# Patient Record
Sex: FEMALE | Race: WHITE | NOT HISPANIC OR LATINO | Employment: UNEMPLOYED | URBAN - NONMETROPOLITAN AREA
[De-identification: names, ages, dates, MRNs, and addresses within clinical notes are randomized per-mention and may not be internally consistent; named-entity substitution may affect disease eponyms.]

---

## 2019-10-30 ENCOUNTER — HOSPITAL ENCOUNTER (EMERGENCY)
Facility: HOSPITAL | Age: 29
Discharge: HOME | End: 2019-10-30
Attending: FAMILY MEDICINE | Admitting: FAMILY MEDICINE

## 2019-10-30 ENCOUNTER — APPOINTMENT (OUTPATIENT)
Dept: GENERAL RADIOLOGY | Facility: HOSPITAL | Age: 29
End: 2019-10-30

## 2019-10-30 VITALS
RESPIRATION RATE: 16 BRPM | OXYGEN SATURATION: 100 % | HEART RATE: 106 BPM | BODY MASS INDEX: 22.5 KG/M2 | WEIGHT: 140 LBS | SYSTOLIC BLOOD PRESSURE: 130 MMHG | DIASTOLIC BLOOD PRESSURE: 82 MMHG | HEIGHT: 66 IN | TEMPERATURE: 98.2 F

## 2019-10-30 DIAGNOSIS — L02.416 ABSCESS OF KNEE, LEFT: Primary | ICD-10-CM

## 2019-10-30 LAB
6-ACETYL MORPHINE: NEGATIVE
ALBUMIN SERPL-MCNC: 4.48 G/DL (ref 3.5–5.2)
ALBUMIN/GLOB SERPL: 1.3 G/DL
ALP SERPL-CCNC: 105 U/L (ref 39–117)
ALT SERPL W P-5'-P-CCNC: 93 U/L (ref 1–33)
AMORPH URATE CRY URNS QL MICRO: ABNORMAL /HPF
AMPHET+METHAMPHET UR QL: NEGATIVE
ANION GAP SERPL CALCULATED.3IONS-SCNC: 9.5 MMOL/L (ref 5–15)
AST SERPL-CCNC: 42 U/L (ref 1–32)
B-HCG UR QL: NEGATIVE
BACTERIA UR QL AUTO: ABNORMAL /HPF
BARBITURATES UR QL SCN: NEGATIVE
BASOPHILS # BLD AUTO: 0.07 10*3/MM3 (ref 0–0.2)
BASOPHILS NFR BLD AUTO: 0.8 % (ref 0–1.5)
BENZODIAZ UR QL SCN: NEGATIVE
BILIRUB SERPL-MCNC: 0.2 MG/DL (ref 0.2–1.2)
BILIRUB UR QL STRIP: NEGATIVE
BUN BLD-MCNC: 17 MG/DL (ref 6–20)
BUN/CREAT SERPL: 18.1 (ref 7–25)
BUPRENORPHINE SERPL-MCNC: NEGATIVE NG/ML
CALCIUM SPEC-SCNC: 9.9 MG/DL (ref 8.6–10.5)
CANNABINOIDS SERPL QL: NEGATIVE
CHLORIDE SERPL-SCNC: 101 MMOL/L (ref 98–107)
CLARITY UR: ABNORMAL
CO2 SERPL-SCNC: 28.5 MMOL/L (ref 22–29)
COCAINE UR QL: NEGATIVE
COLOR UR: YELLOW
CREAT BLD-MCNC: 0.94 MG/DL (ref 0.57–1)
CRP SERPL-MCNC: <0.03 MG/DL (ref 0–0.5)
DEPRECATED RDW RBC AUTO: 41.7 FL (ref 37–54)
EOSINOPHIL # BLD AUTO: 0.1 10*3/MM3 (ref 0–0.4)
EOSINOPHIL NFR BLD AUTO: 1.1 % (ref 0.3–6.2)
ERYTHROCYTE [DISTWIDTH] IN BLOOD BY AUTOMATED COUNT: 12.2 % (ref 12.3–15.4)
ERYTHROCYTE [SEDIMENTATION RATE] IN BLOOD: 3 MM/HR (ref 0–20)
GFR SERPL CREATININE-BSD FRML MDRD: 71 ML/MIN/1.73
GLOBULIN UR ELPH-MCNC: 3.4 GM/DL
GLUCOSE BLD-MCNC: 97 MG/DL (ref 65–99)
GLUCOSE UR STRIP-MCNC: NEGATIVE MG/DL
HCT VFR BLD AUTO: 44.5 % (ref 34–46.6)
HGB BLD-MCNC: 14.8 G/DL (ref 12–15.9)
HGB UR QL STRIP.AUTO: NEGATIVE
HOLD SPECIMEN: NORMAL
HOLD SPECIMEN: NORMAL
HYALINE CASTS UR QL AUTO: ABNORMAL /LPF
IMM GRANULOCYTES # BLD AUTO: 0.06 10*3/MM3 (ref 0–0.05)
IMM GRANULOCYTES NFR BLD AUTO: 0.7 % (ref 0–0.5)
KETONES UR QL STRIP: NEGATIVE
LEUKOCYTE ESTERASE UR QL STRIP.AUTO: ABNORMAL
LYMPHOCYTES # BLD AUTO: 3.03 10*3/MM3 (ref 0.7–3.1)
LYMPHOCYTES NFR BLD AUTO: 34.5 % (ref 19.6–45.3)
MCH RBC QN AUTO: 30.6 PG (ref 26.6–33)
MCHC RBC AUTO-ENTMCNC: 33.3 G/DL (ref 31.5–35.7)
MCV RBC AUTO: 92.1 FL (ref 79–97)
METHADONE UR QL SCN: NEGATIVE
MONOCYTES # BLD AUTO: 0.69 10*3/MM3 (ref 0.1–0.9)
MONOCYTES NFR BLD AUTO: 7.9 % (ref 5–12)
NEUTROPHILS # BLD AUTO: 4.82 10*3/MM3 (ref 1.7–7)
NEUTROPHILS NFR BLD AUTO: 55 % (ref 42.7–76)
NITRITE UR QL STRIP: NEGATIVE
NRBC BLD AUTO-RTO: 0 /100 WBC (ref 0–0.2)
OPIATES UR QL: NEGATIVE
OXYCODONE UR QL SCN: NEGATIVE
PCP UR QL SCN: NEGATIVE
PH UR STRIP.AUTO: 7 [PH] (ref 5–8)
PLATELET # BLD AUTO: 316 10*3/MM3 (ref 140–450)
PMV BLD AUTO: 10.3 FL (ref 6–12)
POTASSIUM BLD-SCNC: 4.7 MMOL/L (ref 3.5–5.2)
PROT SERPL-MCNC: 7.9 G/DL (ref 6–8.5)
PROT UR QL STRIP: NEGATIVE
RBC # BLD AUTO: 4.83 10*6/MM3 (ref 3.77–5.28)
RBC # UR: ABNORMAL /HPF
REF LAB TEST METHOD: ABNORMAL
SODIUM BLD-SCNC: 139 MMOL/L (ref 136–145)
SP GR UR STRIP: 1.02 (ref 1–1.03)
SQUAMOUS #/AREA URNS HPF: ABNORMAL /HPF
UROBILINOGEN UR QL STRIP: ABNORMAL
WBC NRBC COR # BLD: 8.77 10*3/MM3 (ref 3.4–10.8)
WBC UR QL AUTO: ABNORMAL /HPF
WHOLE BLOOD HOLD SPECIMEN: NORMAL
WHOLE BLOOD HOLD SPECIMEN: NORMAL

## 2019-10-30 PROCEDURE — 80307 DRUG TEST PRSMV CHEM ANLYZR: CPT | Performed by: FAMILY MEDICINE

## 2019-10-30 PROCEDURE — 87205 SMEAR GRAM STAIN: CPT | Performed by: FAMILY MEDICINE

## 2019-10-30 PROCEDURE — 86140 C-REACTIVE PROTEIN: CPT | Performed by: FAMILY MEDICINE

## 2019-10-30 PROCEDURE — 73562 X-RAY EXAM OF KNEE 3: CPT

## 2019-10-30 PROCEDURE — 85652 RBC SED RATE AUTOMATED: CPT | Performed by: FAMILY MEDICINE

## 2019-10-30 PROCEDURE — 81025 URINE PREGNANCY TEST: CPT | Performed by: FAMILY MEDICINE

## 2019-10-30 PROCEDURE — 87070 CULTURE OTHR SPECIMN AEROBIC: CPT | Performed by: FAMILY MEDICINE

## 2019-10-30 PROCEDURE — 87186 SC STD MICRODIL/AGAR DIL: CPT | Performed by: FAMILY MEDICINE

## 2019-10-30 PROCEDURE — 85025 COMPLETE CBC W/AUTO DIFF WBC: CPT | Performed by: FAMILY MEDICINE

## 2019-10-30 PROCEDURE — 99284 EMERGENCY DEPT VISIT MOD MDM: CPT

## 2019-10-30 PROCEDURE — 81001 URINALYSIS AUTO W/SCOPE: CPT | Performed by: FAMILY MEDICINE

## 2019-10-30 PROCEDURE — 87147 CULTURE TYPE IMMUNOLOGIC: CPT | Performed by: FAMILY MEDICINE

## 2019-10-30 PROCEDURE — 80053 COMPREHEN METABOLIC PANEL: CPT | Performed by: FAMILY MEDICINE

## 2019-10-30 RX ORDER — SODIUM CHLORIDE 0.9 % (FLUSH) 0.9 %
10 SYRINGE (ML) INJECTION AS NEEDED
Status: DISCONTINUED | OUTPATIENT
Start: 2019-10-30 | End: 2019-10-31 | Stop reason: HOSPADM

## 2019-10-30 RX ORDER — ACETAMINOPHEN 500 MG
1000 TABLET ORAL ONCE
Status: COMPLETED | OUTPATIENT
Start: 2019-10-30 | End: 2019-10-30

## 2019-10-30 RX ORDER — SULFAMETHOXAZOLE AND TRIMETHOPRIM 800; 160 MG/1; MG/1
1 TABLET ORAL ONCE
Status: COMPLETED | OUTPATIENT
Start: 2019-10-30 | End: 2019-10-30

## 2019-10-30 RX ORDER — DEXTROSE MONOHYDRATE 50 MG/ML
50 INJECTION, SOLUTION INTRAVENOUS ONCE
Status: DISCONTINUED | OUTPATIENT
Start: 2019-10-30 | End: 2019-10-31 | Stop reason: HOSPADM

## 2019-10-30 RX ORDER — SULFAMETHOXAZOLE AND TRIMETHOPRIM 800; 160 MG/1; MG/1
1 TABLET ORAL 2 TIMES DAILY
Qty: 14 TABLET | Refills: 0 | Status: SHIPPED | OUTPATIENT
Start: 2019-10-30 | End: 2019-11-06

## 2019-10-30 RX ADMIN — ACETAMINOPHEN 1000 MG: 500 TABLET ORAL at 21:59

## 2019-10-30 RX ADMIN — SODIUM CHLORIDE 1000 ML: 9 INJECTION, SOLUTION INTRAVENOUS at 21:50

## 2019-10-30 RX ADMIN — SULFAMETHOXAZOLE AND TRIMETHOPRIM 160 MG: 800; 160 TABLET ORAL at 22:38

## 2019-11-03 LAB
BACTERIA SPEC AEROBE CULT: ABNORMAL
GRAM STN SPEC: ABNORMAL

## 2021-06-07 ENCOUNTER — HOSPITAL ENCOUNTER (EMERGENCY)
Facility: HOSPITAL | Age: 31
Discharge: LEFT BEFORE DISCHARGE | End: 2021-06-07
Attending: EMERGENCY MEDICINE

## 2021-06-07 VITALS
WEIGHT: 120 LBS | TEMPERATURE: 98 F | HEART RATE: 121 BPM | SYSTOLIC BLOOD PRESSURE: 108 MMHG | DIASTOLIC BLOOD PRESSURE: 68 MMHG | BODY MASS INDEX: 19.29 KG/M2 | HEIGHT: 66 IN | OXYGEN SATURATION: 99 % | RESPIRATION RATE: 20 BRPM

## 2021-06-07 PROCEDURE — 99211 OFF/OP EST MAY X REQ PHY/QHP: CPT | Performed by: EMERGENCY MEDICINE

## 2021-06-08 NOTE — ED NOTES
Pt called for room assignment at this time, no answer. Will continue to watch for pt     Annalisa Cain RN  06/07/21 5776

## 2021-06-08 NOTE — ED NOTES
Pt called for room assignment again at this time. No answer.      Annalisa Cain RN  06/07/21 0144

## 2021-06-11 ENCOUNTER — HOSPITAL ENCOUNTER (EMERGENCY)
Facility: HOSPITAL | Age: 31
Discharge: HOME | End: 2021-06-11
Attending: STUDENT IN AN ORGANIZED HEALTH CARE EDUCATION/TRAINING PROGRAM | Admitting: STUDENT IN AN ORGANIZED HEALTH CARE EDUCATION/TRAINING PROGRAM

## 2021-06-11 DIAGNOSIS — M54.32 SCIATICA OF LEFT SIDE: Primary | ICD-10-CM

## 2021-06-11 PROCEDURE — 63710000001 PROMETHAZINE PER 25 MG: Performed by: NURSE PRACTITIONER

## 2021-06-11 PROCEDURE — 99283 EMERGENCY DEPT VISIT LOW MDM: CPT

## 2021-06-11 PROCEDURE — 96372 THER/PROPH/DIAG INJ SC/IM: CPT

## 2021-06-11 PROCEDURE — 25010000002 HYDROMORPHONE 1 MG/ML SOLUTION: Performed by: NURSE PRACTITIONER

## 2021-06-11 RX ORDER — METHYLPREDNISOLONE ACETATE 80 MG/ML
60 INJECTION, SUSPENSION INTRA-ARTICULAR; INTRALESIONAL; INTRAMUSCULAR; SOFT TISSUE ONCE
Status: DISCONTINUED | OUTPATIENT
Start: 2021-06-11 | End: 2021-06-11 | Stop reason: HOSPADM

## 2021-06-11 RX ORDER — PROMETHAZINE HYDROCHLORIDE 25 MG/1
25 TABLET ORAL ONCE
Status: COMPLETED | OUTPATIENT
Start: 2021-06-11 | End: 2021-06-11

## 2021-06-11 RX ORDER — HYDROCODONE BITARTRATE AND ACETAMINOPHEN 5; 325 MG/1; MG/1
1 TABLET ORAL EVERY 6 HOURS PRN
Qty: 10 TABLET | Refills: 0 | Status: ON HOLD | OUTPATIENT
Start: 2021-06-11 | End: 2021-06-18

## 2021-06-11 RX ADMIN — HYDROMORPHONE HYDROCHLORIDE 1 MG: 1 INJECTION, SOLUTION INTRAMUSCULAR; INTRAVENOUS; SUBCUTANEOUS at 14:38

## 2021-06-11 RX ADMIN — PROMETHAZINE HYDROCHLORIDE 25 MG: 25 TABLET ORAL at 14:38

## 2021-06-11 NOTE — DISCHARGE INSTRUCTIONS
Call one of the offices below to establish a primary care provider.  If you are unable to get an appointment and feel it is an emergency and need to be seen immediately please return to the Emergency Department.    Call one of the office below to set up a primary care provider.    Dr. Spike Acuna                                                                                                       [address] [facility] [address]  [phone]    Dr. Parham, Dr. SEBASTIÁN Jackson, Dr. SANJEVE Jackson ([facility])  [address] [facility] [address]  [phone]    Dr. Hudson, Dr. Gustafson, Dr. Osorio ([facility])  [address] [facility] [address]  [phone]    Dr. Chen  [address] [facility] [address]  [phone]    Dr. Roberson, Dr. Costello, Dr. Pruett, Dr. Farrell ([facility])  [address] [place] [address] [address]ER[address]A [address]  [facility] [address]  [phone]    Dr. Sanna Amaro  [address] [facility] [place] [address]  [phone]    Dr. Roxy eBrgman  [address]   [address]  [facility] [address]  [phone]    Dr. Acuna  [address] [facility] [address]  [phone]    Dr. Steele, Dr. Hays  [address] [facility] [address]  [phone]    Dr. Babin  [address]                                                              BER[address]  [facility] [address]  [phone]    Dr. Finney  [address] [facility] [address]  [phone]    Dr. Miriam Leonard  [address]  [address]  [place] [address]  [phone]    Dr. Elizondo and [facility]   [address] [facility]  [address]  [place] [address]  [phone]               Follow up with your primary care provider in 2-3 days.    Return to the emergency room for worsening symptoms.

## 2021-06-11 NOTE — ED PROVIDER NOTES
Subjective     Back Pain  Location:  Lumbar spine  Quality:  Aching  Radiates to:  Does not radiate  Pain severity:  Moderate  Pain is:  Same all the time  Onset quality:  Sudden  Duration:  1 day  Timing:  Constant  Progression:  Waxing and waning  Chronicity:  New  Context: twisting    Context: not emotional stress, not jumping from heights, not MCA and not MVA    Relieved by:  Nothing  Worsened by:  Nothing  Ineffective treatments:  None tried  Associated symptoms: no abdominal swelling, no bowel incontinence, no leg pain, no perianal numbness, no tingling and no weight loss    Risk factors: no hx of cancer, no lack of exercise and not obese        Review of Systems   Constitutional: Negative.  Negative for weight loss.   HENT: Negative.    Eyes: Negative.    Respiratory: Negative.    Cardiovascular: Negative.    Gastrointestinal: Negative.  Negative for bowel incontinence.   Endocrine: Negative.    Genitourinary: Negative.    Musculoskeletal: Positive for back pain.   Skin: Negative.    Allergic/Immunologic: Negative.    Neurological: Negative.  Negative for tingling.   Hematological: Negative.    Psychiatric/Behavioral: Negative.        No past medical history on file.    No Known Allergies    No past surgical history on file.    No family history on file.    Social History     Socioeconomic History   • Marital status: Single     Spouse name: Not on file   • Number of children: Not on file   • Years of education: Not on file   • Highest education level: Not on file           Objective   Physical Exam  Vitals and nursing note reviewed.   Constitutional:       Appearance: She is well-developed.   HENT:      Head: Normocephalic.      Right Ear: External ear normal.      Left Ear: External ear normal.   Eyes:      Conjunctiva/sclera: Conjunctivae normal.      Pupils: Pupils are equal, round, and reactive to light.   Cardiovascular:      Rate and Rhythm: Normal rate and regular rhythm.      Heart sounds: Normal  heart sounds.   Pulmonary:      Effort: Pulmonary effort is normal.      Breath sounds: Normal breath sounds.   Abdominal:      General: Bowel sounds are normal.      Palpations: Abdomen is soft.   Musculoskeletal:         General: Normal range of motion.      Cervical back: Normal range of motion and neck supple.   Skin:     General: Skin is warm and dry.      Capillary Refill: Capillary refill takes less than 2 seconds.   Neurological:      Mental Status: She is alert and oriented to person, place, and time.   Psychiatric:         Behavior: Behavior normal.         Thought Content: Thought content normal.         Procedures           ED Course                                           MDM    Final diagnoses:   Sciatica of left side       ED Disposition  ED Disposition     ED Disposition Condition Comment    Discharge Stable           Provider, No Known  [facility] [address]  [phone]               Medication List      New Prescriptions    HYDROcodone-acetaminophen 5-325 MG per tablet  Commonly known as: NORCO  Take 1 tablet by mouth Every 6 (Six) Hours As Needed for Moderate Pain .           Where to Get Your Medications      These medications were sent to [facility] [address] [place] [address] [address] [facility] - [phone] [place] [id number] [address] [address]    Phone: [phone]   · HYDROcodone-acetaminophen 5-325 MG per tablet          Geraldo Ogden, APRN  06/11/21 1909

## 2021-06-17 ENCOUNTER — HOSPITAL ENCOUNTER (INPATIENT)
Facility: HOSPITAL | Age: 31
LOS: 35 days | Discharge: HOME | End: 2021-07-23
Attending: FAMILY MEDICINE | Admitting: INTERNAL MEDICINE

## 2021-06-17 ENCOUNTER — APPOINTMENT (OUTPATIENT)
Dept: GENERAL RADIOLOGY | Facility: HOSPITAL | Age: 31
End: 2021-06-17

## 2021-06-17 DIAGNOSIS — U07.1 COVID-19: ICD-10-CM

## 2021-06-17 DIAGNOSIS — A41.9 SEPSIS, DUE TO UNSPECIFIED ORGANISM, UNSPECIFIED WHETHER ACUTE ORGAN DYSFUNCTION PRESENT (HCC): ICD-10-CM

## 2021-06-17 DIAGNOSIS — I33.0 ACUTE BACTERIAL ENDOCARDITIS: Primary | ICD-10-CM

## 2021-06-17 LAB
6-ACETYL MORPHINE: NEGATIVE
ALBUMIN SERPL-MCNC: 2.37 G/DL (ref 3.5–5.2)
ALBUMIN/GLOB SERPL: 0.5 G/DL
ALP SERPL-CCNC: 304 U/L (ref 39–117)
ALT SERPL W P-5'-P-CCNC: 179 U/L (ref 1–33)
AMPHET+METHAMPHET UR QL: POSITIVE
ANION GAP SERPL CALCULATED.3IONS-SCNC: 11.9 MMOL/L (ref 5–15)
AST SERPL-CCNC: 37 U/L (ref 1–32)
B-HCG UR QL: NEGATIVE
BACTERIA UR QL AUTO: ABNORMAL /HPF
BARBITURATES UR QL SCN: NEGATIVE
BASOPHILS # BLD AUTO: 0.09 10*3/MM3 (ref 0–0.2)
BASOPHILS NFR BLD AUTO: 0.6 % (ref 0–1.5)
BENZODIAZ UR QL SCN: NEGATIVE
BILIRUB SERPL-MCNC: 1 MG/DL (ref 0–1.2)
BILIRUB UR QL STRIP: NEGATIVE
BUN SERPL-MCNC: 24 MG/DL (ref 6–20)
BUN/CREAT SERPL: 24 (ref 7–25)
BUPRENORPHINE SERPL-MCNC: NEGATIVE NG/ML
CALCIUM SPEC-SCNC: 8.2 MG/DL (ref 8.6–10.5)
CANNABINOIDS SERPL QL: NEGATIVE
CHLORIDE SERPL-SCNC: 100 MMOL/L (ref 98–107)
CK SERPL-CCNC: 54 U/L (ref 20–180)
CLARITY UR: ABNORMAL
CO2 SERPL-SCNC: 24.1 MMOL/L (ref 22–29)
COCAINE UR QL: NEGATIVE
COLOR UR: ABNORMAL
CREAT SERPL-MCNC: 1 MG/DL (ref 0.57–1)
CRP SERPL-MCNC: 29.71 MG/DL (ref 0–0.5)
D-LACTATE SERPL-SCNC: 2.4 MMOL/L (ref 0.5–2)
DEPRECATED RDW RBC AUTO: 41.5 FL (ref 37–54)
EOSINOPHIL # BLD AUTO: 0 10*3/MM3 (ref 0–0.4)
EOSINOPHIL NFR BLD AUTO: 0 % (ref 0.3–6.2)
ERYTHROCYTE [DISTWIDTH] IN BLOOD BY AUTOMATED COUNT: 13.1 % (ref 12.3–15.4)
ETHANOL BLD-MCNC: <10 MG/DL (ref 0–10)
ETHANOL UR QL: <0.01 %
GFR SERPL CREATININE-BSD FRML MDRD: 65 ML/MIN/1.73
GLOBULIN UR ELPH-MCNC: 4.3 GM/DL
GLUCOSE SERPL-MCNC: 127 MG/DL (ref 65–99)
GLUCOSE UR STRIP-MCNC: NEGATIVE MG/DL
HCT VFR BLD AUTO: 34.7 % (ref 34–46.6)
HGB BLD-MCNC: 11.7 G/DL (ref 12–15.9)
HGB UR QL STRIP.AUTO: ABNORMAL
HYALINE CASTS UR QL AUTO: ABNORMAL /LPF
IMM GRANULOCYTES # BLD AUTO: 0.19 10*3/MM3 (ref 0–0.05)
IMM GRANULOCYTES NFR BLD AUTO: 1.2 % (ref 0–0.5)
KETONES UR QL STRIP: NEGATIVE
LEUKOCYTE ESTERASE UR QL STRIP.AUTO: ABNORMAL
LYMPHOCYTES # BLD AUTO: 1.24 10*3/MM3 (ref 0.7–3.1)
LYMPHOCYTES NFR BLD AUTO: 7.7 % (ref 19.6–45.3)
MCH RBC QN AUTO: 29.3 PG (ref 26.6–33)
MCHC RBC AUTO-ENTMCNC: 33.7 G/DL (ref 31.5–35.7)
MCV RBC AUTO: 87 FL (ref 79–97)
METHADONE UR QL SCN: NEGATIVE
MONOCYTES # BLD AUTO: 1.21 10*3/MM3 (ref 0.1–0.9)
MONOCYTES NFR BLD AUTO: 7.5 % (ref 5–12)
NEUTROPHILS NFR BLD AUTO: 13.36 10*3/MM3 (ref 1.7–7)
NEUTROPHILS NFR BLD AUTO: 83 % (ref 42.7–76)
NITRITE UR QL STRIP: POSITIVE
NRBC BLD AUTO-RTO: 0 /100 WBC (ref 0–0.2)
NT-PROBNP SERPL-MCNC: 509.8 PG/ML (ref 0–450)
OPIATES UR QL: POSITIVE
OXYCODONE UR QL SCN: NEGATIVE
PCP UR QL SCN: NEGATIVE
PH UR STRIP.AUTO: 6 [PH] (ref 5–8)
PLATELET # BLD AUTO: 105 10*3/MM3 (ref 140–450)
PMV BLD AUTO: 11.1 FL (ref 6–12)
POTASSIUM SERPL-SCNC: 2.8 MMOL/L (ref 3.5–5.2)
PROT SERPL-MCNC: 6.7 G/DL (ref 6–8.5)
PROT UR QL STRIP: ABNORMAL
RBC # BLD AUTO: 3.99 10*6/MM3 (ref 3.77–5.28)
RBC # UR: ABNORMAL /HPF
REF LAB TEST METHOD: ABNORMAL
SODIUM SERPL-SCNC: 136 MMOL/L (ref 136–145)
SP GR UR STRIP: 1.01 (ref 1–1.03)
SQUAMOUS #/AREA URNS HPF: ABNORMAL /HPF
TROPONIN T SERPL-MCNC: <0.01 NG/ML (ref 0–0.03)
TSH SERPL DL<=0.05 MIU/L-ACNC: 0.72 UIU/ML (ref 0.27–4.2)
UROBILINOGEN UR QL STRIP: ABNORMAL
WBC # BLD AUTO: 16.09 10*3/MM3 (ref 3.4–10.8)
WBC UR QL AUTO: ABNORMAL /HPF

## 2021-06-17 PROCEDURE — 83605 ASSAY OF LACTIC ACID: CPT | Performed by: FAMILY MEDICINE

## 2021-06-17 PROCEDURE — 80307 DRUG TEST PRSMV CHEM ANLYZR: CPT | Performed by: FAMILY MEDICINE

## 2021-06-17 PROCEDURE — 87086 URINE CULTURE/COLONY COUNT: CPT | Performed by: FAMILY MEDICINE

## 2021-06-17 PROCEDURE — 93010 ELECTROCARDIOGRAM REPORT: CPT | Performed by: SPECIALIST

## 2021-06-17 PROCEDURE — 81001 URINALYSIS AUTO W/SCOPE: CPT | Performed by: FAMILY MEDICINE

## 2021-06-17 PROCEDURE — 80053 COMPREHEN METABOLIC PANEL: CPT | Performed by: FAMILY MEDICINE

## 2021-06-17 PROCEDURE — 99284 EMERGENCY DEPT VISIT MOD MDM: CPT

## 2021-06-17 PROCEDURE — 25010000002 PIPERACILLIN SOD-TAZOBACTAM PER 1 G: Performed by: FAMILY MEDICINE

## 2021-06-17 PROCEDURE — 85025 COMPLETE CBC W/AUTO DIFF WBC: CPT | Performed by: FAMILY MEDICINE

## 2021-06-17 PROCEDURE — 86140 C-REACTIVE PROTEIN: CPT | Performed by: FAMILY MEDICINE

## 2021-06-17 PROCEDURE — 82077 ASSAY SPEC XCP UR&BREATH IA: CPT | Performed by: FAMILY MEDICINE

## 2021-06-17 PROCEDURE — 93005 ELECTROCARDIOGRAM TRACING: CPT | Performed by: FAMILY MEDICINE

## 2021-06-17 PROCEDURE — 87040 BLOOD CULTURE FOR BACTERIA: CPT | Performed by: FAMILY MEDICINE

## 2021-06-17 PROCEDURE — 83735 ASSAY OF MAGNESIUM: CPT | Performed by: FAMILY MEDICINE

## 2021-06-17 PROCEDURE — 36415 COLL VENOUS BLD VENIPUNCTURE: CPT

## 2021-06-17 PROCEDURE — 71045 X-RAY EXAM CHEST 1 VIEW: CPT

## 2021-06-17 PROCEDURE — 87150 DNA/RNA AMPLIFIED PROBE: CPT | Performed by: FAMILY MEDICINE

## 2021-06-17 PROCEDURE — 83880 ASSAY OF NATRIURETIC PEPTIDE: CPT | Performed by: FAMILY MEDICINE

## 2021-06-17 PROCEDURE — 87147 CULTURE TYPE IMMUNOLOGIC: CPT | Performed by: FAMILY MEDICINE

## 2021-06-17 PROCEDURE — 87186 SC STD MICRODIL/AGAR DIL: CPT | Performed by: FAMILY MEDICINE

## 2021-06-17 PROCEDURE — 81025 URINE PREGNANCY TEST: CPT | Performed by: FAMILY MEDICINE

## 2021-06-17 PROCEDURE — 83036 HEMOGLOBIN GLYCOSYLATED A1C: CPT | Performed by: INTERNAL MEDICINE

## 2021-06-17 PROCEDURE — 84443 ASSAY THYROID STIM HORMONE: CPT | Performed by: FAMILY MEDICINE

## 2021-06-17 PROCEDURE — 87077 CULTURE AEROBIC IDENTIFY: CPT | Performed by: FAMILY MEDICINE

## 2021-06-17 PROCEDURE — 84484 ASSAY OF TROPONIN QUANT: CPT | Performed by: FAMILY MEDICINE

## 2021-06-17 PROCEDURE — 82550 ASSAY OF CK (CPK): CPT | Performed by: FAMILY MEDICINE

## 2021-06-17 RX ORDER — SODIUM CHLORIDE 0.9 % (FLUSH) 0.9 %
10 SYRINGE (ML) INJECTION AS NEEDED
Status: DISCONTINUED | OUTPATIENT
Start: 2021-06-17 | End: 2021-07-23 | Stop reason: HOSPADM

## 2021-06-17 RX ADMIN — SODIUM CHLORIDE 1000 ML: 9 INJECTION, SOLUTION INTRAVENOUS at 23:30

## 2021-06-17 RX ADMIN — SODIUM CHLORIDE 1000 ML: 9 INJECTION, SOLUTION INTRAVENOUS at 23:04

## 2021-06-17 RX ADMIN — PIPERACILLIN SODIUM AND TAZOBACTAM SODIUM 3.38 G: 3; .375 INJECTION, POWDER, LYOPHILIZED, FOR SOLUTION INTRAVENOUS at 23:30

## 2021-06-18 ENCOUNTER — APPOINTMENT (OUTPATIENT)
Dept: CARDIOLOGY | Facility: HOSPITAL | Age: 31
End: 2021-06-18

## 2021-06-18 ENCOUNTER — APPOINTMENT (OUTPATIENT)
Dept: GENERAL RADIOLOGY | Facility: HOSPITAL | Age: 31
End: 2021-06-18

## 2021-06-18 ENCOUNTER — APPOINTMENT (OUTPATIENT)
Dept: ULTRASOUND IMAGING | Facility: HOSPITAL | Age: 31
End: 2021-06-18

## 2021-06-18 ENCOUNTER — APPOINTMENT (OUTPATIENT)
Dept: CT IMAGING | Facility: HOSPITAL | Age: 31
End: 2021-06-18

## 2021-06-18 PROBLEM — I38 ENDOCARDITIS: Status: ACTIVE | Noted: 2021-06-18

## 2021-06-18 LAB
BACTERIA BLD CULT: ABNORMAL
BH CV ECHO MEAS - ACS: 1.6 CM
BH CV ECHO MEAS - AO MAX PG: 8.4 MMHG
BH CV ECHO MEAS - AO MEAN PG: 6 MMHG
BH CV ECHO MEAS - AO ROOT AREA (BSA CORRECTED): 1.8
BH CV ECHO MEAS - AO ROOT AREA: 7.3 CM^2
BH CV ECHO MEAS - AO ROOT DIAM: 3.1 CM
BH CV ECHO MEAS - AO V2 MAX: 145 CM/SEC
BH CV ECHO MEAS - AO V2 MEAN: 116 CM/SEC
BH CV ECHO MEAS - AO V2 VTI: 22.9 CM
BH CV ECHO MEAS - BSA(HAYCOCK): 1.6 M^2
BH CV ECHO MEAS - BSA: 1.7 M^2
BH CV ECHO MEAS - BZI_BMI: 20.8 KILOGRAMS/M^2
BH CV ECHO MEAS - BZI_METRIC_HEIGHT: 167.6 CM
BH CV ECHO MEAS - BZI_METRIC_WEIGHT: 58.5 KG
BH CV ECHO MEAS - EDV(CUBED): 79.5 ML
BH CV ECHO MEAS - EDV(MOD-SP4): 50.6 ML
BH CV ECHO MEAS - EDV(TEICH): 83.1 ML
BH CV ECHO MEAS - EF(CUBED): 65.7 %
BH CV ECHO MEAS - EF(MOD-SP4): 72.7 %
BH CV ECHO MEAS - EF(TEICH): 57.5 %
BH CV ECHO MEAS - ESV(CUBED): 27.3 ML
BH CV ECHO MEAS - ESV(MOD-SP4): 13.8 ML
BH CV ECHO MEAS - ESV(TEICH): 35.3 ML
BH CV ECHO MEAS - FS: 30 %
BH CV ECHO MEAS - IVS/LVPW: 0.94
BH CV ECHO MEAS - IVSD: 0.72 CM
BH CV ECHO MEAS - LA DIMENSION: 2.1 CM
BH CV ECHO MEAS - LA/AO: 0.69
BH CV ECHO MEAS - LV DIASTOLIC VOL/BSA (35-75): 30.5 ML/M^2
BH CV ECHO MEAS - LV MASS(C)D: 96.2 GRAMS
BH CV ECHO MEAS - LV MASS(C)DI: 57.9 GRAMS/M^2
BH CV ECHO MEAS - LV SYSTOLIC VOL/BSA (12-30): 8.3 ML/M^2
BH CV ECHO MEAS - LVIDD: 4.3 CM
BH CV ECHO MEAS - LVIDS: 3 CM
BH CV ECHO MEAS - LVLD AP4: 7.3 CM
BH CV ECHO MEAS - LVLS AP4: 5.9 CM
BH CV ECHO MEAS - LVOT AREA (M): 2.8 CM^2
BH CV ECHO MEAS - LVOT AREA: 2.8 CM^2
BH CV ECHO MEAS - LVOT DIAM: 1.9 CM
BH CV ECHO MEAS - LVPWD: 0.77 CM
BH CV ECHO MEAS - MV A MAX VEL: 96.8 CM/SEC
BH CV ECHO MEAS - MV E MAX VEL: 90.8 CM/SEC
BH CV ECHO MEAS - MV E/A: 0.94
BH CV ECHO MEAS - PA ACC TIME: 0.11 SEC
BH CV ECHO MEAS - PA PR(ACCEL): 30 MMHG
BH CV ECHO MEAS - RAP SYSTOLE: 10 MMHG
BH CV ECHO MEAS - RVSP: 29.5 MMHG
BH CV ECHO MEAS - SI(AO): 100.8 ML/M^2
BH CV ECHO MEAS - SI(CUBED): 31.5 ML/M^2
BH CV ECHO MEAS - SI(MOD-SP4): 22.2 ML/M^2
BH CV ECHO MEAS - SI(TEICH): 28.8 ML/M^2
BH CV ECHO MEAS - SV(AO): 167.3 ML
BH CV ECHO MEAS - SV(CUBED): 52.2 ML
BH CV ECHO MEAS - SV(MOD-SP4): 36.8 ML
BH CV ECHO MEAS - SV(TEICH): 47.8 ML
BH CV ECHO MEAS - TR MAX VEL: 221 CM/SEC
BOTTLE TYPE: ABNORMAL
D DIMER PPP FEU-MCNC: 7.66 MCGFEU/ML (ref 0–0.5)
D-LACTATE SERPL-SCNC: 1.3 MMOL/L (ref 0.5–2)
FLUAV RNA RESP QL NAA+PROBE: NOT DETECTED
FLUBV RNA RESP QL NAA+PROBE: NOT DETECTED
GGT SERPL-CCNC: 242 U/L (ref 5–36)
HAV IGM SERPL QL IA: ABNORMAL
HBA1C MFR BLD: 5.6 % (ref 4.8–5.6)
HBV CORE IGM SERPL QL IA: ABNORMAL
HBV SURFACE AG SERPL QL IA: ABNORMAL
HCV AB SER DONR QL: REACTIVE
HIV1+2 AB SER QL: NORMAL
M PNEUMO IGM SER QL: NEGATIVE
MAGNESIUM SERPL-MCNC: 2 MG/DL (ref 1.6–2.6)
MAGNESIUM SERPL-MCNC: 2.1 MG/DL (ref 1.6–2.6)
MAXIMAL PREDICTED HEART RATE: 190 BPM
PHOSPHATE SERPL-MCNC: 2.9 MG/DL (ref 2.5–4.5)
POTASSIUM SERPL-SCNC: 3.1 MMOL/L (ref 3.5–5.2)
S PNEUM AG SPEC QL LA: NEGATIVE
SARS-COV-2 RNA RESP QL NAA+PROBE: DETECTED
STRESS TARGET HR: 162 BPM
TROPONIN T SERPL-MCNC: <0.01 NG/ML (ref 0–0.03)

## 2021-06-18 PROCEDURE — 83735 ASSAY OF MAGNESIUM: CPT | Performed by: INTERNAL MEDICINE

## 2021-06-18 PROCEDURE — 73560 X-RAY EXAM OF KNEE 1 OR 2: CPT

## 2021-06-18 PROCEDURE — 25010000002 VANCOMYCIN: Performed by: INTERNAL MEDICINE

## 2021-06-18 PROCEDURE — 73502 X-RAY EXAM HIP UNI 2-3 VIEWS: CPT | Performed by: RADIOLOGY

## 2021-06-18 PROCEDURE — 25010000002 VANCOMYCIN 5 G RECONSTITUTED SOLUTION: Performed by: INTERNAL MEDICINE

## 2021-06-18 PROCEDURE — 84100 ASSAY OF PHOSPHORUS: CPT | Performed by: INTERNAL MEDICINE

## 2021-06-18 PROCEDURE — G0432 EIA HIV-1/HIV-2 SCREEN: HCPCS | Performed by: INTERNAL MEDICINE

## 2021-06-18 PROCEDURE — 73590 X-RAY EXAM OF LOWER LEG: CPT

## 2021-06-18 PROCEDURE — 73502 X-RAY EXAM HIP UNI 2-3 VIEWS: CPT

## 2021-06-18 PROCEDURE — 87899 AGENT NOS ASSAY W/OPTIC: CPT | Performed by: INTERNAL MEDICINE

## 2021-06-18 PROCEDURE — 85379 FIBRIN DEGRADATION QUANT: CPT | Performed by: FAMILY MEDICINE

## 2021-06-18 PROCEDURE — 93971 EXTREMITY STUDY: CPT

## 2021-06-18 PROCEDURE — 84484 ASSAY OF TROPONIN QUANT: CPT | Performed by: FAMILY MEDICINE

## 2021-06-18 PROCEDURE — 25010000002 MORPHINE PER 10 MG: Performed by: INTERNAL MEDICINE

## 2021-06-18 PROCEDURE — 0 IOPAMIDOL PER 1 ML: Performed by: FAMILY MEDICINE

## 2021-06-18 PROCEDURE — 73620 X-RAY EXAM OF FOOT: CPT

## 2021-06-18 PROCEDURE — 94799 UNLISTED PULMONARY SVC/PX: CPT

## 2021-06-18 PROCEDURE — 73060 X-RAY EXAM OF HUMERUS: CPT | Performed by: RADIOLOGY

## 2021-06-18 PROCEDURE — 84132 ASSAY OF SERUM POTASSIUM: CPT | Performed by: INTERNAL MEDICINE

## 2021-06-18 PROCEDURE — 87636 SARSCOV2 & INF A&B AMP PRB: CPT | Performed by: FAMILY MEDICINE

## 2021-06-18 PROCEDURE — 73590 X-RAY EXAM OF LOWER LEG: CPT | Performed by: RADIOLOGY

## 2021-06-18 PROCEDURE — 83605 ASSAY OF LACTIC ACID: CPT | Performed by: FAMILY MEDICINE

## 2021-06-18 PROCEDURE — 82977 ASSAY OF GGT: CPT | Performed by: INTERNAL MEDICINE

## 2021-06-18 PROCEDURE — 73090 X-RAY EXAM OF FOREARM: CPT | Performed by: RADIOLOGY

## 2021-06-18 PROCEDURE — 73090 X-RAY EXAM OF FOREARM: CPT

## 2021-06-18 PROCEDURE — 80074 ACUTE HEPATITIS PANEL: CPT | Performed by: INTERNAL MEDICINE

## 2021-06-18 PROCEDURE — 73560 X-RAY EXAM OF KNEE 1 OR 2: CPT | Performed by: RADIOLOGY

## 2021-06-18 PROCEDURE — 25010000002 VANCOMYCIN 1 G RECONSTITUTED SOLUTION: Performed by: FAMILY MEDICINE

## 2021-06-18 PROCEDURE — 71275 CT ANGIOGRAPHY CHEST: CPT

## 2021-06-18 PROCEDURE — 73060 X-RAY EXAM OF HUMERUS: CPT

## 2021-06-18 PROCEDURE — 25010000002 ENOXAPARIN PER 10 MG: Performed by: INTERNAL MEDICINE

## 2021-06-18 PROCEDURE — 25010000002 PIPERACILLIN SOD-TAZOBACTAM PER 1 G: Performed by: INTERNAL MEDICINE

## 2021-06-18 PROCEDURE — 99223 1ST HOSP IP/OBS HIGH 75: CPT | Performed by: INTERNAL MEDICINE

## 2021-06-18 PROCEDURE — 93306 TTE W/DOPPLER COMPLETE: CPT | Performed by: SPECIALIST

## 2021-06-18 PROCEDURE — 86738 MYCOPLASMA ANTIBODY: CPT | Performed by: INTERNAL MEDICINE

## 2021-06-18 PROCEDURE — 70450 CT HEAD/BRAIN W/O DYE: CPT

## 2021-06-18 PROCEDURE — 93306 TTE W/DOPPLER COMPLETE: CPT

## 2021-06-18 RX ORDER — DEXTROSE MONOHYDRATE 25 G/50ML
25 INJECTION, SOLUTION INTRAVENOUS
Status: DISCONTINUED | OUTPATIENT
Start: 2021-06-18 | End: 2021-06-18

## 2021-06-18 RX ORDER — MAGNESIUM SULFATE HEPTAHYDRATE 40 MG/ML
2 INJECTION, SOLUTION INTRAVENOUS AS NEEDED
Status: DISCONTINUED | OUTPATIENT
Start: 2021-06-18 | End: 2021-06-26

## 2021-06-18 RX ORDER — POTASSIUM CHLORIDE 20 MEQ/1
40 TABLET, EXTENDED RELEASE ORAL EVERY 4 HOURS
Status: COMPLETED | OUTPATIENT
Start: 2021-06-18 | End: 2021-06-19

## 2021-06-18 RX ORDER — POTASSIUM CHLORIDE 750 MG/1
40 CAPSULE, EXTENDED RELEASE ORAL AS NEEDED
Status: DISCONTINUED | OUTPATIENT
Start: 2021-06-18 | End: 2021-06-26

## 2021-06-18 RX ORDER — POTASSIUM CHLORIDE 1.5 G/1.77G
40 POWDER, FOR SOLUTION ORAL AS NEEDED
Status: DISCONTINUED | OUTPATIENT
Start: 2021-06-18 | End: 2021-06-26

## 2021-06-18 RX ORDER — NICOTINE POLACRILEX 4 MG
15 LOZENGE BUCCAL
Status: DISCONTINUED | OUTPATIENT
Start: 2021-06-18 | End: 2021-06-18

## 2021-06-18 RX ORDER — SODIUM CHLORIDE 0.9 % (FLUSH) 0.9 %
3 SYRINGE (ML) INJECTION EVERY 12 HOURS SCHEDULED
Status: DISCONTINUED | OUTPATIENT
Start: 2021-06-18 | End: 2021-07-23 | Stop reason: HOSPADM

## 2021-06-18 RX ORDER — MAGNESIUM SULFATE HEPTAHYDRATE 40 MG/ML
4 INJECTION, SOLUTION INTRAVENOUS AS NEEDED
Status: DISCONTINUED | OUTPATIENT
Start: 2021-06-18 | End: 2021-06-26

## 2021-06-18 RX ORDER — L.ACID,PARA/B.BIFIDUM/S.THERM 8B CELL
1 CAPSULE ORAL DAILY
Status: DISCONTINUED | OUTPATIENT
Start: 2021-06-18 | End: 2021-07-23 | Stop reason: HOSPADM

## 2021-06-18 RX ORDER — HYDROCODONE BITARTRATE AND ACETAMINOPHEN 7.5; 325 MG/1; MG/1
1 TABLET ORAL EVERY 6 HOURS PRN
Status: DISPENSED | OUTPATIENT
Start: 2021-06-18 | End: 2021-06-25

## 2021-06-18 RX ORDER — POTASSIUM CHLORIDE 1.5 G/1.77G
40 POWDER, FOR SOLUTION ORAL ONCE
Status: COMPLETED | OUTPATIENT
Start: 2021-06-18 | End: 2021-06-18

## 2021-06-18 RX ORDER — NITROGLYCERIN 0.4 MG/1
0.4 TABLET SUBLINGUAL
Status: DISCONTINUED | OUTPATIENT
Start: 2021-06-18 | End: 2021-06-18

## 2021-06-18 RX ORDER — SODIUM CHLORIDE 0.9 % (FLUSH) 0.9 %
3-10 SYRINGE (ML) INJECTION AS NEEDED
Status: DISCONTINUED | OUTPATIENT
Start: 2021-06-18 | End: 2021-07-23 | Stop reason: HOSPADM

## 2021-06-18 RX ADMIN — HYDROCODONE BITARTRATE AND ACETAMINOPHEN 1 TABLET: 7.5; 325 TABLET ORAL at 21:33

## 2021-06-18 RX ADMIN — MORPHINE SULFATE 4 MG: 4 INJECTION, SOLUTION INTRAMUSCULAR; INTRAVENOUS at 16:33

## 2021-06-18 RX ADMIN — SODIUM CHLORIDE, PRESERVATIVE FREE 3 ML: 5 INJECTION INTRAVENOUS at 20:16

## 2021-06-18 RX ADMIN — VANCOMYCIN HYDROCHLORIDE 750 MG: 1 INJECTION, POWDER, LYOPHILIZED, FOR SOLUTION INTRAVENOUS at 23:33

## 2021-06-18 RX ADMIN — MORPHINE SULFATE 4 MG: 4 INJECTION, SOLUTION INTRAMUSCULAR; INTRAVENOUS at 20:16

## 2021-06-18 RX ADMIN — VANCOMYCIN HYDROCHLORIDE 1000 MG: 1 INJECTION, POWDER, LYOPHILIZED, FOR SOLUTION INTRAVENOUS at 00:26

## 2021-06-18 RX ADMIN — HYDROCODONE BITARTRATE AND ACETAMINOPHEN 1 TABLET: 7.5; 325 TABLET ORAL at 12:33

## 2021-06-18 RX ADMIN — PIPERACILLIN SODIUM AND TAZOBACTAM SODIUM 3.38 G: 3; .375 INJECTION, POWDER, LYOPHILIZED, FOR SOLUTION INTRAVENOUS at 07:52

## 2021-06-18 RX ADMIN — SODIUM CHLORIDE, PRESERVATIVE FREE 3 ML: 5 INJECTION INTRAVENOUS at 08:01

## 2021-06-18 RX ADMIN — Medication 1 CAPSULE: at 17:26

## 2021-06-18 RX ADMIN — PIPERACILLIN SODIUM AND TAZOBACTAM SODIUM 3.38 G: 3; .375 INJECTION, POWDER, LYOPHILIZED, FOR SOLUTION INTRAVENOUS at 16:32

## 2021-06-18 RX ADMIN — VANCOMYCIN HYDROCHLORIDE 750 MG: 1 INJECTION, POWDER, LYOPHILIZED, FOR SOLUTION INTRAVENOUS at 11:54

## 2021-06-18 RX ADMIN — POTASSIUM CHLORIDE 40 MEQ: 1.5 POWDER, FOR SOLUTION ORAL at 02:11

## 2021-06-18 RX ADMIN — ENOXAPARIN SODIUM 50 MG: 60 INJECTION SUBCUTANEOUS at 08:01

## 2021-06-18 RX ADMIN — POTASSIUM CHLORIDE 40 MEQ: 20 TABLET, EXTENDED RELEASE ORAL at 21:33

## 2021-06-18 RX ADMIN — IOPAMIDOL 85 ML: 755 INJECTION, SOLUTION INTRAVENOUS at 03:56

## 2021-06-18 RX ADMIN — PIPERACILLIN SODIUM AND TAZOBACTAM SODIUM 3.38 G: 3; .375 INJECTION, POWDER, LYOPHILIZED, FOR SOLUTION INTRAVENOUS at 23:32

## 2021-06-19 LAB
ALBUMIN SERPL-MCNC: 1.79 G/DL (ref 3.5–5.2)
ALP SERPL-CCNC: 240 U/L (ref 39–117)
ALT SERPL W P-5'-P-CCNC: 101 U/L (ref 1–33)
ANION GAP SERPL CALCULATED.3IONS-SCNC: 10.9 MMOL/L (ref 5–15)
AST SERPL-CCNC: 44 U/L (ref 1–32)
BASOPHILS # BLD AUTO: 0.03 10*3/MM3 (ref 0–0.2)
BASOPHILS NFR BLD AUTO: 0.2 % (ref 0–1.5)
BILIRUB CONJ SERPL-MCNC: 0.4 MG/DL (ref 0–0.3)
BILIRUB INDIRECT SERPL-MCNC: 0.6 MG/DL
BILIRUB SERPL-MCNC: 1 MG/DL (ref 0–1.2)
BUN SERPL-MCNC: 18 MG/DL (ref 6–20)
BUN/CREAT SERPL: 22.2 (ref 7–25)
CALCIUM SPEC-SCNC: 8 MG/DL (ref 8.6–10.5)
CHLORIDE SERPL-SCNC: 106 MMOL/L (ref 98–107)
CO2 SERPL-SCNC: 15.1 MMOL/L (ref 22–29)
CREAT SERPL-MCNC: 0.81 MG/DL (ref 0.57–1)
DEPRECATED RDW RBC AUTO: 46.1 FL (ref 37–54)
EOSINOPHIL # BLD AUTO: 0.06 10*3/MM3 (ref 0–0.4)
EOSINOPHIL NFR BLD AUTO: 0.5 % (ref 0.3–6.2)
ERYTHROCYTE [DISTWIDTH] IN BLOOD BY AUTOMATED COUNT: 13.4 % (ref 12.3–15.4)
GFR SERPL CREATININE-BSD FRML MDRD: 83 ML/MIN/1.73
GLUCOSE SERPL-MCNC: 72 MG/DL (ref 65–99)
HCT VFR BLD AUTO: 36.8 % (ref 34–46.6)
HGB BLD-MCNC: 11.6 G/DL (ref 12–15.9)
IMM GRANULOCYTES # BLD AUTO: 0.19 10*3/MM3 (ref 0–0.05)
IMM GRANULOCYTES NFR BLD AUTO: 1.5 % (ref 0–0.5)
INR PPP: 1.08 (ref 0.9–1.1)
LYMPHOCYTES # BLD AUTO: 1.28 10*3/MM3 (ref 0.7–3.1)
LYMPHOCYTES NFR BLD AUTO: 9.8 % (ref 19.6–45.3)
MCH RBC QN AUTO: 29.1 PG (ref 26.6–33)
MCHC RBC AUTO-ENTMCNC: 31.5 G/DL (ref 31.5–35.7)
MCV RBC AUTO: 92.5 FL (ref 79–97)
MONOCYTES # BLD AUTO: 0.95 10*3/MM3 (ref 0.1–0.9)
MONOCYTES NFR BLD AUTO: 7.3 % (ref 5–12)
NEUTROPHILS NFR BLD AUTO: 10.55 10*3/MM3 (ref 1.7–7)
NEUTROPHILS NFR BLD AUTO: 80.7 % (ref 42.7–76)
NRBC BLD AUTO-RTO: 0 /100 WBC (ref 0–0.2)
PLATELET # BLD AUTO: 209 10*3/MM3 (ref 140–450)
PMV BLD AUTO: 10.9 FL (ref 6–12)
POTASSIUM SERPL-SCNC: 5 MMOL/L (ref 3.5–5.2)
POTASSIUM SERPL-SCNC: 5 MMOL/L (ref 3.5–5.2)
PROT SERPL-MCNC: 6.5 G/DL (ref 6–8.5)
PROTHROMBIN TIME: 14.4 SECONDS (ref 12.8–14.5)
QT INTERVAL: 330 MS
QTC INTERVAL: 442 MS
RBC # BLD AUTO: 3.98 10*6/MM3 (ref 3.77–5.28)
SODIUM SERPL-SCNC: 132 MMOL/L (ref 136–145)
TROPONIN T SERPL-MCNC: <0.01 NG/ML (ref 0–0.03)
VANCOMYCIN TROUGH SERPL-MCNC: 7.2 MCG/ML (ref 5–20)
WBC # BLD AUTO: 13.06 10*3/MM3 (ref 3.4–10.8)

## 2021-06-19 PROCEDURE — 85025 COMPLETE CBC W/AUTO DIFF WBC: CPT | Performed by: INTERNAL MEDICINE

## 2021-06-19 PROCEDURE — 84484 ASSAY OF TROPONIN QUANT: CPT | Performed by: INTERNAL MEDICINE

## 2021-06-19 PROCEDURE — 99232 SBSQ HOSP IP/OBS MODERATE 35: CPT | Performed by: INTERNAL MEDICINE

## 2021-06-19 PROCEDURE — 87040 BLOOD CULTURE FOR BACTERIA: CPT | Performed by: INTERNAL MEDICINE

## 2021-06-19 PROCEDURE — 85610 PROTHROMBIN TIME: CPT | Performed by: INTERNAL MEDICINE

## 2021-06-19 PROCEDURE — 80076 HEPATIC FUNCTION PANEL: CPT | Performed by: INTERNAL MEDICINE

## 2021-06-19 PROCEDURE — 99253 IP/OBS CNSLTJ NEW/EST LOW 45: CPT | Performed by: SPECIALIST

## 2021-06-19 PROCEDURE — 25010000002 PIPERACILLIN SOD-TAZOBACTAM PER 1 G: Performed by: INTERNAL MEDICINE

## 2021-06-19 PROCEDURE — 93005 ELECTROCARDIOGRAM TRACING: CPT | Performed by: INTERNAL MEDICINE

## 2021-06-19 PROCEDURE — 87147 CULTURE TYPE IMMUNOLOGIC: CPT | Performed by: INTERNAL MEDICINE

## 2021-06-19 PROCEDURE — 84132 ASSAY OF SERUM POTASSIUM: CPT | Performed by: INTERNAL MEDICINE

## 2021-06-19 PROCEDURE — 94799 UNLISTED PULMONARY SVC/PX: CPT

## 2021-06-19 PROCEDURE — 93010 ELECTROCARDIOGRAM REPORT: CPT | Performed by: SPECIALIST

## 2021-06-19 PROCEDURE — 80048 BASIC METABOLIC PNL TOTAL CA: CPT | Performed by: INTERNAL MEDICINE

## 2021-06-19 PROCEDURE — 80202 ASSAY OF VANCOMYCIN: CPT | Performed by: INTERNAL MEDICINE

## 2021-06-19 PROCEDURE — 25010000002 VANCOMYCIN 5 G RECONSTITUTED SOLUTION: Performed by: INTERNAL MEDICINE

## 2021-06-19 PROCEDURE — 25010000002 ENOXAPARIN PER 10 MG: Performed by: INTERNAL MEDICINE

## 2021-06-19 RX ORDER — IBUPROFEN 400 MG/1
400 TABLET ORAL EVERY 6 HOURS PRN
Status: DISCONTINUED | OUTPATIENT
Start: 2021-06-19 | End: 2021-07-23 | Stop reason: HOSPADM

## 2021-06-19 RX ADMIN — PIPERACILLIN SODIUM AND TAZOBACTAM SODIUM 3.38 G: 3; .375 INJECTION, POWDER, LYOPHILIZED, FOR SOLUTION INTRAVENOUS at 16:21

## 2021-06-19 RX ADMIN — VANCOMYCIN HYDROCHLORIDE 750 MG: 1 INJECTION, POWDER, LYOPHILIZED, FOR SOLUTION INTRAVENOUS at 19:28

## 2021-06-19 RX ADMIN — PIPERACILLIN SODIUM AND TAZOBACTAM SODIUM 3.38 G: 3; .375 INJECTION, POWDER, LYOPHILIZED, FOR SOLUTION INTRAVENOUS at 22:20

## 2021-06-19 RX ADMIN — HYDROCODONE BITARTRATE AND ACETAMINOPHEN 1 TABLET: 7.5; 325 TABLET ORAL at 22:20

## 2021-06-19 RX ADMIN — POTASSIUM CHLORIDE 40 MEQ: 20 TABLET, EXTENDED RELEASE ORAL at 06:00

## 2021-06-19 RX ADMIN — POTASSIUM CHLORIDE 40 MEQ: 20 TABLET, EXTENDED RELEASE ORAL at 01:01

## 2021-06-19 RX ADMIN — SODIUM CHLORIDE, PRESERVATIVE FREE 3 ML: 5 INJECTION INTRAVENOUS at 19:30

## 2021-06-19 RX ADMIN — PIPERACILLIN SODIUM AND TAZOBACTAM SODIUM 3.38 G: 3; .375 INJECTION, POWDER, LYOPHILIZED, FOR SOLUTION INTRAVENOUS at 06:00

## 2021-06-19 RX ADMIN — Medication 1 CAPSULE: at 10:04

## 2021-06-19 RX ADMIN — ENOXAPARIN SODIUM 50 MG: 60 INJECTION SUBCUTANEOUS at 10:03

## 2021-06-19 RX ADMIN — SODIUM CHLORIDE, PRESERVATIVE FREE 3 ML: 5 INJECTION INTRAVENOUS at 10:04

## 2021-06-19 RX ADMIN — IBUPROFEN 400 MG: 400 TABLET, FILM COATED ORAL at 18:00

## 2021-06-19 RX ADMIN — HYDROCODONE BITARTRATE AND ACETAMINOPHEN 1 TABLET: 7.5; 325 TABLET ORAL at 16:22

## 2021-06-19 RX ADMIN — IBUPROFEN 400 MG: 400 TABLET, FILM COATED ORAL at 01:00

## 2021-06-19 RX ADMIN — HYDROCODONE BITARTRATE AND ACETAMINOPHEN 1 TABLET: 7.5; 325 TABLET ORAL at 10:03

## 2021-06-20 ENCOUNTER — APPOINTMENT (OUTPATIENT)
Dept: GENERAL RADIOLOGY | Facility: HOSPITAL | Age: 31
End: 2021-06-20

## 2021-06-20 LAB
BACTERIA SPEC AEROBE CULT: ABNORMAL
GRAM STN SPEC: ABNORMAL
ISOLATED FROM: ABNORMAL
QT INTERVAL: 296 MS
QT INTERVAL: 314 MS
QTC INTERVAL: 402 MS
QTC INTERVAL: 411 MS
VANCOMYCIN TROUGH SERPL-MCNC: 9.8 MCG/ML (ref 5–20)

## 2021-06-20 PROCEDURE — 73630 X-RAY EXAM OF FOOT: CPT

## 2021-06-20 PROCEDURE — 99221 1ST HOSP IP/OBS SF/LOW 40: CPT | Performed by: PSYCHIATRY & NEUROLOGY

## 2021-06-20 PROCEDURE — 87147 CULTURE TYPE IMMUNOLOGIC: CPT | Performed by: INTERNAL MEDICINE

## 2021-06-20 PROCEDURE — 25010000002 MORPHINE PER 10 MG: Performed by: INTERNAL MEDICINE

## 2021-06-20 PROCEDURE — 25010000002 VANCOMYCIN 5 G RECONSTITUTED SOLUTION: Performed by: INTERNAL MEDICINE

## 2021-06-20 PROCEDURE — 73630 X-RAY EXAM OF FOOT: CPT | Performed by: RADIOLOGY

## 2021-06-20 PROCEDURE — 25010000002 ENOXAPARIN PER 10 MG: Performed by: INTERNAL MEDICINE

## 2021-06-20 PROCEDURE — 25010000002 VANCOMYCIN: Performed by: INTERNAL MEDICINE

## 2021-06-20 PROCEDURE — 94799 UNLISTED PULMONARY SVC/PX: CPT

## 2021-06-20 PROCEDURE — 87040 BLOOD CULTURE FOR BACTERIA: CPT | Performed by: INTERNAL MEDICINE

## 2021-06-20 PROCEDURE — 93005 ELECTROCARDIOGRAM TRACING: CPT | Performed by: INTERNAL MEDICINE

## 2021-06-20 PROCEDURE — 93010 ELECTROCARDIOGRAM REPORT: CPT | Performed by: SPECIALIST

## 2021-06-20 PROCEDURE — 99232 SBSQ HOSP IP/OBS MODERATE 35: CPT | Performed by: INTERNAL MEDICINE

## 2021-06-20 PROCEDURE — 25010000002 PIPERACILLIN SOD-TAZOBACTAM PER 1 G: Performed by: INTERNAL MEDICINE

## 2021-06-20 PROCEDURE — 80202 ASSAY OF VANCOMYCIN: CPT | Performed by: INTERNAL MEDICINE

## 2021-06-20 RX ORDER — ONDANSETRON 2 MG/ML
4 INJECTION INTRAMUSCULAR; INTRAVENOUS EVERY 6 HOURS PRN
Status: DISCONTINUED | OUTPATIENT
Start: 2021-06-20 | End: 2021-07-23 | Stop reason: HOSPADM

## 2021-06-20 RX ADMIN — SODIUM CHLORIDE, PRESERVATIVE FREE 3 ML: 5 INJECTION INTRAVENOUS at 20:28

## 2021-06-20 RX ADMIN — VANCOMYCIN HYDROCHLORIDE 750 MG: 1 INJECTION, POWDER, LYOPHILIZED, FOR SOLUTION INTRAVENOUS at 20:28

## 2021-06-20 RX ADMIN — MORPHINE SULFATE 4 MG: 4 INJECTION, SOLUTION INTRAMUSCULAR; INTRAVENOUS at 03:21

## 2021-06-20 RX ADMIN — VANCOMYCIN HYDROCHLORIDE 750 MG: 1 INJECTION, POWDER, LYOPHILIZED, FOR SOLUTION INTRAVENOUS at 03:21

## 2021-06-20 RX ADMIN — HYDROCODONE BITARTRATE AND ACETAMINOPHEN 1 TABLET: 7.5; 325 TABLET ORAL at 06:56

## 2021-06-20 RX ADMIN — Medication 1 CAPSULE: at 09:10

## 2021-06-20 RX ADMIN — PIPERACILLIN SODIUM AND TAZOBACTAM SODIUM 3.38 G: 3; .375 INJECTION, POWDER, LYOPHILIZED, FOR SOLUTION INTRAVENOUS at 15:02

## 2021-06-20 RX ADMIN — HYDROCODONE BITARTRATE AND ACETAMINOPHEN 1 TABLET: 7.5; 325 TABLET ORAL at 17:10

## 2021-06-20 RX ADMIN — SODIUM CHLORIDE, PRESERVATIVE FREE 3 ML: 5 INJECTION INTRAVENOUS at 09:12

## 2021-06-20 RX ADMIN — PIPERACILLIN SODIUM AND TAZOBACTAM SODIUM 3.38 G: 3; .375 INJECTION, POWDER, LYOPHILIZED, FOR SOLUTION INTRAVENOUS at 07:14

## 2021-06-20 RX ADMIN — ENOXAPARIN SODIUM 50 MG: 60 INJECTION SUBCUTANEOUS at 09:10

## 2021-06-20 RX ADMIN — IBUPROFEN 400 MG: 400 TABLET, FILM COATED ORAL at 20:28

## 2021-06-20 RX ADMIN — VANCOMYCIN HYDROCHLORIDE 750 MG: 1 INJECTION, POWDER, LYOPHILIZED, FOR SOLUTION INTRAVENOUS at 12:24

## 2021-06-21 LAB
ANION GAP SERPL CALCULATED.3IONS-SCNC: 6.1 MMOL/L (ref 5–15)
BACTERIA SPEC AEROBE CULT: ABNORMAL
BACTERIA SPEC AEROBE CULT: ABNORMAL
BUN SERPL-MCNC: 14 MG/DL (ref 6–20)
BUN/CREAT SERPL: 23 (ref 7–25)
CALCIUM SPEC-SCNC: 8 MG/DL (ref 8.6–10.5)
CHLORIDE SERPL-SCNC: 104 MMOL/L (ref 98–107)
CO2 SERPL-SCNC: 22.9 MMOL/L (ref 22–29)
CREAT SERPL-MCNC: 0.61 MG/DL (ref 0.57–1)
CRP SERPL-MCNC: 13.79 MG/DL (ref 0–0.5)
GFR SERPL CREATININE-BSD FRML MDRD: 115 ML/MIN/1.73
GLUCOSE SERPL-MCNC: 96 MG/DL (ref 65–99)
POTASSIUM SERPL-SCNC: 4.6 MMOL/L (ref 3.5–5.2)
SODIUM SERPL-SCNC: 133 MMOL/L (ref 136–145)

## 2021-06-21 PROCEDURE — 99232 SBSQ HOSP IP/OBS MODERATE 35: CPT | Performed by: INTERNAL MEDICINE

## 2021-06-21 PROCEDURE — 86140 C-REACTIVE PROTEIN: CPT | Performed by: PHYSICIAN ASSISTANT

## 2021-06-21 PROCEDURE — 25010000002 ENOXAPARIN PER 10 MG: Performed by: INTERNAL MEDICINE

## 2021-06-21 PROCEDURE — C1751 CATH, INF, PER/CENT/MIDLINE: HCPCS

## 2021-06-21 PROCEDURE — 25010000003 CEFAZOLIN SODIUM-DEXTROSE 2-3 GM-%(50ML) RECONSTITUTED SOLUTION: Performed by: INTERNAL MEDICINE

## 2021-06-21 PROCEDURE — 80048 BASIC METABOLIC PNL TOTAL CA: CPT | Performed by: INTERNAL MEDICINE

## 2021-06-21 PROCEDURE — 25010000002 MORPHINE PER 10 MG: Performed by: INTERNAL MEDICINE

## 2021-06-21 RX ORDER — SODIUM CHLORIDE 0.9 % (FLUSH) 0.9 %
10 SYRINGE (ML) INJECTION AS NEEDED
Status: DISCONTINUED | OUTPATIENT
Start: 2021-06-21 | End: 2021-07-23 | Stop reason: HOSPADM

## 2021-06-21 RX ORDER — BISACODYL 10 MG
10 SUPPOSITORY, RECTAL RECTAL ONCE AS NEEDED
Status: DISCONTINUED | OUTPATIENT
Start: 2021-06-21 | End: 2021-07-23 | Stop reason: HOSPADM

## 2021-06-21 RX ORDER — POLYETHYLENE GLYCOL 3350 17 G/17G
17 POWDER, FOR SOLUTION ORAL DAILY PRN
Status: DISCONTINUED | OUTPATIENT
Start: 2021-06-21 | End: 2021-06-22

## 2021-06-21 RX ORDER — CEFAZOLIN SODIUM 2 G/50ML
2 SOLUTION INTRAVENOUS EVERY 8 HOURS
Status: DISCONTINUED | OUTPATIENT
Start: 2021-06-21 | End: 2021-07-13 | Stop reason: ALTCHOICE

## 2021-06-21 RX ORDER — DOCUSATE SODIUM 100 MG/1
100 CAPSULE, LIQUID FILLED ORAL 2 TIMES DAILY
Status: DISCONTINUED | OUTPATIENT
Start: 2021-06-21 | End: 2021-07-23 | Stop reason: HOSPADM

## 2021-06-21 RX ORDER — SODIUM CHLORIDE 0.9 % (FLUSH) 0.9 %
10 SYRINGE (ML) INJECTION EVERY 12 HOURS SCHEDULED
Status: DISCONTINUED | OUTPATIENT
Start: 2021-06-21 | End: 2021-07-23 | Stop reason: HOSPADM

## 2021-06-21 RX ORDER — CHOLECALCIFEROL (VITAMIN D3) 125 MCG
5 CAPSULE ORAL NIGHTLY PRN
Status: DISCONTINUED | OUTPATIENT
Start: 2021-06-21 | End: 2021-07-23 | Stop reason: HOSPADM

## 2021-06-21 RX ADMIN — DOCUSATE SODIUM 100 MG: 100 CAPSULE, LIQUID FILLED ORAL at 22:54

## 2021-06-21 RX ADMIN — SODIUM CHLORIDE, PRESERVATIVE FREE 10 ML: 5 INJECTION INTRAVENOUS at 20:59

## 2021-06-21 RX ADMIN — SODIUM CHLORIDE, PRESERVATIVE FREE 3 ML: 5 INJECTION INTRAVENOUS at 20:59

## 2021-06-21 RX ADMIN — IBUPROFEN 400 MG: 400 TABLET, FILM COATED ORAL at 21:16

## 2021-06-21 RX ADMIN — Medication 5 MG: at 22:54

## 2021-06-21 RX ADMIN — MORPHINE SULFATE 4 MG: 4 INJECTION, SOLUTION INTRAMUSCULAR; INTRAVENOUS at 20:54

## 2021-06-21 RX ADMIN — HYDROCODONE BITARTRATE AND ACETAMINOPHEN 1 TABLET: 7.5; 325 TABLET ORAL at 15:16

## 2021-06-21 RX ADMIN — HYDROCODONE BITARTRATE AND ACETAMINOPHEN 1 TABLET: 7.5; 325 TABLET ORAL at 05:30

## 2021-06-21 RX ADMIN — ENOXAPARIN SODIUM 50 MG: 60 INJECTION SUBCUTANEOUS at 09:53

## 2021-06-21 RX ADMIN — CEFAZOLIN SODIUM 2 G: 2 SOLUTION INTRAVENOUS at 15:17

## 2021-06-21 RX ADMIN — CEFAZOLIN SODIUM 2 G: 2 SOLUTION INTRAVENOUS at 20:59

## 2021-06-21 RX ADMIN — SODIUM CHLORIDE, PRESERVATIVE FREE 3 ML: 5 INJECTION INTRAVENOUS at 09:56

## 2021-06-21 RX ADMIN — Medication 1 CAPSULE: at 09:53

## 2021-06-21 RX ADMIN — HYDROCODONE BITARTRATE AND ACETAMINOPHEN 1 TABLET: 7.5; 325 TABLET ORAL at 22:16

## 2021-06-22 LAB
ALBUMIN SERPL-MCNC: 1.82 G/DL (ref 3.5–5.2)
ALBUMIN/GLOB SERPL: 0.5 G/DL
ALP SERPL-CCNC: 145 U/L (ref 39–117)
ALT SERPL W P-5'-P-CCNC: 39 U/L (ref 1–33)
ANION GAP SERPL CALCULATED.3IONS-SCNC: 5.6 MMOL/L (ref 5–15)
AST SERPL-CCNC: 22 U/L (ref 1–32)
BACTERIA SPEC AEROBE CULT: ABNORMAL
BASOPHILS # BLD AUTO: 0.07 10*3/MM3 (ref 0–0.2)
BASOPHILS NFR BLD AUTO: 0.6 % (ref 0–1.5)
BILIRUB SERPL-MCNC: 0.3 MG/DL (ref 0–1.2)
BUN SERPL-MCNC: 16 MG/DL (ref 6–20)
BUN/CREAT SERPL: 18.8 (ref 7–25)
CALCIUM SPEC-SCNC: 7.7 MG/DL (ref 8.6–10.5)
CHLORIDE SERPL-SCNC: 104 MMOL/L (ref 98–107)
CO2 SERPL-SCNC: 22.4 MMOL/L (ref 22–29)
CREAT SERPL-MCNC: 0.85 MG/DL (ref 0.57–1)
CRP SERPL-MCNC: 11.11 MG/DL (ref 0–0.5)
D DIMER PPP FEU-MCNC: 3.7 MCGFEU/ML (ref 0–0.5)
DEPRECATED RDW RBC AUTO: 42.5 FL (ref 37–54)
EOSINOPHIL # BLD AUTO: 0.11 10*3/MM3 (ref 0–0.4)
EOSINOPHIL NFR BLD AUTO: 0.9 % (ref 0.3–6.2)
ERYTHROCYTE [DISTWIDTH] IN BLOOD BY AUTOMATED COUNT: 12.9 % (ref 12.3–15.4)
GFR SERPL CREATININE-BSD FRML MDRD: 79 ML/MIN/1.73
GLOBULIN UR ELPH-MCNC: 3.9 GM/DL
GLUCOSE SERPL-MCNC: 90 MG/DL (ref 65–99)
GRAM STN SPEC: ABNORMAL
HAPTOGLOB SERPL-MCNC: 311 MG/DL (ref 30–200)
HCT VFR BLD AUTO: 29 % (ref 34–46.6)
HGB BLD-MCNC: 9.6 G/DL (ref 12–15.9)
IMM GRANULOCYTES # BLD AUTO: 0.44 10*3/MM3 (ref 0–0.05)
IMM GRANULOCYTES NFR BLD AUTO: 3.7 % (ref 0–0.5)
ISOLATED FROM: ABNORMAL
LYMPHOCYTES # BLD AUTO: 2.29 10*3/MM3 (ref 0.7–3.1)
LYMPHOCYTES NFR BLD AUTO: 19.2 % (ref 19.6–45.3)
MCH RBC QN AUTO: 29.5 PG (ref 26.6–33)
MCHC RBC AUTO-ENTMCNC: 33.1 G/DL (ref 31.5–35.7)
MCV RBC AUTO: 89.2 FL (ref 79–97)
MONOCYTES # BLD AUTO: 1.11 10*3/MM3 (ref 0.1–0.9)
MONOCYTES NFR BLD AUTO: 9.3 % (ref 5–12)
NEUTROPHILS NFR BLD AUTO: 66.3 % (ref 42.7–76)
NEUTROPHILS NFR BLD AUTO: 7.92 10*3/MM3 (ref 1.7–7)
NRBC BLD AUTO-RTO: 0 /100 WBC (ref 0–0.2)
PLATELET # BLD AUTO: 455 10*3/MM3 (ref 140–450)
PMV BLD AUTO: 10.2 FL (ref 6–12)
POTASSIUM SERPL-SCNC: 4.5 MMOL/L (ref 3.5–5.2)
PROT SERPL-MCNC: 5.7 G/DL (ref 6–8.5)
RBC # BLD AUTO: 3.25 10*6/MM3 (ref 3.77–5.28)
SODIUM SERPL-SCNC: 132 MMOL/L (ref 136–145)
WBC # BLD AUTO: 11.94 10*3/MM3 (ref 3.4–10.8)

## 2021-06-22 PROCEDURE — 99232 SBSQ HOSP IP/OBS MODERATE 35: CPT | Performed by: INTERNAL MEDICINE

## 2021-06-22 PROCEDURE — 85379 FIBRIN DEGRADATION QUANT: CPT | Performed by: INTERNAL MEDICINE

## 2021-06-22 PROCEDURE — 87147 CULTURE TYPE IMMUNOLOGIC: CPT | Performed by: INTERNAL MEDICINE

## 2021-06-22 PROCEDURE — 25010000003 CEFAZOLIN SODIUM-DEXTROSE 2-3 GM-%(50ML) RECONSTITUTED SOLUTION: Performed by: INTERNAL MEDICINE

## 2021-06-22 PROCEDURE — 85025 COMPLETE CBC W/AUTO DIFF WBC: CPT | Performed by: INTERNAL MEDICINE

## 2021-06-22 PROCEDURE — 25010000002 MORPHINE PER 10 MG: Performed by: INTERNAL MEDICINE

## 2021-06-22 PROCEDURE — 25010000002 ENOXAPARIN PER 10 MG: Performed by: INTERNAL MEDICINE

## 2021-06-22 PROCEDURE — 83010 ASSAY OF HAPTOGLOBIN QUANT: CPT | Performed by: INTERNAL MEDICINE

## 2021-06-22 PROCEDURE — 86140 C-REACTIVE PROTEIN: CPT | Performed by: INTERNAL MEDICINE

## 2021-06-22 PROCEDURE — 87040 BLOOD CULTURE FOR BACTERIA: CPT | Performed by: INTERNAL MEDICINE

## 2021-06-22 PROCEDURE — 80053 COMPREHEN METABOLIC PANEL: CPT | Performed by: INTERNAL MEDICINE

## 2021-06-22 PROCEDURE — 87150 DNA/RNA AMPLIFIED PROBE: CPT | Performed by: INTERNAL MEDICINE

## 2021-06-22 RX ORDER — POLYETHYLENE GLYCOL 3350 17 G/17G
17 POWDER, FOR SOLUTION ORAL DAILY
Status: DISCONTINUED | OUTPATIENT
Start: 2021-06-22 | End: 2021-07-23 | Stop reason: HOSPADM

## 2021-06-22 RX ADMIN — SODIUM CHLORIDE, PRESERVATIVE FREE 3 ML: 5 INJECTION INTRAVENOUS at 20:51

## 2021-06-22 RX ADMIN — SODIUM CHLORIDE, PRESERVATIVE FREE 10 ML: 5 INJECTION INTRAVENOUS at 08:48

## 2021-06-22 RX ADMIN — DOCUSATE SODIUM 100 MG: 100 CAPSULE, LIQUID FILLED ORAL at 20:51

## 2021-06-22 RX ADMIN — CEFAZOLIN SODIUM 2 G: 2 SOLUTION INTRAVENOUS at 20:52

## 2021-06-22 RX ADMIN — ENOXAPARIN SODIUM 60 MG: 60 INJECTION SUBCUTANEOUS at 08:46

## 2021-06-22 RX ADMIN — SODIUM CHLORIDE, PRESERVATIVE FREE 3 ML: 5 INJECTION INTRAVENOUS at 08:49

## 2021-06-22 RX ADMIN — POLYETHYLENE GLYCOL (3350) 17 G: 17 POWDER, FOR SOLUTION ORAL at 08:50

## 2021-06-22 RX ADMIN — HYDROCODONE BITARTRATE AND ACETAMINOPHEN 1 TABLET: 7.5; 325 TABLET ORAL at 11:10

## 2021-06-22 RX ADMIN — IBUPROFEN 400 MG: 400 TABLET, FILM COATED ORAL at 14:50

## 2021-06-22 RX ADMIN — HYDROCODONE BITARTRATE AND ACETAMINOPHEN 1 TABLET: 7.5; 325 TABLET ORAL at 21:16

## 2021-06-22 RX ADMIN — CEFAZOLIN SODIUM 2 G: 2 SOLUTION INTRAVENOUS at 11:18

## 2021-06-22 RX ADMIN — HYDROCODONE BITARTRATE AND ACETAMINOPHEN 1 TABLET: 7.5; 325 TABLET ORAL at 04:44

## 2021-06-22 RX ADMIN — CEFAZOLIN SODIUM 2 G: 2 SOLUTION INTRAVENOUS at 04:44

## 2021-06-22 RX ADMIN — DOCUSATE SODIUM 100 MG: 100 CAPSULE, LIQUID FILLED ORAL at 08:44

## 2021-06-22 RX ADMIN — SODIUM CHLORIDE, PRESERVATIVE FREE 10 ML: 5 INJECTION INTRAVENOUS at 20:52

## 2021-06-22 RX ADMIN — Medication 1 CAPSULE: at 08:45

## 2021-06-22 RX ADMIN — MORPHINE SULFATE 4 MG: 4 INJECTION, SOLUTION INTRAMUSCULAR; INTRAVENOUS at 13:20

## 2021-06-23 LAB
ALBUMIN SERPL-MCNC: 1.88 G/DL (ref 3.5–5.2)
ALBUMIN/GLOB SERPL: 0.5 G/DL
ALP SERPL-CCNC: 143 U/L (ref 39–117)
ALT SERPL W P-5'-P-CCNC: 27 U/L (ref 1–33)
ANION GAP SERPL CALCULATED.3IONS-SCNC: 5.4 MMOL/L (ref 5–15)
AST SERPL-CCNC: 23 U/L (ref 1–32)
BASOPHILS # BLD AUTO: 0.06 10*3/MM3 (ref 0–0.2)
BASOPHILS NFR BLD AUTO: 0.7 % (ref 0–1.5)
BILIRUB SERPL-MCNC: 0.2 MG/DL (ref 0–1.2)
BUN SERPL-MCNC: 15 MG/DL (ref 6–20)
BUN/CREAT SERPL: 20.5 (ref 7–25)
CALCIUM SPEC-SCNC: 7.8 MG/DL (ref 8.6–10.5)
CHLORIDE SERPL-SCNC: 106 MMOL/L (ref 98–107)
CO2 SERPL-SCNC: 23.6 MMOL/L (ref 22–29)
CREAT SERPL-MCNC: 0.73 MG/DL (ref 0.57–1)
CRP SERPL-MCNC: 8.05 MG/DL (ref 0–0.5)
DEPRECATED RDW RBC AUTO: 41.6 FL (ref 37–54)
EOSINOPHIL # BLD AUTO: 0.14 10*3/MM3 (ref 0–0.4)
EOSINOPHIL NFR BLD AUTO: 1.6 % (ref 0.3–6.2)
ERYTHROCYTE [DISTWIDTH] IN BLOOD BY AUTOMATED COUNT: 12.7 % (ref 12.3–15.4)
FERRITIN SERPL-MCNC: 400.2 NG/ML (ref 13–150)
FOLATE SERPL-MCNC: 6.78 NG/ML (ref 4.78–24.2)
GFR SERPL CREATININE-BSD FRML MDRD: 94 ML/MIN/1.73
GLOBULIN UR ELPH-MCNC: 3.9 GM/DL
GLUCOSE SERPL-MCNC: 105 MG/DL (ref 65–99)
HCT VFR BLD AUTO: 28.7 % (ref 34–46.6)
HGB BLD-MCNC: 9.3 G/DL (ref 12–15.9)
IMM GRANULOCYTES # BLD AUTO: 0.41 10*3/MM3 (ref 0–0.05)
IMM GRANULOCYTES NFR BLD AUTO: 4.5 % (ref 0–0.5)
IRON 24H UR-MRATE: 26 MCG/DL (ref 37–145)
IRON SATN MFR SERPL: 13 % (ref 20–50)
LDH SERPL-CCNC: 161 U/L (ref 135–214)
LYMPHOCYTES # BLD AUTO: 2.2 10*3/MM3 (ref 0.7–3.1)
LYMPHOCYTES NFR BLD AUTO: 24.4 % (ref 19.6–45.3)
MCH RBC QN AUTO: 29.2 PG (ref 26.6–33)
MCHC RBC AUTO-ENTMCNC: 32.4 G/DL (ref 31.5–35.7)
MCV RBC AUTO: 90 FL (ref 79–97)
MONOCYTES # BLD AUTO: 0.9 10*3/MM3 (ref 0.1–0.9)
MONOCYTES NFR BLD AUTO: 10 % (ref 5–12)
NEUTROPHILS NFR BLD AUTO: 5.32 10*3/MM3 (ref 1.7–7)
NEUTROPHILS NFR BLD AUTO: 58.8 % (ref 42.7–76)
NRBC BLD AUTO-RTO: 0 /100 WBC (ref 0–0.2)
PLATELET # BLD AUTO: 656 10*3/MM3 (ref 140–450)
PMV BLD AUTO: 9.8 FL (ref 6–12)
POTASSIUM SERPL-SCNC: 4.6 MMOL/L (ref 3.5–5.2)
PROT SERPL-MCNC: 5.8 G/DL (ref 6–8.5)
RBC # BLD AUTO: 3.19 10*6/MM3 (ref 3.77–5.28)
RETICS # AUTO: 0.06 10*6/MM3 (ref 0.02–0.13)
RETICS/RBC NFR AUTO: 1.83 % (ref 0.7–1.9)
SODIUM SERPL-SCNC: 135 MMOL/L (ref 136–145)
TIBC SERPL-MCNC: 201 MCG/DL (ref 298–536)
TRANSFERRIN SERPL-MCNC: 135 MG/DL (ref 200–360)
VIT B12 BLD-MCNC: 1050 PG/ML (ref 211–946)
WBC # BLD AUTO: 9.03 10*3/MM3 (ref 3.4–10.8)

## 2021-06-23 PROCEDURE — 82746 ASSAY OF FOLIC ACID SERUM: CPT | Performed by: INTERNAL MEDICINE

## 2021-06-23 PROCEDURE — 84466 ASSAY OF TRANSFERRIN: CPT | Performed by: INTERNAL MEDICINE

## 2021-06-23 PROCEDURE — 83540 ASSAY OF IRON: CPT | Performed by: INTERNAL MEDICINE

## 2021-06-23 PROCEDURE — 82607 VITAMIN B-12: CPT | Performed by: INTERNAL MEDICINE

## 2021-06-23 PROCEDURE — 80053 COMPREHEN METABOLIC PANEL: CPT | Performed by: INTERNAL MEDICINE

## 2021-06-23 PROCEDURE — 99232 SBSQ HOSP IP/OBS MODERATE 35: CPT | Performed by: INTERNAL MEDICINE

## 2021-06-23 PROCEDURE — 82728 ASSAY OF FERRITIN: CPT | Performed by: INTERNAL MEDICINE

## 2021-06-23 PROCEDURE — 85045 AUTOMATED RETICULOCYTE COUNT: CPT | Performed by: INTERNAL MEDICINE

## 2021-06-23 PROCEDURE — 86140 C-REACTIVE PROTEIN: CPT | Performed by: INTERNAL MEDICINE

## 2021-06-23 PROCEDURE — 85025 COMPLETE CBC W/AUTO DIFF WBC: CPT | Performed by: INTERNAL MEDICINE

## 2021-06-23 PROCEDURE — 25010000002 ENOXAPARIN PER 10 MG: Performed by: INTERNAL MEDICINE

## 2021-06-23 PROCEDURE — 25010000002 MORPHINE PER 10 MG: Performed by: INTERNAL MEDICINE

## 2021-06-23 PROCEDURE — 83615 LACTATE (LD) (LDH) ENZYME: CPT | Performed by: INTERNAL MEDICINE

## 2021-06-23 PROCEDURE — 25010000003 CEFAZOLIN SODIUM-DEXTROSE 2-3 GM-%(50ML) RECONSTITUTED SOLUTION: Performed by: INTERNAL MEDICINE

## 2021-06-23 RX ORDER — MULTIPLE VITAMINS W/ MINERALS TAB 9MG-400MCG
1 TAB ORAL DAILY
Status: DISCONTINUED | OUTPATIENT
Start: 2021-06-23 | End: 2021-07-23 | Stop reason: HOSPADM

## 2021-06-23 RX ADMIN — CEFAZOLIN SODIUM 2 G: 2 SOLUTION INTRAVENOUS at 12:07

## 2021-06-23 RX ADMIN — HYDROCODONE BITARTRATE AND ACETAMINOPHEN 1 TABLET: 7.5; 325 TABLET ORAL at 03:14

## 2021-06-23 RX ADMIN — Medication 1 CAPSULE: at 08:52

## 2021-06-23 RX ADMIN — DOCUSATE SODIUM 100 MG: 100 CAPSULE, LIQUID FILLED ORAL at 08:52

## 2021-06-23 RX ADMIN — MORPHINE SULFATE 4 MG: 4 INJECTION, SOLUTION INTRAMUSCULAR; INTRAVENOUS at 00:13

## 2021-06-23 RX ADMIN — SODIUM CHLORIDE, PRESERVATIVE FREE 3 ML: 5 INJECTION INTRAVENOUS at 08:47

## 2021-06-23 RX ADMIN — HYDROCODONE BITARTRATE AND ACETAMINOPHEN 1 TABLET: 7.5; 325 TABLET ORAL at 17:50

## 2021-06-23 RX ADMIN — SODIUM CHLORIDE, PRESERVATIVE FREE 3 ML: 5 INJECTION INTRAVENOUS at 20:11

## 2021-06-23 RX ADMIN — MORPHINE SULFATE 4 MG: 4 INJECTION, SOLUTION INTRAMUSCULAR; INTRAVENOUS at 20:11

## 2021-06-23 RX ADMIN — Medication 1 TABLET: at 12:06

## 2021-06-23 RX ADMIN — SODIUM CHLORIDE, PRESERVATIVE FREE 10 ML: 5 INJECTION INTRAVENOUS at 20:11

## 2021-06-23 RX ADMIN — CEFAZOLIN SODIUM 2 G: 2 SOLUTION INTRAVENOUS at 03:14

## 2021-06-23 RX ADMIN — SODIUM CHLORIDE, PRESERVATIVE FREE 10 ML: 5 INJECTION INTRAVENOUS at 08:47

## 2021-06-23 RX ADMIN — ENOXAPARIN SODIUM 60 MG: 60 INJECTION SUBCUTANEOUS at 08:52

## 2021-06-23 RX ADMIN — IBUPROFEN 400 MG: 400 TABLET, FILM COATED ORAL at 08:52

## 2021-06-23 RX ADMIN — CEFAZOLIN SODIUM 2 G: 2 SOLUTION INTRAVENOUS at 20:10

## 2021-06-24 LAB
ANION GAP SERPL CALCULATED.3IONS-SCNC: 6.7 MMOL/L (ref 5–15)
BACTERIA BLD CULT: ABNORMAL
BASOPHILS # BLD AUTO: 0.08 10*3/MM3 (ref 0–0.2)
BASOPHILS NFR BLD AUTO: 0.8 % (ref 0–1.5)
BOTTLE TYPE: ABNORMAL
BUN SERPL-MCNC: 13 MG/DL (ref 6–20)
BUN/CREAT SERPL: 18.3 (ref 7–25)
CALCIUM SPEC-SCNC: 8 MG/DL (ref 8.6–10.5)
CHLORIDE SERPL-SCNC: 104 MMOL/L (ref 98–107)
CO2 SERPL-SCNC: 25.3 MMOL/L (ref 22–29)
CREAT SERPL-MCNC: 0.71 MG/DL (ref 0.57–1)
CRP SERPL-MCNC: 5.96 MG/DL (ref 0–0.5)
D DIMER PPP FEU-MCNC: 3.02 MCGFEU/ML (ref 0–0.5)
DEPRECATED RDW RBC AUTO: 42 FL (ref 37–54)
EOSINOPHIL # BLD AUTO: 0.14 10*3/MM3 (ref 0–0.4)
EOSINOPHIL NFR BLD AUTO: 1.4 % (ref 0.3–6.2)
ERYTHROCYTE [DISTWIDTH] IN BLOOD BY AUTOMATED COUNT: 12.6 % (ref 12.3–15.4)
GFR SERPL CREATININE-BSD FRML MDRD: 97 ML/MIN/1.73
GLUCOSE SERPL-MCNC: 96 MG/DL (ref 65–99)
HCT VFR BLD AUTO: 29.4 % (ref 34–46.6)
HGB BLD-MCNC: 9.4 G/DL (ref 12–15.9)
IMM GRANULOCYTES # BLD AUTO: 0.43 10*3/MM3 (ref 0–0.05)
IMM GRANULOCYTES NFR BLD AUTO: 4.3 % (ref 0–0.5)
LYMPHOCYTES # BLD AUTO: 2.1 10*3/MM3 (ref 0.7–3.1)
LYMPHOCYTES NFR BLD AUTO: 21.2 % (ref 19.6–45.3)
MCH RBC QN AUTO: 29 PG (ref 26.6–33)
MCHC RBC AUTO-ENTMCNC: 32 G/DL (ref 31.5–35.7)
MCV RBC AUTO: 90.7 FL (ref 79–97)
MONOCYTES # BLD AUTO: 0.85 10*3/MM3 (ref 0.1–0.9)
MONOCYTES NFR BLD AUTO: 8.6 % (ref 5–12)
NEUTROPHILS NFR BLD AUTO: 6.31 10*3/MM3 (ref 1.7–7)
NEUTROPHILS NFR BLD AUTO: 63.7 % (ref 42.7–76)
NRBC BLD AUTO-RTO: 0 /100 WBC (ref 0–0.2)
PLATELET # BLD AUTO: 699 10*3/MM3 (ref 140–450)
PMV BLD AUTO: 9.6 FL (ref 6–12)
POTASSIUM SERPL-SCNC: 4.7 MMOL/L (ref 3.5–5.2)
RBC # BLD AUTO: 3.24 10*6/MM3 (ref 3.77–5.28)
SODIUM SERPL-SCNC: 136 MMOL/L (ref 136–145)
WBC # BLD AUTO: 9.91 10*3/MM3 (ref 3.4–10.8)

## 2021-06-24 PROCEDURE — 87902 NFCT AGT GNTYP ALYS HEP C: CPT | Performed by: INTERNAL MEDICINE

## 2021-06-24 PROCEDURE — 85379 FIBRIN DEGRADATION QUANT: CPT | Performed by: INTERNAL MEDICINE

## 2021-06-24 PROCEDURE — 25010000002 MORPHINE PER 10 MG: Performed by: INTERNAL MEDICINE

## 2021-06-24 PROCEDURE — 87040 BLOOD CULTURE FOR BACTERIA: CPT | Performed by: INTERNAL MEDICINE

## 2021-06-24 PROCEDURE — 25010000003 CEFAZOLIN SODIUM-DEXTROSE 2-3 GM-%(50ML) RECONSTITUTED SOLUTION: Performed by: INTERNAL MEDICINE

## 2021-06-24 PROCEDURE — 86140 C-REACTIVE PROTEIN: CPT | Performed by: INTERNAL MEDICINE

## 2021-06-24 PROCEDURE — 80048 BASIC METABOLIC PNL TOTAL CA: CPT | Performed by: INTERNAL MEDICINE

## 2021-06-24 PROCEDURE — 87522 HEPATITIS C REVRS TRNSCRPJ: CPT | Performed by: INTERNAL MEDICINE

## 2021-06-24 PROCEDURE — 99232 SBSQ HOSP IP/OBS MODERATE 35: CPT | Performed by: INTERNAL MEDICINE

## 2021-06-24 PROCEDURE — 25010000002 ENOXAPARIN PER 10 MG: Performed by: INTERNAL MEDICINE

## 2021-06-24 PROCEDURE — 85025 COMPLETE CBC W/AUTO DIFF WBC: CPT | Performed by: INTERNAL MEDICINE

## 2021-06-24 RX ORDER — FOLIC ACID 1 MG/1
1 TABLET ORAL DAILY
Status: DISCONTINUED | OUTPATIENT
Start: 2021-06-24 | End: 2021-07-23 | Stop reason: HOSPADM

## 2021-06-24 RX ADMIN — ENOXAPARIN SODIUM 60 MG: 60 INJECTION SUBCUTANEOUS at 08:51

## 2021-06-24 RX ADMIN — SODIUM CHLORIDE, PRESERVATIVE FREE 3 ML: 5 INJECTION INTRAVENOUS at 21:43

## 2021-06-24 RX ADMIN — SODIUM CHLORIDE, PRESERVATIVE FREE 10 ML: 5 INJECTION INTRAVENOUS at 08:56

## 2021-06-24 RX ADMIN — IBUPROFEN 400 MG: 400 TABLET, FILM COATED ORAL at 17:30

## 2021-06-24 RX ADMIN — Medication 1 TABLET: at 08:52

## 2021-06-24 RX ADMIN — HYDROCODONE BITARTRATE AND ACETAMINOPHEN 1 TABLET: 7.5; 325 TABLET ORAL at 02:17

## 2021-06-24 RX ADMIN — MORPHINE SULFATE 4 MG: 4 INJECTION, SOLUTION INTRAMUSCULAR; INTRAVENOUS at 12:08

## 2021-06-24 RX ADMIN — MORPHINE SULFATE 4 MG: 4 INJECTION, SOLUTION INTRAMUSCULAR; INTRAVENOUS at 21:43

## 2021-06-24 RX ADMIN — SODIUM CHLORIDE, PRESERVATIVE FREE 10 ML: 5 INJECTION INTRAVENOUS at 21:43

## 2021-06-24 RX ADMIN — SODIUM CHLORIDE, PRESERVATIVE FREE 3 ML: 5 INJECTION INTRAVENOUS at 08:56

## 2021-06-24 RX ADMIN — FOLIC ACID 1 MG: 1 TABLET ORAL at 17:21

## 2021-06-24 RX ADMIN — Medication 1 CAPSULE: at 08:53

## 2021-06-24 RX ADMIN — CEFAZOLIN SODIUM 2 G: 2 SOLUTION INTRAVENOUS at 21:43

## 2021-06-24 RX ADMIN — CEFAZOLIN SODIUM 2 G: 2 SOLUTION INTRAVENOUS at 04:56

## 2021-06-24 RX ADMIN — CEFAZOLIN SODIUM 2 G: 2 SOLUTION INTRAVENOUS at 12:07

## 2021-06-24 RX ADMIN — HYDROCODONE BITARTRATE AND ACETAMINOPHEN 1 TABLET: 7.5; 325 TABLET ORAL at 08:59

## 2021-06-24 RX ADMIN — MORPHINE SULFATE 4 MG: 4 INJECTION, SOLUTION INTRAMUSCULAR; INTRAVENOUS at 04:56

## 2021-06-25 LAB
ALBUMIN SERPL-MCNC: 1.99 G/DL (ref 3.5–5.2)
ALBUMIN/GLOB SERPL: 0.5 G/DL
ALP SERPL-CCNC: 155 U/L (ref 39–117)
ALT SERPL W P-5'-P-CCNC: 20 U/L (ref 1–33)
ANION GAP SERPL CALCULATED.3IONS-SCNC: 7.5 MMOL/L (ref 5–15)
AST SERPL-CCNC: 22 U/L (ref 1–32)
BACTERIA SPEC AEROBE CULT: ABNORMAL
BILIRUB SERPL-MCNC: <0.2 MG/DL (ref 0–1.2)
BUN SERPL-MCNC: 15 MG/DL (ref 6–20)
BUN/CREAT SERPL: 23.4 (ref 7–25)
CALCIUM SPEC-SCNC: 8.4 MG/DL (ref 8.6–10.5)
CHLORIDE SERPL-SCNC: 105 MMOL/L (ref 98–107)
CO2 SERPL-SCNC: 26.5 MMOL/L (ref 22–29)
CREAT SERPL-MCNC: 0.64 MG/DL (ref 0.57–1)
CRP SERPL-MCNC: 4.02 MG/DL (ref 0–0.5)
GFR SERPL CREATININE-BSD FRML MDRD: 109 ML/MIN/1.73
GLOBULIN UR ELPH-MCNC: 4.1 GM/DL
GLUCOSE SERPL-MCNC: 114 MG/DL (ref 65–99)
GRAM STN SPEC: ABNORMAL
ISOLATED FROM: ABNORMAL
POTASSIUM SERPL-SCNC: 4.5 MMOL/L (ref 3.5–5.2)
PROT SERPL-MCNC: 6.1 G/DL (ref 6–8.5)
SODIUM SERPL-SCNC: 139 MMOL/L (ref 136–145)

## 2021-06-25 PROCEDURE — 25010000002 ONDANSETRON PER 1 MG: Performed by: INTERNAL MEDICINE

## 2021-06-25 PROCEDURE — 25010000002 ENOXAPARIN PER 10 MG: Performed by: INTERNAL MEDICINE

## 2021-06-25 PROCEDURE — 80053 COMPREHEN METABOLIC PANEL: CPT | Performed by: INTERNAL MEDICINE

## 2021-06-25 PROCEDURE — 25010000002 MORPHINE PER 10 MG: Performed by: INTERNAL MEDICINE

## 2021-06-25 PROCEDURE — 25010000003 CEFAZOLIN SODIUM-DEXTROSE 2-3 GM-%(50ML) RECONSTITUTED SOLUTION: Performed by: INTERNAL MEDICINE

## 2021-06-25 PROCEDURE — 99232 SBSQ HOSP IP/OBS MODERATE 35: CPT | Performed by: INTERNAL MEDICINE

## 2021-06-25 PROCEDURE — 86140 C-REACTIVE PROTEIN: CPT | Performed by: NURSE PRACTITIONER

## 2021-06-25 RX ORDER — HYDROCODONE BITARTRATE AND ACETAMINOPHEN 7.5; 325 MG/1; MG/1
1 TABLET ORAL EVERY 6 HOURS PRN
Status: DISCONTINUED | OUTPATIENT
Start: 2021-06-25 | End: 2021-07-02

## 2021-06-25 RX ADMIN — ENOXAPARIN SODIUM 60 MG: 60 INJECTION SUBCUTANEOUS at 09:25

## 2021-06-25 RX ADMIN — HYDROCODONE BITARTRATE AND ACETAMINOPHEN 1 TABLET: 7.5; 325 TABLET ORAL at 02:42

## 2021-06-25 RX ADMIN — SODIUM CHLORIDE, PRESERVATIVE FREE 10 ML: 5 INJECTION INTRAVENOUS at 20:45

## 2021-06-25 RX ADMIN — DOCUSATE SODIUM 100 MG: 100 CAPSULE, LIQUID FILLED ORAL at 09:25

## 2021-06-25 RX ADMIN — HYDROCODONE BITARTRATE AND ACETAMINOPHEN 1 TABLET: 7.5; 325 TABLET ORAL at 20:44

## 2021-06-25 RX ADMIN — CEFAZOLIN SODIUM 2 G: 2 SOLUTION INTRAVENOUS at 13:35

## 2021-06-25 RX ADMIN — MORPHINE SULFATE 4 MG: 4 INJECTION, SOLUTION INTRAMUSCULAR; INTRAVENOUS at 09:46

## 2021-06-25 RX ADMIN — ONDANSETRON 4 MG: 2 INJECTION INTRAMUSCULAR; INTRAVENOUS at 09:46

## 2021-06-25 RX ADMIN — IBUPROFEN 400 MG: 400 TABLET, FILM COATED ORAL at 23:32

## 2021-06-25 RX ADMIN — FOLIC ACID 1 MG: 1 TABLET ORAL at 09:25

## 2021-06-25 RX ADMIN — POLYETHYLENE GLYCOL (3350) 17 G: 17 POWDER, FOR SOLUTION ORAL at 09:26

## 2021-06-25 RX ADMIN — HYDROCODONE BITARTRATE AND ACETAMINOPHEN 1 TABLET: 7.5; 325 TABLET ORAL at 13:51

## 2021-06-25 RX ADMIN — Medication 5 MG: at 22:24

## 2021-06-25 RX ADMIN — Medication 1 CAPSULE: at 09:26

## 2021-06-25 RX ADMIN — CEFAZOLIN SODIUM 2 G: 2 SOLUTION INTRAVENOUS at 03:39

## 2021-06-25 RX ADMIN — Medication 1 TABLET: at 09:25

## 2021-06-25 RX ADMIN — CEFAZOLIN SODIUM 2 G: 2 SOLUTION INTRAVENOUS at 20:45

## 2021-06-25 RX ADMIN — SODIUM CHLORIDE, PRESERVATIVE FREE 3 ML: 5 INJECTION INTRAVENOUS at 20:45

## 2021-06-26 LAB
ANION GAP SERPL CALCULATED.3IONS-SCNC: 5.9 MMOL/L (ref 5–15)
BASOPHILS # BLD AUTO: 0.09 10*3/MM3 (ref 0–0.2)
BASOPHILS NFR BLD AUTO: 0.9 % (ref 0–1.5)
BUN SERPL-MCNC: 15 MG/DL (ref 6–20)
BUN/CREAT SERPL: 23.8 (ref 7–25)
CALCIUM SPEC-SCNC: 8.2 MG/DL (ref 8.6–10.5)
CHLORIDE SERPL-SCNC: 102 MMOL/L (ref 98–107)
CO2 SERPL-SCNC: 28.1 MMOL/L (ref 22–29)
CREAT SERPL-MCNC: 0.63 MG/DL (ref 0.57–1)
CRP SERPL-MCNC: 2.82 MG/DL (ref 0–0.5)
DEPRECATED RDW RBC AUTO: 41.3 FL (ref 37–54)
EOSINOPHIL # BLD AUTO: 0.12 10*3/MM3 (ref 0–0.4)
EOSINOPHIL NFR BLD AUTO: 1.2 % (ref 0.3–6.2)
ERYTHROCYTE [DISTWIDTH] IN BLOOD BY AUTOMATED COUNT: 12.6 % (ref 12.3–15.4)
GFR SERPL CREATININE-BSD FRML MDRD: 111 ML/MIN/1.73
GLUCOSE SERPL-MCNC: 98 MG/DL (ref 65–99)
HCT VFR BLD AUTO: 27.9 % (ref 34–46.6)
HCV GENTYP SERPL NAA+PROBE: NORMAL
HCV GENTYP SERPL NAA+PROBE: NORMAL
HCV RNA SERPL NAA+PROBE-ACNC: NORMAL IU/ML
HCV RNA SERPL NAA+PROBE-LOG IU: 6.65 LOG10 IU/ML
HGB BLD-MCNC: 9.1 G/DL (ref 12–15.9)
HYPOCHROMIA BLD QL: NORMAL
IMM GRANULOCYTES # BLD AUTO: 0.25 10*3/MM3 (ref 0–0.05)
IMM GRANULOCYTES NFR BLD AUTO: 2.5 % (ref 0–0.5)
LABORATORY COMMENT REPORT: NORMAL
LYMPHOCYTES # BLD AUTO: 2.31 10*3/MM3 (ref 0.7–3.1)
LYMPHOCYTES NFR BLD AUTO: 22.7 % (ref 19.6–45.3)
MAGNESIUM SERPL-MCNC: 1.5 MG/DL (ref 1.6–2.6)
MCH RBC QN AUTO: 29.4 PG (ref 26.6–33)
MCHC RBC AUTO-ENTMCNC: 32.6 G/DL (ref 31.5–35.7)
MCV RBC AUTO: 90.3 FL (ref 79–97)
MONOCYTES # BLD AUTO: 1.06 10*3/MM3 (ref 0.1–0.9)
MONOCYTES NFR BLD AUTO: 10.4 % (ref 5–12)
NEUTROPHILS NFR BLD AUTO: 6.33 10*3/MM3 (ref 1.7–7)
NEUTROPHILS NFR BLD AUTO: 62.3 % (ref 42.7–76)
NRBC BLD AUTO-RTO: 0 /100 WBC (ref 0–0.2)
PLATELET # BLD AUTO: 860 10*3/MM3 (ref 140–450)
PMV BLD AUTO: 9.3 FL (ref 6–12)
POTASSIUM SERPL-SCNC: 4.3 MMOL/L (ref 3.5–5.2)
RBC # BLD AUTO: 3.09 10*6/MM3 (ref 3.77–5.28)
REF LAB TEST REF RANGE: NORMAL
SMALL PLATELETS BLD QL SMEAR: NORMAL
SODIUM SERPL-SCNC: 136 MMOL/L (ref 136–145)
URATE SERPL-MCNC: 4.4 MG/DL (ref 2.4–5.7)
WBC # BLD AUTO: 10.16 10*3/MM3 (ref 3.4–10.8)

## 2021-06-26 PROCEDURE — 83735 ASSAY OF MAGNESIUM: CPT | Performed by: HOSPITALIST

## 2021-06-26 PROCEDURE — 99232 SBSQ HOSP IP/OBS MODERATE 35: CPT | Performed by: HOSPITALIST

## 2021-06-26 PROCEDURE — 84550 ASSAY OF BLOOD/URIC ACID: CPT | Performed by: HOSPITALIST

## 2021-06-26 PROCEDURE — 25010000003 CEFAZOLIN SODIUM-DEXTROSE 2-3 GM-%(50ML) RECONSTITUTED SOLUTION: Performed by: INTERNAL MEDICINE

## 2021-06-26 PROCEDURE — 25010000002 ENOXAPARIN PER 10 MG: Performed by: INTERNAL MEDICINE

## 2021-06-26 PROCEDURE — 85007 BL SMEAR W/DIFF WBC COUNT: CPT | Performed by: INTERNAL MEDICINE

## 2021-06-26 PROCEDURE — 85025 COMPLETE CBC W/AUTO DIFF WBC: CPT | Performed by: INTERNAL MEDICINE

## 2021-06-26 PROCEDURE — 86140 C-REACTIVE PROTEIN: CPT | Performed by: NURSE PRACTITIONER

## 2021-06-26 PROCEDURE — 25010000003 MAGNESIUM SULFATE 4 GM/100ML SOLUTION: Performed by: HOSPITALIST

## 2021-06-26 PROCEDURE — 80048 BASIC METABOLIC PNL TOTAL CA: CPT | Performed by: INTERNAL MEDICINE

## 2021-06-26 RX ORDER — MAGNESIUM SULFATE HEPTAHYDRATE 40 MG/ML
4 INJECTION, SOLUTION INTRAVENOUS ONCE
Status: COMPLETED | OUTPATIENT
Start: 2021-06-26 | End: 2021-06-26

## 2021-06-26 RX ORDER — IRON POLYSACCHARIDE COMPLEX 150 MG
150 CAPSULE ORAL DAILY
Status: DISCONTINUED | OUTPATIENT
Start: 2021-06-26 | End: 2021-07-23 | Stop reason: HOSPADM

## 2021-06-26 RX ADMIN — Medication 1 TABLET: at 10:25

## 2021-06-26 RX ADMIN — CEFAZOLIN SODIUM 2 G: 2 SOLUTION INTRAVENOUS at 21:10

## 2021-06-26 RX ADMIN — Medication 5 MG: at 21:10

## 2021-06-26 RX ADMIN — SODIUM CHLORIDE, PRESERVATIVE FREE 3 ML: 5 INJECTION INTRAVENOUS at 21:21

## 2021-06-26 RX ADMIN — Medication 150 MG: at 13:13

## 2021-06-26 RX ADMIN — CEFAZOLIN SODIUM 2 G: 2 SOLUTION INTRAVENOUS at 03:06

## 2021-06-26 RX ADMIN — FOLIC ACID 1 MG: 1 TABLET ORAL at 10:25

## 2021-06-26 RX ADMIN — HYDROCODONE BITARTRATE AND ACETAMINOPHEN 1 TABLET: 7.5; 325 TABLET ORAL at 21:10

## 2021-06-26 RX ADMIN — DOCUSATE SODIUM 100 MG: 100 CAPSULE, LIQUID FILLED ORAL at 10:26

## 2021-06-26 RX ADMIN — Medication 1 CAPSULE: at 10:25

## 2021-06-26 RX ADMIN — SODIUM CHLORIDE, PRESERVATIVE FREE 3 ML: 5 INJECTION INTRAVENOUS at 10:29

## 2021-06-26 RX ADMIN — SODIUM CHLORIDE, PRESERVATIVE FREE 10 ML: 5 INJECTION INTRAVENOUS at 10:30

## 2021-06-26 RX ADMIN — IBUPROFEN 400 MG: 400 TABLET, FILM COATED ORAL at 10:26

## 2021-06-26 RX ADMIN — MAGNESIUM SULFATE HEPTAHYDRATE 4 G: 40 INJECTION, SOLUTION INTRAVENOUS at 13:12

## 2021-06-26 RX ADMIN — HYDROCODONE BITARTRATE AND ACETAMINOPHEN 1 TABLET: 7.5; 325 TABLET ORAL at 11:18

## 2021-06-26 RX ADMIN — SODIUM CHLORIDE, PRESERVATIVE FREE 10 ML: 5 INJECTION INTRAVENOUS at 21:20

## 2021-06-26 RX ADMIN — CEFAZOLIN SODIUM 2 G: 2 SOLUTION INTRAVENOUS at 11:18

## 2021-06-26 RX ADMIN — ENOXAPARIN SODIUM 60 MG: 60 INJECTION SUBCUTANEOUS at 10:27

## 2021-06-26 RX ADMIN — POLYETHYLENE GLYCOL (3350) 17 G: 17 POWDER, FOR SOLUTION ORAL at 10:27

## 2021-06-27 LAB
CRP SERPL-MCNC: 2.43 MG/DL (ref 0–0.5)
MAGNESIUM SERPL-MCNC: 2.5 MG/DL (ref 1.6–2.6)

## 2021-06-27 PROCEDURE — 25010000003 CEFAZOLIN SODIUM-DEXTROSE 2-3 GM-%(50ML) RECONSTITUTED SOLUTION: Performed by: INTERNAL MEDICINE

## 2021-06-27 PROCEDURE — 25010000002 ENOXAPARIN PER 10 MG: Performed by: INTERNAL MEDICINE

## 2021-06-27 PROCEDURE — 83735 ASSAY OF MAGNESIUM: CPT | Performed by: HOSPITALIST

## 2021-06-27 PROCEDURE — 94799 UNLISTED PULMONARY SVC/PX: CPT

## 2021-06-27 PROCEDURE — 86140 C-REACTIVE PROTEIN: CPT | Performed by: NURSE PRACTITIONER

## 2021-06-27 PROCEDURE — 99231 SBSQ HOSP IP/OBS SF/LOW 25: CPT | Performed by: HOSPITALIST

## 2021-06-27 RX ADMIN — CEFAZOLIN SODIUM 2 G: 2 SOLUTION INTRAVENOUS at 19:54

## 2021-06-27 RX ADMIN — FOLIC ACID 1 MG: 1 TABLET ORAL at 08:26

## 2021-06-27 RX ADMIN — CEFAZOLIN SODIUM 2 G: 2 SOLUTION INTRAVENOUS at 03:03

## 2021-06-27 RX ADMIN — HYDROCODONE BITARTRATE AND ACETAMINOPHEN 1 TABLET: 7.5; 325 TABLET ORAL at 19:53

## 2021-06-27 RX ADMIN — IBUPROFEN 400 MG: 400 TABLET, FILM COATED ORAL at 03:03

## 2021-06-27 RX ADMIN — Medication 1 TABLET: at 08:26

## 2021-06-27 RX ADMIN — CEFAZOLIN SODIUM 2 G: 2 SOLUTION INTRAVENOUS at 12:11

## 2021-06-27 RX ADMIN — Medication 1 CAPSULE: at 08:26

## 2021-06-27 RX ADMIN — SODIUM CHLORIDE, PRESERVATIVE FREE 10 ML: 5 INJECTION INTRAVENOUS at 08:27

## 2021-06-27 RX ADMIN — Medication 5 MG: at 19:53

## 2021-06-27 RX ADMIN — IBUPROFEN 400 MG: 400 TABLET, FILM COATED ORAL at 08:26

## 2021-06-27 RX ADMIN — Medication 150 MG: at 08:26

## 2021-06-27 RX ADMIN — SODIUM CHLORIDE, PRESERVATIVE FREE 3 ML: 5 INJECTION INTRAVENOUS at 20:00

## 2021-06-27 RX ADMIN — ENOXAPARIN SODIUM 60 MG: 60 INJECTION SUBCUTANEOUS at 08:28

## 2021-06-27 RX ADMIN — SODIUM CHLORIDE, PRESERVATIVE FREE 10 ML: 5 INJECTION INTRAVENOUS at 19:54

## 2021-06-28 LAB — CRP SERPL-MCNC: 2.08 MG/DL (ref 0–0.5)

## 2021-06-28 PROCEDURE — 25010000003 CEFAZOLIN SODIUM-DEXTROSE 2-3 GM-%(50ML) RECONSTITUTED SOLUTION: Performed by: INTERNAL MEDICINE

## 2021-06-28 PROCEDURE — 86140 C-REACTIVE PROTEIN: CPT | Performed by: NURSE PRACTITIONER

## 2021-06-28 PROCEDURE — 99232 SBSQ HOSP IP/OBS MODERATE 35: CPT | Performed by: INTERNAL MEDICINE

## 2021-06-28 PROCEDURE — 25010000002 ENOXAPARIN PER 10 MG: Performed by: INTERNAL MEDICINE

## 2021-06-28 PROCEDURE — 94799 UNLISTED PULMONARY SVC/PX: CPT

## 2021-06-28 RX ADMIN — ENOXAPARIN SODIUM 60 MG: 60 INJECTION SUBCUTANEOUS at 08:52

## 2021-06-28 RX ADMIN — SODIUM CHLORIDE, PRESERVATIVE FREE 10 ML: 5 INJECTION INTRAVENOUS at 08:51

## 2021-06-28 RX ADMIN — CEFAZOLIN SODIUM 2 G: 2 SOLUTION INTRAVENOUS at 04:33

## 2021-06-28 RX ADMIN — FOLIC ACID 1 MG: 1 TABLET ORAL at 08:52

## 2021-06-28 RX ADMIN — IBUPROFEN 400 MG: 400 TABLET, FILM COATED ORAL at 08:52

## 2021-06-28 RX ADMIN — Medication 5 MG: at 20:45

## 2021-06-28 RX ADMIN — Medication 1 TABLET: at 08:52

## 2021-06-28 RX ADMIN — CEFAZOLIN SODIUM 2 G: 2 SOLUTION INTRAVENOUS at 12:45

## 2021-06-28 RX ADMIN — Medication 150 MG: at 08:52

## 2021-06-28 RX ADMIN — IBUPROFEN 400 MG: 400 TABLET, FILM COATED ORAL at 18:16

## 2021-06-28 RX ADMIN — CEFAZOLIN SODIUM 2 G: 2 SOLUTION INTRAVENOUS at 20:34

## 2021-06-28 RX ADMIN — SODIUM CHLORIDE, PRESERVATIVE FREE 10 ML: 5 INJECTION INTRAVENOUS at 20:35

## 2021-06-28 RX ADMIN — Medication 1 CAPSULE: at 08:52

## 2021-06-28 RX ADMIN — IBUPROFEN 400 MG: 400 TABLET, FILM COATED ORAL at 02:41

## 2021-06-28 RX ADMIN — SODIUM CHLORIDE, PRESERVATIVE FREE 3 ML: 5 INJECTION INTRAVENOUS at 20:35

## 2021-06-28 RX ADMIN — HYDROCODONE BITARTRATE AND ACETAMINOPHEN 1 TABLET: 7.5; 325 TABLET ORAL at 20:45

## 2021-06-29 LAB
ANION GAP SERPL CALCULATED.3IONS-SCNC: 6.7 MMOL/L (ref 5–15)
BACTERIA SPEC AEROBE CULT: NORMAL
BACTERIA SPEC AEROBE CULT: NORMAL
BASOPHILS # BLD AUTO: 0.13 10*3/MM3 (ref 0–0.2)
BASOPHILS NFR BLD AUTO: 1.3 % (ref 0–1.5)
BUN SERPL-MCNC: 16 MG/DL (ref 6–20)
BUN/CREAT SERPL: 23.5 (ref 7–25)
CALCIUM SPEC-SCNC: 8.6 MG/DL (ref 8.6–10.5)
CHLORIDE SERPL-SCNC: 107 MMOL/L (ref 98–107)
CO2 SERPL-SCNC: 25.3 MMOL/L (ref 22–29)
CREAT SERPL-MCNC: 0.68 MG/DL (ref 0.57–1)
CRP SERPL-MCNC: 1.47 MG/DL (ref 0–0.5)
DEPRECATED RDW RBC AUTO: 43.2 FL (ref 37–54)
EOSINOPHIL # BLD AUTO: 0.07 10*3/MM3 (ref 0–0.4)
EOSINOPHIL NFR BLD AUTO: 0.7 % (ref 0.3–6.2)
ERYTHROCYTE [DISTWIDTH] IN BLOOD BY AUTOMATED COUNT: 12.4 % (ref 12.3–15.4)
GFR SERPL CREATININE-BSD FRML MDRD: 102 ML/MIN/1.73
GLUCOSE SERPL-MCNC: 95 MG/DL (ref 65–99)
HCT VFR BLD AUTO: 31.3 % (ref 34–46.6)
HGB BLD-MCNC: 9.8 G/DL (ref 12–15.9)
IMM GRANULOCYTES # BLD AUTO: 0.21 10*3/MM3 (ref 0–0.05)
IMM GRANULOCYTES NFR BLD AUTO: 2.1 % (ref 0–0.5)
LYMPHOCYTES # BLD AUTO: 2.43 10*3/MM3 (ref 0.7–3.1)
LYMPHOCYTES NFR BLD AUTO: 24.9 % (ref 19.6–45.3)
MCH RBC QN AUTO: 29.5 PG (ref 26.6–33)
MCHC RBC AUTO-ENTMCNC: 31.3 G/DL (ref 31.5–35.7)
MCV RBC AUTO: 94.3 FL (ref 79–97)
MONOCYTES # BLD AUTO: 0.82 10*3/MM3 (ref 0.1–0.9)
MONOCYTES NFR BLD AUTO: 8.4 % (ref 5–12)
NEUTROPHILS NFR BLD AUTO: 6.11 10*3/MM3 (ref 1.7–7)
NEUTROPHILS NFR BLD AUTO: 62.6 % (ref 42.7–76)
NRBC BLD AUTO-RTO: 0 /100 WBC (ref 0–0.2)
PLATELET # BLD AUTO: 684 10*3/MM3 (ref 140–450)
PMV BLD AUTO: 10.9 FL (ref 6–12)
POTASSIUM SERPL-SCNC: 4.6 MMOL/L (ref 3.5–5.2)
RBC # BLD AUTO: 3.32 10*6/MM3 (ref 3.77–5.28)
SODIUM SERPL-SCNC: 139 MMOL/L (ref 136–145)
WBC # BLD AUTO: 9.77 10*3/MM3 (ref 3.4–10.8)

## 2021-06-29 PROCEDURE — 25010000003 CEFAZOLIN SODIUM-DEXTROSE 2-3 GM-%(50ML) RECONSTITUTED SOLUTION: Performed by: INTERNAL MEDICINE

## 2021-06-29 PROCEDURE — 25010000002 ENOXAPARIN PER 10 MG: Performed by: INTERNAL MEDICINE

## 2021-06-29 PROCEDURE — 99231 SBSQ HOSP IP/OBS SF/LOW 25: CPT | Performed by: INTERNAL MEDICINE

## 2021-06-29 PROCEDURE — 94799 UNLISTED PULMONARY SVC/PX: CPT

## 2021-06-29 PROCEDURE — 85025 COMPLETE CBC W/AUTO DIFF WBC: CPT | Performed by: INTERNAL MEDICINE

## 2021-06-29 PROCEDURE — 80048 BASIC METABOLIC PNL TOTAL CA: CPT | Performed by: INTERNAL MEDICINE

## 2021-06-29 PROCEDURE — 86140 C-REACTIVE PROTEIN: CPT | Performed by: NURSE PRACTITIONER

## 2021-06-29 RX ADMIN — IBUPROFEN 400 MG: 400 TABLET, FILM COATED ORAL at 14:26

## 2021-06-29 RX ADMIN — SODIUM CHLORIDE, PRESERVATIVE FREE 10 ML: 5 INJECTION INTRAVENOUS at 08:56

## 2021-06-29 RX ADMIN — SODIUM CHLORIDE, PRESERVATIVE FREE 3 ML: 5 INJECTION INTRAVENOUS at 08:56

## 2021-06-29 RX ADMIN — CEFAZOLIN SODIUM 2 G: 2 SOLUTION INTRAVENOUS at 11:59

## 2021-06-29 RX ADMIN — SODIUM CHLORIDE, PRESERVATIVE FREE 3 ML: 5 INJECTION INTRAVENOUS at 21:05

## 2021-06-29 RX ADMIN — ENOXAPARIN SODIUM 60 MG: 60 INJECTION SUBCUTANEOUS at 08:53

## 2021-06-29 RX ADMIN — CEFAZOLIN SODIUM 2 G: 2 SOLUTION INTRAVENOUS at 21:04

## 2021-06-29 RX ADMIN — SODIUM CHLORIDE, PRESERVATIVE FREE 10 ML: 5 INJECTION INTRAVENOUS at 21:04

## 2021-06-29 RX ADMIN — CEFAZOLIN SODIUM 2 G: 2 SOLUTION INTRAVENOUS at 03:27

## 2021-06-30 LAB — CRP SERPL-MCNC: 1.21 MG/DL (ref 0–0.5)

## 2021-06-30 PROCEDURE — 99231 SBSQ HOSP IP/OBS SF/LOW 25: CPT | Performed by: INTERNAL MEDICINE

## 2021-06-30 PROCEDURE — 25010000003 CEFAZOLIN SODIUM-DEXTROSE 2-3 GM-%(50ML) RECONSTITUTED SOLUTION: Performed by: INTERNAL MEDICINE

## 2021-06-30 PROCEDURE — 25010000002 ENOXAPARIN PER 10 MG: Performed by: INTERNAL MEDICINE

## 2021-06-30 PROCEDURE — 86140 C-REACTIVE PROTEIN: CPT | Performed by: NURSE PRACTITIONER

## 2021-06-30 RX ADMIN — IBUPROFEN 400 MG: 400 TABLET, FILM COATED ORAL at 10:47

## 2021-06-30 RX ADMIN — FOLIC ACID 1 MG: 1 TABLET ORAL at 09:09

## 2021-06-30 RX ADMIN — DOCUSATE SODIUM 100 MG: 100 CAPSULE, LIQUID FILLED ORAL at 09:09

## 2021-06-30 RX ADMIN — IBUPROFEN 400 MG: 400 TABLET, FILM COATED ORAL at 00:56

## 2021-06-30 RX ADMIN — ENOXAPARIN SODIUM 60 MG: 60 INJECTION SUBCUTANEOUS at 09:10

## 2021-06-30 RX ADMIN — SODIUM CHLORIDE, PRESERVATIVE FREE 10 ML: 5 INJECTION INTRAVENOUS at 21:00

## 2021-06-30 RX ADMIN — POLYETHYLENE GLYCOL (3350) 17 G: 17 POWDER, FOR SOLUTION ORAL at 09:08

## 2021-06-30 RX ADMIN — CEFAZOLIN SODIUM 2 G: 2 SOLUTION INTRAVENOUS at 05:12

## 2021-06-30 RX ADMIN — SODIUM CHLORIDE, PRESERVATIVE FREE 10 ML: 5 INJECTION INTRAVENOUS at 09:11

## 2021-06-30 RX ADMIN — HYDROCODONE BITARTRATE AND ACETAMINOPHEN 1 TABLET: 7.5; 325 TABLET ORAL at 23:25

## 2021-06-30 RX ADMIN — Medication 150 MG: at 09:10

## 2021-06-30 RX ADMIN — SODIUM CHLORIDE, PRESERVATIVE FREE 3 ML: 5 INJECTION INTRAVENOUS at 09:11

## 2021-06-30 RX ADMIN — Medication 1 TABLET: at 09:09

## 2021-06-30 RX ADMIN — CEFAZOLIN SODIUM 2 G: 2 SOLUTION INTRAVENOUS at 21:00

## 2021-06-30 RX ADMIN — CEFAZOLIN SODIUM 2 G: 2 SOLUTION INTRAVENOUS at 12:50

## 2021-06-30 RX ADMIN — SODIUM CHLORIDE, PRESERVATIVE FREE 3 ML: 5 INJECTION INTRAVENOUS at 21:00

## 2021-06-30 RX ADMIN — HYDROCODONE BITARTRATE AND ACETAMINOPHEN 1 TABLET: 7.5; 325 TABLET ORAL at 13:34

## 2021-06-30 RX ADMIN — Medication 1 CAPSULE: at 09:10

## 2021-07-01 LAB — CRP SERPL-MCNC: 1 MG/DL (ref 0–0.5)

## 2021-07-01 PROCEDURE — 99231 SBSQ HOSP IP/OBS SF/LOW 25: CPT | Performed by: INTERNAL MEDICINE

## 2021-07-01 PROCEDURE — 94799 UNLISTED PULMONARY SVC/PX: CPT

## 2021-07-01 PROCEDURE — 25010000003 CEFAZOLIN SODIUM-DEXTROSE 2-3 GM-%(50ML) RECONSTITUTED SOLUTION: Performed by: INTERNAL MEDICINE

## 2021-07-01 PROCEDURE — 25010000002 ENOXAPARIN PER 10 MG: Performed by: INTERNAL MEDICINE

## 2021-07-01 PROCEDURE — 86140 C-REACTIVE PROTEIN: CPT | Performed by: NURSE PRACTITIONER

## 2021-07-01 RX ADMIN — CEFAZOLIN SODIUM 2 G: 2 SOLUTION INTRAVENOUS at 20:50

## 2021-07-01 RX ADMIN — CEFAZOLIN SODIUM 2 G: 2 SOLUTION INTRAVENOUS at 11:52

## 2021-07-01 RX ADMIN — SODIUM CHLORIDE, PRESERVATIVE FREE 3 ML: 5 INJECTION INTRAVENOUS at 20:50

## 2021-07-01 RX ADMIN — ENOXAPARIN SODIUM 60 MG: 60 INJECTION SUBCUTANEOUS at 08:40

## 2021-07-01 RX ADMIN — CEFAZOLIN SODIUM 2 G: 2 SOLUTION INTRAVENOUS at 05:28

## 2021-07-01 RX ADMIN — IBUPROFEN 400 MG: 400 TABLET, FILM COATED ORAL at 12:45

## 2021-07-01 RX ADMIN — HYDROCODONE BITARTRATE AND ACETAMINOPHEN 1 TABLET: 7.5; 325 TABLET ORAL at 17:49

## 2021-07-01 RX ADMIN — SODIUM CHLORIDE, PRESERVATIVE FREE 10 ML: 5 INJECTION INTRAVENOUS at 20:50

## 2021-07-01 RX ADMIN — Medication 1 TABLET: at 08:40

## 2021-07-01 RX ADMIN — FOLIC ACID 1 MG: 1 TABLET ORAL at 08:41

## 2021-07-01 RX ADMIN — Medication 5 MG: at 22:29

## 2021-07-01 RX ADMIN — SODIUM CHLORIDE, PRESERVATIVE FREE 10 ML: 5 INJECTION INTRAVENOUS at 08:41

## 2021-07-01 RX ADMIN — SODIUM CHLORIDE, PRESERVATIVE FREE 3 ML: 5 INJECTION INTRAVENOUS at 08:41

## 2021-07-01 RX ADMIN — Medication 150 MG: at 08:40

## 2021-07-01 RX ADMIN — Medication 1 CAPSULE: at 08:40

## 2021-07-01 NOTE — PROGRESS NOTES
PROGRESS NOTE         Patient Identification:  Name:  Marce Gutierrez  Age:  31 y.o.  Sex:  female  :  1990  MRN:  [id number]  Visit Number:  [id number]  Primary Care Provider:  Provider, No Known         LOS: 13 days       ----------------------------------------------------------------------------------------------------------------------  Subjective       Chief Complaints:    Leg Pain and Arm Pain        Interval History:      Patient is sitting up in bed in no apparent distress.  Nurses are at bedside.  Complains of right knee pain, without any evidence of erythema or edema.  Range of motion is intact and patient ambulates around the room regularly.  Afebrile, no diarrhea.  KATHRIN is on hold as anesthesia wishes to wait until the patient is 20 days out from initial positive COVID-19 test.  CRP is improved at 1.00.    Review of Systems:    Constitutional: no fever, chills and night sweats. No appetite change or unexpected weight change. No fatigue.  Eyes: no eye drainage, itching or redness.  HEENT: no mouth sores, dysphagia or nose bleed.  Respiratory: no for shortness of breath, cough or production of sputum.  Cardiovascular: no chest pain, no palpitations, no orthopnea.  Gastrointestinal: no nausea, vomiting or diarrhea. No abdominal pain, hematemesis or rectal bleeding.  Genitourinary: no dysuria or polyuria.  Hematologic/lymphatic: no lymph node abnormalities, no easy bruising or easy bleeding.  Musculoskeletal:  Right knee pain.  Skin: No rash and no itching.  Neurological: no loss of consciousness, no seizure, no headache.  Behavioral/Psych: no depression or suicidal ideation.  Endocrine: no hot flashes.  Immunologic: negative.     ----------------------------------------------------------------------------------------------------------------------      Objective       [facility] Meds:  ceFAZolin, 2 g, Intravenous, Q8H  docusate sodium, 100 mg, Oral, BID  enoxaparin, 1 mg/kg,  Subcutaneous, Daily  folic acid, 1 mg, Oral, Daily  iron polysaccharides, 150 mg, Oral, Daily  lactobacillus acidophilus, 1 capsule, Oral, Daily  multivitamin with minerals, 1 tablet, Oral, Daily  polyethylene glycol, 17 g, Oral, Daily  sodium chloride, 10 mL, Intravenous, Q12H  sodium chloride, 3 mL, Intravenous, Q12H         ----------------------------------------------------------------------------------------------------------------------    Vital Signs:  Temp:  [97.8 °F (36.6 °C)-98.3 °F (36.8 °C)] 98.3 °F (36.8 °C)  Heart Rate:  [] 100  Resp:  [14-23] 14  BP: (103-135)/(65-98) 135/89  Mean Arterial Pressure (Non-Invasive) for the past 24 hrs (Last 3 readings):   Noninvasive MAP (mmHg)   07/01/21 1002 105   07/01/21 0942 95   07/01/21 0847 92     SpO2 Percentage    07/01/21 0847 07/01/21 0942 07/01/21 1002   SpO2: 95% 98% 91%     SpO2:  [91 %-100 %] 91 %  on   ;   Device (Oxygen Therapy): room air    Body mass index is 21.56 kg/m².  Wt Readings from Last 3 Encounters:   07/01/21 60.6 kg (133 lb 9.6 oz)   10/30/19 63.5 kg (140 lb)        Intake/Output Summary (Last 24 hours) at 7/1/2021 1336  Last data filed at 7/1/2021 0846  Gross per 24 hour   Intake 360 ml   Output --   Net 360 ml     Diet Regular  ----------------------------------------------------------------------------------------------------------------------      Physical Exam:    Constitutional:  Well-developed and well-nourished.  No respiratory distress.      HENT:  Head: Normocephalic and atraumatic.  Mouth:  Moist mucous membranes.    Eyes:  Conjunctivae and EOM are normal.  No scleral icterus.  Neck:  Neck supple.  No JVD present.  Cardiovascular:  Normal rate, regular rhythm and normal heart sounds with no murmur. No edema.  Pulmonary/Chest:  No respiratory distress, no wheezes, no crackles, with normal breath sounds and good air movement.  Abdominal:  Soft.  Bowel sounds are normal.  No distension and no tenderness.   Musculoskeletal:   No edema, no tenderness, and no deformity.  No swelling or redness of joints.  Neurological:  Alert and oriented to person, place, and time.  No facial droop.  No slurred speech.   Skin:  Skin is warm and dry.  No rash noted.  No pallor.   Psychiatric:  Normal mood and affect.  Behavior is normal.  ----------------------------------------------------------------------------------------------------------------------              Results from last 7 days   Lab Units 07/01/21  0150 06/30/21  0055 06/29/21  0042 06/26/21 0258   CRP mg/dL 1.00* 1.21* 1.47* 2.82*   WBC 10*3/mm3  --   --  9.77 10.16   HEMOGLOBIN g/dL  --   --  9.8* 9.1*   HEMATOCRIT %  --   --  31.3* 27.9*   MCV fL  --   --  94.3 90.3   MCHC g/dL  --   --  31.3* 32.6   PLATELETS 10*3/mm3  --   --  684* 860*     Results from last 7 days   Lab Units 06/29/21  0042 06/27/21  0317 06/26/21  0258 06/25/21  0222   SODIUM mmol/L 139  --  136 139   POTASSIUM mmol/L 4.6  --  4.3 4.5   MAGNESIUM mg/dL  --  2.5 1.5*  --    CHLORIDE mmol/L 107  --  102 105   CO2 mmol/L 25.3  --  28.1 26.5   BUN mg/dL 16  --  15 15   CREATININE mg/dL 0.68  --  0.63 0.64   EGFR IF NONAFRICN AM mL/min/1.73 102  --  111 109   CALCIUM mg/dL 8.6  --  8.2* 8.4*   GLUCOSE mg/dL 95  --  98 114*   ALBUMIN g/dL  --   --   --  1.99*   BILIRUBIN mg/dL  --   --   --  <0.2   ALK PHOS U/L  --   --   --  155*   AST (SGOT) U/L  --   --   --  22   ALT (SGPT) U/L  --   --   --  20   Estimated Creatinine Clearance: 115.7 mL/min (by C-G formula based on SCr of 0.68 mg/dL).  No results found for: AMMONIA    No results found for: HGBA1C, POCGLU  Lab Results   Component Value Date    HGBA1C 5.60 06/17/2021     Lab Results   Component Value Date    TSH 0.718 06/17/2021       Blood Culture   Date Value Ref Range Status   06/20/2021 Abnormal Stain (C)  Preliminary   06/19/2021 Staphylococcus aureus (C)  Final     Comment:     Infectious disease consultation is highly recommended to rule out distant foci of  infection.   06/17/2021 Staphylococcus aureus (C)  Final     Comment:     Infectious disease consultation is highly recommended to rule out distant foci of infection.   06/17/2021 Staphylococcus aureus (C)  Final     Comment:     Infectious disease consultation is highly recommended to rule out distant foci of infection.  Refer to previous blood culture collected on 6/17/2021 2318 for MICs.     Urine Culture   Date Value Ref Range Status   06/17/2021 >100,000 CFU/mL Escherichia coli (A)  Final   06/17/2021 >100,000 CFU/mL Staphylococcus aureus (A)  Corrected     No results found for: WOUNDCX  No results found for: STOOLCX  No results found for: RESPCX  Pain Management Panel       Pain Management Panel Latest Ref Rng & Units 6/17/2021 10/30/2019    AMPHETAMINES SCREEN, URINE Negative Positive(A) Negative    BARBITURATES SCREEN Negative Negative Negative    BENZODIAZEPINE SCREEN, URINE Negative Negative Negative    BUPRENORPHINEUR Negative Negative Negative    COCAINE SCREEN, URINE Negative Negative Negative    METHADONE SCREEN, URINE Negative Negative Negative              ----------------------------------------------------------------------------------------------------------------------  Imaging Results (Last 24 Hours)       ** No results found for the last 24 hours. **            ----------------------------------------------------------------------------------------------------------------------    Assessment/Plan       Assessment/Plan     ASSESSMENT:    1.  Severe sepsis with lactic acid greater than 2 on admission  2.  Complicated MSSA bacteremia  3.  Likely underlying endocarditis  4.  Septic emboli  5.  Incidental COVID-19    PLAN:    Patient is sitting up in bed in no apparent distress.  Nurses are at bedside.  Complains of right knee pain, without any evidence of erythema or edema.  Range of motion is intact and patient ambulates around the room regularly.  Afebrile, no diarrhea.  KATHRIN is on hold as  anesthesia wishes to wait until the patient is 20 days out from initial positive COVID-19 test.  CRP is improved at 1.00.    Blood cultures from 6/24/2021 finalized as no growth. Blood culture from 6/22/2021 1 out of 1 set positive for MSSA.    Urinalysis on 6/17/2021 was positive and urine culture finalized as greater than 100,000 colonies of E. coli and greater than 100,000 colonies of MRSA.  Blood cultures on 6/17/2021 finalized as MSSA.  Repeat blood cultures on 6/19/2021 and 6/20/2021 are still showing growth.  COVID-19 and flu A/B PCR on 6/18/2021 detected COVID-19.  Mycoplasma pneumoniae antibody on 6/18/2021 was negative.  Strep pneumo antigen on 6/18/2021 was negative.  CT chest with PE protocol on 6/18/2021 showed technically degraded exam, but no definite PE is seen, and there is no aortic dissection.  Pulmonary edema with a trace amount of right pleural fluid.  Poorly defined nodular infiltrates on both sides of the chest suspicious for septic emboli.  Continued follow-up is recommended.     Contacted micro lab regarding urine culture on 6/17/2021.  Urine culture has finalized as MSSA, but MRSA verbiage was included underneath and micro lab corrected.    COVID-19 diagnosis was incidental and patient remains asymptomatic.  Patient is now out of isolation as of 6/28/2021.    Recommend to continue Cefazolin monotherapy. We will follow closely and adjust antibiotic therapy as appropriate.  Would recommend KATHRIN, as patient is able to be removed from isolation and has completed 10 days of isolation and remained asymptomatic.     Recommend possible orthopedic consult for evaluation of right knee pain.      Code Status:   Code Status and Medical Interventions:   Ordered at: 06/18/21 0446     Level Of Support Discussed With:    Patient     Code Status:    CPR     Medical Interventions (Level of Support Prior to Arrest):    Full       SYL Jiménez  07/01/21  13:36 EDT

## 2021-07-01 NOTE — PROGRESS NOTES
[facility] HOSPITALIST PROGRESS NOTE     Patient Identification:  Name:  Marce Gutierrez  Age:  31 y.o.  Sex:  female  :  1990  MRN:  [id number]  Visit Number:  [id number]  ROOM: [place] [address]     Primary Care Provider:  Provider, No Known    Length of stay in inpatient status:  13    Subjective     Chief Compliant:    Chief Complaint   Patient presents with   • Leg Pain   • Arm Pain       History of Presenting Illness:    Patient reports right knee and some mild back pain. She reports she feels stiff because she hasn't been out of bed much. No redness, warmth, or swelling of any joints.     ROS:  Otherwise 10 point ROS negative other than documented above in HPI.     Objective     Current Hospital Meds:ceFAZolin, 2 g, Intravenous, Q8H  docusate sodium, 100 mg, Oral, BID  enoxaparin, 1 mg/kg, Subcutaneous, Daily  folic acid, 1 mg, Oral, Daily  iron polysaccharides, 150 mg, Oral, Daily  lactobacillus acidophilus, 1 capsule, Oral, Daily  multivitamin with minerals, 1 tablet, Oral, Daily  polyethylene glycol, 17 g, Oral, Daily  sodium chloride, 10 mL, Intravenous, Q12H  sodium chloride, 3 mL, Intravenous, Q12H         Current Antimicrobial Therapy:  Anti-Infectives (From admission, onward)    Ordered     Dose/Rate Route Frequency Start Stop    21 1104  ceFAZolin Sodium-Dextrose (ANCEF) IVPB (duplex) 2 g     Donna Carcamo APRN reviewed the order on 21 1249.   Ordering Provider: Ila Gold MD    2 g  over 30 Minutes Intravenous Every 8 Hours 21 1200 21 1159    21 2305  vancomycin 1 g/250 mL 0.9% NS (vial-mate)     Ordering Provider: Basilia Villanueva DO    20 mg/kg × 54.4 kg Intravenous Once 21 2307 21 0207    21 2305  piperacillin-tazobactam (ZOSYN) 3.375 g/100 mL 0.9% NS IVPB (mbp)     Ordering Provider: Basilia Villanueva DO    3.375 g Intravenous Once 21 2307 21 0040        Current Diuretic Therapy:  Diuretics (From admission,  onward)    None        ----------------------------------------------------------------------------------------------------------------------  Vital Signs:  Temp:  [98 °F (36.7 °C)-98.3 °F (36.8 °C)] 98.3 °F (36.8 °C)  Heart Rate:  [] 85  Resp:  [14-23] 16  BP: (103-139)/(65-98) 139/87  SpO2:  [91 %-100 %] 100 %  on   ;   Device (Oxygen Therapy): room air  Body mass index is 21.56 kg/m².    Wt Readings from Last 3 Encounters:   07/01/21 60.6 kg (133 lb 9.6 oz)   10/30/19 63.5 kg (140 lb)     Intake & Output (last 3 days)       06/28 0701 - 06/29 0700 06/29 0701 - 06/30 0700 06/30 0701 - 07/01 0700 07/01 0701 - 07/02 0700    P.O. 794 600 480 360    I.V. (mL/kg) 95 (1.6) 99.6 (1.7)      Total Intake(mL/kg) 889 (14.5) 699.6 (11.6) 480 (7.9) 360 (5.9)    Urine (mL/kg/hr) 400 (0.3)       Total Output 400       Net +489 +699.6 +480 +360            Urine Unmeasured Occurrence 4 x 5 x 1 x         Diet Regular  ----------------------------------------------------------------------------------------------------------------------  Physical exam:  Constitutional:  Well-developed and well-nourished.  No respiratory distress.      HENT:  Head:  Normocephalic and atraumatic.  Mouth:  Moist mucous membranes.    Eyes:  Conjunctivae and EOM are normal. No scleral icterus.    Neck:  Neck supple.  No JVD present.    Cardiovascular:  Normal rate, regular rhythm and normal heart sounds with no murmur.  Pulmonary/Chest:  No respiratory distress, no wheezes, no crackles, with normal breath sounds and good air movement.  Abdominal:  Soft.  Bowel sounds are normal.  No distension and no tenderness.   Musculoskeletal:  No edema, no tenderness, and no deformity.  No red or swollen joints anywhere.    Neurological:  Alert and oriented to person, place, and time.  No cranial nerve deficit.  No tongue deviation.  No facial droop.  No slurred speech.   Skin:  Skin is warm and dry. No rash noted. No pallor.   Peripheral vascular:  Pulses in  all 4 extremities with no clubbing, no cyanosis, no edema.  ----------------------------------------------------------------------------------------------------------------------  Tele:    ----------------------------------------------------------------------------------------------------------------------  Results from last 7 days   Lab Units 07/01/21  0150 06/30/21  0055 06/29/21 0042 06/26/21 0258   CRP mg/dL 1.00* 1.21* 1.47* 2.82*   WBC 10*3/mm3  --   --  9.77 10.16   HEMOGLOBIN g/dL  --   --  9.8* 9.1*   HEMATOCRIT %  --   --  31.3* 27.9*   MCV fL  --   --  94.3 90.3   MCHC g/dL  --   --  31.3* 32.6   PLATELETS 10*3/mm3  --   --  684* 860*         Results from last 7 days   Lab Units 06/29/21  0042 06/27/21 0317 06/26/21 0258 06/25/21 0222   SODIUM mmol/L 139  --  136 139   POTASSIUM mmol/L 4.6  --  4.3 4.5   MAGNESIUM mg/dL  --  2.5 1.5*  --    CHLORIDE mmol/L 107  --  102 105   CO2 mmol/L 25.3  --  28.1 26.5   BUN mg/dL 16  --  15 15   CREATININE mg/dL 0.68  --  0.63 0.64   EGFR IF NONAFRICN AM mL/min/1.73 102  --  111 109   CALCIUM mg/dL 8.6  --  8.2* 8.4*   GLUCOSE mg/dL 95  --  98 114*   ALBUMIN g/dL  --   --   --  1.99*   BILIRUBIN mg/dL  --   --   --  <0.2   ALK PHOS U/L  --   --   --  155*   AST (SGOT) U/L  --   --   --  22   ALT (SGPT) U/L  --   --   --  20   Estimated Creatinine Clearance: 115.7 mL/min (by C-G formula based on SCr of 0.68 mg/dL).  No results found for: AMMONIA              No results found for: HGBA1C, POCGLU  Lab Results   Component Value Date    TSH 0.718 06/17/2021     Lab Results   Component Value Date    PREGTESTUR Negative 06/17/2021     Pain Management Panel     Pain Management Panel Latest Ref Rng & Units 6/17/2021 10/30/2019    AMPHETAMINES SCREEN, URINE Negative Positive(A) Negative    BARBITURATES SCREEN Negative Negative Negative    BENZODIAZEPINE SCREEN, URINE Negative Negative Negative    BUPRENORPHINEUR Negative Negative Negative    COCAINE SCREEN, URINE Negative  Negative Negative    METHADONE SCREEN, URINE Negative Negative Negative        Brief Urine Lab Results  (Last result in the past 365 days)      Color   Clarity   Blood   Leuk Est   Nitrite   Protein   CREAT   Urine HCG        06/17/21 2253 Dark Yellow Cloudy Large (3+) Moderate (2+) Positive 30 mg/dL (1+)         06/17/21 2253               Negative         No results found for: BLOODCX  No results found for: URINECX  No results found for: WOUNDCX  No results found for: STOOLCX  No results found for: RESPCX  No results found for: AFBCX  Results from last 7 days   Lab Units 07/01/21  0150 06/30/21  0055 06/29/21  0042 06/28/21  0113 06/27/21  0317 06/26/21  0258 06/25/21  0222   CRP mg/dL 1.00* 1.21* 1.47* 2.08* 2.43* 2.82* 4.02*       I have personally looked at the labs and they are summarized above.  ----------------------------------------------------------------------------------------------------------------------  Detailed radiology reports for the last 24 hours:    Imaging Results (Last 24 Hours)     ** No results found for the last 24 hours. **        Assessment & Plan    #Sepsis in setting of MSSA bacteremia and E. Coli/MSSA UTI  #Probable endocarditis w/ septic emboli  #Hx of IVDU  - Blood cultures initially persistently positive. Blood cultures from 6/24 NGTD  - ID following. Continue IV cefazolin. CRP improved.   - Consulted cardiology for KATHRIN now patient is out of isolation. Anesthesia electing to wait until 20 day marker prior to procedure.   - Continue care consulted for IV abx given hx of IVDU.     #Hypomagnesemia  - Replacement ordered    #BARRY and AOCD  - Hemoglobin stable. Continue niferex    #L ankle cellulitus and sprain   - Improved     #Hepatitis C 1a genotype, HCV [id number]  - Outpatient f/u     #COVID 19  - Asymptomatic. Has completed 10 days of isolation     F: PO  E: Replace as needed   N: Regular     Code status: Full     Dispo: Pending clinical improvement       VTE Prophylaxis:    Mechanical Order History:     None      Pharmalogical Order History:      Ordered     Dose Route Frequency Stop    06/21/21 1157  enoxaparin (LOVENOX) syringe 60 mg      1 mg/kg SC Daily --    06/18/21 0528  enoxaparin (LOVENOX) syringe 50 mg  Status:  Discontinued      1 mg/kg SC Daily 06/21/21 1157    06/18/21 0524  Pharmacy to Dose enoxaparin (LOVENOX)  Status:  Discontinued     Question:  Indication of use  Answer:  Prophylaxis    -- XX Continuous PRN 06/20/21 1211                Otis De La Fuente MD  [facility]  07/01/21  16:36 EDT

## 2021-07-01 NOTE — PLAN OF CARE
Problem: Adult Inpatient Plan of Care  Goal: Plan of Care Review  Outcome: Ongoing, Progressing  Flowsheets  Taken 7/1/2021 1515 by Essie Grande RN  Progress: no change  Outcome Summary: VSS. Patient on room air. Pt complained of intermittent back/knee pain relieved with ibuprofen. No othe complaints at this time. Call light within reach.  Taken 6/25/2021 1634 by Devora Sam RN  Plan of Care Reviewed With: patient  Goal: Patient-Specific Goal (Individualized)  Outcome: Ongoing, Progressing  Goal: Absence of Hospital-Acquired Illness or Injury  Outcome: Ongoing, Progressing  Intervention: Identify and Manage Fall Risk  Recent Flowsheet Documentation  Taken 7/1/2021 1500 by Essie Grande RN  Safety Promotion/Fall Prevention: safety round/check completed  Taken 7/1/2021 1300 by Essie Grande RN  Safety Promotion/Fall Prevention: safety round/check completed  Taken 7/1/2021 1100 by Essie Grande RN  Safety Promotion/Fall Prevention: safety round/check completed  Taken 7/1/2021 0900 by Essie Grande RN  Safety Promotion/Fall Prevention: safety round/check completed  Taken 7/1/2021 0700 by Essie Garnde RN  Safety Promotion/Fall Prevention: safety round/check completed  Intervention: Prevent Infection  Recent Flowsheet Documentation  Taken 7/1/2021 1500 by Essie Grande RN  Infection Prevention:   rest/sleep promoted   single patient room provided  Taken 7/1/2021 1300 by Essie Grande RN  Infection Prevention:   single patient room provided   rest/sleep promoted  Taken 7/1/2021 1100 by Essie Grande RN  Infection Prevention:   rest/sleep promoted   single patient room provided  Taken 7/1/2021 0900 by Essie Grande RN  Infection Prevention:   rest/sleep promoted   single patient room provided  Taken 7/1/2021 0700 by Essie Grande RN  Infection Prevention:   rest/sleep promoted   single patient room provided  Goal: Optimal Comfort and Wellbeing  Outcome: Ongoing, Progressing  Intervention: Provide  Person-Centered Care  Recent Flowsheet Documentation  Taken 7/1/2021 1430 by Essie Grande RN  Trust Relationship/Rapport:   care explained   choices provided   emotional support provided   empathic listening provided   questions answered   questions encouraged   reassurance provided   thoughts/feelings acknowledged  Goal: Readiness for Transition of Care  Outcome: Ongoing, Progressing     Problem: Fall Injury Risk  Goal: Absence of Fall and Fall-Related Injury  Outcome: Ongoing, Progressing  Intervention: Identify and Manage Contributors to Fall Injury Risk  Recent Flowsheet Documentation  Taken 7/1/2021 1500 by Essie Grande RN  Medication Review/Management: medications reviewed  Taken 7/1/2021 1300 by Essie Grande RN  Medication Review/Management: medications reviewed  Taken 7/1/2021 1100 by Essie Grande RN  Medication Review/Management: medications reviewed  Taken 7/1/2021 0900 by Essie Grande RN  Medication Review/Management: medications reviewed  Taken 7/1/2021 0700 by Essie Grande RN  Medication Review/Management: medications reviewed  Intervention: Promote Injury-Free Environment  Recent Flowsheet Documentation  Taken 7/1/2021 1500 by Essie Grande RN  Safety Promotion/Fall Prevention: safety round/check completed  Taken 7/1/2021 1300 by Essie Grande RN  Safety Promotion/Fall Prevention: safety round/check completed  Taken 7/1/2021 1100 by Essie Grande RN  Safety Promotion/Fall Prevention: safety round/check completed  Taken 7/1/2021 0900 by Essie Grande RN  Safety Promotion/Fall Prevention: safety round/check completed  Taken 7/1/2021 0700 by Essie Grande RN  Safety Promotion/Fall Prevention: safety round/check completed     Problem: Skin Injury Risk Increased  Goal: Skin Health and Integrity  Outcome: Ongoing, Progressing     Problem: Skin or Soft Tissue Infection  Goal: Infection Symptom Resolution  Outcome: Ongoing, Progressing  Intervention: Provide Meticulous Infection Site  Care  Recent Flowsheet Documentation  Taken 7/1/2021 1500 by Essie Grande RN  Infection Prevention:   rest/sleep promoted   single patient room provided  Taken 7/1/2021 1300 by Essie Grande RN  Infection Prevention:   single patient room provided   rest/sleep promoted  Taken 7/1/2021 1100 by Essie Grande RN  Infection Prevention:   rest/sleep promoted   single patient room provided  Taken 7/1/2021 0900 by Essie Grande RN  Infection Prevention:   rest/sleep promoted   single patient room provided  Taken 7/1/2021 0700 by Essie Grande RN  Infection Prevention:   rest/sleep promoted   single patient room provided   Goal Outcome Evaluation:           Progress: no change  Outcome Summary: VSS. Patient on room air. Pt complained of intermittent back/knee pain relieved with ibuprofen. No othe complaints at this time. Call light within reach.

## 2021-07-01 NOTE — PROGRESS NOTES
Discharge Planning Assessment  TAYLOR Antonio     Patient Name: Marce Gutierrez  MRN: [id number]  Today's Date: 7/1/2021    Admit Date: 6/17/2021        Discharge Plan     Row Name 07/01/21 1635       Plan    Plan  SS spoke with Tasneem in [facility] who reported that Pt had been denied by insurance. SS to set up a peer to peer to dispute the denial. SS will continue to follow and assist with discharge planning.    Patient/Family in Agreement with Plan  yes          [facility]

## 2021-07-01 NOTE — PAYOR COMM NOTE
"CONTACT:  JOSE VARGHESE MSN, APRN  UTILIZATION MANAGEMENT DEPT.  [facility]  [address] [facility], [address]  PHONE:  [phone]  FAX: [phone]    CLINICAL UPDATE    REFER TO AUTH # [id number]    Tran Santiago (31 y.o. Female)     Date of Birth Social Security Number Address Home Phone MRN    1990  [address] [id number]  [id number]    Yazidism Marital Status          Non-Lutheran Single       Admission Date Admission Type Admitting Provider Attending Provider Department, Room/Bed    6/17/21 Emergency Nicci Valdez MD Hacker, Bill J, MD [facility] PROGRESS CARE, P204/S2    Discharge Date Discharge Disposition Discharge Destination                       Attending Provider: Otis De La Fuente MD    Allergies: Cinnamon    Isolation: None   Infection: COVID (History) (06/28/21)   Code Status: CPR    Ht: 167.6 cm (66\")   Wt: 60.6 kg (133 lb 9.6 oz)    Admission Cmt: None   Principal Problem: Endocarditis [I38]                 Active Insurance as of 6/17/2021     Primary Coverage     Payor Plan Insurance Group Employer/Plan Group    [facility]      Payor Plan Address Payor Plan Phone Number Payor Plan Fax Number Effective Dates    [address] [address]   12/13/2019 - None Entered    [place] [address]       Subscriber Name Subscriber Birth Date Member ID       TRAN SANTIAGO 1990 [id number]                 Emergency Contacts      (Rel.) Home Phone Work Phone Mobile Phone    GILDARDO LEIVA (Father) -- -- [phone]            Vital Signs (last day)     Date/Time   Temp   Temp src   Pulse   Resp   BP   Patient Position   SpO2    07/01/21 1002   --   --   100   14   135/89   --   91    07/01/21 0942   --   --   92   16   116/85   --   98    07/01/21 0847   --   --   98   18   128/82   --   95    07/01/21 0835   98.3 (36.8)   Oral   --   16   120/82   Lying   --    07/01/21 0535   --   --   79   --   120/88   --   100 "    07/01/21 0400   98.3 (36.8)   Oral   78   --   --   --   97    07/01/21 0200   --   --   82   14   103/65   --   97    07/01/21 0000   98 (36.7)   Oral   86   --   --   --   99    06/30/21 2300   --   --   80   --   --   --   100    06/30/21 2100   --   --   78   --   108/75   --   100    06/30/21 2005   98.3 (36.8)   Oral   93   21   108/75   --   100    06/30/21 1900   --   --   103   23   124/98   --   100    06/30/21 1600   98.2 (36.8)   Oral   --   14   121/82   Lying   --    06/30/21 1410   97.8 (36.6)   Oral   --   15   --   Lying   --    06/30/21 1024   --   --   84   16   134/87   --   100    06/30/21 0910   98.2 (36.8)   Oral   --   15   134/87   Lying   --    06/30/21 0602   --   --   61   13   122/79   --   99    06/30/21 0502   --   --   75   13   --   --   99    06/30/21 0402   98 (36.7)   Oral   73   --   130/86   --   99    06/30/21 0302   --   --   80   28   --   --   99    06/30/21 0201   --   --   81   16   --   --   100    06/30/21 0102   --   --   77   22   134/97   --   99    06/30/21 0002   98 (36.7)   Oral   77   --   --   --   --              Intake & Output (last day)       06/30 0701 - 07/01 0700 07/01 0701 - 07/02 0700    P.O. 480 360    I.V. (mL/kg)      Total Intake(mL/kg) 480 (7.9) 360 (5.9)    Net +480 +360          Urine Unmeasured Occurrence 1 x         Current Facility-Administered Medications   Medication Dose Route Frequency Provider Last Rate Last Admin   • bisacodyl (DULCOLAX) suppository 10 mg  10 mg Rectal Once PRN Aaron Trujillo MD       • ceFAZolin Sodium-Dextrose (ANCEF) IVPB (duplex) 2 g  2 g Intravenous Q8H Ila Gold MD   2 g at 07/01/21 1152   • docusate sodium (COLACE) capsule 100 mg  100 mg Oral BID Aaron Trujillo MD   100 mg at 06/30/21 0909   • enoxaparin (LOVENOX) syringe 60 mg  1 mg/kg Subcutaneous Daily Deng Sanchez MD   60 mg at 07/01/21 0840   • folic acid (FOLVITE) tablet 1 mg  1 mg Oral Daily Deng Sanchez MD   1 mg at  07/01/21 0841   • HYDROcodone-acetaminophen (NORCO) 7.5-325 MG per tablet 1 tablet  1 tablet Oral Q6H PRN Deng Sanchez MD   1 tablet at 06/30/21 2325   • ibuprofen (ADVIL,MOTRIN) tablet 400 mg  400 mg Oral Q6H PRN Nicci Valdez MD   400 mg at 07/01/21 1245   • iron polysaccharides (NIFEREX) capsule 150 mg  150 mg Oral Daily Merry Mccann MD   150 mg at 07/01/21 0840   • lactobacillus acidophilus (RISAQUAD) capsule 1 capsule  1 capsule Oral Daily Behbahani, Katayoun, MD   1 capsule at 07/01/21 0840   • melatonin tablet 5 mg  5 mg Oral Nightly PRN Aaron Trujillo MD   5 mg at 06/28/21 2045   • multivitamin with minerals 1 tablet  1 tablet Oral Daily Deng Sanchez MD   1 tablet at 07/01/21 0840   • ondansetron (ZOFRAN) injection 4 mg  4 mg Intravenous Q6H PRN Behbahani, Katayoun, MD   4 mg at 06/25/21 0946   • polyethylene glycol (MIRALAX) packet 17 g  17 g Oral Daily Deng Sanchez MD   17 g at 06/30/21 0908   • sodium chloride 0.9 % flush 10 mL  10 mL Intravenous PRN Nicci Valdez MD       • sodium chloride 0.9 % flush 10 mL  10 mL Intravenous Q12H Deng Sanchez MD   10 mL at 07/01/21 0841   • sodium chloride 0.9 % flush 10 mL  10 mL Intravenous PRN Deng Sanchez MD       • sodium chloride 0.9 % flush 3 mL  3 mL Intravenous Q12H Nicci Valdez MD   3 mL at 07/01/21 0841   • sodium chloride 0.9 % flush 3-10 mL  3-10 mL Intravenous PRN Nicci Valdez MD         Lab Results (last 24 hours)     Procedure Component Value Units Date/Time    C-reactive Protein [[id number]]  (Abnormal) Collected: 07/01/21 0150    Specimen: Blood Updated: 07/01/21 0222     C-Reactive Protein 1.00 mg/dL         Imaging Results (Last 24 Hours)     ** No results found for the last 24 hours. **        Orders (last 24 hrs)      Start     Ordered    06/26/21 1200  iron polysaccharides (NIFEREX) capsule 150 mg  Daily      06/26/21 1030    06/26/21 0600  C-reactive Protein  Daily      06/25/21 0750    06/25/21  1346  HYDROcodone-acetaminophen (NORCO) 7.5-325 MG per tablet 1 tablet  Every 6 Hours PRN      06/25/21 1346    06/24/21 1700  folic acid (FOLVITE) tablet 1 mg  Daily      06/24/21 1538    06/23/21 1100  multivitamin with minerals 1 tablet  Daily      06/23/21 0928    06/22/21 1200  Dietary Nutrition Supplements Boost Plus (Ensure Enlive, Ensure Plus)  Daily With Breakfast, Lunch & Dinner      06/22/21 0854    06/22/21 0945  polyethylene glycol (MIRALAX) packet 17 g  Daily      06/22/21 0851    06/22/21 0900  enoxaparin (LOVENOX) syringe 60 mg  Daily      06/21/21 1157    06/21/21 2230  docusate sodium (COLACE) capsule 100 mg  2 Times Daily      06/21/21 2124 06/21/21 2220  melatonin tablet 5 mg  Nightly PRN      06/21/21 2220    06/21/21 2124  bisacodyl (DULCOLAX) suppository 10 mg  Once As Needed      06/21/21 2124    06/21/21 2100  sodium chloride 0.9 % flush 10 mL  Every 12 Hours Scheduled      06/21/21 1701    06/21/21 1701  sodium chloride 0.9 % flush 10 mL  As Needed      06/21/21 1701    06/21/21 1200  ceFAZolin Sodium-Dextrose (ANCEF) IVPB (duplex) 2 g  Every 8 Hours      06/21/21 1104    06/20/21 0706  ondansetron (ZOFRAN) injection 4 mg  Every 6 Hours PRN      06/20/21 0706    06/19/21 0003  ibuprofen (ADVIL,MOTRIN) tablet 400 mg  Every 6 Hours PRN      06/19/21 0003    06/18/21 2019  Patient Currently On Electrolyte Replacement Protocol - Please Refer to MAR for Protocol Details  Misc Nursing Order (Specify)  Daily     Comments: Patient Currently On Electrolyte Replacement Protocol - Please Refer to MAR for Protocol Details    06/18/21 2018    06/18/21 1800  lactobacillus acidophilus (RISAQUAD) capsule 1 capsule  Daily      06/18/21 1459    06/18/21 0900  sodium chloride 0.9 % flush 3 mL  Every 12 Hours Scheduled      06/18/21 0524    06/18/21 0800  Neuro Checks  Every 4 Hours      06/18/21 0524    06/18/21 0555  Patient Currently On Electrolyte Replacement Protocol - Please Refer to MAR for Protocol  Details  Misc Nursing Order (Specify)  Daily     Comments: Patient Currently On Electrolyte Replacement Protocol - Please Refer to MAR for Protocol Details    06/18/21 0554    06/18/21 0524  sodium chloride 0.9 % flush 3-10 mL  As Needed      06/18/21 0524 06/17/21 2243  sodium chloride 0.9 % flush 10 mL  As Needed      06/17/21 2243    Unscheduled  Oxygen Therapy- Nasal Cannula; Titrate for SPO2: 90% - 95%  Continuous PRN     Comments: If Patient Develops Unresponsiveness, Acute Dyspnea, Cyanosis or Suspected Hypoxemia Start Continuous Pulse Ox Monitoring, Apply Oxygen & Notify Provider    06/18/21 0524    Unscheduled  ECG 12 Lead  As Needed     Comments: Nurse to Release if Patient Expericences Acute Chest Pain or Dysrhythmias    06/18/21 0524    Unscheduled  Blood Gas, Arterial -With Co-Ox Panel: Yes  As Needed     Comments: Per O2 PolicyNotify Physician      06/18/21 0524    Unscheduled  Magnesium  As Needed      06/18/21 0554    Unscheduled  Potassium  As Needed      06/18/21 0554    Unscheduled  Magnesium  As Needed      06/18/21 2018    Unscheduled  Potassium  As Needed      06/18/21 2018    Unscheduled  Change Dressing to IV Site As Needed When Damp, Loose or Soiled  As Needed      06/21/21 1701    --  SCANNED - TELEMETRY        06/17/21 0000    --  SCANNED - TELEMETRY        06/17/21 0000    --  SCANNED - TELEMETRY        06/17/21 0000    --  SCANNED - TELEMETRY        06/17/21 0000    --  SCANNED - TELEMETRY        06/17/21 0000    --  SCANNED - TELEMETRY        06/17/21 0000    --  SCANNED - TELEMETRY        06/17/21 0000    --  SCANNED - TELEMETRY        06/17/21 0000    --  SCANNED - TELEMETRY        06/17/21 0000    --  SCANNED - TELEMETRY        06/17/21 0000    --  SCANNED - TELEMETRY        06/17/21 0000    --  SCANNED - TELEMETRY        06/17/21 0000    --  SCANNED - TELEMETRY        06/17/21 0000    --  SCANNED - TELEMETRY        06/17/21 0000    --  SCANNED - TELEMETRY        06/17/21 0000     --  SCANNED - TELEMETRY        06/17/21 0000    --  SCANNED - TELEMETRY        06/17/21 0000    --  SCANNED - TELEMETRY        06/17/21 0000    --  SCANNED - TELEMETRY        06/17/21 0000    --  SCANNED - TELEMETRY        06/17/21 0000    --  SCANNED - TELEMETRY        06/17/21 0000    --  SCANNED - TELEMETRY        06/17/21 0000    --  SCANNED - TELEMETRY        06/17/21 0000    --  SCANNED - TELEMETRY        06/17/21 0000    --  SCANNED - TELEMETRY        06/17/21 0000    --  SCANNED - TELEMETRY        06/17/21 0000    --  SCANNED - TELEMETRY        06/17/21 0000    --  SCANNED - TELEMETRY        06/17/21 0000    --  SCANNED - TELEMETRY        06/17/21 0000    --  SCANNED - TELEMETRY        06/17/21 0000    --  SCANNED - TELEMETRY        06/17/21 0000    --  SCANNED - TELEMETRY        06/17/21 0000    --  SCANNED - TELEMETRY        06/17/21 0000    --  SCANNED - TELEMETRY        06/17/21 0000    --  SCANNED - TELEMETRY        06/17/21 0000    --  SCANNED - TELEMETRY        06/17/21 0000    --  SCANNED - TELEMETRY        06/17/21 0000    --  SCANNED - TELEMETRY        06/17/21 0000    --  SCANNED - TELEMETRY        06/17/21 0000    --  SCANNED - TELEMETRY        06/17/21 0000    --  SCANNED - TELEMETRY        06/17/21 0000    --  SCANNED - TELEMETRY        06/17/21 0000    --  SCANNED - TELEMETRY        06/17/21 0000    --  SCANNED - TELEMETRY        06/17/21 0000    --  SCANNED - TELEMETRY        06/17/21 0000    --  SCANNED - TELEMETRY        06/17/21 0000    --  SCANNED - TELEMETRY        06/17/21 0000    --  SCANNED - TELEMETRY        06/17/21 0000    --  SCANNED - TELEMETRY        06/17/21 0000    --  SCANNED - TELEMETRY        06/17/21 0000    --  SCANNED - TELEMETRY        06/17/21 0000    --  SCANNED - TELEMETRY        06/17/21 0000    --  SCANNED - TELEMETRY        06/17/21 0000    --  SCANNED - TELEMETRY        06/17/21 0000    --  SCANNED - TELEMETRY        06/17/21 0000    --  SCANNED - TELEMETRY         21 0000    --  SCANNED - TELEMETRY        21 0000    --  SCANNED - TELEMETRY        21 0000    --  SCANNED - TELEMETRY        21 0000    --  SCANNED - TELEMETRY        21 0000    --  SCANNED - TELEMETRY        21 0000    --  SCANNED - TELEMETRY        21 0000    --  SCANNED - TELEMETRY        21 0000    --  SCANNED - TELEMETRY        21 0000    --  SCANNED - TELEMETRY        21 0000    --  SCANNED - TELEMETRY        21 0000    --  SCANNED - TELEMETRY        21 0000    --  SCANNED - TELEMETRY        21 0000    --  SCANNED - TELEMETRY        21 0000    --  SCANNED - TELEMETRY        21 0000    --  SCANNED - TELEMETRY        21 0000    --  SCANNED - TELEMETRY        21 0000    --  SCANNED - TELEMETRY        21 0000    --  SCANNED - TELEMETRY        21 0000    --  SCANNED - TELEMETRY        21 0000    --  SCANNED - TELEMETRY        21 0000    --  SCANNED - TELEMETRY        21 0000    --  SCANNED - TELEMETRY        21 0000    --  SCANNED - TELEMETRY        21 0000                   Physician Progress Notes (last 24 hours) (Notes from 21 1300 through 21 1300)      Otis De La Fuente MD at [address] [facility]              [facility] HOSPITALIST PROGRESS NOTE     Patient Identification:  Name:  Marce Gutierrez  Age:  31 y.o.  Sex:  female  :  1990  MRN:  [id number]  Visit Number:  [id number]  [facility]     Primary Care Provider:  Provider, No Known    Length of stay in inpatient status:  12    Subjective     Chief Compliant:    Chief Complaint   Patient presents with   • Leg Pain   • Arm Pain       History of Presenting Illness:    Patient reports feeling well with no new complaints. She denies any fevers/chills.     ROS:  Otherwise 10 point ROS negative other than documented above in HPI.     Objective     Current Hospital Meds:ceFAZolin, 2 g,  Intravenous, Q8H  docusate sodium, 100 mg, Oral, BID  enoxaparin, 1 mg/kg, Subcutaneous, Daily  folic acid, 1 mg, Oral, Daily  iron polysaccharides, 150 mg, Oral, Daily  lactobacillus acidophilus, 1 capsule, Oral, Daily  multivitamin with minerals, 1 tablet, Oral, Daily  polyethylene glycol, 17 g, Oral, Daily  sodium chloride, 10 mL, Intravenous, Q12H  sodium chloride, 3 mL, Intravenous, Q12H         Current Antimicrobial Therapy:  Anti-Infectives (From admission, onward)    Ordered     Dose/Rate Route Frequency Start Stop    06/21/21 1104  ceFAZolin Sodium-Dextrose (ANCEF) IVPB (duplex) 2 g     Donna Carcamo APRN reviewed the order on 06/24/21 1249.   Ordering Provider: Ila Gold MD    2 g  over 30 Minutes Intravenous Every 8 Hours 06/21/21 1200 07/19/21 1159    06/17/21 2305  vancomycin 1 g/250 mL 0.9% NS (vial-mate)     Ordering Provider: Basilia Villanueva DO    20 mg/kg × 54.4 kg Intravenous Once 06/17/21 2307 06/18/21 0207    06/17/21 2305  piperacillin-tazobactam (ZOSYN) 3.375 g/100 mL 0.9% NS IVPB (mbp)     Ordering Provider: Basilia Villanueva DO    3.375 g Intravenous Once 06/17/21 2307 06/18/21 0040        Current Diuretic Therapy:  Diuretics (From admission, onward)    None        ----------------------------------------------------------------------------------------------------------------------  Vital Signs:  Temp:  [97.8 °F (36.6 °C)-98.6 °F (37 °C)] 97.8 °F (36.6 °C)  Heart Rate:  [61-87] 84  Resp:  [13-28] 15  BP: (118-134)/(75-97) 134/87  SpO2:  [89 %-100 %] 100 %  on   ;   Device (Oxygen Therapy): room air  Body mass index is 21.43 kg/m².    Wt Readings from Last 3 Encounters:   06/30/21 60.2 kg (132 lb 12.8 oz)   10/30/19 63.5 kg (140 lb)     Intake & Output (last 3 days)       06/27 0701 - 06/28 0700 06/28 0701 - 06/29 0700 06/29 0701 - 06/30 0700 06/30 0701 - 07/01 0700    P.O. 1200 794 600 480    I.V. (mL/kg) 100.3 (1.6) 95 (1.6) 99.6 (1.7)     Total Intake(mL/kg) 1300.3 (21.1)  889 (14.5) 699.6 (11.6) 480 (8)    Urine (mL/kg/hr) 400 (0.3) 400 (0.3)      Total Output 400 400      Net +900.3 +489 +699.6 +480            Urine Unmeasured Occurrence  4 x 5 x         Diet Regular  ----------------------------------------------------------------------------------------------------------------------  Physical exam:  Constitutional:  Well-developed and well-nourished.  No respiratory distress.      HENT:  Head:  Normocephalic and atraumatic.  Mouth:  Moist mucous membranes.    Eyes:  Conjunctivae and EOM are normal. No scleral icterus.    Neck:  Neck supple.  No JVD present.    Cardiovascular:  Normal rate, regular rhythm and normal heart sounds with no murmur.  Pulmonary/Chest:  No respiratory distress, no wheezes, no crackles, with normal breath sounds and good air movement.  Abdominal:  Soft.  Bowel sounds are normal.  No distension and no tenderness.   Musculoskeletal:  No edema, no tenderness, and no deformity.  No red or swollen joints anywhere.    Neurological:  Alert and oriented to person, place, and time.  No cranial nerve deficit.  No tongue deviation.  No facial droop.  No slurred speech.   Skin:  Skin is warm and dry. No rash noted. No pallor.   Peripheral vascular:  Pulses in all 4 extremities with no clubbing, no cyanosis, no edema.  ----------------------------------------------------------------------------------------------------------------------  Tele:    ----------------------------------------------------------------------------------------------------------------------  Results from last 7 days   Lab Units 06/30/21  0055 06/29/21  0042 06/28/21  0113 06/26/21  0258 06/24/21  0232   CRP mg/dL 1.21* 1.47* 2.08* 2.82* 5.96*   WBC 10*3/mm3  --  9.77  --  10.16 9.91   HEMOGLOBIN g/dL  --  9.8*  --  9.1* 9.4*   HEMATOCRIT %  --  31.3*  --  27.9* 29.4*   MCV fL  --  94.3  --  90.3 90.7   MCHC g/dL  --  31.3*  --  32.6 32.0   PLATELETS 10*3/mm3  --  684*  --  860* 699*          Results from last 7 days   Lab Units 06/29/21  0042 06/27/21  0317 06/26/21  0258 06/25/21  0222   SODIUM mmol/L 139  --  136 139   POTASSIUM mmol/L 4.6  --  4.3 4.5   MAGNESIUM mg/dL  --  2.5 1.5*  --    CHLORIDE mmol/L 107  --  102 105   CO2 mmol/L 25.3  --  28.1 26.5   BUN mg/dL 16  --  15 15   CREATININE mg/dL 0.68  --  0.63 0.64   EGFR IF NONAFRICN AM mL/min/1.73 102  --  111 109   CALCIUM mg/dL 8.6  --  8.2* 8.4*   GLUCOSE mg/dL 95  --  98 114*   ALBUMIN g/dL  --   --   --  1.99*   BILIRUBIN mg/dL  --   --   --  <0.2   ALK PHOS U/L  --   --   --  155*   AST (SGOT) U/L  --   --   --  22   ALT (SGPT) U/L  --   --   --  20   Estimated Creatinine Clearance: 115 mL/min (by C-G formula based on SCr of 0.68 mg/dL).  No results found for: AMMONIA              No results found for: HGBA1C, POCGLU  Lab Results   Component Value Date    TSH 0.718 06/17/2021     Lab Results   Component Value Date    PREGTESTUR Negative 06/17/2021     Pain Management Panel     Pain Management Panel Latest Ref Rng & Units 6/17/2021 10/30/2019    AMPHETAMINES SCREEN, URINE Negative Positive(A) Negative    BARBITURATES SCREEN Negative Negative Negative    BENZODIAZEPINE SCREEN, URINE Negative Negative Negative    BUPRENORPHINEUR Negative Negative Negative    COCAINE SCREEN, URINE Negative Negative Negative    METHADONE SCREEN, URINE Negative Negative Negative        Brief Urine Lab Results  (Last result in the past 365 days)      Color   Clarity   Blood   Leuk Est   Nitrite   Protein   CREAT   Urine HCG        06/17/21 2253 Dark Yellow Cloudy Large (3+) Moderate (2+) Positive 30 mg/dL (1+)         06/17/21 2253               Negative         Blood Culture   Date Value Ref Range Status   06/24/2021 No growth at 5 days  Final   06/24/2021 No growth at 5 days  Final     No results found for: URINECX  No results found for: WOUNDCX  No results found for: STOOLCX  No results found for: RESPCX  No results found for: AFBCX  Results from last 7 days    Lab Units 06/30/21  0055 06/29/21  0042 06/28/21  0113 06/27/21  0317 06/26/21  0258 06/25/21  0222 06/24/21  0232   CRP mg/dL 1.21* 1.47* 2.08* 2.43* 2.82* 4.02* 5.96*       I have personally looked at the labs and they are summarized above.  ----------------------------------------------------------------------------------------------------------------------  Detailed radiology reports for the last 24 hours:    Imaging Results (Last 24 Hours)     ** No results found for the last 24 hours. **        Assessment & Plan    #Sepsis in setting of MSSA bacteremia and E. Coli/MSSA UTI  #Probable endocarditis w/ septic emboli  #Hx of IVDU  - Blood cultures initially persistently positive. Blood cultures from 6/24 NGTD  - ID following. Continue IV cefazolin.   - Consulted cardiology for KATHRIN now patient is out of isolation. Anesthesia electing to wait until 20 day marker prior to procedure.   - Continue care consulted for IV abx given hx of IVDU.     #Hypomagnesemia  - Replacement ordered    #BARRY and AOCD  - Hemoglobin stable. Continue niferex    #L ankle cellulitus and sprain   - Improved     #Hepatitis C 1a genotype, HCV [id number]  - Outpatient f/u     #COVID 19  - Asymptomatic. Has completed 10 days of isolation     F: PO  E: Replace as needed   N: Regular     Code status: Full     Dispo: Pending clinical improvement     VTE Prophylaxis:   Mechanical Order History:     None      Pharmalogical Order History:      Ordered     Dose Route Frequency Stop    06/21/21 1157  enoxaparin (LOVENOX) syringe 60 mg      1 mg/kg SC Daily --    06/18/21 0528  enoxaparin (LOVENOX) syringe 50 mg  Status:  Discontinued      1 mg/kg SC Daily 06/21/21 1157    06/18/21 0524  Pharmacy to Dose enoxaparin (LOVENOX)  Status:  Discontinued     Question:  Indication of use  Answer:  Prophylaxis    -- XX Continuous PRN 06/20/21 1211                Otis De La Fuente MD  [facility]  06/30/21  15:26 EDT    Electronically signed by  Otis De La Fuente MD at 06/30/21 1527       Consult Notes (last 24 hours) (Notes from 06/30/21 1300 through 07/01/21 1300)    No notes of this type exist for this encounter.

## 2021-07-01 NOTE — NURSING NOTE
Pt noted to be in room before I assisted another nurse taking pt to CT. Upon returning back to the floor pt was gone from room. After assessing the situation, the pt was not on the floor. Security was called and pt has returned to her room.

## 2021-07-02 ENCOUNTER — APPOINTMENT (OUTPATIENT)
Dept: MRI IMAGING | Facility: HOSPITAL | Age: 31
End: 2021-07-02

## 2021-07-02 LAB
ALBUMIN SERPL-MCNC: 2.79 G/DL (ref 3.5–5.2)
ALBUMIN/GLOB SERPL: 0.7 G/DL
ALP SERPL-CCNC: 124 U/L (ref 39–117)
ALT SERPL W P-5'-P-CCNC: 13 U/L (ref 1–33)
ANION GAP SERPL CALCULATED.3IONS-SCNC: 7.6 MMOL/L (ref 5–15)
AST SERPL-CCNC: 20 U/L (ref 1–32)
BASOPHILS # BLD AUTO: 0.15 10*3/MM3 (ref 0–0.2)
BASOPHILS NFR BLD AUTO: 1.9 % (ref 0–1.5)
BILIRUB SERPL-MCNC: 0.2 MG/DL (ref 0–1.2)
BUN SERPL-MCNC: 15 MG/DL (ref 6–20)
BUN/CREAT SERPL: 26.8 (ref 7–25)
CALCIUM SPEC-SCNC: 8.5 MG/DL (ref 8.6–10.5)
CHLORIDE SERPL-SCNC: 105 MMOL/L (ref 98–107)
CO2 SERPL-SCNC: 25.4 MMOL/L (ref 22–29)
CREAT SERPL-MCNC: 0.56 MG/DL (ref 0.57–1)
CRP SERPL-MCNC: 1.46 MG/DL (ref 0–0.5)
DEPRECATED RDW RBC AUTO: 43.5 FL (ref 37–54)
EOSINOPHIL # BLD AUTO: 0.14 10*3/MM3 (ref 0–0.4)
EOSINOPHIL NFR BLD AUTO: 1.8 % (ref 0.3–6.2)
ERYTHROCYTE [DISTWIDTH] IN BLOOD BY AUTOMATED COUNT: 12.6 % (ref 12.3–15.4)
GFR SERPL CREATININE-BSD FRML MDRD: 127 ML/MIN/1.73
GLOBULIN UR ELPH-MCNC: 4 GM/DL
GLUCOSE SERPL-MCNC: 96 MG/DL (ref 65–99)
HCT VFR BLD AUTO: 29.1 % (ref 34–46.6)
HGB BLD-MCNC: 8.9 G/DL (ref 12–15.9)
IMM GRANULOCYTES # BLD AUTO: 0.17 10*3/MM3 (ref 0–0.05)
IMM GRANULOCYTES NFR BLD AUTO: 2.1 % (ref 0–0.5)
LYMPHOCYTES # BLD AUTO: 2.94 10*3/MM3 (ref 0.7–3.1)
LYMPHOCYTES NFR BLD AUTO: 36.9 % (ref 19.6–45.3)
MCH RBC QN AUTO: 28.9 PG (ref 26.6–33)
MCHC RBC AUTO-ENTMCNC: 30.6 G/DL (ref 31.5–35.7)
MCV RBC AUTO: 94.5 FL (ref 79–97)
MONOCYTES # BLD AUTO: 0.67 10*3/MM3 (ref 0.1–0.9)
MONOCYTES NFR BLD AUTO: 8.4 % (ref 5–12)
NEUTROPHILS NFR BLD AUTO: 3.9 10*3/MM3 (ref 1.7–7)
NEUTROPHILS NFR BLD AUTO: 48.9 % (ref 42.7–76)
NRBC BLD AUTO-RTO: 0 /100 WBC (ref 0–0.2)
PLATELET # BLD AUTO: 619 10*3/MM3 (ref 140–450)
PMV BLD AUTO: 8.9 FL (ref 6–12)
POTASSIUM SERPL-SCNC: 3.9 MMOL/L (ref 3.5–5.2)
PROT SERPL-MCNC: 6.8 G/DL (ref 6–8.5)
RBC # BLD AUTO: 3.08 10*6/MM3 (ref 3.77–5.28)
SODIUM SERPL-SCNC: 138 MMOL/L (ref 136–145)
WBC # BLD AUTO: 7.97 10*3/MM3 (ref 3.4–10.8)

## 2021-07-02 PROCEDURE — 99232 SBSQ HOSP IP/OBS MODERATE 35: CPT | Performed by: INTERNAL MEDICINE

## 2021-07-02 PROCEDURE — 85025 COMPLETE CBC W/AUTO DIFF WBC: CPT | Performed by: INTERNAL MEDICINE

## 2021-07-02 PROCEDURE — 80053 COMPREHEN METABOLIC PANEL: CPT | Performed by: INTERNAL MEDICINE

## 2021-07-02 PROCEDURE — 25010000002 ENOXAPARIN PER 10 MG: Performed by: INTERNAL MEDICINE

## 2021-07-02 PROCEDURE — 25010000003 CEFAZOLIN SODIUM-DEXTROSE 2-3 GM-%(50ML) RECONSTITUTED SOLUTION: Performed by: INTERNAL MEDICINE

## 2021-07-02 PROCEDURE — 86140 C-REACTIVE PROTEIN: CPT | Performed by: NURSE PRACTITIONER

## 2021-07-02 PROCEDURE — 72148 MRI LUMBAR SPINE W/O DYE: CPT

## 2021-07-02 PROCEDURE — 72148 MRI LUMBAR SPINE W/O DYE: CPT | Performed by: RADIOLOGY

## 2021-07-02 RX ORDER — HYDROCODONE BITARTRATE AND ACETAMINOPHEN 7.5; 325 MG/1; MG/1
1 TABLET ORAL EVERY 8 HOURS PRN
Status: DISCONTINUED | OUTPATIENT
Start: 2021-07-02 | End: 2021-07-07

## 2021-07-02 RX ADMIN — CEFAZOLIN SODIUM 2 G: 2 SOLUTION INTRAVENOUS at 04:52

## 2021-07-02 RX ADMIN — SODIUM CHLORIDE, PRESERVATIVE FREE 10 ML: 5 INJECTION INTRAVENOUS at 08:33

## 2021-07-02 RX ADMIN — Medication 1 CAPSULE: at 08:32

## 2021-07-02 RX ADMIN — Medication 150 MG: at 08:32

## 2021-07-02 RX ADMIN — SODIUM CHLORIDE, PRESERVATIVE FREE 3 ML: 5 INJECTION INTRAVENOUS at 20:44

## 2021-07-02 RX ADMIN — SODIUM CHLORIDE, PRESERVATIVE FREE 3 ML: 5 INJECTION INTRAVENOUS at 08:33

## 2021-07-02 RX ADMIN — CEFAZOLIN SODIUM 2 G: 2 SOLUTION INTRAVENOUS at 12:54

## 2021-07-02 RX ADMIN — ENOXAPARIN SODIUM 60 MG: 60 INJECTION SUBCUTANEOUS at 08:33

## 2021-07-02 RX ADMIN — IBUPROFEN 400 MG: 400 TABLET, FILM COATED ORAL at 08:32

## 2021-07-02 RX ADMIN — CEFAZOLIN SODIUM 2 G: 2 SOLUTION INTRAVENOUS at 20:44

## 2021-07-02 RX ADMIN — FOLIC ACID 1 MG: 1 TABLET ORAL at 08:32

## 2021-07-02 RX ADMIN — Medication 1 TABLET: at 08:32

## 2021-07-02 NOTE — PROGRESS NOTES
[facility] HOSPITALIST PROGRESS NOTE     Patient Identification:  Name:  Marce Gutierrez  Age:  31 y.o.  Sex:  female  :  1990  MRN:  [id number]  Visit Number:  [id number]  ROOM: [place] [id number]     Primary Care Provider:  Provider, No Known    Length of stay in inpatient status:  14    Subjective     Chief Compliant:    Chief Complaint   Patient presents with   • Leg Pain   • Arm Pain       History of Presenting Illness:    Patient notes continued lower back pain and R knee pain. She reports back pain bothers her more and she gets weak in her legs when she walks. She thinks it might be from being in the bed so long.     ROS:  Otherwise 10 point ROS negative other than documented above in HPI.     Objective     Current Hospital Meds:ceFAZolin, 2 g, Intravenous, Q8H  docusate sodium, 100 mg, Oral, BID  enoxaparin, 1 mg/kg, Subcutaneous, Daily  folic acid, 1 mg, Oral, Daily  iron polysaccharides, 150 mg, Oral, Daily  lactobacillus acidophilus, 1 capsule, Oral, Daily  multivitamin with minerals, 1 tablet, Oral, Daily  polyethylene glycol, 17 g, Oral, Daily  sodium chloride, 10 mL, Intravenous, Q12H  sodium chloride, 3 mL, Intravenous, Q12H         Current Antimicrobial Therapy:  Anti-Infectives (From admission, onward)    Ordered     Dose/Rate Route Frequency Start Stop    21 1104  ceFAZolin Sodium-Dextrose (ANCEF) IVPB (duplex) 2 g     Donna Carcamo APRN reviewed the order on 21 1249.   Ordering Provider: Ila Gold MD    2 g  over 30 Minutes Intravenous Every 8 Hours 21 1200 21 1159    21 2305  vancomycin 1 g/250 mL 0.9% NS (vial-mate)     Ordering Provider: Basilia Villanueva DO    20 mg/kg × 54.4 kg Intravenous Once 21 2307 21 0207    21 2305  piperacillin-tazobactam (ZOSYN) 3.375 g/100 mL 0.9% NS IVPB (mbp)     Ordering Provider: Basilia Villanueva DO    3.375 g Intravenous Once 21 2307 21 0040        Current Diuretic  Therapy:  Diuretics (From admission, onward)    None        ----------------------------------------------------------------------------------------------------------------------  Vital Signs:  Temp:  [98 °F (36.7 °C)-98.8 °F (37.1 °C)] 98 °F (36.7 °C)  Heart Rate:  [] 96  Resp:  [14-23] 16  BP: (106-140)/() 117/86  SpO2:  [92 %-100 %] 92 %  on   ;   Device (Oxygen Therapy): room air  Body mass index is 21.43 kg/m².    Wt Readings from Last 3 Encounters:   07/02/21 60.2 kg (132 lb 12.8 oz)   10/30/19 63.5 kg (140 lb)     Intake & Output (last 3 days)       06/29 0701 - 06/30 0700 06/30 0701 - 07/01 0700 07/01 0701 - 07/02 0700 07/02 0701 - 07/03 0700    P.O.  300    I.V. (mL/kg) 99.6 (1.7)       Total Intake(mL/kg) 699.6 (11.6) 480 (7.9) 1080 (17.9) 300 (5)    Urine (mL/kg/hr)        Total Output        Net +699.6 +480 +1080 +300            Urine Unmeasured Occurrence 5 x 1 x 1 x         Diet Regular  ----------------------------------------------------------------------------------------------------------------------  Physical exam:  Constitutional:  Well-developed and well-nourished.  No respiratory distress.      HENT:  Head:  Normocephalic and atraumatic.  Mouth:  Moist mucous membranes.    Eyes:  Conjunctivae and EOM are normal. No scleral icterus.    Neck:  Neck supple.  No JVD present.    Cardiovascular:  Normal rate, regular rhythm and normal heart sounds with no murmur.  Pulmonary/Chest:  No respiratory distress, no wheezes, no crackles, with normal breath sounds and good air movement.  Abdominal:  Soft.  Bowel sounds are normal.  No distension and no tenderness.   Musculoskeletal:  Point tenderness over lumbar spine, no LE weakness, No redness or warmth of right knee.   Neurological:  Alert and oriented to person, place, and time.  No cranial nerve deficit.  No tongue deviation.  No facial droop.  No slurred speech.   Skin:  Skin is warm and dry. No rash noted. No pallor.    Peripheral vascular:  Pulses in all 4 extremities with no clubbing, no cyanosis, no edema.  ----------------------------------------------------------------------------------------------------------------------  Tele:    ----------------------------------------------------------------------------------------------------------------------  Results from last 7 days   Lab Units 07/02/21 0109 07/01/21  0150 06/30/21 0055 06/29/21 0042 06/26/21  0258   CRP mg/dL 1.46* 1.00* 1.21* 1.47* 2.82*   WBC 10*3/mm3 7.97  --   --  9.77 10.16   HEMOGLOBIN g/dL 8.9*  --   --  9.8* 9.1*   HEMATOCRIT % 29.1*  --   --  31.3* 27.9*   MCV fL 94.5  --   --  94.3 90.3   MCHC g/dL 30.6*  --   --  31.3* 32.6   PLATELETS 10*3/mm3 619*  --   --  684* 860*         Results from last 7 days   Lab Units 07/02/21 0109 06/29/21 0042 06/27/21 0317 06/26/21  0258   SODIUM mmol/L 138 139  --  136   POTASSIUM mmol/L 3.9 4.6  --  4.3   MAGNESIUM mg/dL  --   --  2.5 1.5*   CHLORIDE mmol/L 105 107  --  102   CO2 mmol/L 25.4 25.3  --  28.1   BUN mg/dL 15 16  --  15   CREATININE mg/dL 0.56* 0.68  --  0.63   EGFR IF NONAFRICN AM mL/min/1.73 127 102  --  111   CALCIUM mg/dL 8.5* 8.6  --  8.2*   GLUCOSE mg/dL 96 95  --  98   ALBUMIN g/dL 2.79*  --   --   --    BILIRUBIN mg/dL 0.2  --   --   --    ALK PHOS U/L 124*  --   --   --    AST (SGOT) U/L 20  --   --   --    ALT (SGPT) U/L 13  --   --   --    Estimated Creatinine Clearance: 139.6 mL/min (A) (by C-G formula based on SCr of 0.56 mg/dL (L)).  No results found for: AMMONIA              No results found for: HGBA1C, POCGLU  Lab Results   Component Value Date    TSH 0.718 06/17/2021     Lab Results   Component Value Date    PREGTESTUR Negative 06/17/2021     Pain Management Panel     Pain Management Panel Latest Ref Rng & Units 6/17/2021 10/30/2019    AMPHETAMINES SCREEN, URINE Negative Positive(A) Negative    BARBITURATES SCREEN Negative Negative Negative    BENZODIAZEPINE SCREEN, URINE Negative  Negative Negative    BUPRENORPHINEUR Negative Negative Negative    COCAINE SCREEN, URINE Negative Negative Negative    METHADONE SCREEN, URINE Negative Negative Negative        Brief Urine Lab Results  (Last result in the past 365 days)      Color   Clarity   Blood   Leuk Est   Nitrite   Protein   CREAT   Urine HCG        06/17/21 2253 Dark Yellow Cloudy Large (3+) Moderate (2+) Positive 30 mg/dL (1+)         06/17/21 2253               Negative         No results found for: BLOODCX  No results found for: URINECX  No results found for: WOUNDCX  No results found for: STOOLCX  No results found for: RESPCX  No results found for: AFBCX  Results from last 7 days   Lab Units 07/02/21  0109 07/01/21  0150 06/30/21  0055 06/29/21  0042 06/28/21  0113 06/27/21  0317 06/26/21  0258   CRP mg/dL 1.46* 1.00* 1.21* 1.47* 2.08* 2.43* 2.82*       I have personally looked at the labs and they are summarized above.  ----------------------------------------------------------------------------------------------------------------------  Detailed radiology reports for the last 24 hours:    Imaging Results (Last 24 Hours)     ** No results found for the last 24 hours. **        Assessment & Plan    #Sepsis in setting of MSSA bacteremia and E. Coli/MSSA UTI  #Probable endocarditis w/ septic emboli  #Hx of IVDU  - Blood cultures initially persistently positive. Blood cultures from 6/24 NGTD  - ID following. Continue IV cefazolin. CRP improved.   - Consulted cardiology for KATHRIN now patient is out of isolation. Anesthesia electing to wait until 20 day marker prior to procedure.   - Continue care consulted for IV abx given hx of IVDU.     #Persistent lower back pain  - Will get MRI to rule osteo given MSSA bacteremia that was initially fairly persistent     #R knee pain   - No redness, warmth, limited ROM to suspect septic arthritis.     #Hypomagnesemia  - Replacement ordered    #BARRY and AOCD  - Hemoglobin stable. Continue niferex    #L ankle  cellulitus and sprain   - Improved     #Hepatitis C 1a genotype, HCV [id number]  - Outpatient f/u     #COVID 19  - Asymptomatic. Has completed 10 days of isolation     F: PO  E: Replace as needed   N: Regular     Code status: Full     Dispo: Pending clinical improvement       VTE Prophylaxis:   Mechanical Order History:     None      Pharmalogical Order History:      Ordered     Dose Route Frequency Stop    06/21/21 1157  enoxaparin (LOVENOX) syringe 60 mg      1 mg/kg SC Daily --    06/18/21 0528  enoxaparin (LOVENOX) syringe 50 mg  Status:  Discontinued      1 mg/kg SC Daily 06/21/21 1157    06/18/21 0524  Pharmacy to Dose enoxaparin (LOVENOX)  Status:  Discontinued     Question:  Indication of use  Answer:  Prophylaxis    -- XX Continuous PRN 06/20/21 1211                Otis De La Fuente MD  [facility]  07/02/21  11:15 EDT

## 2021-07-02 NOTE — PROGRESS NOTES
PROGRESS NOTE         Patient Identification:  Name:  Marce Gutierrez  Age:  31 y.o.  Sex:  female  :  1990  MRN:  [id number]  Visit Number:  [id number]  Primary Care Provider:  Provider, No Known         LOS: 14 days       ----------------------------------------------------------------------------------------------------------------------  Subjective       Chief Complaints:    Leg Pain and Arm Pain        Interval History:      Patient is resting comfortably in bed in no apparent distress.  No issues reported overnight.  Patient denied any new complaints at this time.  Afebrile, no diarrhea.  KATHRIN is on hold as anesthesia wishes to wait until the patient is 20 days out from initial positive COVID-19 test.  CRP is stable at 1.46.  WBC remains normal.    Review of Systems:    Constitutional: no fever, chills and night sweats. No appetite change or unexpected weight change. No fatigue.  Eyes: no eye drainage, itching or redness.  HEENT: no mouth sores, dysphagia or nose bleed.  Respiratory: no for shortness of breath, cough or production of sputum.  Cardiovascular: no chest pain, no palpitations, no orthopnea.  Gastrointestinal: no nausea, vomiting or diarrhea. No abdominal pain, hematemesis or rectal bleeding.  Genitourinary: no dysuria or polyuria.  Hematologic/lymphatic: no lymph node abnormalities, no easy bruising or easy bleeding.  Musculoskeletal:  Right knee pain.  Skin: No rash and no itching.  Neurological: no loss of consciousness, no seizure, no headache.  Behavioral/Psych: no depression or suicidal ideation.  Endocrine: no hot flashes.  Immunologic: negative.     ----------------------------------------------------------------------------------------------------------------------      Objective       [facility] Meds:  ceFAZolin, 2 g, Intravenous, Q8H  docusate sodium, 100 mg, Oral, BID  enoxaparin, 1 mg/kg, Subcutaneous, Daily  folic acid, 1 mg, Oral, Daily  iron polysaccharides,  150 mg, Oral, Daily  lactobacillus acidophilus, 1 capsule, Oral, Daily  multivitamin with minerals, 1 tablet, Oral, Daily  polyethylene glycol, 17 g, Oral, Daily  sodium chloride, 10 mL, Intravenous, Q12H  sodium chloride, 3 mL, Intravenous, Q12H         ----------------------------------------------------------------------------------------------------------------------    Vital Signs:  Temp:  [98 °F (36.7 °C)-98.8 °F (37.1 °C)] 98 °F (36.7 °C)  Heart Rate:  [] 96  Resp:  [14-23] 16  BP: (106-140)/() 117/86  Mean Arterial Pressure (Non-Invasive) for the past 24 hrs (Last 3 readings):   Noninvasive MAP (mmHg)   07/02/21 0903 95   07/02/21 0836 76   07/02/21 0605 106     SpO2 Percentage    07/02/21 0605 07/02/21 0700 07/02/21 0836   SpO2: 98% 94% 92%     SpO2:  [92 %-100 %] 92 %  on   ;   Device (Oxygen Therapy): room air    Body mass index is 21.43 kg/m².  Wt Readings from Last 3 Encounters:   07/02/21 60.2 kg (132 lb 12.8 oz)   10/30/19 63.5 kg (140 lb)        Intake/Output Summary (Last 24 hours) at 7/2/2021 1203  Last data filed at 7/2/2021 0800  Gross per 24 hour   Intake 1020 ml   Output --   Net 1020 ml     Diet Regular  ----------------------------------------------------------------------------------------------------------------------      Physical Exam:    Constitutional:  Well-developed and well-nourished.  No respiratory distress.      HENT:  Head: Normocephalic and atraumatic.  Mouth:  Moist mucous membranes.    Eyes:  Conjunctivae and EOM are normal.  No scleral icterus.  Neck:  Neck supple.  No JVD present.  Cardiovascular:  Normal rate, regular rhythm and normal heart sounds with no murmur. No edema.  Pulmonary/Chest:  No respiratory distress, no wheezes, no crackles, with normal breath sounds and good air movement.  Abdominal:  Soft.  Bowel sounds are normal.  No distension and no tenderness.   Musculoskeletal:  No edema, no tenderness, and no deformity.  No swelling or redness of  joints.  Neurological:  Alert and oriented to person, place, and time.  No facial droop.  No slurred speech.   Skin:  Skin is warm and dry.  No rash noted.  No pallor.   Psychiatric:  Normal mood and affect.  Behavior is normal.  ----------------------------------------------------------------------------------------------------------------------              Results from last 7 days   Lab Units 07/02/21 0109 07/01/21  0150 06/30/21  0055 06/29/21  0042 06/26/21  0258   CRP mg/dL 1.46* 1.00* 1.21* 1.47* 2.82*   WBC 10*3/mm3 7.97  --   --  9.77 10.16   HEMOGLOBIN g/dL 8.9*  --   --  9.8* 9.1*   HEMATOCRIT % 29.1*  --   --  31.3* 27.9*   MCV fL 94.5  --   --  94.3 90.3   MCHC g/dL 30.6*  --   --  31.3* 32.6   PLATELETS 10*3/mm3 619*  --   --  684* 860*     Results from last 7 days   Lab Units 07/02/21 0109 06/29/21  0042 06/27/21  0317 06/26/21  0258   SODIUM mmol/L 138 139  --  136   POTASSIUM mmol/L 3.9 4.6  --  4.3   MAGNESIUM mg/dL  --   --  2.5 1.5*   CHLORIDE mmol/L 105 107  --  102   CO2 mmol/L 25.4 25.3  --  28.1   BUN mg/dL 15 16  --  15   CREATININE mg/dL 0.56* 0.68  --  0.63   EGFR IF NONAFRICN AM mL/min/1.73 127 102  --  111   CALCIUM mg/dL 8.5* 8.6  --  8.2*   GLUCOSE mg/dL 96 95  --  98   ALBUMIN g/dL 2.79*  --   --   --    BILIRUBIN mg/dL 0.2  --   --   --    ALK PHOS U/L 124*  --   --   --    AST (SGOT) U/L 20  --   --   --    ALT (SGPT) U/L 13  --   --   --    Estimated Creatinine Clearance: 139.6 mL/min (A) (by C-G formula based on SCr of 0.56 mg/dL (L)).  No results found for: AMMONIA    No results found for: HGBA1C, POCGLU  Lab Results   Component Value Date    HGBA1C 5.60 06/17/2021     Lab Results   Component Value Date    TSH 0.718 06/17/2021       Blood Culture   Date Value Ref Range Status   06/20/2021 Abnormal Stain (C)  Preliminary   06/19/2021 Staphylococcus aureus (C)  Final     Comment:     Infectious disease consultation is highly recommended to rule out distant foci of infection.    06/17/2021 Staphylococcus aureus (C)  Final     Comment:     Infectious disease consultation is highly recommended to rule out distant foci of infection.   06/17/2021 Staphylococcus aureus (C)  Final     Comment:     Infectious disease consultation is highly recommended to rule out distant foci of infection.  Refer to previous blood culture collected on 6/17/2021 2318 for MICs.     Urine Culture   Date Value Ref Range Status   06/17/2021 >100,000 CFU/mL Escherichia coli (A)  Final   06/17/2021 >100,000 CFU/mL Staphylococcus aureus (A)  Corrected     No results found for: WOUNDCX  No results found for: STOOLCX  No results found for: RESPCX  Pain Management Panel       Pain Management Panel Latest Ref Rng & Units 6/17/2021 10/30/2019    AMPHETAMINES SCREEN, URINE Negative Positive(A) Negative    BARBITURATES SCREEN Negative Negative Negative    BENZODIAZEPINE SCREEN, URINE Negative Negative Negative    BUPRENORPHINEUR Negative Negative Negative    COCAINE SCREEN, URINE Negative Negative Negative    METHADONE SCREEN, URINE Negative Negative Negative              ----------------------------------------------------------------------------------------------------------------------  Imaging Results (Last 24 Hours)       Procedure Component Value Units Date/Time    MRI Lumbar Spine Without Contrast [[id number]] Resulted: 07/02/21 1143     Updated: 07/02/21 1143            ----------------------------------------------------------------------------------------------------------------------    Assessment/Plan       Assessment/Plan     ASSESSMENT:    1.  Severe sepsis with lactic acid greater than 2 on admission  2.  Complicated MSSA bacteremia  3.  Likely underlying endocarditis  4.  Septic emboli  5.  Incidental COVID-19    PLAN:    Patient is resting comfortably in bed in no apparent distress.  No issues reported overnight.  Patient denied any new complaints at this time.  Afebrile, no diarrhea.  KATHRIN is on hold as  anesthesia wishes to wait until the patient is 20 days out from initial positive COVID-19 test.  CRP is stable at 1.46.  WBC remains normal.    Blood cultures from 6/24/2021 finalized as no growth. Blood culture from 6/22/2021 1 out of 1 set positive for MSSA.    Urinalysis on 6/17/2021 was positive and urine culture finalized as greater than 100,000 colonies of E. coli and greater than 100,000 colonies of MRSA.  Blood cultures on 6/17/2021 finalized as MSSA.  Repeat blood cultures on 6/19/2021 and 6/20/2021 are still showing growth.  COVID-19 and flu A/B PCR on 6/18/2021 detected COVID-19.  Mycoplasma pneumoniae antibody on 6/18/2021 was negative.  Strep pneumo antigen on 6/18/2021 was negative.  CT chest with PE protocol on 6/18/2021 showed technically degraded exam, but no definite PE is seen, and there is no aortic dissection.  Pulmonary edema with a trace amount of right pleural fluid.  Poorly defined nodular infiltrates on both sides of the chest suspicious for septic emboli.  Continued follow-up is recommended.     Contacted micro lab regarding urine culture on 6/17/2021.  Urine culture has finalized as MSSA, but MRSA verbiage was included underneath and micro lab corrected.    COVID-19 diagnosis was incidental and patient remains asymptomatic.  Patient is now out of isolation as of 6/28/2021.    Recommend to continue Cefazolin monotherapy. We will follow closely and adjust antibiotic therapy as appropriate.  Would recommend KATHRIN, as patient is able to be removed from isolation and has completed 10 days of isolation and remained asymptomatic.     Recommend possible orthopedic consult for evaluation of right knee pain.      Code Status:   Code Status and Medical Interventions:   Ordered at: 06/18/21 0446     Level Of Support Discussed With:    Patient     Code Status:    CPR     Medical Interventions (Level of Support Prior to Arrest):    Full       SYL Jiménez  07/02/21  12:03 EDT

## 2021-07-02 NOTE — PLAN OF CARE
Goal Outcome Evaluation:           Progress: improving  Outcome Summary: Patient vital signs currently stable and she remains on RA and tolerating it well. She is alert and oriented x4. She is pleasant. She has complained of right sided knee paint that was relieved by PRN medication.  Pending transfer to telemetry floor. Patient has removed BP cuff on several occassions today despite education, making it difficult to obtain q1hr BP checks. Otherwise, no significant changes noted.

## 2021-07-02 NOTE — NURSING NOTE
Pt arrived on floor from PCU. Pt is calm and pleasant, has no requests at this time. Denies any pain or needs at this time. VSS

## 2021-07-02 NOTE — PROGRESS NOTES
Antimicrobial Length of Therapy:    Day 12 of 28 cefazolin    Thank you,    Doretha Varma, PharmD  7/2/2021  15:24 EDT

## 2021-07-02 NOTE — PROGRESS NOTES
Discharge Planning Assessment  TAYLOR Antonio     Patient Name: Marce Gutierrez  MRN: [id number]  Today's Date: 7/2/2021    Admit Date: 6/17/2021        Discharge Plan     Row Name 07/02/21 1603       Plan    Plan  Pt was denied by insurance for [facility]. SS left voicemail with [employer] to set up peer to peer to be completed by Dr. Balderas. SS will continue to follow and assist with discharge planning.    Patient/Family in Agreement with Plan  yes              MERCY Staley

## 2021-07-02 NOTE — PAYOR COMM NOTE
"CONTACT:  JOSE VARGHESE MSN, APRN  UTILIZATION MANAGEMENT DEPT.  [facility]  [address], [address]  PHONE:  [phone]  FAX: [phone]    CLINICAL UPDATE    REFER TO AUTH # [id number]    (NO PHYSICIAN PROGRESS NOTE YET FOR 7/2/21)    Tran Santiago (31 y.o. Female)     Date of Birth Social Security Number Address Home Phone MRN    1990  [address] [address]  [id number]    Taoism Marital Status          Non-Latter day Single       Admission Date Admission Type Admitting Provider Attending Provider Department, Room/Bed    6/17/21 Emergency Nicci Valdez MD Hacker, Bill J, MD [facility] PROGRESS CARE, P204/S2    Discharge Date Discharge Disposition Discharge Destination                       Attending Provider: Otis De La Fuente MD    Allergies: Cinnamon    Isolation: None   Infection: COVID (History) (06/28/21)   Code Status: CPR    Ht: 167.6 cm (66\")   Wt: 60.2 kg (132 lb 12.8 oz)    Admission Cmt: None   Principal Problem: Endocarditis [I38]                 Active Insurance as of 6/17/2021     Primary Coverage     Payor Plan Insurance Group Employer/Plan Group    [facility]      Payor Plan Address Payor Plan Phone Number Payor Plan Fax Number Effective Dates    [address] [address]   12/13/2019 - None Entered    [place] [address]       Subscriber Name Subscriber Birth Date Member ID       TRAN SANTIAGO 1990 [id number]                 Emergency Contacts      (Rel.) Home Phone Work Phone Mobile Phone    GILDARDO LEIVA (Father) -- -- [phone]            Vital Signs (last day)     Date/Time   Temp   Temp src   Pulse   Resp   BP   Patient Position   SpO2    07/02/21 0903   --   --   96   16   117/86   --   --    07/02/21 0836   98 (36.7)   Oral   98   17   106/69   Lying   92    07/02/21 0700   --   --   69   --   --   --   94    07/02/21 0605   --   --   85   20   131/91   --   98    07/02/21 0505 "   --   --   66   --   113/88   --   98    07/02/21 0405   98.3 (36.8)   Oral   78   20   132/90   --   98    07/02/21 0305   --   --   80   --   112/73   --   97    07/02/21 0205   --   --   74   19   111/74   --   97    07/02/21 0105   --   --   64   23   116/83   --   99    07/02/21 0005   98 (36.7)   Oral   80   --   118/79   --   92    07/01/21 2305   --   --   84   18   120/82   --   98    07/01/21 2205   --   --   79   --   117/73   --   99    07/01/21 2055   --   --   88   --   140/87   --   100    07/01/21 2000   98.8 (37.1)   Oral   105   --   --   --   95    07/01/21 1940   --   --   --   --   --   --   100    07/01/21 1900   --   --   82   --   --   --   99    07/01/21 1801   --   --   86   --   --   --   99    07/01/21 1800   --   --   106   --   --   --   96    07/01/21 1705   --   --   104   16   (!) 140/102   --   99    07/01/21 1603   --   --   85   16   111/72   --   98    07/01/21 1503   --   --   85   16   139/87   --   100    07/01/21 1403   --   --   81   16   119/78   --   99    07/01/21 1303   --   --   92   14   118/79   --   100    07/01/21 1224   --   --   105   16   134/97   --   99    07/01/21 1103   --   --   98   14   129/91   --   100    07/01/21 1002   --   --   100   14   135/89   --   91    07/01/21 0942   --   --   92   16   116/85   --   98    07/01/21 0847   --   --   98   18   128/82   --   95    07/01/21 0835   98.3 (36.8)   Oral   --   16   120/82   Lying   --    07/01/21 0535   --   --   79   --   120/88   --   100    07/01/21 0400   98.3 (36.8)   Oral   78   --   --   --   97    07/01/21 0200   --   --   82   14   103/65   --   97    07/01/21 0000   98 (36.7)   Oral   86   --   --   --   99              Intake & Output (last day)       07/01 0701 - 07/02 0700 07/02 0701 - 07/03 0700    P.O. 1080 300    Total Intake(mL/kg) 1080 (17.9) 300 (5)    Net +1080 +300          Urine Unmeasured Occurrence 1 x         Current Facility-Administered Medications   Medication Dose Route  Frequency Provider Last Rate Last Admin   • bisacodyl (DULCOLAX) suppository 10 mg  10 mg Rectal Once PRN Aaron Trujillo MD       • ceFAZolin Sodium-Dextrose (ANCEF) IVPB (duplex) 2 g  2 g Intravenous Q8H Ila Gold MD   2 g at 07/02/21 0452   • docusate sodium (COLACE) capsule 100 mg  100 mg Oral BID Aaron Trujillo MD   100 mg at 06/30/21 0909   • enoxaparin (LOVENOX) syringe 60 mg  1 mg/kg Subcutaneous Daily Deng Sanchez MD   60 mg at 07/02/21 0833   • folic acid (FOLVITE) tablet 1 mg  1 mg Oral Daily Deng Sanchez MD   1 mg at 07/02/21 0832   • HYDROcodone-acetaminophen (NORCO) 7.5-325 MG per tablet 1 tablet  1 tablet Oral Q8H PRN Otis De La Fuente MD       • ibuprofen (ADVIL,MOTRIN) tablet 400 mg  400 mg Oral Q6H PRN Nicci Valdez MD   400 mg at 07/02/21 0832   • iron polysaccharides (NIFEREX) capsule 150 mg  150 mg Oral Daily Merry Mccann MD   150 mg at 07/02/21 0832   • lactobacillus acidophilus (RISAQUAD) capsule 1 capsule  1 capsule Oral Daily Behbahani, Katayoun, MD   1 capsule at 07/02/21 0832   • melatonin tablet 5 mg  5 mg Oral Nightly PRN Aaron Trujillo MD   5 mg at 07/01/21 2229   • multivitamin with minerals 1 tablet  1 tablet Oral Daily Deng Sanchez MD   1 tablet at 07/02/21 0832   • ondansetron (ZOFRAN) injection 4 mg  4 mg Intravenous Q6H PRN Behbahani, Katayoun, MD   4 mg at 06/25/21 0946   • polyethylene glycol (MIRALAX) packet 17 g  17 g Oral Daily Deng Sanchez MD   17 g at 06/30/21 0908   • sodium chloride 0.9 % flush 10 mL  10 mL Intravenous PRN Nicci Valdez MD       • sodium chloride 0.9 % flush 10 mL  10 mL Intravenous Q12H Deng Sanchez MD   10 mL at 07/02/21 0833   • sodium chloride 0.9 % flush 10 mL  10 mL Intravenous PRN Deng Sanchez MD       • sodium chloride 0.9 % flush 3 mL  3 mL Intravenous Q12H Nicci Valdez MD   3 mL at 07/02/21 0833   • sodium chloride 0.9 % flush 3-10 mL  3-10 mL Intravenous PRN Courtney,  Nicci ARZOLA MD         Lab Results (last 24 hours)     Procedure Component Value Units Date/Time    Comprehensive Metabolic Panel [[id number]]  (Abnormal) Collected: 07/02/21 0109    Specimen: Blood Updated: 07/02/21 0203     Glucose 96 mg/dL      BUN 15 mg/dL      Creatinine 0.56 mg/dL      Sodium 138 mmol/L      Potassium 3.9 mmol/L      Chloride 105 mmol/L      CO2 25.4 mmol/L      Calcium 8.5 mg/dL      Total Protein 6.8 g/dL      Albumin 2.79 g/dL      ALT (SGPT) 13 U/L      AST (SGOT) 20 U/L      Alkaline Phosphatase 124 U/L      Total Bilirubin 0.2 mg/dL      eGFR Non African Amer 127 mL/min/1.73      Globulin 4.0 gm/dL      A/G Ratio 0.7 g/dL      BUN/Creatinine Ratio 26.8     Anion Gap 7.6 mmol/L     Narrative:      GFR Normal >60  Chronic Kidney Disease <60  Kidney Failure <15      C-reactive Protein [[id number]]  (Abnormal) Collected: 07/02/21 0109    Specimen: Blood Updated: 07/02/21 0203     C-Reactive Protein 1.46 mg/dL     CBC & Differential [[id number]]  (Abnormal) Collected: 07/02/21 0109    Specimen: Blood Updated: 07/02/21 0125    Narrative:      The following orders were created for panel order CBC & Differential.  Procedure                               Abnormality         Status                     ---------                               -----------         ------                     CBC Auto Differential[[id number]]        Abnormal            Final result                 Please view results for these tests on the individual orders.    CBC Auto Differential [[id number]]  (Abnormal) Collected: 07/02/21 0109    Specimen: Blood Updated: 07/02/21 0125     WBC 7.97 10*3/mm3      RBC 3.08 10*6/mm3      Hemoglobin 8.9 g/dL      Hematocrit 29.1 %      MCV 94.5 fL      MCH 28.9 pg      MCHC 30.6 g/dL      RDW 12.6 %      RDW-SD 43.5 fl      MPV 8.9 fL      Platelets 619 10*3/mm3      Neutrophil % 48.9 %      Lymphocyte % 36.9 %      Monocyte % 8.4 %      Eosinophil % 1.8 %      Basophil % 1.9 %      Immature  Grans % 2.1 %      Neutrophils, Absolute 3.90 10*3/mm3      Lymphocytes, Absolute 2.94 10*3/mm3      Monocytes, Absolute 0.67 10*3/mm3      Eosinophils, Absolute 0.14 10*3/mm3      Basophils, Absolute 0.15 10*3/mm3      Immature Grans, Absolute 0.17 10*3/mm3      nRBC 0.0 /100 WBC         Imaging Results (Last 24 Hours)     ** No results found for the last 24 hours. **        Orders (last 24 hrs)      Start     Ordered    21 1113  MRI Lumbar Spine Without Contrast  1 Time Imaging      21 1112    21 0715  HYDROcodone-acetaminophen (NORCO) 7.5-325 MG per tablet 1 tablet  Every 8 Hours PRN      21 0708                   Physician Progress Notes (last 24 hours) (Notes from 21 1113 through 21 1113)      Otis De La Fuente MD at 21 1636              [facility]     Patient Identification:  Name:  Marce Gutierrez  Age:  31 y.o.  Sex:  female  :  1990  MRN:  [id number]  Visit Number:  [id number]  ROOM: [place] [address]     Primary Care Provider:  Provider, No Known    Length of stay in inpatient status:  13    Subjective     Chief Compliant:    Chief Complaint   Patient presents with   • Leg Pain   • Arm Pain       History of Presenting Illness:    Patient reports right knee and some mild back pain. She reports she feels stiff because she hasn't been out of bed much. No redness, warmth, or swelling of any joints.     ROS:  Otherwise 10 point ROS negative other than documented above in HPI.     Objective     Current Hospital Meds:ceFAZolin, 2 g, Intravenous, Q8H  docusate sodium, 100 mg, Oral, BID  enoxaparin, 1 mg/kg, Subcutaneous, Daily  folic acid, 1 mg, Oral, Daily  iron polysaccharides, 150 mg, Oral, Daily  lactobacillus acidophilus, 1 capsule, Oral, Daily  multivitamin with minerals, 1 tablet, Oral, Daily  polyethylene glycol, 17 g, Oral, Daily  sodium chloride, 10 mL, Intravenous, Q12H  sodium chloride, 3 mL, Intravenous, Q12H         Current  Antimicrobial Therapy:  Anti-Infectives (From admission, onward)    Ordered     Dose/Rate Route Frequency Start Stop    06/21/21 1104  ceFAZolin Sodium-Dextrose (ANCEF) IVPB (duplex) 2 g     Donna Carcamo APRN reviewed the order on 06/24/21 1249.   Ordering Provider: Ila Gold MD    2 g  over 30 Minutes Intravenous Every 8 Hours 06/21/21 1200 07/19/21 1159    06/17/21 2305  vancomycin 1 g/250 mL 0.9% NS (vial-mate)     Ordering Provider: Basilia Villanueva DO    20 mg/kg × 54.4 kg Intravenous Once 06/17/21 2307 06/18/21 0207    06/17/21 2305  piperacillin-tazobactam (ZOSYN) 3.375 g/100 mL 0.9% NS IVPB (mbp)     Ordering Provider: Basilia Villanueva DO    3.375 g Intravenous Once 06/17/21 2307 06/18/21 0040        Current Diuretic Therapy:  Diuretics (From admission, onward)    None        ----------------------------------------------------------------------------------------------------------------------  Vital Signs:  Temp:  [98 °F (36.7 °C)-98.3 °F (36.8 °C)] 98.3 °F (36.8 °C)  Heart Rate:  [] 85  Resp:  [14-23] 16  BP: (103-139)/(65-98) 139/87  SpO2:  [91 %-100 %] 100 %  on   ;   Device (Oxygen Therapy): room air  Body mass index is 21.56 kg/m².    Wt Readings from Last 3 Encounters:   07/01/21 60.6 kg (133 lb 9.6 oz)   10/30/19 63.5 kg (140 lb)     Intake & Output (last 3 days)       06/28 0701 - 06/29 0700 06/29 0701 - 06/30 0700 06/30 0701 - 07/01 0700 07/01 0701 - 07/02 0700    P.O. 794 600 480 360    I.V. (mL/kg) 95 (1.6) 99.6 (1.7)      Total Intake(mL/kg) 889 (14.5) 699.6 (11.6) 480 (7.9) 360 (5.9)    Urine (mL/kg/hr) 400 (0.3)       Total Output 400       Net +489 +699.6 +480 +360            Urine Unmeasured Occurrence 4 x 5 x 1 x         Diet Regular  ----------------------------------------------------------------------------------------------------------------------  Physical exam:  Constitutional:  Well-developed and well-nourished.  No respiratory distress.      HENT:  Head:   Normocephalic and atraumatic.  Mouth:  Moist mucous membranes.    Eyes:  Conjunctivae and EOM are normal. No scleral icterus.    Neck:  Neck supple.  No JVD present.    Cardiovascular:  Normal rate, regular rhythm and normal heart sounds with no murmur.  Pulmonary/Chest:  No respiratory distress, no wheezes, no crackles, with normal breath sounds and good air movement.  Abdominal:  Soft.  Bowel sounds are normal.  No distension and no tenderness.   Musculoskeletal:  No edema, no tenderness, and no deformity.  No red or swollen joints anywhere.    Neurological:  Alert and oriented to person, place, and time.  No cranial nerve deficit.  No tongue deviation.  No facial droop.  No slurred speech.   Skin:  Skin is warm and dry. No rash noted. No pallor.   Peripheral vascular:  Pulses in all 4 extremities with no clubbing, no cyanosis, no edema.  ----------------------------------------------------------------------------------------------------------------------  Tele:    ----------------------------------------------------------------------------------------------------------------------    Assessment & Plan    #Sepsis in setting of MSSA bacteremia and E. Coli/MSSA UTI  #Probable endocarditis w/ septic emboli  #Hx of IVDU  - Blood cultures initially persistently positive. Blood cultures from 6/24 NGTD  - ID following. Continue IV cefazolin. CRP improved.   - Consulted cardiology for KATHRIN now patient is out of isolation. Anesthesia electing to wait until 20 day marker prior to procedure.   - Continue care consulted for IV abx given hx of IVDU.     #Hypomagnesemia  - Replacement ordered    #BARRY and AOCD  - Hemoglobin stable. Continue niferex    #L ankle cellulitus and sprain   - Improved     #Hepatitis C 1a genotype, HCV [id number]  - Outpatient f/u     #COVID 19  - Asymptomatic. Has completed 10 days of isolation     F: PO  E: Replace as needed   N: Regular     Code status: Full     Dispo: Pending clinical improvement        VTE Prophylaxis:   Mechanical Order History:     None      Pharmalogical Order History:      Ordered     Dose Route Frequency Stop    21 115  enoxaparin (LOVENOX) syringe 60 mg      1 mg/kg SC Daily --    21 05  enoxaparin (LOVENOX) syringe 50 mg  Status:  Discontinued      1 mg/kg SC Daily 21 1157    21 0524  Pharmacy to Dose enoxaparin (LOVENOX)  Status:  Discontinued     Question:  Indication of use  Answer:  Prophylaxis    -- XX Continuous PRN 21 1211                Otis De La Fuente MD  [facility]  21  16:36 EDT    Electronically signed by Otis De La Fuente MD at 21 1637     Eun Sarmiento PA at 21 1336                     PROGRESS NOTE         Patient Identification:  Name:  Marce Gutierrez  Age:  31 y.o.  Sex:  female  :  1990  MRN:  [id number]  Visit Number:  [id number]  Primary Care Provider:  Provider, No Known         LOS: 13 days       ----------------------------------------------------------------------------------------------------------------------  Subjective       Chief Complaints:    Leg Pain and Arm Pain        Interval History:      Patient is sitting up in bed in no apparent distress.  Nurses are at bedside.  Complains of right knee pain, without any evidence of erythema or edema.  Range of motion is intact and patient ambulates around the room regularly.  Afebrile, no diarrhea.  KATHRIN is on hold as anesthesia wishes to wait until the patient is 20 days out from initial positive COVID-19 test.  CRP is improved at 1.00.    Review of Systems:    Constitutional: no fever, chills and night sweats. No appetite change or unexpected weight change. No fatigue.  Eyes: no eye drainage, itching or redness.  HEENT: no mouth sores, dysphagia or nose bleed.  Respiratory: no for shortness of breath, cough or production of sputum.  Cardiovascular: no chest pain, no palpitations, no orthopnea.  Gastrointestinal: no nausea,  vomiting or diarrhea. No abdominal pain, hematemesis or rectal bleeding.  Genitourinary: no dysuria or polyuria.  Hematologic/lymphatic: no lymph node abnormalities, no easy bruising or easy bleeding.  Musculoskeletal:  Right knee pain.  Skin: No rash and no itching.  Neurological: no loss of consciousness, no seizure, no headache.  Behavioral/Psych: no depression or suicidal ideation.  Endocrine: no hot flashes.  Immunologic: negative.     ----------------------------------------------------------------------------------------------------------------------      Objective       [facility] Meds:  ceFAZolin, 2 g, Intravenous, Q8H  docusate sodium, 100 mg, Oral, BID  enoxaparin, 1 mg/kg, Subcutaneous, Daily  folic acid, 1 mg, Oral, Daily  iron polysaccharides, 150 mg, Oral, Daily  lactobacillus acidophilus, 1 capsule, Oral, Daily  multivitamin with minerals, 1 tablet, Oral, Daily  polyethylene glycol, 17 g, Oral, Daily  sodium chloride, 10 mL, Intravenous, Q12H  sodium chloride, 3 mL, Intravenous, Q12H         ----------------------------------------------------------------------------------------------------------------------    Vital Signs:  Temp:  [97.8 °F (36.6 °C)-98.3 °F (36.8 °C)] 98.3 °F (36.8 °C)  Heart Rate:  [] 100  Resp:  [14-23] 14  BP: (103-135)/(65-98) 135/89  Mean Arterial Pressure (Non-Invasive) for the past 24 hrs (Last 3 readings):   Noninvasive MAP (mmHg)   07/01/21 1002 105   07/01/21 0942 95   07/01/21 0847 92     SpO2 Percentage    07/01/21 0847 07/01/21 0942 07/01/21 1002   SpO2: 95% 98% 91%     SpO2:  [91 %-100 %] 91 %  on   ;   Device (Oxygen Therapy): room air    Body mass index is 21.56 kg/m².  Wt Readings from Last 3 Encounters:   07/01/21 60.6 kg (133 lb 9.6 oz)   10/30/19 63.5 kg (140 lb)        Intake/Output Summary (Last 24 hours) at 7/1/2021 1336  Last data filed at 7/1/2021 0846  Gross per 24 hour   Intake 360 ml   Output --   Net 360 ml     Diet  Regular  ----------------------------------------------------------------------------------------------------------------------      Physical Exam:    Constitutional:  Well-developed and well-nourished.  No respiratory distress.      HENT:  Head: Normocephalic and atraumatic.  Mouth:  Moist mucous membranes.    Eyes:  Conjunctivae and EOM are normal.  No scleral icterus.  Neck:  Neck supple.  No JVD present.  Cardiovascular:  Normal rate, regular rhythm and normal heart sounds with no murmur. No edema.  Pulmonary/Chest:  No respiratory distress, no wheezes, no crackles, with normal breath sounds and good air movement.  Abdominal:  Soft.  Bowel sounds are normal.  No distension and no tenderness.   Musculoskeletal:  No edema, no tenderness, and no deformity.  No swelling or redness of joints.  Neurological:  Alert and oriented to person, place, and time.  No facial droop.  No slurred speech.   Skin:  Skin is warm and dry.  No rash noted.  No pallor.   Psychiatric:  Normal mood and affect.  Behavior is normal.    Assessment/Plan       Assessment/Plan     ASSESSMENT:    1.  Severe sepsis with lactic acid greater than 2 on admission  2.  Complicated MSSA bacteremia  3.  Likely underlying endocarditis  4.  Septic emboli  5.  Incidental COVID-19    PLAN:    Patient is sitting up in bed in no apparent distress.  Nurses are at bedside.  Complains of right knee pain, without any evidence of erythema or edema.  Range of motion is intact and patient ambulates around the room regularly.  Afebrile, no diarrhea.  KATHRIN is on hold as anesthesia wishes to wait until the patient is 20 days out from initial positive COVID-19 test.  CRP is improved at 1.00.    Blood cultures from 6/24/2021 finalized as no growth. Blood culture from 6/22/2021 1 out of 1 set positive for MSSA.    Urinalysis on 6/17/2021 was positive and urine culture finalized as greater than 100,000 colonies of E. coli and greater than 100,000 colonies of MRSA.  Blood  cultures on 6/17/2021 finalized as MSSA.  Repeat blood cultures on 6/19/2021 and 6/20/2021 are still showing growth.  COVID-19 and flu A/B PCR on 6/18/2021 detected COVID-19.  Mycoplasma pneumoniae antibody on 6/18/2021 was negative.  Strep pneumo antigen on 6/18/2021 was negative.  CT chest with PE protocol on 6/18/2021 showed technically degraded exam, but no definite PE is seen, and there is no aortic dissection.  Pulmonary edema with a trace amount of right pleural fluid.  Poorly defined nodular infiltrates on both sides of the chest suspicious for septic emboli.  Continued follow-up is recommended.     Contacted micro lab regarding urine culture on 6/17/2021.  Urine culture has finalized as MSSA, but MRSA verbiage was included underneath and micro lab corrected.    COVID-19 diagnosis was incidental and patient remains asymptomatic.  Patient is now out of isolation as of 6/28/2021.    Recommend to continue Cefazolin monotherapy. We will follow closely and adjust antibiotic therapy as appropriate.  Would recommend KATHRIN, as patient is able to be removed from isolation and has completed 10 days of isolation and remained asymptomatic.     Recommend possible orthopedic consult for evaluation of right knee pain.      Code Status:   Code Status and Medical Interventions:   Ordered at: 06/18/21 0446     Level Of Support Discussed With:    Patient     Code Status:    CPR     Medical Interventions (Level of Support Prior to Arrest):    Full       SYL Jiménez  07/01/21  13:36 EDT        Electronically signed by Eun Sarmiento PA at 07/01/21 6748       Madeleine Frankel LSW      Case Management   Progress Notes       Signed   Date of Service:  07/01/21 1637   Creation Time:  07/01/21 1637            Signed            Discharge Planning Assessment  TAYLOR Antonio     Patient Name: Marce Gutierrez                     MRN: [id number]  Today's Date: 7/1/2021                       Admit Date:  6/17/2021                Discharge Plan      Row Name 07/01/21 1635           Plan     Plan  SS spoke with Tasneem in [facility] who reported that Pt had been denied by insurance. SS to set up a peer to peer to dispute the denial. SS will continue to follow and assist with discharge planning.     Patient/Family in Agreement with Plan  yes             MERCY Staley

## 2021-07-02 NOTE — NURSING NOTE
Report called to ADAN Jose on [address] at this time. Belongings remain at bedside with patient for transportation. Patient declined family notification of transfer of care.     ADAN Gibson

## 2021-07-02 NOTE — PLAN OF CARE
Goal Outcome Evaluation:   VSS and afebrile during this shift. Pt alert and oriented x 4. No major changes during this shift. Pt has no complained of any joint pain during this shift. Pt resting at this time. Safety maintained, will continue to monitor.

## 2021-07-03 ENCOUNTER — APPOINTMENT (OUTPATIENT)
Dept: CARDIOLOGY | Facility: HOSPITAL | Age: 31
End: 2021-07-03

## 2021-07-03 LAB
ALBUMIN SERPL-MCNC: 2.84 G/DL (ref 3.5–5.2)
ALBUMIN/GLOB SERPL: 0.7 G/DL
ALP SERPL-CCNC: 124 U/L (ref 39–117)
ALT SERPL W P-5'-P-CCNC: 11 U/L (ref 1–33)
ANION GAP SERPL CALCULATED.3IONS-SCNC: 8.9 MMOL/L (ref 5–15)
AST SERPL-CCNC: 17 U/L (ref 1–32)
BASOPHILS # BLD AUTO: 0.14 10*3/MM3 (ref 0–0.2)
BASOPHILS NFR BLD AUTO: 1.2 % (ref 0–1.5)
BILIRUB SERPL-MCNC: 0.2 MG/DL (ref 0–1.2)
BUN SERPL-MCNC: 15 MG/DL (ref 6–20)
BUN/CREAT SERPL: 20.8 (ref 7–25)
CALCIUM SPEC-SCNC: 8.5 MG/DL (ref 8.6–10.5)
CHLORIDE SERPL-SCNC: 103 MMOL/L (ref 98–107)
CO2 SERPL-SCNC: 25.1 MMOL/L (ref 22–29)
CREAT SERPL-MCNC: 0.72 MG/DL (ref 0.57–1)
CRP SERPL-MCNC: 0.87 MG/DL (ref 0–0.5)
DEPRECATED RDW RBC AUTO: 41.3 FL (ref 37–54)
EOSINOPHIL # BLD AUTO: 0.13 10*3/MM3 (ref 0–0.4)
EOSINOPHIL NFR BLD AUTO: 1.1 % (ref 0.3–6.2)
ERYTHROCYTE [DISTWIDTH] IN BLOOD BY AUTOMATED COUNT: 12.5 % (ref 12.3–15.4)
GFR SERPL CREATININE-BSD FRML MDRD: 95 ML/MIN/1.73
GLOBULIN UR ELPH-MCNC: 4 GM/DL
GLUCOSE SERPL-MCNC: 96 MG/DL (ref 65–99)
HCT VFR BLD AUTO: 29.5 % (ref 34–46.6)
HGB BLD-MCNC: 9.6 G/DL (ref 12–15.9)
IMM GRANULOCYTES # BLD AUTO: 0.14 10*3/MM3 (ref 0–0.05)
IMM GRANULOCYTES NFR BLD AUTO: 1.2 % (ref 0–0.5)
LYMPHOCYTES # BLD AUTO: 2.8 10*3/MM3 (ref 0.7–3.1)
LYMPHOCYTES NFR BLD AUTO: 24.4 % (ref 19.6–45.3)
MAGNESIUM SERPL-MCNC: 1.8 MG/DL (ref 1.6–2.6)
MCH RBC QN AUTO: 29.6 PG (ref 26.6–33)
MCHC RBC AUTO-ENTMCNC: 32.5 G/DL (ref 31.5–35.7)
MCV RBC AUTO: 91 FL (ref 79–97)
MONOCYTES # BLD AUTO: 0.87 10*3/MM3 (ref 0.1–0.9)
MONOCYTES NFR BLD AUTO: 7.6 % (ref 5–12)
NEUTROPHILS NFR BLD AUTO: 64.5 % (ref 42.7–76)
NEUTROPHILS NFR BLD AUTO: 7.41 10*3/MM3 (ref 1.7–7)
NRBC BLD AUTO-RTO: 0 /100 WBC (ref 0–0.2)
PLATELET # BLD AUTO: 600 10*3/MM3 (ref 140–450)
PMV BLD AUTO: 8.8 FL (ref 6–12)
POTASSIUM SERPL-SCNC: 4.2 MMOL/L (ref 3.5–5.2)
PROT SERPL-MCNC: 6.8 G/DL (ref 6–8.5)
RBC # BLD AUTO: 3.24 10*6/MM3 (ref 3.77–5.28)
SODIUM SERPL-SCNC: 137 MMOL/L (ref 136–145)
WBC # BLD AUTO: 11.49 10*3/MM3 (ref 3.4–10.8)

## 2021-07-03 PROCEDURE — 83735 ASSAY OF MAGNESIUM: CPT | Performed by: INTERNAL MEDICINE

## 2021-07-03 PROCEDURE — 93308 TTE F-UP OR LMTD: CPT | Performed by: INTERNAL MEDICINE

## 2021-07-03 PROCEDURE — 86140 C-REACTIVE PROTEIN: CPT | Performed by: INTERNAL MEDICINE

## 2021-07-03 PROCEDURE — 25010000002 ENOXAPARIN PER 10 MG: Performed by: INTERNAL MEDICINE

## 2021-07-03 PROCEDURE — 93325 DOPPLER ECHO COLOR FLOW MAPG: CPT

## 2021-07-03 PROCEDURE — 99232 SBSQ HOSP IP/OBS MODERATE 35: CPT | Performed by: INTERNAL MEDICINE

## 2021-07-03 PROCEDURE — 80053 COMPREHEN METABOLIC PANEL: CPT | Performed by: INTERNAL MEDICINE

## 2021-07-03 PROCEDURE — 85025 COMPLETE CBC W/AUTO DIFF WBC: CPT | Performed by: INTERNAL MEDICINE

## 2021-07-03 PROCEDURE — 93010 ELECTROCARDIOGRAM REPORT: CPT | Performed by: INTERNAL MEDICINE

## 2021-07-03 PROCEDURE — 93325 DOPPLER ECHO COLOR FLOW MAPG: CPT | Performed by: INTERNAL MEDICINE

## 2021-07-03 PROCEDURE — 93005 ELECTROCARDIOGRAM TRACING: CPT | Performed by: INTERNAL MEDICINE

## 2021-07-03 PROCEDURE — 94799 UNLISTED PULMONARY SVC/PX: CPT

## 2021-07-03 PROCEDURE — 93308 TTE F-UP OR LMTD: CPT

## 2021-07-03 PROCEDURE — 25010000003 CEFAZOLIN SODIUM-DEXTROSE 2-3 GM-%(50ML) RECONSTITUTED SOLUTION: Performed by: INTERNAL MEDICINE

## 2021-07-03 RX ADMIN — Medication 1 TABLET: at 08:00

## 2021-07-03 RX ADMIN — IBUPROFEN 400 MG: 400 TABLET, FILM COATED ORAL at 19:08

## 2021-07-03 RX ADMIN — SODIUM CHLORIDE, PRESERVATIVE FREE 3 ML: 5 INJECTION INTRAVENOUS at 21:12

## 2021-07-03 RX ADMIN — CEFAZOLIN SODIUM 2 G: 2 SOLUTION INTRAVENOUS at 21:03

## 2021-07-03 RX ADMIN — FOLIC ACID 1 MG: 1 TABLET ORAL at 08:00

## 2021-07-03 RX ADMIN — Medication 150 MG: at 08:00

## 2021-07-03 RX ADMIN — SODIUM CHLORIDE, PRESERVATIVE FREE 10 ML: 5 INJECTION INTRAVENOUS at 08:01

## 2021-07-03 RX ADMIN — IBUPROFEN 400 MG: 400 TABLET, FILM COATED ORAL at 08:43

## 2021-07-03 RX ADMIN — Medication 1 CAPSULE: at 08:00

## 2021-07-03 RX ADMIN — SODIUM CHLORIDE, PRESERVATIVE FREE 3 ML: 5 INJECTION INTRAVENOUS at 08:00

## 2021-07-03 RX ADMIN — SODIUM CHLORIDE, PRESERVATIVE FREE 10 ML: 5 INJECTION INTRAVENOUS at 21:12

## 2021-07-03 RX ADMIN — ENOXAPARIN SODIUM 60 MG: 60 INJECTION SUBCUTANEOUS at 08:00

## 2021-07-03 RX ADMIN — CEFAZOLIN SODIUM 2 G: 2 SOLUTION INTRAVENOUS at 11:16

## 2021-07-03 RX ADMIN — CEFAZOLIN SODIUM 2 G: 2 SOLUTION INTRAVENOUS at 04:10

## 2021-07-03 RX ADMIN — IBUPROFEN 400 MG: 400 TABLET, FILM COATED ORAL at 00:24

## 2021-07-03 NOTE — PROGRESS NOTES
PROGRESS NOTE         Patient Identification:  Name:  Marce Gutierrez  Age:  31 y.o.  Sex:  female  :  1990  MRN:  [id number]  Visit Number:  [id number]  Primary Care Provider:  Provider, No Known         LOS: 15 days       ----------------------------------------------------------------------------------------------------------------------  Subjective       Chief Complaints:    Leg Pain and Arm Pain        Interval History:      Patient is sitting up in bed in no apparent distress.  Significant other is at bedside.  Patient reports some mild chest pain overnight, but none currently.  Admits to some palpitations occasionally.  Denies any shortness of breath, cough, nausea, vomiting, or diarrhea.  Afebrile, no diarrhea.  KATHRIN is on hold as anesthesia wishes to wait until the patient is 20 days out from initial positive COVID-19 test.  CRP is improving at 0.87.  WBC is worsened at 11.49.  MRI of the lumbar spine on 2021 revealed no acute findings.    Review of Systems:    Constitutional: no fever, chills and night sweats. No appetite change or unexpected weight change. No fatigue.  Eyes: no eye drainage, itching or redness.  HEENT: no mouth sores, dysphagia or nose bleed.  Respiratory: no for shortness of breath, cough or production of sputum.  Cardiovascular: no chest pain, no palpitations, no orthopnea.  Gastrointestinal: no nausea, vomiting or diarrhea. No abdominal pain, hematemesis or rectal bleeding.  Genitourinary: no dysuria or polyuria.  Hematologic/lymphatic: no lymph node abnormalities, no easy bruising or easy bleeding.  Musculoskeletal:  Right knee pain.  Skin: No rash and no itching.  Neurological: no loss of consciousness, no seizure, no headache.  Behavioral/Psych: no depression or suicidal ideation.  Endocrine: no hot flashes.  Immunologic: negative.      ----------------------------------------------------------------------------------------------------------------------      Objective       [facility] Meds:  ceFAZolin, 2 g, Intravenous, Q8H  docusate sodium, 100 mg, Oral, BID  enoxaparin, 1 mg/kg, Subcutaneous, Daily  folic acid, 1 mg, Oral, Daily  iron polysaccharides, 150 mg, Oral, Daily  lactobacillus acidophilus, 1 capsule, Oral, Daily  multivitamin with minerals, 1 tablet, Oral, Daily  polyethylene glycol, 17 g, Oral, Daily  sodium chloride, 10 mL, Intravenous, Q12H  sodium chloride, 3 mL, Intravenous, Q12H         ----------------------------------------------------------------------------------------------------------------------    Vital Signs:  Temp:  [97.8 °F (36.6 °C)-99.4 °F (37.4 °C)] 98 °F (36.7 °C)  Heart Rate:  [] 78  Resp:  [18-22] 20  BP: ()/(53-98) 102/60  Mean Arterial Pressure (Non-Invasive) for the past 24 hrs (Last 3 readings):   Noninvasive MAP (mmHg)   07/02/21 1900 92   07/02/21 1628 103   07/02/21 1427 114     SpO2 Percentage    07/02/21 1628 07/02/21 1900 07/03/21 0642   SpO2: 99% 99% 99%     SpO2:  [91 %-100 %] 99 %  on   ;   Device (Oxygen Therapy): room air    Body mass index is 19.95 kg/m².  Wt Readings from Last 3 Encounters:   07/03/21 56.1 kg (123 lb 9.6 oz)   10/30/19 63.5 kg (140 lb)        Intake/Output Summary (Last 24 hours) at 7/3/2021 0976  Last data filed at 7/3/2021 0300  Gross per 24 hour   Intake 720 ml   Output --   Net 720 ml     Diet Regular  ----------------------------------------------------------------------------------------------------------------------      Physical Exam:    Constitutional:  Well-developed and well-nourished.  No respiratory distress.      HENT:  Head: Normocephalic and atraumatic.  Mouth:  Moist mucous membranes.    Eyes:  Conjunctivae and EOM are normal.  No scleral icterus.  Neck:  Neck supple.  No JVD present.  Cardiovascular:  Normal rate, regular rhythm and normal  heart sounds with no murmur. No edema.  Pulmonary/Chest:  No respiratory distress, no wheezes, no crackles, with normal breath sounds and good air movement.  Abdominal:  Soft.  Bowel sounds are normal.  No distension and no tenderness.   Musculoskeletal:  No edema, no tenderness, and no deformity.  No swelling or redness of joints.  Neurological:  Alert and oriented to person, place, and time.  No facial droop.  No slurred speech.   Skin:  Skin is warm and dry.  No rash noted.  No pallor.   Psychiatric:  Normal mood and affect.  Behavior is normal.  ----------------------------------------------------------------------------------------------------------------------              Results from last 7 days   Lab Units 07/03/21  0408 07/02/21  0109 07/01/21  0150 06/29/21  0042   CRP mg/dL 0.87* 1.46* 1.00* 1.47*   WBC 10*3/mm3 11.49* 7.97  --  9.77   HEMOGLOBIN g/dL 9.6* 8.9*  --  9.8*   HEMATOCRIT % 29.5* 29.1*  --  31.3*   MCV fL 91.0 94.5  --  94.3   MCHC g/dL 32.5 30.6*  --  31.3*   PLATELETS 10*3/mm3 600* 619*  --  684*     Results from last 7 days   Lab Units 07/03/21  0408 07/02/21  0109 06/29/21  0042 06/27/21  0317   SODIUM mmol/L 137 138 139  --    POTASSIUM mmol/L 4.2 3.9 4.6  --    MAGNESIUM mg/dL  --   --   --  2.5   CHLORIDE mmol/L 103 105 107  --    CO2 mmol/L 25.1 25.4 25.3  --    BUN mg/dL 15 15 16  --    CREATININE mg/dL 0.72 0.56* 0.68  --    EGFR IF NONAFRICN AM mL/min/1.73 95 127 102  --    CALCIUM mg/dL 8.5* 8.5* 8.6  --    GLUCOSE mg/dL 96 96 95  --    ALBUMIN g/dL 2.84* 2.79*  --   --    BILIRUBIN mg/dL 0.2 0.2  --   --    ALK PHOS U/L 124* 124*  --   --    AST (SGOT) U/L 17 20  --   --    ALT (SGPT) U/L 11 13  --   --    Estimated Creatinine Clearance: 101.2 mL/min (by C-G formula based on SCr of 0.72 mg/dL).  No results found for: AMMONIA    No results found for: HGBA1C, POCGLU  Lab Results   Component Value Date    HGBA1C 5.60 06/17/2021     Lab Results   Component Value Date    TSH 0.718  06/17/2021       Blood Culture   Date Value Ref Range Status   06/20/2021 Abnormal Stain (C)  Preliminary   06/19/2021 Staphylococcus aureus (C)  Final     Comment:     Infectious disease consultation is highly recommended to rule out distant foci of infection.   06/17/2021 Staphylococcus aureus (C)  Final     Comment:     Infectious disease consultation is highly recommended to rule out distant foci of infection.   06/17/2021 Staphylococcus aureus (C)  Final     Comment:     Infectious disease consultation is highly recommended to rule out distant foci of infection.  Refer to previous blood culture collected on 6/17/2021 2318 for MICs.     Urine Culture   Date Value Ref Range Status   06/17/2021 >100,000 CFU/mL Escherichia coli (A)  Final   06/17/2021 >100,000 CFU/mL Staphylococcus aureus (A)  Corrected     No results found for: WOUNDCX  No results found for: STOOLCX  No results found for: RESPCX  Pain Management Panel       Pain Management Panel Latest Ref Rng & Units 6/17/2021 10/30/2019    AMPHETAMINES SCREEN, URINE Negative Positive(A) Negative    BARBITURATES SCREEN Negative Negative Negative    BENZODIAZEPINE SCREEN, URINE Negative Negative Negative    BUPRENORPHINEUR Negative Negative Negative    COCAINE SCREEN, URINE Negative Negative Negative    METHADONE SCREEN, URINE Negative Negative Negative              ----------------------------------------------------------------------------------------------------------------------  Imaging Results (Last 24 Hours)       ** No results found for the last 24 hours. **            ----------------------------------------------------------------------------------------------------------------------    Assessment/Plan       Assessment/Plan     ASSESSMENT:    1.  Severe sepsis with lactic acid greater than 2 on admission  2.  Complicated MSSA bacteremia  3.  Likely underlying endocarditis  4.  Septic emboli  5.  Incidental COVID-19    PLAN:    Patient is sitting up in  bed in no apparent distress.  Significant other is at bedside.  Patient reports some mild chest pain overnight, but none currently.  Admits to some palpitations occasionally.  Denies any shortness of breath, cough, nausea, vomiting, or diarrhea.  Afebrile, no diarrhea.  KATHRIN is on hold as anesthesia wishes to wait until the patient is 20 days out from initial positive COVID-19 test.  CRP is improving at 0.87.  WBC is worsened at 11.49.  MRI of the lumbar spine on 7/2/2021 revealed no acute findings.    Blood cultures from 6/24/2021 finalized as no growth. Blood culture from 6/22/2021 1 out of 1 set positive for MSSA.    Urinalysis on 6/17/2021 was positive and urine culture finalized as greater than 100,000 colonies of E. coli and greater than 100,000 colonies of MRSA.  Blood cultures on 6/17/2021 finalized as MSSA.  Repeat blood cultures on 6/19/2021 and 6/20/2021 are still showing growth.  COVID-19 and flu A/B PCR on 6/18/2021 detected COVID-19.  Mycoplasma pneumoniae antibody on 6/18/2021 was negative.  Strep pneumo antigen on 6/18/2021 was negative.  CT chest with PE protocol on 6/18/2021 showed technically degraded exam, but no definite PE is seen, and there is no aortic dissection.  Pulmonary edema with a trace amount of right pleural fluid.  Poorly defined nodular infiltrates on both sides of the chest suspicious for septic emboli.  Continued follow-up is recommended.     Contacted micro lab regarding urine culture on 6/17/2021.  Urine culture has finalized as MSSA, but MRSA verbiage was included underneath and micro lab corrected.    COVID-19 diagnosis was incidental and patient remains asymptomatic.  Patient is now out of isolation as of 6/28/2021.    Recommend to continue Cefazolin monotherapy. We will follow closely and adjust antibiotic therapy as appropriate.  Would recommend KATHRIN, as patient is able to be removed from isolation and has completed 10 days of isolation and remained asymptomatic.  We await  KATHRIN results to determine duration of antibiotic therapy.    Code Status:   Code Status and Medical Interventions:   Ordered at: 06/18/21 0446     Level Of Support Discussed With:    Patient     Code Status:    CPR     Medical Interventions (Level of Support Prior to Arrest):    Full       SYL Jiménez  07/03/21  09:27 EDT

## 2021-07-03 NOTE — PLAN OF CARE
Problem: Adult Inpatient Plan of Care  Goal: Plan of Care Review  7/3/2021 0636 by Kvng Ohara RN  Outcome: Ongoing, Progressing  7/3/2021 0636 by Kvng Ohara RN  Outcome: Ongoing, Progressing  Goal: Patient-Specific Goal (Individualized)  7/3/2021 0636 by Kvng Ohara RN  Outcome: Ongoing, Progressing  7/3/2021 0636 by Kvng Ohara RN  Outcome: Ongoing, Progressing  Goal: Absence of Hospital-Acquired Illness or Injury  7/3/2021 0636 by Kvng Ohara RN  Outcome: Ongoing, Progressing  7/3/2021 0636 by Kvng Ohara RN  Outcome: Ongoing, Progressing  Intervention: Identify and Manage Fall Risk  Recent Flowsheet Documentation  Taken 7/3/2021 0054 by Kvng Ohara RN  Safety Promotion/Fall Prevention: safety round/check completed  Taken 7/2/2021 1959 by Kvng Ohara RN  Safety Promotion/Fall Prevention: safety round/check completed  Intervention: Prevent Skin Injury  Recent Flowsheet Documentation  Taken 7/2/2021 1959 by Kvng Ohara RN  Skin Protection: adhesive use limited  Intervention: Prevent and Manage VTE (venous thromboembolism) Risk  Recent Flowsheet Documentation  Taken 7/2/2021 1959 by Kvng Ohara RN  VTE Prevention/Management: (see mar) --  Goal: Optimal Comfort and Wellbeing  7/3/2021 0636 by Kvng Ohara RN  Outcome: Ongoing, Progressing  7/3/2021 0636 by Kvng Ohara RN  Outcome: Ongoing, Progressing  Intervention: Provide Person-Centered Care  Recent Flowsheet Documentation  Taken 7/2/2021 1959 by Kvng Ohara RN  Trust Relationship/Rapport: care explained  Goal: Readiness for Transition of Care  7/3/2021 0636 by Kvng Ohara RN  Outcome: Ongoing, Progressing  7/3/2021 0636 by Kvng Ohara RN  Outcome: Ongoing, Progressing   Goal Outcome Evaluation:

## 2021-07-03 NOTE — PROGRESS NOTES
[facility] HOSPITALIST PROGRESS NOTE     Patient Identification:  Name:  Marce Gutierrez  Age:  31 y.o.  Sex:  female  :  1990  MRN:  [id number]  Visit Number:  [id number]  ROOM: [address] [place] [address]     Primary Care Provider:  Provider, No Known    Length of stay in inpatient status:  15    Subjective     Chief Compliant:    Chief Complaint   Patient presents with   • Leg Pain   • Arm Pain       History of Presenting Illness:    Patient denies any new complaints. She has been walking the halls and today was found to have Hrs 130's-150's on tele. Patient made her way back to her bed and EKG showed sinus rhythm.     ROS:  Otherwise 10 point ROS negative other than documented above in HPI.     Objective     Current Hospital Meds:ceFAZolin, 2 g, Intravenous, Q8H  docusate sodium, 100 mg, Oral, BID  enoxaparin, 1 mg/kg, Subcutaneous, Daily  folic acid, 1 mg, Oral, Daily  iron polysaccharides, 150 mg, Oral, Daily  lactobacillus acidophilus, 1 capsule, Oral, Daily  multivitamin with minerals, 1 tablet, Oral, Daily  polyethylene glycol, 17 g, Oral, Daily  sodium chloride, 10 mL, Intravenous, Q12H  sodium chloride, 3 mL, Intravenous, Q12H         Current Antimicrobial Therapy:  Anti-Infectives (From admission, onward)    Ordered     Dose/Rate Route Frequency Start Stop    21 1104  ceFAZolin Sodium-Dextrose (ANCEF) IVPB (duplex) 2 g     Donna Carcamo APRN reviewed the order on 21 1249.   Ordering Provider: Otis De La uFente MD    2 g  over 30 Minutes Intravenous Every 8 Hours 21 1200 21 1159    21 2305  vancomycin 1 g/250 mL 0.9% NS (vial-mate)     Ordering Provider: Basilia Villanueva DO    20 mg/kg × 54.4 kg Intravenous Once 21 2307 21 0207    21 2305  piperacillin-tazobactam (ZOSYN) 3.375 g/100 mL 0.9% NS IVPB (mbp)     Ordering Provider: Basilia Villanueva DO    3.375 g Intravenous Once 21 2307 21 0040        Current Diuretic Therapy:  Diuretics (From  admission, onward)    None        ----------------------------------------------------------------------------------------------------------------------  Vital Signs:  Temp:  [97.8 °F (36.6 °C)-99.4 °F (37.4 °C)] 98.5 °F (36.9 °C)  Heart Rate:  [] 87  Resp:  [18-20] 20  BP: ()/(53-89) 111/68  SpO2:  [98 %-99 %] 98 %  on   ;   Device (Oxygen Therapy): room air  Body mass index is 19.95 kg/m².    Wt Readings from Last 3 Encounters:   07/03/21 56.1 kg (123 lb 9.6 oz)   10/30/19 63.5 kg (140 lb)     Intake & Output (last 3 days)       06/30 0701 - 07/01 0700 07/01 0701 - 07/02 0700 07/02 0701 - 07/03 0700 07/03 0701 - 07/04 0700    P.O. 480 1080 1020 840    I.V. (mL/kg)        Total Intake(mL/kg) 480 (7.9) 1080 (17.9) 1020 (18.2) 840 (15)    Net +480 +1080 +1020 +840            Urine Unmeasured Occurrence 1 x 1 x 5 x 3 x        Diet Regular  ----------------------------------------------------------------------------------------------------------------------  Physical exam:  Constitutional:  Well-developed and well-nourished.  No respiratory distress.      HENT:  Head:  Normocephalic and atraumatic.  Mouth:  Moist mucous membranes.    Eyes:  Conjunctivae and EOM are normal. No scleral icterus.    Neck:  Neck supple.  No JVD present.    Cardiovascular:  Normal rate, regular rhythm and normal heart sounds with no murmur.  Pulmonary/Chest:  No respiratory distress, no wheezes, no crackles, with normal breath sounds and good air movement.  Abdominal:  Soft.  Bowel sounds are normal.  No distension and no tenderness.   Musculoskeletal:  No edema, no tenderness, and no deformity.  No red or swollen joints anywhere.    Neurological:  Alert and oriented to person, place, and time.  No cranial nerve deficit.  No tongue deviation.  No facial droop.  No slurred speech.   Skin:  Skin is warm and dry. No rash noted. No pallor.   Peripheral vascular:  Pulses in all 4 extremities with no clubbing, no cyanosis, no  edema.  ----------------------------------------------------------------------------------------------------------------------  Tele:    ----------------------------------------------------------------------------------------------------------------------  Results from last 7 days   Lab Units 07/03/21 0408 07/02/21 0109 07/01/21 0150 06/29/21 0042   CRP mg/dL 0.87* 1.46* 1.00* 1.47*   WBC 10*3/mm3 11.49* 7.97  --  9.77   HEMOGLOBIN g/dL 9.6* 8.9*  --  9.8*   HEMATOCRIT % 29.5* 29.1*  --  31.3*   MCV fL 91.0 94.5  --  94.3   MCHC g/dL 32.5 30.6*  --  31.3*   PLATELETS 10*3/mm3 600* 619*  --  684*         Results from last 7 days   Lab Units 07/03/21 0408 07/02/21 0109 06/29/21 0042 06/27/21  0317   SODIUM mmol/L 137 138 139  --    POTASSIUM mmol/L 4.2 3.9 4.6  --    MAGNESIUM mg/dL  --   --   --  2.5   CHLORIDE mmol/L 103 105 107  --    CO2 mmol/L 25.1 25.4 25.3  --    BUN mg/dL 15 15 16  --    CREATININE mg/dL 0.72 0.56* 0.68  --    EGFR IF NONAFRICN AM mL/min/1.73 95 127 102  --    CALCIUM mg/dL 8.5* 8.5* 8.6  --    GLUCOSE mg/dL 96 96 95  --    ALBUMIN g/dL 2.84* 2.79*  --   --    BILIRUBIN mg/dL 0.2 0.2  --   --    ALK PHOS U/L 124* 124*  --   --    AST (SGOT) U/L 17 20  --   --    ALT (SGPT) U/L 11 13  --   --    Estimated Creatinine Clearance: 101.2 mL/min (by C-G formula based on SCr of 0.72 mg/dL).  No results found for: AMMONIA              No results found for: HGBA1C, POCGLU  Lab Results   Component Value Date    TSH 0.718 06/17/2021     Lab Results   Component Value Date    PREGTESTUR Negative 06/17/2021     Pain Management Panel     Pain Management Panel Latest Ref Rng & Units 6/17/2021 10/30/2019    AMPHETAMINES SCREEN, URINE Negative Positive(A) Negative    BARBITURATES SCREEN Negative Negative Negative    BENZODIAZEPINE SCREEN, URINE Negative Negative Negative    BUPRENORPHINEUR Negative Negative Negative    COCAINE SCREEN, URINE Negative Negative Negative    METHADONE SCREEN, URINE Negative  Negative Negative        Brief Urine Lab Results  (Last result in the past 365 days)      Color   Clarity   Blood   Leuk Est   Nitrite   Protein   CREAT   Urine HCG        06/17/21 2253 Dark Yellow Cloudy Large (3+) Moderate (2+) Positive 30 mg/dL (1+)         06/17/21 2253               Negative         No results found for: BLOODCX  No results found for: URINECX  No results found for: WOUNDCX  No results found for: STOOLCX  No results found for: RESPCX  No results found for: AFBCX  Results from last 7 days   Lab Units 07/03/21  0408 07/02/21  0109 07/01/21  0150 06/30/21  0055 06/29/21  0042 06/28/21  0113 06/27/21  0317   CRP mg/dL 0.87* 1.46* 1.00* 1.21* 1.47* 2.08* 2.43*       I have personally looked at the labs and they are summarized above.  ----------------------------------------------------------------------------------------------------------------------  Detailed radiology reports for the last 24 hours:    Imaging Results (Last 24 Hours)     ** No results found for the last 24 hours. **        Assessment & Plan    #Sepsis in setting of MSSA bacteremia and E. Coli/MSSA UTI  #Probable endocarditis w/ septic emboli  #Hx of IVDU\  #Tachycardia   - Blood cultures initially persistently positive. Blood cultures from 6/24 NGTD  - ID following. Continue IV cefazolin. CRP improved.   - Consulted cardiology for KATHRIN now patient is out of isolation. Anesthesia electing to wait until 20 day marker prior to procedure.   - Continue care consulted for IV abx given hx of IVDU.   - Will repeat TTE limited to see if endocarditis now evident.     #Persistent lower back pain  - MRI did not reveal infectious etiology.     #R knee pain   - No redness, warmth, limited ROM to suspect septic arthritis.     #Hypomagnesemia  - Replacement ordered    #BARRY and AOCD  - Hemoglobin stable. Continue niferex    #L ankle cellulitus and sprain   - Improved     #Hepatitis C 1a genotype, HCV [id number]  - Outpatient f/u     #COVID 19  -  Asymptomatic. Has completed 10 days of isolation     F: PO  E: Replace as needed   N: Regular     Code status: Full     Dispo: Pending clinical improvement       VTE Prophylaxis:   Mechanical Order History:     None      Pharmalogical Order History:      Ordered     Dose Route Frequency Stop    06/21/21 1157  enoxaparin (LOVENOX) syringe 60 mg      1 mg/kg SC Daily --    06/18/21 0528  enoxaparin (LOVENOX) syringe 50 mg  Status:  Discontinued      1 mg/kg SC Daily 06/21/21 1157    06/18/21 0524  Pharmacy to Dose enoxaparin (LOVENOX)  Status:  Discontinued     Question:  Indication of use  Answer:  Prophylaxis    -- XX Continuous PRN 06/20/21 1211                Otis De La Fuente MD  [facility] Hospitalist  07/03/21  16:09 EDT

## 2021-07-03 NOTE — PLAN OF CARE
Problem: Adult Inpatient Plan of Care  Goal: Plan of Care Review  Outcome: Ongoing, Progressing  Goal: Patient-Specific Goal (Individualized)  Outcome: Ongoing, Progressing  Goal: Absence of Hospital-Acquired Illness or Injury  Outcome: Ongoing, Progressing  Intervention: Identify and Manage Fall Risk  Recent Flowsheet Documentation  Taken 7/3/2021 0843 by Haja Lawton RN  Safety Promotion/Fall Prevention:   safety round/check completed   fall prevention program maintained  Intervention: Prevent Skin Injury  Recent Flowsheet Documentation  Taken 7/3/2021 0843 by Haja Lawton RN  Body Position: position changed independently  Skin Protection:   adhesive use limited   pulse oximeter probe site changed  Intervention: Prevent and Manage VTE (venous thromboembolism) Risk  Recent Flowsheet Documentation  Taken 7/3/2021 0843 by Haja Lawton RN  VTE Prevention/Management: (see orders ) --  Goal: Optimal Comfort and Wellbeing  Outcome: Ongoing, Progressing  Intervention: Provide Person-Centered Care  Recent Flowsheet Documentation  Taken 7/3/2021 0843 by Haja Lawton RN  Trust Relationship/Rapport:   care explained   thoughts/feelings acknowledged  Goal: Readiness for Transition of Care  Outcome: Ongoing, Progressing     Problem: Fall Injury Risk  Goal: Absence of Fall and Fall-Related Injury  Outcome: Ongoing, Progressing  Intervention: Identify and Manage Contributors to Fall Injury Risk  Recent Flowsheet Documentation  Taken 7/3/2021 0843 by Haja Lawton RN  Medication Review/Management: medications reviewed  Intervention: Promote Injury-Free Environment  Recent Flowsheet Documentation  Taken 7/3/2021 0843 by Haja Lawton RN  Safety Promotion/Fall Prevention:   safety round/check completed   fall prevention program maintained     Problem: Skin Injury Risk Increased  Goal: Skin Health and Integrity  Outcome: Ongoing, Progressing  Intervention: Optimize Skin Protection  Recent Flowsheet  Documentation  Taken 7/3/2021 0843 by Haja Lawton, RN  Pressure Reduction Techniques: frequent weight shift encouraged  Head of Bed (HOB): HOB elevated  Pressure Reduction Devices: pressure-redistributing mattress utilized  Skin Protection:   adhesive use limited   pulse oximeter probe site changed  Intervention: Promote and Optimize Oral Intake  Recent Flowsheet Documentation  Taken 7/3/2021 0843 by Haja Lawton, RN  Oral Nutrition Promotion: rest periods promoted     Problem: Skin or Soft Tissue Infection  Goal: Infection Symptom Resolution  Outcome: Ongoing, Progressing   Goal Outcome Evaluation:

## 2021-07-04 LAB
6-ACETYL MORPHINE: NEGATIVE
AMPHET+METHAMPHET UR QL: NEGATIVE
BARBITURATES UR QL SCN: NEGATIVE
BENZODIAZ UR QL SCN: NEGATIVE
BH CV ECHO MEAS - BSA(HAYCOCK): 1.6 M^2
BH CV ECHO MEAS - BSA: 1.6 M^2
BH CV ECHO MEAS - BZI_BMI: 19.9 KILOGRAMS/M^2
BH CV ECHO MEAS - BZI_METRIC_HEIGHT: 167.6 CM
BH CV ECHO MEAS - BZI_METRIC_WEIGHT: 55.8 KG
BUPRENORPHINE SERPL-MCNC: NEGATIVE NG/ML
CANNABINOIDS SERPL QL: NEGATIVE
COCAINE UR QL: NEGATIVE
CRP SERPL-MCNC: 0.7 MG/DL (ref 0–0.5)
MAXIMAL PREDICTED HEART RATE: 190 BPM
METHADONE UR QL SCN: NEGATIVE
OPIATES UR QL: NEGATIVE
OXYCODONE UR QL SCN: NEGATIVE
PCP UR QL SCN: NEGATIVE
STRESS TARGET HR: 162 BPM

## 2021-07-04 PROCEDURE — 80307 DRUG TEST PRSMV CHEM ANLYZR: CPT | Performed by: INTERNAL MEDICINE

## 2021-07-04 PROCEDURE — 93005 ELECTROCARDIOGRAM TRACING: CPT | Performed by: INTERNAL MEDICINE

## 2021-07-04 PROCEDURE — 25010000003 CEFAZOLIN SODIUM-DEXTROSE 2-3 GM-%(50ML) RECONSTITUTED SOLUTION: Performed by: INTERNAL MEDICINE

## 2021-07-04 PROCEDURE — 25010000002 ENOXAPARIN PER 10 MG: Performed by: INTERNAL MEDICINE

## 2021-07-04 PROCEDURE — 93010 ELECTROCARDIOGRAM REPORT: CPT | Performed by: INTERNAL MEDICINE

## 2021-07-04 PROCEDURE — 99231 SBSQ HOSP IP/OBS SF/LOW 25: CPT | Performed by: INTERNAL MEDICINE

## 2021-07-04 PROCEDURE — 86140 C-REACTIVE PROTEIN: CPT | Performed by: INTERNAL MEDICINE

## 2021-07-04 RX ADMIN — IBUPROFEN 400 MG: 400 TABLET, FILM COATED ORAL at 18:21

## 2021-07-04 RX ADMIN — IBUPROFEN 400 MG: 400 TABLET, FILM COATED ORAL at 08:03

## 2021-07-04 RX ADMIN — SODIUM CHLORIDE, PRESERVATIVE FREE 3 ML: 5 INJECTION INTRAVENOUS at 20:20

## 2021-07-04 RX ADMIN — ENOXAPARIN SODIUM 60 MG: 60 INJECTION SUBCUTANEOUS at 08:03

## 2021-07-04 RX ADMIN — Medication 1 TABLET: at 08:03

## 2021-07-04 RX ADMIN — SODIUM CHLORIDE, PRESERVATIVE FREE 10 ML: 5 INJECTION INTRAVENOUS at 08:03

## 2021-07-04 RX ADMIN — SODIUM CHLORIDE, PRESERVATIVE FREE 10 ML: 5 INJECTION INTRAVENOUS at 20:20

## 2021-07-04 RX ADMIN — CEFAZOLIN SODIUM 2 G: 2 SOLUTION INTRAVENOUS at 20:19

## 2021-07-04 RX ADMIN — CEFAZOLIN SODIUM 2 G: 2 SOLUTION INTRAVENOUS at 03:29

## 2021-07-04 RX ADMIN — Medication 1 CAPSULE: at 08:03

## 2021-07-04 RX ADMIN — HYDROCODONE BITARTRATE AND ACETAMINOPHEN 1 TABLET: 7.5; 325 TABLET ORAL at 20:27

## 2021-07-04 RX ADMIN — CEFAZOLIN SODIUM 2 G: 2 SOLUTION INTRAVENOUS at 11:06

## 2021-07-04 RX ADMIN — Medication 150 MG: at 08:03

## 2021-07-04 RX ADMIN — FOLIC ACID 1 MG: 1 TABLET ORAL at 08:03

## 2021-07-04 NOTE — PROGRESS NOTES
[facility] HOSPITALIST PROGRESS NOTE     Patient Identification:  Name:  Marce Gutierrez  Age:  31 y.o.  Sex:  female  :  1990  MRN:  [id number]  Visit Number:  [id number]  ROOM: [address] [place] [address]     Primary Care Provider:  Provider, No Known    Length of stay in inpatient status:  16    Subjective     Chief Compliant:    Chief Complaint   Patient presents with   • Leg Pain   • Arm Pain       History of Presenting Illness:    Patient denies any new complaints. Boyfriend supportive bedside. Patient continues to have sinus tachycardia worse on exertion.     ROS:  Otherwise 10 point ROS negative other than documented above in HPI.     Objective     Current Hospital Meds:ceFAZolin, 2 g, Intravenous, Q8H  docusate sodium, 100 mg, Oral, BID  enoxaparin, 1 mg/kg, Subcutaneous, Daily  folic acid, 1 mg, Oral, Daily  iron polysaccharides, 150 mg, Oral, Daily  lactobacillus acidophilus, 1 capsule, Oral, Daily  multivitamin with minerals, 1 tablet, Oral, Daily  polyethylene glycol, 17 g, Oral, Daily  sodium chloride, 10 mL, Intravenous, Q12H  sodium chloride, 3 mL, Intravenous, Q12H         Current Antimicrobial Therapy:  Anti-Infectives (From admission, onward)    Ordered     Dose/Rate Route Frequency Start Stop    21 1104  ceFAZolin Sodium-Dextrose (ANCEF) IVPB (duplex) 2 g     Donna Carcamo APRN reviewed the order on 21 1249.   Ordering Provider: Otis De La Fuente MD    2 g  over 30 Minutes Intravenous Every 8 Hours 21 1200 21 1159    21 2305  vancomycin 1 g/250 mL 0.9% NS (vial-mate)     Ordering Provider: Basilia Villanueva DO    20 mg/kg × 54.4 kg Intravenous Once 21 2307 21 0207    21 2305  piperacillin-tazobactam (ZOSYN) 3.375 g/100 mL 0.9% NS IVPB (mbp)     Ordering Provider: Basilia Villanueva DO    3.375 g Intravenous Once 21 2307 21 0040        Current Diuretic Therapy:  Diuretics (From admission, onward)    None         ----------------------------------------------------------------------------------------------------------------------  Vital Signs:  Temp:  [97.8 °F (36.6 °C)-98.4 °F (36.9 °C)] 98.1 °F (36.7 °C)  Heart Rate:  [] 120  Resp:  [18-20] 18  BP: ()/(58-96) 140/85  SpO2:  [96 %-98 %] 98 %  on   ;   Device (Oxygen Therapy): room air  Body mass index is 19.79 kg/m².    Wt Readings from Last 3 Encounters:   07/04/21 55.6 kg (122 lb 9.6 oz)   10/30/19 63.5 kg (140 lb)     Intake & Output (last 3 days)       07/01 0701 - 07/02 0700 07/02 0701 - 07/03 0700 07/03 0701 - 07/04 0700 07/04 0701 - 07/05 0700    P.O. 1080 1020 1680 240    I.V. (mL/kg)   100 (1.8)     Total Intake(mL/kg) 1080 (17.9) 1020 (18.2) 1780 (32) 240 (4.3)    Net +1080 +1020 +1780 +240            Urine Unmeasured Occurrence 1 x 5 x 4 x         Diet Regular  ----------------------------------------------------------------------------------------------------------------------  Physical exam:  Constitutional:  Well-developed and well-nourished.  No respiratory distress.      HENT:  Head:  Normocephalic and atraumatic.  Mouth:  Moist mucous membranes.    Eyes:  Conjunctivae and EOM are normal. No scleral icterus.    Neck:  Neck supple.  No JVD present.    Cardiovascular:  Normal rate, regular rhythm and normal heart sounds with no murmur.  Pulmonary/Chest:  No respiratory distress, no wheezes, no crackles, with normal breath sounds and good air movement.  Abdominal:  Soft.  Bowel sounds are normal.  No distension and no tenderness.   Musculoskeletal:  No edema, no tenderness, and no deformity.  No red or swollen joints anywhere.    Neurological:  Alert and oriented to person, place, and time.  No cranial nerve deficit.  No tongue deviation.  No facial droop.  No slurred speech.   Skin:  Skin is warm and dry. No rash noted. No pallor.   Peripheral vascular:  Pulses in all 4 extremities with no clubbing, no cyanosis, no  edema.  ----------------------------------------------------------------------------------------------------------------------  Tele:    ----------------------------------------------------------------------------------------------------------------------  Results from last 7 days   Lab Units 07/04/21  0401 07/03/21  0408 07/02/21 0109 06/29/21 0042   CRP mg/dL 0.70* 0.87* 1.46* 1.47*   WBC 10*3/mm3  --  11.49* 7.97 9.77   HEMOGLOBIN g/dL  --  9.6* 8.9* 9.8*   HEMATOCRIT %  --  29.5* 29.1* 31.3*   MCV fL  --  91.0 94.5 94.3   MCHC g/dL  --  32.5 30.6* 31.3*   PLATELETS 10*3/mm3  --  600* 619* 684*         Results from last 7 days   Lab Units 07/03/21  1600 07/03/21 0408 07/02/21 0109 06/29/21 0042   SODIUM mmol/L  --  137 138 139   POTASSIUM mmol/L  --  4.2 3.9 4.6   MAGNESIUM mg/dL 1.8  --   --   --    CHLORIDE mmol/L  --  103 105 107   CO2 mmol/L  --  25.1 25.4 25.3   BUN mg/dL  --  15 15 16   CREATININE mg/dL  --  0.72 0.56* 0.68   EGFR IF NONAFRICN AM mL/min/1.73  --  95 127 102   CALCIUM mg/dL  --  8.5* 8.5* 8.6   GLUCOSE mg/dL  --  96 96 95   ALBUMIN g/dL  --  2.84* 2.79*  --    BILIRUBIN mg/dL  --  0.2 0.2  --    ALK PHOS U/L  --  124* 124*  --    AST (SGOT) U/L  --  17 20  --    ALT (SGPT) U/L  --  11 13  --    Estimated Creatinine Clearance: 100.3 mL/min (by C-G formula based on SCr of 0.72 mg/dL).  No results found for: AMMONIA              No results found for: HGBA1C, POCGLU  Lab Results   Component Value Date    TSH 0.718 06/17/2021     Lab Results   Component Value Date    PREGTESTUR Negative 06/17/2021     Pain Management Panel     Pain Management Panel Latest Ref Rng & Units 7/4/2021 6/17/2021    AMPHETAMINES SCREEN, URINE Negative Negative Positive(A)    BARBITURATES SCREEN Negative Negative Negative    BENZODIAZEPINE SCREEN, URINE Negative Negative Negative    BUPRENORPHINEUR Negative Negative Negative    COCAINE SCREEN, URINE Negative Negative Negative    METHADONE SCREEN, URINE Negative  Negative Negative        Brief Urine Lab Results  (Last result in the past 365 days)      Color   Clarity   Blood   Leuk Est   Nitrite   Protein   CREAT   Urine HCG        06/17/21 2253 Dark Yellow Cloudy Large (3+) Moderate (2+) Positive 30 mg/dL (1+)         06/17/21 2253               Negative         No results found for: BLOODCX  No results found for: URINECX  No results found for: WOUNDCX  No results found for: STOOLCX  No results found for: RESPCX  No results found for: AFBCX  Results from last 7 days   Lab Units 07/04/21  0401 07/03/21  0408 07/02/21  0109 07/01/21  0150 06/30/21  0055 06/29/21  0042 06/28/21  0113   CRP mg/dL 0.70* 0.87* 1.46* 1.00* 1.21* 1.47* 2.08*       I have personally looked at the labs and they are summarized above.  ----------------------------------------------------------------------------------------------------------------------  Detailed radiology reports for the last 24 hours:    Imaging Results (Last 24 Hours)     ** No results found for the last 24 hours. **        Assessment & Plan    #Sepsis in setting of MSSA bacteremia and E. Coli/MSSA UTI  #Probable endocarditis w/ septic emboli  #Hx of IVDU  - Blood cultures initially persistently positive. Blood cultures from 6/24 NGTD  - ID following. Continue IV cefazolin. CRP improved.   - Consulted cardiology for KATHRIN now patient is out of isolation. Anesthesia electing to wait until 20 day marker prior to procedure.   - Repeat TTE did not reveal evidence of endocarditis.   - Continue care consulted for IV abx given hx of IVDU.     #Persistent lower back pain  - MRI did not reveal infectious etiology.     #R knee pain   - No redness, warmth, limited ROM to suspect septic arthritis.     #Hypomagnesemia  - Replacement ordered    #BARRY and AOCD  - Hemoglobin stable. Continue niferex    #L ankle cellulitus and sprain   - Improved     #Hepatitis C 1a genotype, HCV [id number]  - Outpatient f/u     #COVID 19  - Asymptomatic. Has completed 10  days of isolation     F: PO  E: Replace as needed   N: Regular     Code status: Full     Dispo: Pending clinical improvement     VTE Prophylaxis:   Mechanical Order History:     None      Pharmalogical Order History:      Ordered     Dose Route Frequency Stop    06/21/21 1157  enoxaparin (LOVENOX) syringe 60 mg      1 mg/kg SC Daily --    06/18/21 0528  enoxaparin (LOVENOX) syringe 50 mg  Status:  Discontinued      1 mg/kg SC Daily 06/21/21 1157    06/18/21 0524  Pharmacy to Dose enoxaparin (LOVENOX)  Status:  Discontinued     Question:  Indication of use  Answer:  Prophylaxis    -- XX Continuous PRN 06/20/21 1211                Otis De La Fuente MD  [facility] Hospitalist  07/04/21  17:36 EDT

## 2021-07-04 NOTE — PLAN OF CARE
Problem: Adult Inpatient Plan of Care  Goal: Plan of Care Review  Outcome: Ongoing, Progressing  Goal: Patient-Specific Goal (Individualized)  Outcome: Ongoing, Progressing  Goal: Absence of Hospital-Acquired Illness or Injury  Outcome: Ongoing, Progressing  Intervention: Identify and Manage Fall Risk  Recent Flowsheet Documentation  Taken 7/4/2021 0803 by Haja Lawton RN  Safety Promotion/Fall Prevention:   safety round/check completed   fall prevention program maintained  Intervention: Prevent Skin Injury  Recent Flowsheet Documentation  Taken 7/4/2021 0803 by Haja Lawton RN  Body Position: position changed independently  Goal: Optimal Comfort and Wellbeing  Outcome: Ongoing, Progressing  Intervention: Provide Person-Centered Care  Recent Flowsheet Documentation  Taken 7/4/2021 0803 by Haja Lawton RN  Trust Relationship/Rapport:   care explained   thoughts/feelings acknowledged  Goal: Readiness for Transition of Care  Outcome: Ongoing, Progressing     Problem: Fall Injury Risk  Goal: Absence of Fall and Fall-Related Injury  Outcome: Ongoing, Progressing  Intervention: Identify and Manage Contributors to Fall Injury Risk  Recent Flowsheet Documentation  Taken 7/4/2021 0803 by Haja Lawton RN  Medication Review/Management: medications reviewed  Intervention: Promote Injury-Free Environment  Recent Flowsheet Documentation  Taken 7/4/2021 0803 by Haja Lawton RN  Safety Promotion/Fall Prevention:   safety round/check completed   fall prevention program maintained     Problem: Skin Injury Risk Increased  Goal: Skin Health and Integrity  Outcome: Ongoing, Progressing  Intervention: Optimize Skin Protection  Recent Flowsheet Documentation  Taken 7/4/2021 0803 by Haja Lawton RN  Head of Bed (HOB): HOB elevated  Intervention: Promote and Optimize Oral Intake  Recent Flowsheet Documentation  Taken 7/4/2021 0803 by Haja Lawton RN  Oral Nutrition Promotion: rest periods promoted     Problem:  Skin or Soft Tissue Infection  Goal: Infection Symptom Resolution  Outcome: Ongoing, Progressing   Goal Outcome Evaluation:

## 2021-07-04 NOTE — PROGRESS NOTES
PROGRESS NOTE         Patient Identification:  Name:  Marce Gutierrez  Age:  31 y.o.  Sex:  female  :  1990  MRN:  [id number]  Visit Number:  [id number]  Primary Care Provider:  Provider, No Known         LOS: 16 days       ----------------------------------------------------------------------------------------------------------------------  Subjective       Chief Complaints:    Leg Pain and Arm Pain        Interval History:      Patient is sitting up in bed in no apparent distress.  Significant other is at bedside.  Patient reports some palpitations early this morning. Denies any shortness of breath, chest pain, cough, nausea, vomiting, or diarrhea.  Afebrile, no diarrhea. CRP is improving at 0.70.  We await KATHRIN.    Review of Systems:    Constitutional: no fever, chills and night sweats. No appetite change or unexpected weight change. No fatigue.  Eyes: no eye drainage, itching or redness.  HEENT: no mouth sores, dysphagia or nose bleed.  Respiratory: no for shortness of breath, cough or production of sputum.  Cardiovascular: no chest pain and no orthopnea.  Palpitations.  Gastrointestinal: no nausea, vomiting or diarrhea. No abdominal pain, hematemesis or rectal bleeding.  Genitourinary: no dysuria or polyuria.  Hematologic/lymphatic: no lymph node abnormalities, no easy bruising or easy bleeding.  Musculoskeletal:  No muscle or joint pain.  Skin: No rash and no itching.  Neurological: no loss of consciousness, no seizure, no headache.  Behavioral/Psych: no depression or suicidal ideation.  Endocrine: no hot flashes.  Immunologic: negative.     ----------------------------------------------------------------------------------------------------------------------      Objective       [facility] Meds:  ceFAZolin, 2 g, Intravenous, Q8H  docusate sodium, 100 mg, Oral, BID  enoxaparin, 1 mg/kg, Subcutaneous, Daily  folic acid, 1 mg, Oral, Daily  iron polysaccharides, 150 mg, Oral,  Daily  lactobacillus acidophilus, 1 capsule, Oral, Daily  multivitamin with minerals, 1 tablet, Oral, Daily  polyethylene glycol, 17 g, Oral, Daily  sodium chloride, 10 mL, Intravenous, Q12H  sodium chloride, 3 mL, Intravenous, Q12H         ----------------------------------------------------------------------------------------------------------------------    Vital Signs:  Temp:  [97.8 °F (36.6 °C)-98.5 °F (36.9 °C)] 98.4 °F (36.9 °C)  Heart Rate:  [] 109  Resp:  [18-20] 20  BP: ()/(58-96) 122/86  Mean Arterial Pressure (Non-Invasive) for the past 24 hrs (Last 3 readings):   Noninvasive MAP (mmHg)   07/04/21 1027 99   07/04/21 0616 72   07/04/21 0218 91     SpO2 Percentage    07/04/21 0218 07/04/21 0616 07/04/21 1027   SpO2: 96% 96% 97%     SpO2:  [96 %-98 %] 97 %  on   ;   Device (Oxygen Therapy): room air    Body mass index is 19.79 kg/m².  Wt Readings from Last 3 Encounters:   07/04/21 55.6 kg (122 lb 9.6 oz)   10/30/19 63.5 kg (140 lb)        Intake/Output Summary (Last 24 hours) at 7/4/2021 1054  Last data filed at 7/4/2021 0917  Gross per 24 hour   Intake 1540 ml   Output --   Net 1540 ml     Diet Regular  ----------------------------------------------------------------------------------------------------------------------      Physical Exam:    Constitutional:  Well-developed and well-nourished.  No respiratory distress.      HENT:  Head: Normocephalic and atraumatic.  Mouth:  Moist mucous membranes.    Eyes:  Conjunctivae and EOM are normal.  No scleral icterus.  Neck:  Neck supple.  No JVD present.  Cardiovascular:  Normal rate, regular rhythm and normal heart sounds with no murmur. No edema.  Pulmonary/Chest:  No respiratory distress, no wheezes, no crackles, with normal breath sounds and good air movement.  Abdominal:  Soft.  Bowel sounds are normal.  No distension and no tenderness.   Musculoskeletal:  No edema, no tenderness, and no deformity.  No swelling or redness of  joints.  Neurological:  Alert and oriented to person, place, and time.  No facial droop.  No slurred speech.   Skin:  Skin is warm and dry.  No rash noted.  No pallor.   Psychiatric:  Normal mood and affect.  Behavior is normal.  ----------------------------------------------------------------------------------------------------------------------              Results from last 7 days   Lab Units 07/04/21  0401 07/03/21  0408 07/02/21 0109 06/29/21 0042   CRP mg/dL 0.70* 0.87* 1.46* 1.47*   WBC 10*3/mm3  --  11.49* 7.97 9.77   HEMOGLOBIN g/dL  --  9.6* 8.9* 9.8*   HEMATOCRIT %  --  29.5* 29.1* 31.3*   MCV fL  --  91.0 94.5 94.3   MCHC g/dL  --  32.5 30.6* 31.3*   PLATELETS 10*3/mm3  --  600* 619* 684*     Results from last 7 days   Lab Units 07/03/21  1600 07/03/21 0408 07/02/21 0109 06/29/21  0042   SODIUM mmol/L  --  137 138 139   POTASSIUM mmol/L  --  4.2 3.9 4.6   MAGNESIUM mg/dL 1.8  --   --   --    CHLORIDE mmol/L  --  103 105 107   CO2 mmol/L  --  25.1 25.4 25.3   BUN mg/dL  --  15 15 16   CREATININE mg/dL  --  0.72 0.56* 0.68   EGFR IF NONAFRICN AM mL/min/1.73  --  95 127 102   CALCIUM mg/dL  --  8.5* 8.5* 8.6   GLUCOSE mg/dL  --  96 96 95   ALBUMIN g/dL  --  2.84* 2.79*  --    BILIRUBIN mg/dL  --  0.2 0.2  --    ALK PHOS U/L  --  124* 124*  --    AST (SGOT) U/L  --  17 20  --    ALT (SGPT) U/L  --  11 13  --    Estimated Creatinine Clearance: 100.3 mL/min (by C-G formula based on SCr of 0.72 mg/dL).  No results found for: AMMONIA    No results found for: HGBA1C, POCGLU  Lab Results   Component Value Date    HGBA1C 5.60 06/17/2021     Lab Results   Component Value Date    TSH 0.718 06/17/2021       Blood Culture   Date Value Ref Range Status   06/20/2021 Abnormal Stain (C)  Preliminary   06/19/2021 Staphylococcus aureus (C)  Final     Comment:     Infectious disease consultation is highly recommended to rule out distant foci of infection.   06/17/2021 Staphylococcus aureus (C)  Final     Comment:      Infectious disease consultation is highly recommended to rule out distant foci of infection.   06/17/2021 Staphylococcus aureus (C)  Final     Comment:     Infectious disease consultation is highly recommended to rule out distant foci of infection.  Refer to previous blood culture collected on 6/17/2021 2318 for MICs.     Urine Culture   Date Value Ref Range Status   06/17/2021 >100,000 CFU/mL Escherichia coli (A)  Final   06/17/2021 >100,000 CFU/mL Staphylococcus aureus (A)  Corrected     No results found for: WOUNDCX  No results found for: STOOLCX  No results found for: RESPCX  Pain Management Panel       Pain Management Panel Latest Ref Rng & Units 7/4/2021 6/17/2021    AMPHETAMINES SCREEN, URINE Negative Negative Positive(A)    BARBITURATES SCREEN Negative Negative Negative    BENZODIAZEPINE SCREEN, URINE Negative Negative Negative    BUPRENORPHINEUR Negative Negative Negative    COCAINE SCREEN, URINE Negative Negative Negative    METHADONE SCREEN, URINE Negative Negative Negative              ----------------------------------------------------------------------------------------------------------------------  Imaging Results (Last 24 Hours)       ** No results found for the last 24 hours. **            ----------------------------------------------------------------------------------------------------------------------    Assessment/Plan       Assessment/Plan     ASSESSMENT:    1.  Severe sepsis with lactic acid greater than 2 on admission  2.  Complicated MSSA bacteremia  3.  Likely underlying endocarditis  4.  Septic emboli    PLAN:    Patient is sitting up in bed in no apparent distress.  Significant other is at bedside.  Patient reports some palpitations early this morning. Denies any shortness of breath, chest pain, cough, nausea, vomiting, or diarrhea.  Afebrile, no diarrhea. CRP is improving at 0.70.  We await KATHRIN.    Blood cultures from 6/24/2021 finalized as no growth. Blood culture from 6/22/2021 1  out of 1 set positive for MSSA.    Urinalysis on 6/17/2021 was positive and urine culture finalized as greater than 100,000 colonies of E. coli and greater than 100,000 colonies of MRSA.  Blood cultures on 6/17/2021 finalized as MSSA.  Repeat blood cultures on 6/19/2021 and 6/20/2021 are still showing growth.  COVID-19 and flu A/B PCR on 6/18/2021 detected COVID-19.  Mycoplasma pneumoniae antibody on 6/18/2021 was negative.  Strep pneumo antigen on 6/18/2021 was negative.  CT chest with PE protocol on 6/18/2021 showed technically degraded exam, but no definite PE is seen, and there is no aortic dissection.  Pulmonary edema with a trace amount of right pleural fluid.  Poorly defined nodular infiltrates on both sides of the chest suspicious for septic emboli.  Continued follow-up is recommended. MRI of the lumbar spine on 7/2/2021 revealed no acute findings.     Contacted micro lab regarding urine culture on 6/17/2021.  Urine culture has finalized as MSSA, but MRSA verbiage was included underneath and micro lab corrected.    COVID-19 diagnosis was incidental and patient remains asymptomatic.  Patient is now out of isolation as of 6/28/2021.    Recommend to continue Cefazolin monotherapy. We will follow closely and adjust antibiotic therapy as appropriate.  Recommend KATHRIN, as patient is able to be removed from isolation and has completed 10 days of isolation and remained asymptomatic.    Code Status:   Code Status and Medical Interventions:   Ordered at: 06/18/21 0446     Level Of Support Discussed With:    Patient     Code Status:    CPR     Medical Interventions (Level of Support Prior to Arrest):    Full       SYL Jiménez  07/04/21  10:54 EDT

## 2021-07-04 NOTE — PLAN OF CARE
Goal Outcome Evaluation: Lying in bed asleep. Easy to arouse. Informed patient that a urine specimen was needed earlier in the day, has not done so yet. 0 acute distress noted. Will continue to monitor

## 2021-07-05 ENCOUNTER — APPOINTMENT (OUTPATIENT)
Dept: ULTRASOUND IMAGING | Facility: HOSPITAL | Age: 31
End: 2021-07-05

## 2021-07-05 LAB
ANION GAP SERPL CALCULATED.3IONS-SCNC: 8 MMOL/L (ref 5–15)
BASOPHILS # BLD AUTO: 0.14 10*3/MM3 (ref 0–0.2)
BASOPHILS NFR BLD AUTO: 1.2 % (ref 0–1.5)
BUN SERPL-MCNC: 14 MG/DL (ref 6–20)
BUN/CREAT SERPL: 21.2 (ref 7–25)
CALCIUM SPEC-SCNC: 8.6 MG/DL (ref 8.6–10.5)
CHLORIDE SERPL-SCNC: 104 MMOL/L (ref 98–107)
CO2 SERPL-SCNC: 26 MMOL/L (ref 22–29)
CREAT SERPL-MCNC: 0.66 MG/DL (ref 0.57–1)
CRP SERPL-MCNC: 0.71 MG/DL (ref 0–0.5)
DEPRECATED RDW RBC AUTO: 44.6 FL (ref 37–54)
EOSINOPHIL # BLD AUTO: 0.15 10*3/MM3 (ref 0–0.4)
EOSINOPHIL NFR BLD AUTO: 1.3 % (ref 0.3–6.2)
ERYTHROCYTE [DISTWIDTH] IN BLOOD BY AUTOMATED COUNT: 12.9 % (ref 12.3–15.4)
GFR SERPL CREATININE-BSD FRML MDRD: 105 ML/MIN/1.73
GLUCOSE SERPL-MCNC: 92 MG/DL (ref 65–99)
HCT VFR BLD AUTO: 29.5 % (ref 34–46.6)
HGB BLD-MCNC: 9.2 G/DL (ref 12–15.9)
IMM GRANULOCYTES # BLD AUTO: 0.13 10*3/MM3 (ref 0–0.05)
IMM GRANULOCYTES NFR BLD AUTO: 1.2 % (ref 0–0.5)
LYMPHOCYTES # BLD AUTO: 3.36 10*3/MM3 (ref 0.7–3.1)
LYMPHOCYTES NFR BLD AUTO: 29.9 % (ref 19.6–45.3)
MCH RBC QN AUTO: 29.7 PG (ref 26.6–33)
MCHC RBC AUTO-ENTMCNC: 31.2 G/DL (ref 31.5–35.7)
MCV RBC AUTO: 95.2 FL (ref 79–97)
MONOCYTES # BLD AUTO: 0.89 10*3/MM3 (ref 0.1–0.9)
MONOCYTES NFR BLD AUTO: 7.9 % (ref 5–12)
NEUTROPHILS NFR BLD AUTO: 58.5 % (ref 42.7–76)
NEUTROPHILS NFR BLD AUTO: 6.58 10*3/MM3 (ref 1.7–7)
NRBC BLD AUTO-RTO: 0 /100 WBC (ref 0–0.2)
PLATELET # BLD AUTO: 518 10*3/MM3 (ref 140–450)
PMV BLD AUTO: 9.2 FL (ref 6–12)
POTASSIUM SERPL-SCNC: 3.5 MMOL/L (ref 3.5–5.2)
QT INTERVAL: 328 MS
QT INTERVAL: 342 MS
QTC INTERVAL: 433 MS
QTC INTERVAL: 454 MS
RBC # BLD AUTO: 3.1 10*6/MM3 (ref 3.77–5.28)
SODIUM SERPL-SCNC: 138 MMOL/L (ref 136–145)
WBC # BLD AUTO: 11.25 10*3/MM3 (ref 3.4–10.8)

## 2021-07-05 PROCEDURE — 99231 SBSQ HOSP IP/OBS SF/LOW 25: CPT | Performed by: INTERNAL MEDICINE

## 2021-07-05 PROCEDURE — 85025 COMPLETE CBC W/AUTO DIFF WBC: CPT | Performed by: INTERNAL MEDICINE

## 2021-07-05 PROCEDURE — 80048 BASIC METABOLIC PNL TOTAL CA: CPT | Performed by: INTERNAL MEDICINE

## 2021-07-05 PROCEDURE — 86140 C-REACTIVE PROTEIN: CPT | Performed by: INTERNAL MEDICINE

## 2021-07-05 PROCEDURE — 93971 EXTREMITY STUDY: CPT | Performed by: RADIOLOGY

## 2021-07-05 PROCEDURE — 25010000003 CEFAZOLIN SODIUM-DEXTROSE 2-3 GM-%(50ML) RECONSTITUTED SOLUTION: Performed by: INTERNAL MEDICINE

## 2021-07-05 PROCEDURE — 25010000002 ENOXAPARIN PER 10 MG: Performed by: INTERNAL MEDICINE

## 2021-07-05 PROCEDURE — 93971 EXTREMITY STUDY: CPT

## 2021-07-05 RX ADMIN — IBUPROFEN 400 MG: 400 TABLET, FILM COATED ORAL at 10:12

## 2021-07-05 RX ADMIN — SODIUM CHLORIDE, PRESERVATIVE FREE 10 ML: 5 INJECTION INTRAVENOUS at 09:03

## 2021-07-05 RX ADMIN — CEFAZOLIN SODIUM 2 G: 2 SOLUTION INTRAVENOUS at 11:15

## 2021-07-05 RX ADMIN — HYDROCODONE BITARTRATE AND ACETAMINOPHEN 1 TABLET: 7.5; 325 TABLET ORAL at 21:07

## 2021-07-05 RX ADMIN — IBUPROFEN 400 MG: 400 TABLET, FILM COATED ORAL at 18:46

## 2021-07-05 RX ADMIN — FOLIC ACID 1 MG: 1 TABLET ORAL at 09:02

## 2021-07-05 RX ADMIN — ENOXAPARIN SODIUM 60 MG: 60 INJECTION SUBCUTANEOUS at 09:03

## 2021-07-05 RX ADMIN — SODIUM CHLORIDE, PRESERVATIVE FREE 3 ML: 5 INJECTION INTRAVENOUS at 09:06

## 2021-07-05 RX ADMIN — Medication 1 TABLET: at 09:03

## 2021-07-05 RX ADMIN — Medication 1 CAPSULE: at 09:03

## 2021-07-05 RX ADMIN — DOCUSATE SODIUM 100 MG: 100 CAPSULE, LIQUID FILLED ORAL at 21:05

## 2021-07-05 RX ADMIN — Medication 150 MG: at 09:03

## 2021-07-05 RX ADMIN — CEFAZOLIN SODIUM 2 G: 2 SOLUTION INTRAVENOUS at 04:19

## 2021-07-05 NOTE — PROGRESS NOTES
PROGRESS NOTE         Patient Identification:  Name:  Marce Gutierrez  Age:  31 y.o.  Sex:  female  :  1990  MRN:  [id number]  Visit Number:  [id number]  Primary Care Provider:  Provider, No Known         LOS: 17 days       ----------------------------------------------------------------------------------------------------------------------  Subjective       Chief Complaints:    Leg Pain and Arm Pain        Interval History:      Patient is sitting up in bed in no apparent distress.  Nurse is at bedside.  Patient denies any new complaints at this time and is overall feeling well.  Afebrile, no diarrhea. CRP is stable at 0.71.  WBC is stable at 11.25.  We await KTAHRIN.    Review of Systems:    Constitutional: no fever, chills and night sweats. No appetite change or unexpected weight change. No fatigue.  Eyes: no eye drainage, itching or redness.  HEENT: no mouth sores, dysphagia or nose bleed.  Respiratory: no for shortness of breath, cough or production of sputum.  Cardiovascular: no chest pain and no orthopnea.  Palpitations.  Gastrointestinal: no nausea, vomiting or diarrhea. No abdominal pain, hematemesis or rectal bleeding.  Genitourinary: no dysuria or polyuria.  Hematologic/lymphatic: no lymph node abnormalities, no easy bruising or easy bleeding.  Musculoskeletal:  No muscle or joint pain.  Skin: No rash and no itching.  Neurological: no loss of consciousness, no seizure, no headache.  Behavioral/Psych: no depression or suicidal ideation.  Endocrine: no hot flashes.  Immunologic: negative.     ----------------------------------------------------------------------------------------------------------------------      Objective       [facility] Meds:  ceFAZolin, 2 g, Intravenous, Q8H  docusate sodium, 100 mg, Oral, BID  enoxaparin, 1 mg/kg, Subcutaneous, Daily  folic acid, 1 mg, Oral, Daily  iron polysaccharides, 150 mg, Oral, Daily  lactobacillus acidophilus, 1 capsule, Oral,  Daily  multivitamin with minerals, 1 tablet, Oral, Daily  polyethylene glycol, 17 g, Oral, Daily  sodium chloride, 10 mL, Intravenous, Q12H  sodium chloride, 3 mL, Intravenous, Q12H         ----------------------------------------------------------------------------------------------------------------------    Vital Signs:  Temp:  [98.1 °F (36.7 °C)-98.9 °F (37.2 °C)] 98.3 °F (36.8 °C)  Heart Rate:  [] 85  Resp:  [18] 18  BP: (107-140)/(62-88) 107/62  Mean Arterial Pressure (Non-Invasive) for the past 24 hrs (Last 3 readings):   Noninvasive MAP (mmHg)   07/05/21 0633 80   07/05/21 0251 86   07/04/21 1825 102     SpO2 Percentage    07/04/21 1825 07/05/21 0251 07/05/21 0633   SpO2: 99% 99% 98%     SpO2:  [98 %-99 %] 98 %  on   ;   Device (Oxygen Therapy): room air    Body mass index is 20.05 kg/m².  Wt Readings from Last 3 Encounters:   07/05/21 56.3 kg (124 lb 3.2 oz)   10/30/19 63.5 kg (140 lb)        Intake/Output Summary (Last 24 hours) at 7/5/2021 1137  Last data filed at 7/5/2021 0400  Gross per 24 hour   Intake 840 ml   Output --   Net 840 ml     Diet Regular  ----------------------------------------------------------------------------------------------------------------------      Physical Exam:    Constitutional:  Well-developed and well-nourished.  No respiratory distress.      HENT:  Head: Normocephalic and atraumatic.  Mouth:  Moist mucous membranes.    Eyes:  Conjunctivae and EOM are normal.  No scleral icterus.  Neck:  Neck supple.  No JVD present.  Cardiovascular:  Normal rate, regular rhythm and normal heart sounds with no murmur. No edema.  Pulmonary/Chest:  No respiratory distress, no wheezes, no crackles, with normal breath sounds and good air movement.  Abdominal:  Soft.  Bowel sounds are normal.  No distension and no tenderness.   Musculoskeletal:  No edema, no tenderness, and no deformity.  No swelling or redness of joints.  Neurological:  Alert and oriented to person, place, and time.  No  facial droop.  No slurred speech.   Skin:  Skin is warm and dry.  No rash noted.  No pallor.   Psychiatric:  Normal mood and affect.  Behavior is normal.  ----------------------------------------------------------------------------------------------------------------------              Results from last 7 days   Lab Units 07/05/21 0100 07/04/21  0401 07/03/21  0408 07/02/21  0109   CRP mg/dL 0.71* 0.70* 0.87* 1.46*   WBC 10*3/mm3 11.25*  --  11.49* 7.97   HEMOGLOBIN g/dL 9.2*  --  9.6* 8.9*   HEMATOCRIT % 29.5*  --  29.5* 29.1*   MCV fL 95.2  --  91.0 94.5   MCHC g/dL 31.2*  --  32.5 30.6*   PLATELETS 10*3/mm3 518*  --  600* 619*     Results from last 7 days   Lab Units 07/05/21  0100 07/03/21  1600 07/03/21  0408 07/02/21  0109   SODIUM mmol/L 138  --  137 138   POTASSIUM mmol/L 3.5  --  4.2 3.9   MAGNESIUM mg/dL  --  1.8  --   --    CHLORIDE mmol/L 104  --  103 105   CO2 mmol/L 26.0  --  25.1 25.4   BUN mg/dL 14  --  15 15   CREATININE mg/dL 0.66  --  0.72 0.56*   EGFR IF NONAFRICN AM mL/min/1.73 105  --  95 127   CALCIUM mg/dL 8.6  --  8.5* 8.5*   GLUCOSE mg/dL 92  --  96 96   ALBUMIN g/dL  --   --  2.84* 2.79*   BILIRUBIN mg/dL  --   --  0.2 0.2   ALK PHOS U/L  --   --  124* 124*   AST (SGOT) U/L  --   --  17 20   ALT (SGPT) U/L  --   --  11 13   Estimated Creatinine Clearance: 110.8 mL/min (by C-G formula based on SCr of 0.66 mg/dL).  No results found for: AMMONIA    No results found for: HGBA1C, POCGLU  Lab Results   Component Value Date    HGBA1C 5.60 06/17/2021     Lab Results   Component Value Date    TSH 0.718 06/17/2021       Blood Culture   Date Value Ref Range Status   06/20/2021 Abnormal Stain (C)  Preliminary   06/19/2021 Staphylococcus aureus (C)  Final     Comment:     Infectious disease consultation is highly recommended to rule out distant foci of infection.   06/17/2021 Staphylococcus aureus (C)  Final     Comment:     Infectious disease consultation is highly recommended to rule out distant  foci of infection.   06/17/2021 Staphylococcus aureus (C)  Final     Comment:     Infectious disease consultation is highly recommended to rule out distant foci of infection.  Refer to previous blood culture collected on 6/17/2021 2318 for MICs.     Urine Culture   Date Value Ref Range Status   06/17/2021 >100,000 CFU/mL Escherichia coli (A)  Final   06/17/2021 >100,000 CFU/mL Staphylococcus aureus (A)  Corrected     No results found for: WOUNDCX  No results found for: STOOLCX  No results found for: RESPCX  Pain Management Panel       Pain Management Panel Latest Ref Rng & Units 7/4/2021 6/17/2021    AMPHETAMINES SCREEN, URINE Negative Negative Positive(A)    BARBITURATES SCREEN Negative Negative Negative    BENZODIAZEPINE SCREEN, URINE Negative Negative Negative    BUPRENORPHINEUR Negative Negative Negative    COCAINE SCREEN, URINE Negative Negative Negative    METHADONE SCREEN, URINE Negative Negative Negative              ----------------------------------------------------------------------------------------------------------------------  Imaging Results (Last 24 Hours)       ** No results found for the last 24 hours. **            ----------------------------------------------------------------------------------------------------------------------    Assessment/Plan       Assessment/Plan     ASSESSMENT:    1.  Severe sepsis with lactic acid greater than 2 on admission  2.  Complicated MSSA bacteremia  3.  Likely underlying endocarditis  4.  Septic emboli    PLAN:    Patient is sitting up in bed in no apparent distress.  Nurse is at bedside.  Patient denies any new complaints at this time and is overall feeling well.  Afebrile, no diarrhea. CRP is stable at 0.71.  WBC is stable at 11.25.  We await KATHRIN.    Blood cultures from 6/24/2021 finalized as no growth. Blood culture from 6/22/2021 1 out of 1 set positive for MSSA.    Urinalysis on 6/17/2021 was positive and urine culture finalized as greater than 100,000  colonies of E. coli and greater than 100,000 colonies of MRSA.  Blood cultures on 6/17/2021 finalized as MSSA.  Repeat blood cultures on 6/19/2021 and 6/20/2021 are still showing growth.  COVID-19 and flu A/B PCR on 6/18/2021 detected COVID-19.  Mycoplasma pneumoniae antibody on 6/18/2021 was negative.  Strep pneumo antigen on 6/18/2021 was negative.  CT chest with PE protocol on 6/18/2021 showed technically degraded exam, but no definite PE is seen, and there is no aortic dissection.  Pulmonary edema with a trace amount of right pleural fluid.  Poorly defined nodular infiltrates on both sides of the chest suspicious for septic emboli.  Continued follow-up is recommended. MRI of the lumbar spine on 7/2/2021 revealed no acute findings.     Contacted micro lab regarding urine culture on 6/17/2021.  Urine culture has finalized as MSSA, but MRSA verbiage was included underneath and micro lab corrected.    COVID-19 diagnosis was incidental and patient remains asymptomatic.  Patient is now out of isolation as of 6/28/2021.    Recommend to continue Cefazolin monotherapy. We will follow closely and adjust antibiotic therapy as appropriate.  Continue to recommend KATHRIN and will follow up on results.    Code Status:   Code Status and Medical Interventions:   Ordered at: 06/18/21 0446     Level Of Support Discussed With:    Patient     Code Status:    CPR     Medical Interventions (Level of Support Prior to Arrest):    Full       SYL Jiménez  07/05/21  11:37 EDT

## 2021-07-05 NOTE — PROGRESS NOTES
Assisted By: Aarti ARELLANO    CC: Follow-up on bacteremia    Interview History/HPI: Patient states she feels better than the last time I saw her.  She is now out of Covid isolation.  Patient states that she is no longer having significant left ankle pain and she was walking around the room on this.  She is developed a new pain in her right leg just inferior to the tibial plateau area.  She states when she bends her knee fully she will feel this.  She has not noticed any erythema, she has had no fever.  She is tolerating her diet and otherwise is without complaints.    ROS:     Vitals:    07/05/21 1411   BP: 134/86   Pulse: 100   Resp: 18   Temp: 98.4 °F (36.9 °C)   SpO2: 97%         Intake/Output Summary (Last 24 hours) at 7/5/2021 1637  Last data filed at 7/5/2021 0400  Gross per 24 hour   Intake 840 ml   Output --   Net 840 ml       EXAM: Pleasant, she can ambulate her in the room, no distress, mood is good on room air lungs clear not using sensory muscles, heart regular rate and rhythm without murmur rub or gallop abdomen is soft, benign nontender the right leg there is minimal tenderness in the area described above but no erythema or cords noted, the left ankle is much improved from a erythema standpoint from the last time I saw it edema is improved as well.  No effusion noted on the right knee.      Tele: Sinus rhythm and sinus tachycardia    LABS:     Lab Results (last 48 hours)     Procedure Component Value Units Date/Time    Basic Metabolic Panel [[id number]]  (Normal) Collected: 07/05/21 0100    Specimen: Blood Updated: 07/05/21 0212     Glucose 92 mg/dL      BUN 14 mg/dL      Creatinine 0.66 mg/dL      Sodium 138 mmol/L      Potassium 3.5 mmol/L      Chloride 104 mmol/L      CO2 26.0 mmol/L      Calcium 8.6 mg/dL      eGFR Non African Amer 105 mL/min/1.73      BUN/Creatinine Ratio 21.2     Anion Gap 8.0 mmol/L     Narrative:      GFR Normal >60  Chronic Kidney Disease <60  Kidney Failure <15      C-reactive  Protein [[id number]]  (Abnormal) Collected: 07/05/21 0100    Specimen: Blood Updated: 07/05/21 0212     C-Reactive Protein 0.71 mg/dL     CBC & Differential [[id number]]  (Abnormal) Collected: 07/05/21 0100    Specimen: Blood Updated: 07/05/21 0204    Narrative:      The following orders were created for panel order CBC & Differential.  Procedure                               Abnormality         Status                     ---------                               -----------         ------                     CBC Auto Differential[[id number]]        Abnormal            Final result                 Please view results for these tests on the individual orders.    CBC Auto Differential [[id number]]  (Abnormal) Collected: 07/05/21 0100    Specimen: Blood Updated: 07/05/21 0204     WBC 11.25 10*3/mm3      RBC 3.10 10*6/mm3      Hemoglobin 9.2 g/dL      Hematocrit 29.5 %      MCV 95.2 fL      MCH 29.7 pg      MCHC 31.2 g/dL      RDW 12.9 %      RDW-SD 44.6 fl      MPV 9.2 fL      Platelets 518 10*3/mm3      Neutrophil % 58.5 %      Lymphocyte % 29.9 %      Monocyte % 7.9 %      Eosinophil % 1.3 %      Basophil % 1.2 %      Immature Grans % 1.2 %      Neutrophils, Absolute 6.58 10*3/mm3      Lymphocytes, Absolute 3.36 10*3/mm3      Monocytes, Absolute 0.89 10*3/mm3      Eosinophils, Absolute 0.15 10*3/mm3      Basophils, Absolute 0.14 10*3/mm3      Immature Grans, Absolute 0.13 10*3/mm3      nRBC 0.0 /100 WBC     Urine Drug Screen - Urine, Clean Catch [[id number]]  (Normal) Collected: 07/04/21 1031    Specimen: Urine, Clean Catch Updated: 07/04/21 1049     Amphetamine Screen, Urine Negative     Barbiturates Screen, Urine Negative     Benzodiazepine Screen, Urine Negative     Cocaine Screen, Urine Negative     Methadone Screen, Urine Negative     Opiate Screen Negative     Phencyclidine (PCP), Urine Negative     THC, Screen, Urine Negative     6-ACETYL MORPHINE Negative     Buprenorphine, Screen, Urine Negative     Oxycodone  Screen, Urine Negative    Narrative:      Negative Thresholds For Drugs Screened:                  Amphetamines              1000 ng/ml               Barbiturates               200 ng/ml               Benzodiazepines            200 ng/ml              Cocaine                    300 ng/ml              Methadone                  300 ng/ml              Opiates                    300 ng/ml               Phencyclidine               25 ng/ml               THC                         50 ng/ml              6-Acetyl Morphine           10 ng/ml              Buprenorphine                5 ng/ml              Oxycodone                  300 ng/ml    The reference range for all drugs tested is negative. This report includes final unconfirmed qualitative results to be used for medical treatment purposes only. Unconfirmed results must not be used for non-medical purposes such as employment or legal testing. Clinical consideration should be applied to any drug of abuse test, especially when unconfirmed quantitative results are used.        C-reactive Protein [[id number]]  (Abnormal) Collected: 07/04/21 0401    Specimen: Blood Updated: 07/04/21 0453     C-Reactive Protein 0.70 mg/dL                Radiology:    Imaging Results (Last 72 Hours)     Procedure Component Value Units Date/Time    US Venous Doppler Lower Extremity Right (duplex) [[id number]] Resulted: 07/05/21 1623     Updated: 07/05/21 1623          Results for orders placed during the hospital encounter of 06/17/21    Adult Transthoracic Echo Limited W/ Cont if Necessary Per Protocol    Interpretation Summary  · Left ventricular ejection fraction appears to be 61 - 65%. Left ventricular systolic function is normal.  · No significant functional valvular abnormalities noted  · There is no evidence of pericardial effusion      Assessment/Plan:   Right leg pain, check Doppler, otherwise monitor expectantly do not see erythema.    Bacteremia, blood culture from the 24th is not  growing.  This is MSSA.  Patient is on Ancef.  CRP is low.  Patient needs a KATHRIN but anesthesia wanted to wait until the 20-day inder from her positive Covid swab.  This hopefully would be by Friday.  If this is negative and patient continues to do well potentially patient could be discharged sooner she would probably only need 2 weeks of Ancef post negative culture.  Patient is in agreement to get the KATHRIN done.  Reactive thrombocytopenia is improving.    Covid, asymptomatic, she is out of isolation now.    Hepatitis C, she does have positive RNA quantitation, will need further outpatient follow-up and possible treatment.  Transaminases are normal.    DVT prophylaxis, she is now 17 days out from a Covid diagnosis that was asymptomatic and would not have normally required admission.  This being the case I have decreased her Lovenox to prophylactic it 40 mg daily.    Disposition Home    Deng Sanchez MD

## 2021-07-05 NOTE — PLAN OF CARE
Goal Outcome Evaluation:  Plan of Care Reviewed With: patient           Outcome Summary: Pt resting in bed at this time with no complaints or request. VSS at this time.  Pt complained of pain, see MAR.  No requests or complaints at this time.  Will continue to follow plan of care.

## 2021-07-05 NOTE — PLAN OF CARE
Goal Outcome Evaluation:               Pt has had a non eventful day. Had some pain in her knees this AM and took Ibuprofen that brought the pain down but no completely gone. Pt ambulated in duenas and HR got in the 150s. Pt stated she did not have any CP but did feel some fluttering. Pt is being treated with antibiotic. Family visited with patient for a little while. Right knee had a slight knot that is painful and makes it hard to bend. MD ordered US Venous doppler. Pending results. Will continue to monitor and follow plan of care.

## 2021-07-06 LAB
ALBUMIN SERPL-MCNC: 3.26 G/DL (ref 3.5–5.2)
ALBUMIN/GLOB SERPL: 0.8 G/DL
ALP SERPL-CCNC: 130 U/L (ref 39–117)
ALT SERPL W P-5'-P-CCNC: 11 U/L (ref 1–33)
ANION GAP SERPL CALCULATED.3IONS-SCNC: 10.5 MMOL/L (ref 5–15)
AST SERPL-CCNC: 19 U/L (ref 1–32)
BASOPHILS # BLD AUTO: 0.12 10*3/MM3 (ref 0–0.2)
BASOPHILS NFR BLD AUTO: 1.1 % (ref 0–1.5)
BILIRUB SERPL-MCNC: 0.2 MG/DL (ref 0–1.2)
BUN SERPL-MCNC: 18 MG/DL (ref 6–20)
BUN/CREAT SERPL: 25 (ref 7–25)
CALCIUM SPEC-SCNC: 8.8 MG/DL (ref 8.6–10.5)
CHLORIDE SERPL-SCNC: 104 MMOL/L (ref 98–107)
CO2 SERPL-SCNC: 24.5 MMOL/L (ref 22–29)
CREAT SERPL-MCNC: 0.72 MG/DL (ref 0.57–1)
CRP SERPL-MCNC: 0.62 MG/DL (ref 0–0.5)
DEPRECATED RDW RBC AUTO: 43.3 FL (ref 37–54)
EOSINOPHIL # BLD AUTO: 0.21 10*3/MM3 (ref 0–0.4)
EOSINOPHIL NFR BLD AUTO: 1.9 % (ref 0.3–6.2)
ERYTHROCYTE [DISTWIDTH] IN BLOOD BY AUTOMATED COUNT: 12.7 % (ref 12.3–15.4)
GFR SERPL CREATININE-BSD FRML MDRD: 95 ML/MIN/1.73
GLOBULIN UR ELPH-MCNC: 4.1 GM/DL
GLUCOSE SERPL-MCNC: 81 MG/DL (ref 65–99)
HCT VFR BLD AUTO: 29.3 % (ref 34–46.6)
HGB BLD-MCNC: 9.2 G/DL (ref 12–15.9)
IMM GRANULOCYTES # BLD AUTO: 0.14 10*3/MM3 (ref 0–0.05)
IMM GRANULOCYTES NFR BLD AUTO: 1.3 % (ref 0–0.5)
LYMPHOCYTES # BLD AUTO: 3.22 10*3/MM3 (ref 0.7–3.1)
LYMPHOCYTES NFR BLD AUTO: 29 % (ref 19.6–45.3)
MCH RBC QN AUTO: 29.4 PG (ref 26.6–33)
MCHC RBC AUTO-ENTMCNC: 31.4 G/DL (ref 31.5–35.7)
MCV RBC AUTO: 93.6 FL (ref 79–97)
MONOCYTES # BLD AUTO: 0.83 10*3/MM3 (ref 0.1–0.9)
MONOCYTES NFR BLD AUTO: 7.5 % (ref 5–12)
NEUTROPHILS NFR BLD AUTO: 59.2 % (ref 42.7–76)
NEUTROPHILS NFR BLD AUTO: 6.57 10*3/MM3 (ref 1.7–7)
NRBC BLD AUTO-RTO: 0 /100 WBC (ref 0–0.2)
PLATELET # BLD AUTO: 526 10*3/MM3 (ref 140–450)
PMV BLD AUTO: 9.4 FL (ref 6–12)
POTASSIUM SERPL-SCNC: 3.8 MMOL/L (ref 3.5–5.2)
PROT SERPL-MCNC: 7.4 G/DL (ref 6–8.5)
RBC # BLD AUTO: 3.13 10*6/MM3 (ref 3.77–5.28)
SODIUM SERPL-SCNC: 139 MMOL/L (ref 136–145)
WBC # BLD AUTO: 11.09 10*3/MM3 (ref 3.4–10.8)

## 2021-07-06 PROCEDURE — 86140 C-REACTIVE PROTEIN: CPT | Performed by: INTERNAL MEDICINE

## 2021-07-06 PROCEDURE — 25010000003 CEFAZOLIN SODIUM-DEXTROSE 2-3 GM-%(50ML) RECONSTITUTED SOLUTION: Performed by: INTERNAL MEDICINE

## 2021-07-06 PROCEDURE — 25010000002 ENOXAPARIN PER 10 MG: Performed by: INTERNAL MEDICINE

## 2021-07-06 PROCEDURE — 99231 SBSQ HOSP IP/OBS SF/LOW 25: CPT | Performed by: INTERNAL MEDICINE

## 2021-07-06 PROCEDURE — 80053 COMPREHEN METABOLIC PANEL: CPT | Performed by: INTERNAL MEDICINE

## 2021-07-06 PROCEDURE — 85025 COMPLETE CBC W/AUTO DIFF WBC: CPT | Performed by: INTERNAL MEDICINE

## 2021-07-06 RX ADMIN — CEFAZOLIN SODIUM 2 G: 2 SOLUTION INTRAVENOUS at 04:02

## 2021-07-06 RX ADMIN — FOLIC ACID 1 MG: 1 TABLET ORAL at 08:12

## 2021-07-06 RX ADMIN — SODIUM CHLORIDE, PRESERVATIVE FREE 3 ML: 5 INJECTION INTRAVENOUS at 08:34

## 2021-07-06 RX ADMIN — SODIUM CHLORIDE, PRESERVATIVE FREE 3 ML: 5 INJECTION INTRAVENOUS at 20:13

## 2021-07-06 RX ADMIN — IBUPROFEN 400 MG: 400 TABLET, FILM COATED ORAL at 17:30

## 2021-07-06 RX ADMIN — HYDROCODONE BITARTRATE AND ACETAMINOPHEN 1 TABLET: 7.5; 325 TABLET ORAL at 21:44

## 2021-07-06 RX ADMIN — CEFAZOLIN SODIUM 2 G: 2 SOLUTION INTRAVENOUS at 11:10

## 2021-07-06 RX ADMIN — SODIUM CHLORIDE, PRESERVATIVE FREE 10 ML: 5 INJECTION INTRAVENOUS at 08:12

## 2021-07-06 RX ADMIN — Medication 1 CAPSULE: at 08:12

## 2021-07-06 RX ADMIN — CEFAZOLIN SODIUM 2 G: 2 SOLUTION INTRAVENOUS at 20:13

## 2021-07-06 RX ADMIN — ENOXAPARIN SODIUM 40 MG: 40 INJECTION SUBCUTANEOUS at 08:12

## 2021-07-06 RX ADMIN — IBUPROFEN 400 MG: 400 TABLET, FILM COATED ORAL at 08:13

## 2021-07-06 RX ADMIN — Medication 1 TABLET: at 08:12

## 2021-07-06 RX ADMIN — SODIUM CHLORIDE, PRESERVATIVE FREE 10 ML: 5 INJECTION INTRAVENOUS at 20:14

## 2021-07-06 RX ADMIN — Medication 150 MG: at 08:12

## 2021-07-06 NOTE — PROGRESS NOTES
"Assisted By: Aarti ARELLANO    CC: Follow-up on bacteremia    Interview History/HPI: Patient states she feels okay, knee is still \"sore\" on the right but no worse.  She is ambulating well.  No chest pain or shortness of breath, no cough.      Vitals:    07/06/21 1018   BP: 130/88   Pulse: 86   Resp: 18   Temp: 98.3 °F (36.8 °C)   SpO2: 98%         Intake/Output Summary (Last 24 hours) at 7/6/2021 1211  Last data filed at 7/6/2021 0200  Gross per 24 hour   Intake 1440 ml   Output --   Net 1440 ml       EXAM: Pleasant no distress lungs clear heart regular rate and rhythm no significant finding on the right knee.  Left ankle still looks good, mood is good skin warm and dry    Tele: Sinus rhythm    LABS:     Lab Results (last 48 hours)     Procedure Component Value Units Date/Time    Comprehensive Metabolic Panel [[id number]]  (Abnormal) Collected: 07/06/21 0036    Specimen: Blood Updated: 07/06/21 0233     Glucose 81 mg/dL      BUN 18 mg/dL      Creatinine 0.72 mg/dL      Sodium 139 mmol/L      Potassium 3.8 mmol/L      Chloride 104 mmol/L      CO2 24.5 mmol/L      Calcium 8.8 mg/dL      Total Protein 7.4 g/dL      Albumin 3.26 g/dL      ALT (SGPT) 11 U/L      AST (SGOT) 19 U/L      Alkaline Phosphatase 130 U/L      Total Bilirubin 0.2 mg/dL      eGFR Non African Amer 95 mL/min/1.73      Globulin 4.1 gm/dL      A/G Ratio 0.8 g/dL      BUN/Creatinine Ratio 25.0     Anion Gap 10.5 mmol/L     Narrative:      GFR Normal >60  Chronic Kidney Disease <60  Kidney Failure <15      C-reactive Protein [[id number]]  (Abnormal) Collected: 07/06/21 0036    Specimen: Blood Updated: 07/06/21 0233     C-Reactive Protein 0.62 mg/dL     CBC & Differential [[id number]]  (Abnormal) Collected: 07/06/21 0036    Specimen: Blood Updated: 07/06/21 0127    Narrative:      The following orders were created for panel order CBC & Differential.  Procedure                               Abnormality         Status                     ---------                   "             -----------         ------                     CBC Auto Differential[[id number]]        Abnormal            Final result                 Please view results for these tests on the individual orders.    CBC Auto Differential [[id number]]  (Abnormal) Collected: 07/06/21 0036    Specimen: Blood Updated: 07/06/21 0127     WBC 11.09 10*3/mm3      RBC 3.13 10*6/mm3      Hemoglobin 9.2 g/dL      Hematocrit 29.3 %      MCV 93.6 fL      MCH 29.4 pg      MCHC 31.4 g/dL      RDW 12.7 %      RDW-SD 43.3 fl      MPV 9.4 fL      Platelets 526 10*3/mm3      Neutrophil % 59.2 %      Lymphocyte % 29.0 %      Monocyte % 7.5 %      Eosinophil % 1.9 %      Basophil % 1.1 %      Immature Grans % 1.3 %      Neutrophils, Absolute 6.57 10*3/mm3      Lymphocytes, Absolute 3.22 10*3/mm3      Monocytes, Absolute 0.83 10*3/mm3      Eosinophils, Absolute 0.21 10*3/mm3      Basophils, Absolute 0.12 10*3/mm3      Immature Grans, Absolute 0.14 10*3/mm3      nRBC 0.0 /100 WBC     Basic Metabolic Panel [[id number]]  (Normal) Collected: 07/05/21 0100    Specimen: Blood Updated: 07/05/21 0212     Glucose 92 mg/dL      BUN 14 mg/dL      Creatinine 0.66 mg/dL      Sodium 138 mmol/L      Potassium 3.5 mmol/L      Chloride 104 mmol/L      CO2 26.0 mmol/L      Calcium 8.6 mg/dL      eGFR Non African Amer 105 mL/min/1.73      BUN/Creatinine Ratio 21.2     Anion Gap 8.0 mmol/L     Narrative:      GFR Normal >60  Chronic Kidney Disease <60  Kidney Failure <15      C-reactive Protein [[id number]]  (Abnormal) Collected: 07/05/21 0100    Specimen: Blood Updated: 07/05/21 0212     C-Reactive Protein 0.71 mg/dL     CBC & Differential [[id number]]  (Abnormal) Collected: 07/05/21 0100    Specimen: Blood Updated: 07/05/21 0204    Narrative:      The following orders were created for panel order CBC & Differential.  Procedure                               Abnormality         Status                     ---------                               -----------          ------                     CBC Auto Differential[[id number]]        Abnormal            Final result                 Please view results for these tests on the individual orders.    CBC Auto Differential [[id number]]  (Abnormal) Collected: 07/05/21 0100    Specimen: Blood Updated: 07/05/21 0204     WBC 11.25 10*3/mm3      RBC 3.10 10*6/mm3      Hemoglobin 9.2 g/dL      Hematocrit 29.5 %      MCV 95.2 fL      MCH 29.7 pg      MCHC 31.2 g/dL      RDW 12.9 %      RDW-SD 44.6 fl      MPV 9.2 fL      Platelets 518 10*3/mm3      Neutrophil % 58.5 %      Lymphocyte % 29.9 %      Monocyte % 7.9 %      Eosinophil % 1.3 %      Basophil % 1.2 %      Immature Grans % 1.2 %      Neutrophils, Absolute 6.58 10*3/mm3      Lymphocytes, Absolute 3.36 10*3/mm3      Monocytes, Absolute 0.89 10*3/mm3      Eosinophils, Absolute 0.15 10*3/mm3      Basophils, Absolute 0.14 10*3/mm3      Immature Grans, Absolute 0.13 10*3/mm3      nRBC 0.0 /100 WBC                Radiology:    Imaging Results (Last 72 Hours)     Procedure Component Value Units Date/Time    US Venous Doppler Lower Extremity Right (duplex) [[id number]] Collected: 07/05/21 1719     Updated: 07/05/21 1721    Narrative:      US VENOUS DOPPLER LOWER EXTREMITY RIGHT (DUPLEX)-     CLINICAL INDICATION: edema; I33.0-Acute and subacute infective  endocarditis; U07.1-COVID-19; A41.9-Sepsis, unspecified organism        COMPARISON: None available      TECHNIQUE: Color Doppler imaging was used with compression and  augmentation to evaluate the right lower extremity deep venous system.     FINDINGS:   There is patent spontaneous flow from the common femoral vein through  the posterior tibial veins.  There was no internal clot or area of noncompressibility in the right  lower extremity.  Normal augmentation was elicited where applicable.          Impression:      No DVT in the right lower extremity on today's exam.      This report was finalized on 7/5/2021 5:19 PM by Dr. Hakeem Morgan MD.              Results for orders placed during the hospital encounter of 06/17/21    Adult Transthoracic Echo Limited W/ Cont if Necessary Per Protocol    Interpretation Summary  · Left ventricular ejection fraction appears to be 61 - 65%. Left ventricular systolic function is normal.  · No significant functional valvular abnormalities noted  · There is no evidence of pericardial effusion      Assessment/Plan:   Bacteremia, MSSA, on Ancef.  Considering the possibility of a KATHRIN on Friday, if in fact this would change the ultimate course of antibiotic therapy.  But that this would not change the course this may not be needed to be done, awaiting callback from infectious disease.  CRP is low.    Knee pain, no change, should resolve, Doppler negative    COVID-19 asymptomatic    DVT prophylaxis, continue subcu Lovenox    Hepatitis C, viral load was active, this will need to be rechecked in 6 months and consider treatment if persistent.  Noted transaminases normal.    Anemia, on folic acid and iron, stable    Disposition Home    Deng Sanchez MD

## 2021-07-06 NOTE — PLAN OF CARE
Goal Outcome Evaluation:  Plan of Care Reviewed With: patient           Outcome Summary: Pt resting in bed at this time.  Pt complained of pain, see MAR.  VSS at this time.  Will continue to follow plan of care.

## 2021-07-06 NOTE — PAYOR COMM NOTE
"CONTACT:  JOSE VARGHESE MSN, APRN  UTILIZATION MANAGEMENT DEPT.  [facility]  [address] [facility], [facility], [address]  PHONE:  [phone]  FAX: [phone]    CLINICAL UPDATE    REFER TO AUTH # [id number]    (NOTE THAT ATTENDING'S PROGRESS NOTE NOT AVAILABLE YET FOR 7/6/21).    Tran Santiago (31 y.o. Female)     Date of Birth Social Security Number Address Home Phone MRN    1990  [address] [facility] [address]  [id number]    Hoahaoism Marital Status          Non-Zoroastrian Single       Admission Date Admission Type Admitting Provider Attending Provider Department, Room/Bed    6/17/21 Emergency Nicci Valdez MD Heinss, Karl F, MD [address], [address]    Discharge Date Discharge Disposition Discharge Destination                       Attending Provider: Deng Sanchez MD    Allergies: Cinnamon    Isolation: None   Infection: COVID (History) (06/28/21)   Code Status: CPR    Ht: 167.6 cm (66\")   Wt: 57 kg (125 lb 9.6 oz)    Admission Cmt: None   Principal Problem: Endocarditis [I38]                 Active Insurance as of 6/17/2021     Primary Coverage     Payor Plan Insurance Group Employer/Plan Group    [facility]      Payor Plan Address Payor Plan Phone Number Payor Plan Fax Number Effective Dates    [address] [address]   12/13/2019 - None Entered    [place] [address]       Subscriber Name Subscriber Birth Date Member ID       TRAN SANTIAGO 1990 [id number]                 Emergency Contacts      (Rel.) Home Phone Work Phone Mobile Phone    GILDARDO LEIVA (Father) -- -- [phone]            Vital Signs (last day)     Date/Time   Temp   Temp src   Pulse   Resp   BP   Patient Position   SpO2    07/06/21 1018   98.3 (36.8)   Oral   86   18   130/88   Lying   98    07/06/21 0626   98.5 (36.9)   Oral   86   18   122/73   Lying   97    07/06/21 0335   97.3 (36.3)   Oral   100   18   133/85   Sitting   97    07/05/21 1825   " 98.8 (37.1)   Oral   97   18   138/88   Sitting   98    07/05/21 1411   98.4 (36.9)   Oral   100   18   134/86   Sitting   97    07/05/21 0633   98.3 (36.8)   Oral   85   18   107/62   Lying   98    07/05/21 0251   98.1 (36.7)   Oral   91   18   112/69   Lying   99              Intake & Output (last day)       07/05 0701 - 07/06 0700 07/06 0701 - 07/07 0700    P.O. 1440     Total Intake(mL/kg) 1440 (25.3)     Net +1440           Urine Unmeasured Occurrence 4 x         Current Facility-Administered Medications   Medication Dose Route Frequency Provider Last Rate Last Admin   • bisacodyl (DULCOLAX) suppository 10 mg  10 mg Rectal Once PRN Otis De La Fuente MD       • ceFAZolin Sodium-Dextrose (ANCEF) IVPB (duplex) 2 g  2 g Intravenous Q8H Otis De La Fuente MD 0 mL/hr at 07/05/21 1150 2 g at 07/06/21 1110   • docusate sodium (COLACE) capsule 100 mg  100 mg Oral BID Otis De La Fuente MD   100 mg at 07/05/21 2105   • enoxaparin (LOVENOX) syringe 40 mg  40 mg Subcutaneous Daily Deng Sanchez MD   40 mg at 07/06/21 0812   • folic acid (FOLVITE) tablet 1 mg  1 mg Oral Daily Otis De La Fuente MD   1 mg at 07/06/21 0812   • HYDROcodone-acetaminophen (NORCO) 7.5-325 MG per tablet 1 tablet  1 tablet Oral Q8H PRN Otis De La Fuente MD   1 tablet at 07/05/21 2107   • ibuprofen (ADVIL,MOTRIN) tablet 400 mg  400 mg Oral Q6H PRN Otis De La Fuente MD   400 mg at 07/06/21 0813   • iron polysaccharides (NIFEREX) capsule 150 mg  150 mg Oral Daily Otis De La Fuente MD   150 mg at 07/06/21 0812   • lactobacillus acidophilus (RISAQUAD) capsule 1 capsule  1 capsule Oral Daily Otis De La Fuente MD   1 capsule at 07/06/21 0812   • melatonin tablet 5 mg  5 mg Oral Nightly PRN Otis De La Fuente MD   5 mg at 07/01/21 2229   • multivitamin with minerals 1 tablet  1 tablet Oral Daily Otis De La Fuente MD   1 tablet at 07/06/21 0812   • ondansetron (ZOFRAN) injection 4 mg  4 mg Intravenous Q6H PRN Otis De La Fuente MD   4 mg at 06/25/21 0946   • polyethylene glycol  (MIRALAX) packet 17 g  17 g Oral Daily Otis De La Fuente MD   17 g at 06/30/21 0908   • sodium chloride 0.9 % flush 10 mL  10 mL Intravenous PRN Otis De La Fuente MD       • sodium chloride 0.9 % flush 10 mL  10 mL Intravenous Q12H Otis De La Fuente MD   10 mL at 07/06/21 0812   • sodium chloride 0.9 % flush 10 mL  10 mL Intravenous PRN Otis De La Fuente MD       • sodium chloride 0.9 % flush 3 mL  3 mL Intravenous Q12H Otis De La Fuente MD   3 mL at 07/06/21 0834   • sodium chloride 0.9 % flush 3-10 mL  3-10 mL Intravenous PRN Otis De La Fuente MD         Lab Results (last 24 hours)     Procedure Component Value Units Date/Time    Comprehensive Metabolic Panel [[id number]]  (Abnormal) Collected: 07/06/21 0036    Specimen: Blood Updated: 07/06/21 0233     Glucose 81 mg/dL      BUN 18 mg/dL      Creatinine 0.72 mg/dL      Sodium 139 mmol/L      Potassium 3.8 mmol/L      Chloride 104 mmol/L      CO2 24.5 mmol/L      Calcium 8.8 mg/dL      Total Protein 7.4 g/dL      Albumin 3.26 g/dL      ALT (SGPT) 11 U/L      AST (SGOT) 19 U/L      Alkaline Phosphatase 130 U/L      Total Bilirubin 0.2 mg/dL      eGFR Non African Amer 95 mL/min/1.73      Globulin 4.1 gm/dL      A/G Ratio 0.8 g/dL      BUN/Creatinine Ratio 25.0     Anion Gap 10.5 mmol/L     Narrative:      GFR Normal >60  Chronic Kidney Disease <60  Kidney Failure <15      C-reactive Protein [[id number]]  (Abnormal) Collected: 07/06/21 0036    Specimen: Blood Updated: 07/06/21 0233     C-Reactive Protein 0.62 mg/dL     CBC & Differential [[id number]]  (Abnormal) Collected: 07/06/21 0036    Specimen: Blood Updated: 07/06/21 0127    Narrative:      The following orders were created for panel order CBC & Differential.  Procedure                               Abnormality         Status                     ---------                               -----------         ------                     CBC Auto Differential[[id number]]        Abnormal            Final result                 Please  view results for these tests on the individual orders.    CBC Auto Differential [[id number]]  (Abnormal) Collected: 07/06/21 0036    Specimen: Blood Updated: 07/06/21 0127     WBC 11.09 10*3/mm3      RBC 3.13 10*6/mm3      Hemoglobin 9.2 g/dL      Hematocrit 29.3 %      MCV 93.6 fL      MCH 29.4 pg      MCHC 31.4 g/dL      RDW 12.7 %      RDW-SD 43.3 fl      MPV 9.4 fL      Platelets 526 10*3/mm3      Neutrophil % 59.2 %      Lymphocyte % 29.0 %      Monocyte % 7.5 %      Eosinophil % 1.9 %      Basophil % 1.1 %      Immature Grans % 1.3 %      Neutrophils, Absolute 6.57 10*3/mm3      Lymphocytes, Absolute 3.22 10*3/mm3      Monocytes, Absolute 0.83 10*3/mm3      Eosinophils, Absolute 0.21 10*3/mm3      Basophils, Absolute 0.12 10*3/mm3      Immature Grans, Absolute 0.14 10*3/mm3      nRBC 0.0 /100 WBC         Imaging Results (Last 24 Hours)     Procedure Component Value Units Date/Time    US Venous Doppler Lower Extremity Right (duplex) [[id number]] Collected: 07/05/21 1719     Updated: 07/05/21 1721    Narrative:      US VENOUS DOPPLER LOWER EXTREMITY RIGHT (DUPLEX)-     CLINICAL INDICATION: edema; I33.0-Acute and subacute infective  endocarditis; U07.1-COVID-19; A41.9-Sepsis, unspecified organism        COMPARISON: None available      TECHNIQUE: Color Doppler imaging was used with compression and  augmentation to evaluate the right lower extremity deep venous system.     FINDINGS:   There is patent spontaneous flow from the common femoral vein through  the posterior tibial veins.  There was no internal clot or area of noncompressibility in the right  lower extremity.  Normal augmentation was elicited where applicable.          Impression:      No DVT in the right lower extremity on today's exam.      This report was finalized on 7/5/2021 5:19 PM by Dr. Hakeem Morgan MD.           Orders (last 24 hrs)      Start     Ordered    07/06/21 0900  enoxaparin (LOVENOX) syringe 40 mg  Daily      07/05/21 1542    07/06/21  0600  CBC & Differential  Morning Draw      07/05/21 1650    07/06/21 0600  Comprehensive Metabolic Panel  Morning Draw      07/05/21 1650    07/06/21 0600  CBC Auto Differential  PROCEDURE ONCE      07/05/21 2203                   Physician Progress Notes (last 24 hours) (Notes from 07/05/21 1206 through 07/06/21 1206)      Deng Sanchez MD at 07/05/21 1830        Discussed with patient's father and entertained questions.    Electronically signed by Deng Sanchez MD at 07/05/21 1831     Deng Sanchez MD at 07/05/21 1637          Assisted By: Aarti ARELLANO    CC: Follow-up on bacteremia    Interview History/HPI: Patient states she feels better than the last time I saw her.  She is now out of Covid isolation.  Patient states that she is no longer having significant left ankle pain and she was walking around the room on this.  She is developed a new pain in her right leg just inferior to the tibial plateau area.  She states when she bends her knee fully she will feel this.  She has not noticed any erythema, she has had no fever.  She is tolerating her diet and otherwise is without complaints.    ROS:     Vitals:    07/05/21 1411   BP: 134/86   Pulse: 100   Resp: 18   Temp: 98.4 °F (36.9 °C)   SpO2: 97%         Intake/Output Summary (Last 24 hours) at 7/5/2021 1637  Last data filed at 7/5/2021 0400  Gross per 24 hour   Intake 840 ml   Output --   Net 840 ml       EXAM: Pleasant, she can ambulate her in the room, no distress, mood is good on room air lungs clear not using sensory muscles, heart regular rate and rhythm without murmur rub or gallop abdomen is soft, benign nontender the right leg there is minimal tenderness in the area described above but no erythema or cords noted, the left ankle is much improved from a erythema standpoint from the last time I saw it edema is improved as well.  No effusion noted on the right knee.      Tele: Sinus rhythm and sinus tachycardia      Assessment/Plan:   Right leg pain, check  Doppler, otherwise monitor expectantly do not see erythema.    Bacteremia, blood culture from the 24th is not growing.  This is MSSA.  Patient is on Ancef.  CRP is low.  Patient needs a KATHRIN but anesthesia wanted to wait until the 20-day inder from her positive Covid swab.  This hopefully would be by Friday.  If this is negative and patient continues to do well potentially patient could be discharged sooner she would probably only need 2 weeks of Ancef post negative culture.  Patient is in agreement to get the KATHRIN done.  Reactive thrombocytopenia is improving.    Covid, asymptomatic, she is out of isolation now.    Hepatitis C, she does have positive RNA quantitation, will need further outpatient follow-up and possible treatment.  Transaminases are normal.    DVT prophylaxis, she is now 17 days out from a Covid diagnosis that was asymptomatic and would not have normally required admission.  This being the case I have decreased her Lovenox to prophylactic it 40 mg daily.    Disposition Home    Deng Sanchez MD         Electronically signed by Deng Sanchez MD at 07/05/21 1650       Consult Notes (last 24 hours) (Notes from 07/05/21 1206 through 07/06/21 1206)    No notes of this type exist for this encounter.

## 2021-07-06 NOTE — NURSING NOTE
RN called to room for patient concern that midline had been pulled out while in bed.  RN noted small dried drainage to dressing below site.  Midline flushed well with ample blood return noted. Will monitor.

## 2021-07-06 NOTE — PLAN OF CARE
Goal Outcome Evaluation:    Patient resting in bed with friend at bedside, patient continuing to get IV antibiotics and has concerns about midline placed 2 weeks ago. PICC RN to come and evaluate site and line, upon my assessment it appears to be in working order with blood return and flushing well. Will monitor.

## 2021-07-06 NOTE — PROGRESS NOTES
PROGRESS NOTE         Patient Identification:  Name:  Marce Gutierrez  Age:  31 y.o.  Sex:  female  :  1990  MRN:  [id number]  Visit Number:  [id number]  Primary Care Provider:  Provider, No Known         LOS: 18 days       ----------------------------------------------------------------------------------------------------------------------  Subjective       Chief Complaints:    Leg Pain and Arm Pain        Interval History:      Patient is sitting up in bed in no apparent distress.  Friend is at bedside. Patient denies any new complaints at this time, but continues to have some right knee pain.  No edema or erythema and only mild tenderness to palpation.  Afebrile, no diarrhea. CRP is stable at 0.62.  WBC is stable at 11.09.  Venous duplex of right lower extremity on 2021 showed no DVT.  We await KATHRIN.    Review of Systems:    Constitutional: no fever, chills and night sweats. No appetite change or unexpected weight change. No fatigue.  Eyes: no eye drainage, itching or redness.  HEENT: no mouth sores, dysphagia or nose bleed.  Respiratory: no for shortness of breath, cough or production of sputum.  Cardiovascular: no chest pain and no orthopnea.  Palpitations.  Gastrointestinal: no nausea, vomiting or diarrhea. No abdominal pain, hematemesis or rectal bleeding.  Genitourinary: no dysuria or polyuria.  Hematologic/lymphatic: no lymph node abnormalities, no easy bruising or easy bleeding.  Musculoskeletal:  No muscle or joint pain.  Skin: No rash and no itching.  Neurological: no loss of consciousness, no seizure, no headache.  Behavioral/Psych: no depression or suicidal ideation.  Endocrine: no hot flashes.  Immunologic: negative.     ----------------------------------------------------------------------------------------------------------------------      Objective       [facility] Meds:  ceFAZolin, 2 g, Intravenous, Q8H  docusate sodium, 100 mg, Oral, BID  enoxaparin, 40 mg,  Subcutaneous, Daily  folic acid, 1 mg, Oral, Daily  iron polysaccharides, 150 mg, Oral, Daily  lactobacillus acidophilus, 1 capsule, Oral, Daily  multivitamin with minerals, 1 tablet, Oral, Daily  polyethylene glycol, 17 g, Oral, Daily  sodium chloride, 10 mL, Intravenous, Q12H  sodium chloride, 3 mL, Intravenous, Q12H         ----------------------------------------------------------------------------------------------------------------------    Vital Signs:  Temp:  [97.3 °F (36.3 °C)-98.8 °F (37.1 °C)] 98.3 °F (36.8 °C)  Heart Rate:  [] 86  Resp:  [18] 18  BP: (122-138)/(73-88) 130/88  Mean Arterial Pressure (Non-Invasive) for the past 24 hrs (Last 3 readings):   Noninvasive MAP (mmHg)   07/06/21 1018 101   07/06/21 0626 92   07/06/21 0335 99     SpO2 Percentage    07/06/21 0335 07/06/21 0626 07/06/21 1018   SpO2: 97% 97% 98%     SpO2:  [97 %-98 %] 98 %  on   ;   Device (Oxygen Therapy): room air    Body mass index is 20.27 kg/m².  Wt Readings from Last 3 Encounters:   07/06/21 57 kg (125 lb 9.6 oz)   10/30/19 63.5 kg (140 lb)        Intake/Output Summary (Last 24 hours) at 7/6/2021 1135  Last data filed at 7/6/2021 0200  Gross per 24 hour   Intake 1440 ml   Output --   Net 1440 ml     Diet Regular  ----------------------------------------------------------------------------------------------------------------------      Physical Exam:    Constitutional:  Well-developed and well-nourished.  No respiratory distress.      HENT:  Head: Normocephalic and atraumatic.  Mouth:  Moist mucous membranes.    Eyes:  Conjunctivae and EOM are normal.  No scleral icterus.  Neck:  Neck supple.  No JVD present.  Cardiovascular:  Normal rate, regular rhythm and normal heart sounds with no murmur. No edema.  Pulmonary/Chest:  No respiratory distress, no wheezes, no crackles, with normal breath sounds and good air movement.  Abdominal:  Soft.  Bowel sounds are normal.  No distension and no tenderness.   Musculoskeletal:  No  edema, no tenderness, and no deformity.  No swelling or redness of joints.  Right knee with mild tenderness to palpation, but no erythema, edema, or warmth.  Neurological:  Alert and oriented to person, place, and time.  No facial droop.  No slurred speech.   Skin:  Skin is warm and dry.  No rash noted.  No pallor.   Psychiatric:  Normal mood and affect.  Behavior is normal.  ----------------------------------------------------------------------------------------------------------------------              Results from last 7 days   Lab Units 07/06/21  0036 07/05/21  0100 07/04/21  0401 07/03/21  0408   CRP mg/dL 0.62* 0.71* 0.70* 0.87*   WBC 10*3/mm3 11.09* 11.25*  --  11.49*   HEMOGLOBIN g/dL 9.2* 9.2*  --  9.6*   HEMATOCRIT % 29.3* 29.5*  --  29.5*   MCV fL 93.6 95.2  --  91.0   MCHC g/dL 31.4* 31.2*  --  32.5   PLATELETS 10*3/mm3 526* 518*  --  600*     Results from last 7 days   Lab Units 07/06/21  0036 07/05/21  0100 07/03/21  1600 07/03/21  0408 07/02/21  0109   SODIUM mmol/L 139 138  --  137 138   POTASSIUM mmol/L 3.8 3.5  --  4.2 3.9   MAGNESIUM mg/dL  --   --  1.8  --   --    CHLORIDE mmol/L 104 104  --  103 105   CO2 mmol/L 24.5 26.0  --  25.1 25.4   BUN mg/dL 18 14  --  15 15   CREATININE mg/dL 0.72 0.66  --  0.72 0.56*   EGFR IF NONAFRICN AM mL/min/1.73 95 105  --  95 127   CALCIUM mg/dL 8.8 8.6  --  8.5* 8.5*   GLUCOSE mg/dL 81 92  --  96 96   ALBUMIN g/dL 3.26*  --   --  2.84* 2.79*   BILIRUBIN mg/dL 0.2  --   --  0.2 0.2   ALK PHOS U/L 130*  --   --  124* 124*   AST (SGOT) U/L 19  --   --  17 20   ALT (SGPT) U/L 11  --   --  11 13   Estimated Creatinine Clearance: 102.8 mL/min (by C-G formula based on SCr of 0.72 mg/dL).  No results found for: AMMONIA    No results found for: HGBA1C, POCGLU  Lab Results   Component Value Date    HGBA1C 5.60 06/17/2021     Lab Results   Component Value Date    TSH 0.718 06/17/2021       Blood Culture   Date Value Ref Range Status   06/20/2021 Abnormal Stain (C)   Preliminary   06/19/2021 Staphylococcus aureus (C)  Final     Comment:     Infectious disease consultation is highly recommended to rule out distant foci of infection.   06/17/2021 Staphylococcus aureus (C)  Final     Comment:     Infectious disease consultation is highly recommended to rule out distant foci of infection.   06/17/2021 Staphylococcus aureus (C)  Final     Comment:     Infectious disease consultation is highly recommended to rule out distant foci of infection.  Refer to previous blood culture collected on 6/17/2021 2318 for MICs.     Urine Culture   Date Value Ref Range Status   06/17/2021 >100,000 CFU/mL Escherichia coli (A)  Final   06/17/2021 >100,000 CFU/mL Staphylococcus aureus (A)  Corrected     No results found for: WOUNDCX  No results found for: STOOLCX  No results found for: RESPCX  Pain Management Panel       Pain Management Panel Latest Ref Rng & Units 7/4/2021 6/17/2021    AMPHETAMINES SCREEN, URINE Negative Negative Positive(A)    BARBITURATES SCREEN Negative Negative Negative    BENZODIAZEPINE SCREEN, URINE Negative Negative Negative    BUPRENORPHINEUR Negative Negative Negative    COCAINE SCREEN, URINE Negative Negative Negative    METHADONE SCREEN, URINE Negative Negative Negative              ----------------------------------------------------------------------------------------------------------------------  Imaging Results (Last 24 Hours)       Procedure Component Value Units Date/Time    US Venous Doppler Lower Extremity Right (duplex) [[id number]] Collected: 07/05/21 1719     Updated: 07/05/21 1721    Narrative:      US VENOUS DOPPLER LOWER EXTREMITY RIGHT (DUPLEX)-     CLINICAL INDICATION: edema; I33.0-Acute and subacute infective  endocarditis; U07.1-COVID-19; A41.9-Sepsis, unspecified organism        COMPARISON: None available      TECHNIQUE: Color Doppler imaging was used with compression and  augmentation to evaluate the right lower extremity deep venous system.     FINDINGS:    There is patent spontaneous flow from the common femoral vein through  the posterior tibial veins.  There was no internal clot or area of noncompressibility in the right  lower extremity.  Normal augmentation was elicited where applicable.          Impression:      No DVT in the right lower extremity on today's exam.      This report was finalized on 7/5/2021 5:19 PM by Dr. Hakeem Morgan MD.               ----------------------------------------------------------------------------------------------------------------------    Assessment/Plan       Assessment/Plan     ASSESSMENT:    1.  Severe sepsis with lactic acid greater than 2 on admission  2.  Complicated MSSA bacteremia  3.  Likely underlying endocarditis  4.  Septic emboli    PLAN:    Patient is sitting up in bed in no apparent distress.  Friend is at bedside. Patient denies any new complaints at this time, but continues to have some right knee pain.  No edema or erythema and only mild tenderness to palpation.  Afebrile, no diarrhea. CRP is stable at 0.62.  WBC is stable at 11.09.  Venous duplex of right lower extremity on 7/5/2021 showed no DVT.  We await KATHRIN.    Blood cultures from 6/24/2021 finalized as no growth. Blood culture from 6/22/2021 1 out of 1 set positive for MSSA.    Urinalysis on 6/17/2021 was positive and urine culture finalized as greater than 100,000 colonies of E. coli and greater than 100,000 colonies of MRSA.  Blood cultures on 6/17/2021 finalized as MSSA.  Repeat blood cultures on 6/19/2021 and 6/20/2021 are still showing growth.  COVID-19 and flu A/B PCR on 6/18/2021 detected COVID-19.  Mycoplasma pneumoniae antibody on 6/18/2021 was negative.  Strep pneumo antigen on 6/18/2021 was negative.  CT chest with PE protocol on 6/18/2021 showed technically degraded exam, but no definite PE is seen, and there is no aortic dissection.  Pulmonary edema with a trace amount of right pleural fluid.  Poorly defined nodular infiltrates on both sides  of the chest suspicious for septic emboli.  Continued follow-up is recommended. MRI of the lumbar spine on 7/2/2021 revealed no acute findings.     Contacted micro lab regarding urine culture on 6/17/2021.  Urine culture has finalized as MSSA, but MRSA verbiage was included underneath and micro lab corrected.    COVID-19 diagnosis was incidental and patient remains asymptomatic.  Patient is now out of isolation as of 6/28/2021.    Recommend to continue Cefazolin monotherapy. We will follow closely and adjust antibiotic therapy as appropriate.  Continue to recommend KATHRIN and will follow up on results.  If KATHRIN is clear of vegetations or abscesses, patient will need 4 weeks of IV antibiotics from blood culture clearing date.  If KATHRIN shows any vegetations or abscesses, patient would require 4 to 6 weeks of IV antibiotic therapy from blood culture clearing date.    Code Status:   Code Status and Medical Interventions:   Ordered at: 06/18/21 0446     Level Of Support Discussed With:    Patient     Code Status:    CPR     Medical Interventions (Level of Support Prior to Arrest):    Full       SYL Jiménez  07/06/21  11:35 EDT

## 2021-07-07 LAB — CRP SERPL-MCNC: 0.35 MG/DL (ref 0–0.5)

## 2021-07-07 PROCEDURE — 86140 C-REACTIVE PROTEIN: CPT | Performed by: INTERNAL MEDICINE

## 2021-07-07 PROCEDURE — 25010000003 CEFAZOLIN SODIUM-DEXTROSE 2-3 GM-%(50ML) RECONSTITUTED SOLUTION: Performed by: INTERNAL MEDICINE

## 2021-07-07 PROCEDURE — 99231 SBSQ HOSP IP/OBS SF/LOW 25: CPT | Performed by: INTERNAL MEDICINE

## 2021-07-07 PROCEDURE — 25010000002 ENOXAPARIN PER 10 MG: Performed by: INTERNAL MEDICINE

## 2021-07-07 RX ORDER — HYDROCODONE BITARTRATE AND ACETAMINOPHEN 7.5; 325 MG/1; MG/1
1 TABLET ORAL 2 TIMES DAILY PRN
Status: DISCONTINUED | OUTPATIENT
Start: 2021-07-07 | End: 2021-07-23 | Stop reason: HOSPADM

## 2021-07-07 RX ADMIN — CEFAZOLIN SODIUM 2 G: 2 SOLUTION INTRAVENOUS at 11:21

## 2021-07-07 RX ADMIN — CEFAZOLIN SODIUM 2 G: 2 SOLUTION INTRAVENOUS at 20:41

## 2021-07-07 RX ADMIN — SODIUM CHLORIDE, PRESERVATIVE FREE 3 ML: 5 INJECTION INTRAVENOUS at 20:40

## 2021-07-07 RX ADMIN — IBUPROFEN 400 MG: 400 TABLET, FILM COATED ORAL at 17:36

## 2021-07-07 RX ADMIN — Medication 150 MG: at 08:47

## 2021-07-07 RX ADMIN — Medication 1 TABLET: at 08:47

## 2021-07-07 RX ADMIN — DOCUSATE SODIUM 100 MG: 100 CAPSULE, LIQUID FILLED ORAL at 20:40

## 2021-07-07 RX ADMIN — SODIUM CHLORIDE, PRESERVATIVE FREE 10 ML: 5 INJECTION INTRAVENOUS at 21:36

## 2021-07-07 RX ADMIN — FOLIC ACID 1 MG: 1 TABLET ORAL at 08:47

## 2021-07-07 RX ADMIN — CEFAZOLIN SODIUM 2 G: 2 SOLUTION INTRAVENOUS at 03:02

## 2021-07-07 RX ADMIN — SODIUM CHLORIDE, PRESERVATIVE FREE 10 ML: 5 INJECTION INTRAVENOUS at 08:49

## 2021-07-07 RX ADMIN — HYDROCODONE BITARTRATE AND ACETAMINOPHEN 1 TABLET: 7.5; 325 TABLET ORAL at 20:48

## 2021-07-07 RX ADMIN — Medication 1 CAPSULE: at 08:47

## 2021-07-07 RX ADMIN — ENOXAPARIN SODIUM 40 MG: 40 INJECTION SUBCUTANEOUS at 08:48

## 2021-07-07 NOTE — PROGRESS NOTES
PROGRESS NOTE         Patient Identification:  Name:  Marce Gutierrez  Age:  31 y.o.  Sex:  female  :  1990  MRN:  [id number]  Visit Number:  [id number]  Primary Care Provider:  Provider, No Known         LOS: 19 days       ----------------------------------------------------------------------------------------------------------------------  Subjective       Chief Complaints:    Leg Pain and Arm Pain        Interval History:      Patient is sitting up in bed in no apparent distress.  Overall, patient reports that she feels well today.  Afebrile, no diarrhea. CRP is now normalized. We await KATHRIN, which is planned for later this week.    Review of Systems:    Constitutional: no fever, chills and night sweats. No appetite change or unexpected weight change. No fatigue.  Eyes: no eye drainage, itching or redness.  HEENT: no mouth sores, dysphagia or nose bleed.  Respiratory: no for shortness of breath, cough or production of sputum.  Cardiovascular: no chest pain and no orthopnea.  Palpitations.  Gastrointestinal: no nausea, vomiting or diarrhea. No abdominal pain, hematemesis or rectal bleeding.  Genitourinary: no dysuria or polyuria.  Hematologic/lymphatic: no lymph node abnormalities, no easy bruising or easy bleeding.  Musculoskeletal:  No muscle or joint pain.  Skin: No rash and no itching.  Neurological: no loss of consciousness, no seizure, no headache.  Behavioral/Psych: no depression or suicidal ideation.  Endocrine: no hot flashes.  Immunologic: negative.     ----------------------------------------------------------------------------------------------------------------------      Objective       Current [facility] Meds:  ceFAZolin, 2 g, Intravenous, Q8H  docusate sodium, 100 mg, Oral, BID  enoxaparin, 40 mg, Subcutaneous, Daily  folic acid, 1 mg, Oral, Daily  iron polysaccharides, 150 mg, Oral, Daily  lactobacillus acidophilus, 1 capsule, Oral, Daily  multivitamin with minerals, 1 tablet, Oral,  Daily  polyethylene glycol, 17 g, Oral, Daily  sodium chloride, 10 mL, Intravenous, Q12H  sodium chloride, 3 mL, Intravenous, Q12H         ----------------------------------------------------------------------------------------------------------------------    Vital Signs:  Temp:  [97.5 °F (36.4 °C)-98.7 °F (37.1 °C)] 97.5 °F (36.4 °C)  Heart Rate:  [] 75  Resp:  [18] 18  BP: (113-137)/(72-89) 113/77  Mean Arterial Pressure (Non-Invasive) for the past 24 hrs (Last 3 readings):   Noninvasive MAP (mmHg)   07/07/21 0641 901   07/07/21 0300 89   07/06/21 1748 105     SpO2 Percentage    07/06/21 1748 07/07/21 0300 07/07/21 0641   SpO2: 98% 97% 96%     SpO2:  [96 %-98 %] 96 %  on   ;   Device (Oxygen Therapy): room air    Body mass index is 20.21 kg/m².  Wt Readings from Last 3 Encounters:   07/07/21 56.8 kg (125 lb 3.2 oz)   10/30/19 63.5 kg (140 lb)        Intake/Output Summary (Last 24 hours) at 7/7/2021 0954  Last data filed at 7/7/2021 0917  Gross per 24 hour   Intake 840 ml   Output --   Net 840 ml     Diet Regular  ----------------------------------------------------------------------------------------------------------------------      Physical Exam:    Constitutional:  Well-developed and well-nourished.  No respiratory distress.      HENT:  Head: Normocephalic and atraumatic.  Mouth:  Moist mucous membranes.    Eyes:  Conjunctivae and EOM are normal.  No scleral icterus.  Neck:  Neck supple.  No JVD present.  Cardiovascular:  Normal rate, regular rhythm and normal heart sounds with no murmur. No edema.  Pulmonary/Chest:  No respiratory distress, no wheezes, no crackles, with normal breath sounds and good air movement.  Abdominal:  Soft.  Bowel sounds are normal.  No distension and no tenderness.   Musculoskeletal:  No edema, no tenderness, and no deformity.  No swelling or redness of joints.    Neurological:  Alert and oriented to person, place, and time.  No facial droop.  No slurred speech.   Skin:  Skin  is warm and dry.  No rash noted.  No pallor.   Psychiatric:  Normal mood and affect.  Behavior is normal.  ----------------------------------------------------------------------------------------------------------------------              Results from last 7 days   Lab Units 07/07/21  0250 07/06/21  0036 07/05/21  0100 07/03/21  0408   CRP mg/dL 0.35 0.62* 0.71* 0.87*   WBC 10*3/mm3  --  11.09* 11.25* 11.49*   HEMOGLOBIN g/dL  --  9.2* 9.2* 9.6*   HEMATOCRIT %  --  29.3* 29.5* 29.5*   MCV fL  --  93.6 95.2 91.0   MCHC g/dL  --  31.4* 31.2* 32.5   PLATELETS 10*3/mm3  --  526* 518* 600*     Results from last 7 days   Lab Units 07/06/21  0036 07/05/21  0100 07/03/21  1600 07/03/21  0408 07/02/21  0109   SODIUM mmol/L 139 138  --  137 138   POTASSIUM mmol/L 3.8 3.5  --  4.2 3.9   MAGNESIUM mg/dL  --   --  1.8  --   --    CHLORIDE mmol/L 104 104  --  103 105   CO2 mmol/L 24.5 26.0  --  25.1 25.4   BUN mg/dL 18 14  --  15 15   CREATININE mg/dL 0.72 0.66  --  0.72 0.56*   EGFR IF NONAFRICN AM mL/min/1.73 95 105  --  95 127   CALCIUM mg/dL 8.8 8.6  --  8.5* 8.5*   GLUCOSE mg/dL 81 92  --  96 96   ALBUMIN g/dL 3.26*  --   --  2.84* 2.79*   BILIRUBIN mg/dL 0.2  --   --  0.2 0.2   ALK PHOS U/L 130*  --   --  124* 124*   AST (SGOT) U/L 19  --   --  17 20   ALT (SGPT) U/L 11  --   --  11 13   Estimated Creatinine Clearance: 102.4 mL/min (by C-G formula based on SCr of 0.72 mg/dL).  No results found for: AMMONIA    No results found for: HGBA1C, POCGLU  Lab Results   Component Value Date    HGBA1C 5.60 06/17/2021     Lab Results   Component Value Date    TSH 0.718 06/17/2021       Blood Culture   Date Value Ref Range Status   06/20/2021 Abnormal Stain (C)  Preliminary   06/19/2021 Staphylococcus aureus (C)  Final     Comment:     Infectious disease consultation is highly recommended to rule out distant foci of infection.   06/17/2021 Staphylococcus aureus (C)  Final     Comment:     Infectious disease consultation is highly  recommended to rule out distant foci of infection.   06/17/2021 Staphylococcus aureus (C)  Final     Comment:     Infectious disease consultation is highly recommended to rule out distant foci of infection.  Refer to previous blood culture collected on 6/17/2021 2318 for MICs.     Urine Culture   Date Value Ref Range Status   06/17/2021 >100,000 CFU/mL Escherichia coli (A)  Final   06/17/2021 >100,000 CFU/mL Staphylococcus aureus (A)  Corrected     No results found for: WOUNDCX  No results found for: STOOLCX  No results found for: RESPCX  Pain Management Panel       Pain Management Panel Latest Ref Rng & Units 7/4/2021 6/17/2021    AMPHETAMINES SCREEN, URINE Negative Negative Positive(A)    BARBITURATES SCREEN Negative Negative Negative    BENZODIAZEPINE SCREEN, URINE Negative Negative Negative    BUPRENORPHINEUR Negative Negative Negative    COCAINE SCREEN, URINE Negative Negative Negative    METHADONE SCREEN, URINE Negative Negative Negative              ----------------------------------------------------------------------------------------------------------------------  Imaging Results (Last 24 Hours)       ** No results found for the last 24 hours. **            ----------------------------------------------------------------------------------------------------------------------    Assessment/Plan       Assessment/Plan     ASSESSMENT:    1.  Severe sepsis with lactic acid greater than 2 on admission  2.  Complicated MSSA bacteremia  3.  Likely underlying endocarditis  4.  Septic emboli    PLAN:    Patient is sitting up in bed in no apparent distress.  Overall, patient reports that she feels well today.  Afebrile, no diarrhea. CRP is now normalized. We await KATHRIN, which is planned for later this week.    Blood cultures from 6/24/2021 finalized as no growth. Blood culture from 6/22/2021 1 out of 1 set positive for MSSA.    Urinalysis on 6/17/2021 was positive and urine culture finalized as greater than 100,000  colonies of E. coli and greater than 100,000 colonies of MRSA.  Blood cultures on 6/17/2021 finalized as MSSA.  Repeat blood cultures on 6/19/2021 and 6/20/2021 are still showing growth.  COVID-19 and flu A/B PCR on 6/18/2021 detected COVID-19.  Mycoplasma pneumoniae antibody on 6/18/2021 was negative.  Strep pneumo antigen on 6/18/2021 was negative.  CT chest with PE protocol on 6/18/2021 showed technically degraded exam, but no definite PE is seen, and there is no aortic dissection.  Pulmonary edema with a trace amount of right pleural fluid.  Poorly defined nodular infiltrates on both sides of the chest suspicious for septic emboli.  Continued follow-up is recommended. MRI of the lumbar spine on 7/2/2021 revealed no acute findings.     Contacted micro lab regarding urine culture on 6/17/2021.  Urine culture has finalized as MSSA, but MRSA verbiage was included underneath and micro lab corrected.    COVID-19 diagnosis was incidental and patient remains asymptomatic.  Patient is now out of isolation as of 6/28/2021.    Recommend to continue Cefazolin monotherapy. We will follow closely and adjust antibiotic therapy as appropriate.  Continue to recommend KATHRIN and will follow up on results.  If KATHRIN is clear of vegetations or abscesses, patient will need 4 weeks of IV antibiotics from blood culture clearing date.  If KATHRIN shows any vegetations or abscesses, patient would require 4 to 6 weeks of IV antibiotic therapy from blood culture clearing date.    Code Status:   Code Status and Medical Interventions:   Ordered at: 06/18/21 0446     Level Of Support Discussed With:    Patient     Code Status:    CPR     Medical Interventions (Level of Support Prior to Arrest):    Full       SYL Jiménez  07/07/21  09:54 EDT

## 2021-07-07 NOTE — PLAN OF CARE
Goal Outcome Evaluation:  Plan of Care Reviewed With: patient        Progress: improving  Outcome Summary:  (Pt sitting up in bed watching tv in no apparent distress. Pt does complain of generalized, see mar for intervention. Denies any other needs at this time, will continue to monitor for any changes.)

## 2021-07-07 NOTE — PLAN OF CARE
Goal Outcome Evaluation:      Pt rested in bed comfortable today, ambulated in room. Complaints of pain to lower middle back, ibuprofen given; pain resolved. Due for possible KATHRIN Friday.

## 2021-07-07 NOTE — CASE MANAGEMENT/SOCIAL WORK
Discharge Planning Assessment   [facility]     Patient Name: Marce Gutierrez  MRN: [id number]  Today's Date: 7/7/2021    Admit Date: 6/17/2021    Discharge Needs Assessment    No documentation.       Discharge Plan     Row Name 07/07/21 1605       Plan    Plan Pt was admitted on 06/17/21. SS spoke with [facility] [place] per Tasneem who states peer to peer was completed but denial was upheld [facility].  notified Dr. Sanchez and asked for a swing bed consult. SS to follow.    Row Name 07/07/21 2170       Leanne Merino

## 2021-07-07 NOTE — PROGRESS NOTES
Assisted By: Aarti ARELLANO    CC: Follow-up on bacteremia    Interview History/HPI: Patient is a the right knee she states feels better, no new complaints.    ROS:     Vitals:    07/07/21 1503   BP: 137/96   Pulse: 110   Resp: 18   Temp: 98.2 °F (36.8 °C)   SpO2: 99%         Intake/Output Summary (Last 24 hours) at 7/7/2021 1520  Last data filed at 7/7/2021 1300  Gross per 24 hour   Intake 840 ml   Output --   Net 840 ml       EXAM: Lungs clear heart regular rate and rhythm no edema left ankle still looks good, no erythema on the right knee    Tele: Sinus    LABS:     Lab Results (last 48 hours)     Procedure Component Value Units Date/Time    C-reactive Protein [[id number]]  (Normal) Collected: 07/07/21 0250    Specimen: Blood Updated: 07/07/21 0356     C-Reactive Protein 0.35 mg/dL     Comprehensive Metabolic Panel [[id number]]  (Abnormal) Collected: 07/06/21 0036    Specimen: Blood Updated: 07/06/21 0233     Glucose 81 mg/dL      BUN 18 mg/dL      Creatinine 0.72 mg/dL      Sodium 139 mmol/L      Potassium 3.8 mmol/L      Chloride 104 mmol/L      CO2 24.5 mmol/L      Calcium 8.8 mg/dL      Total Protein 7.4 g/dL      Albumin 3.26 g/dL      ALT (SGPT) 11 U/L      AST (SGOT) 19 U/L      Alkaline Phosphatase 130 U/L      Total Bilirubin 0.2 mg/dL      eGFR Non African Amer 95 mL/min/1.73      Globulin 4.1 gm/dL      A/G Ratio 0.8 g/dL      BUN/Creatinine Ratio 25.0     Anion Gap 10.5 mmol/L     Narrative:      GFR Normal >60  Chronic Kidney Disease <60  Kidney Failure <15      C-reactive Protein [[id number]]  (Abnormal) Collected: 07/06/21 0036    Specimen: Blood Updated: 07/06/21 0233     C-Reactive Protein 0.62 mg/dL     CBC & Differential [[id number]]  (Abnormal) Collected: 07/06/21 0036    Specimen: Blood Updated: 07/06/21 0127    Narrative:      The following orders were created for panel order CBC & Differential.  Procedure                               Abnormality         Status                     ---------                                -----------         ------                     CBC Auto Differential[[id number]]        Abnormal            Final result                 Please view results for these tests on the individual orders.    CBC Auto Differential [[id number]]  (Abnormal) Collected: 07/06/21 0036    Specimen: Blood Updated: 07/06/21 0127     WBC 11.09 10*3/mm3      RBC 3.13 10*6/mm3      Hemoglobin 9.2 g/dL      Hematocrit 29.3 %      MCV 93.6 fL      MCH 29.4 pg      MCHC 31.4 g/dL      RDW 12.7 %      RDW-SD 43.3 fl      MPV 9.4 fL      Platelets 526 10*3/mm3      Neutrophil % 59.2 %      Lymphocyte % 29.0 %      Monocyte % 7.5 %      Eosinophil % 1.9 %      Basophil % 1.1 %      Immature Grans % 1.3 %      Neutrophils, Absolute 6.57 10*3/mm3      Lymphocytes, Absolute 3.22 10*3/mm3      Monocytes, Absolute 0.83 10*3/mm3      Eosinophils, Absolute 0.21 10*3/mm3      Basophils, Absolute 0.12 10*3/mm3      Immature Grans, Absolute 0.14 10*3/mm3      nRBC 0.0 /100 WBC                Radiology:    Imaging Results (Last 72 Hours)     Procedure Component Value Units Date/Time    US Venous Doppler Lower Extremity Right (duplex) [[id number]] Collected: 07/05/21 1719     Updated: 07/05/21 1721    Narrative:      US VENOUS DOPPLER LOWER EXTREMITY RIGHT (DUPLEX)-     CLINICAL INDICATION: edema; I33.0-Acute and subacute infective  endocarditis; U07.1-COVID-19; A41.9-Sepsis, unspecified organism        COMPARISON: None available      TECHNIQUE: Color Doppler imaging was used with compression and  augmentation to evaluate the right lower extremity deep venous system.     FINDINGS:   There is patent spontaneous flow from the common femoral vein through  the posterior tibial veins.  There was no internal clot or area of noncompressibility in the right  lower extremity.  Normal augmentation was elicited where applicable.          Impression:      No DVT in the right lower extremity on today's exam.      This report was finalized on  7/5/2021 5:19 PM by Dr. Hakeem Morgan MD.             Results for orders placed during the hospital encounter of 06/17/21    Adult Transthoracic Echo Limited W/ Cont if Necessary Per Protocol    Interpretation Summary  · Left ventricular ejection fraction appears to be 61 - 65%. Left ventricular systolic function is normal.  · No significant functional valvular abnormalities noted  · There is no evidence of pericardial effusion      Assessment/Plan:   Complicated bacteremia that did not clear within 2 to 3 days, on IV Ancef and progressing well, CRP is normalized, per infectious disease recommendation, if KATHRIN negative patient needs 4 weeks IV antibiotic therapy of +4 to 6 weeks.  I did discuss this with the patient.  I discussed this case with anesthesia as well as cardiology, will temporarily plan KATHRIN for Friday.    Hepatitis C, will need outpatient follow-up    COVID-19 positive, has been asymptomatic throughout the stay, she is out of isolation now.    DVT prophylaxis, Lovenox    Reactive thrombocytopenia, follow-up in a.m.    Patient is not requiring much hydrocodone, frequency decreased to twice a day as needed.    D/W Patient's father and entertained questions    Disposition Home although we are trying [facility] or swing bed, I discussed with .    Deng Sanchez MD

## 2021-07-08 LAB
ANION GAP SERPL CALCULATED.3IONS-SCNC: 8.4 MMOL/L (ref 5–15)
BASOPHILS # BLD AUTO: 0.11 10*3/MM3 (ref 0–0.2)
BASOPHILS NFR BLD AUTO: 1 % (ref 0–1.5)
BUN SERPL-MCNC: 15 MG/DL (ref 6–20)
BUN/CREAT SERPL: 25.4 (ref 7–25)
CALCIUM SPEC-SCNC: 8.5 MG/DL (ref 8.6–10.5)
CHLORIDE SERPL-SCNC: 103 MMOL/L (ref 98–107)
CO2 SERPL-SCNC: 26.6 MMOL/L (ref 22–29)
CREAT SERPL-MCNC: 0.59 MG/DL (ref 0.57–1)
CRP SERPL-MCNC: <0.3 MG/DL (ref 0–0.5)
DEPRECATED RDW RBC AUTO: 44.3 FL (ref 37–54)
EOSINOPHIL # BLD AUTO: 0.16 10*3/MM3 (ref 0–0.4)
EOSINOPHIL NFR BLD AUTO: 1.5 % (ref 0.3–6.2)
ERYTHROCYTE [DISTWIDTH] IN BLOOD BY AUTOMATED COUNT: 12.8 % (ref 12.3–15.4)
GFR SERPL CREATININE-BSD FRML MDRD: 120 ML/MIN/1.73
GLUCOSE SERPL-MCNC: 96 MG/DL (ref 65–99)
HCT VFR BLD AUTO: 31.1 % (ref 34–46.6)
HGB BLD-MCNC: 9.4 G/DL (ref 12–15.9)
IMM GRANULOCYTES # BLD AUTO: 0.09 10*3/MM3 (ref 0–0.05)
IMM GRANULOCYTES NFR BLD AUTO: 0.8 % (ref 0–0.5)
LYMPHOCYTES # BLD AUTO: 2.63 10*3/MM3 (ref 0.7–3.1)
LYMPHOCYTES NFR BLD AUTO: 24.4 % (ref 19.6–45.3)
MAGNESIUM SERPL-MCNC: 1.7 MG/DL (ref 1.6–2.6)
MCH RBC QN AUTO: 28.7 PG (ref 26.6–33)
MCHC RBC AUTO-ENTMCNC: 30.2 G/DL (ref 31.5–35.7)
MCV RBC AUTO: 95.1 FL (ref 79–97)
MONOCYTES # BLD AUTO: 0.79 10*3/MM3 (ref 0.1–0.9)
MONOCYTES NFR BLD AUTO: 7.3 % (ref 5–12)
NEUTROPHILS NFR BLD AUTO: 6.99 10*3/MM3 (ref 1.7–7)
NEUTROPHILS NFR BLD AUTO: 65 % (ref 42.7–76)
NRBC BLD AUTO-RTO: 0 /100 WBC (ref 0–0.2)
PLATELET # BLD AUTO: 438 10*3/MM3 (ref 140–450)
PMV BLD AUTO: 9.6 FL (ref 6–12)
POTASSIUM SERPL-SCNC: 3.9 MMOL/L (ref 3.5–5.2)
POTASSIUM SERPL-SCNC: 4 MMOL/L (ref 3.5–5.2)
RBC # BLD AUTO: 3.27 10*6/MM3 (ref 3.77–5.28)
SODIUM SERPL-SCNC: 138 MMOL/L (ref 136–145)
WBC # BLD AUTO: 10.77 10*3/MM3 (ref 3.4–10.8)

## 2021-07-08 PROCEDURE — 83735 ASSAY OF MAGNESIUM: CPT | Performed by: PHYSICIAN ASSISTANT

## 2021-07-08 PROCEDURE — 93005 ELECTROCARDIOGRAM TRACING: CPT | Performed by: INTERNAL MEDICINE

## 2021-07-08 PROCEDURE — 99231 SBSQ HOSP IP/OBS SF/LOW 25: CPT | Performed by: INTERNAL MEDICINE

## 2021-07-08 PROCEDURE — 93005 ELECTROCARDIOGRAM TRACING: CPT | Performed by: PHYSICIAN ASSISTANT

## 2021-07-08 PROCEDURE — 93010 ELECTROCARDIOGRAM REPORT: CPT | Performed by: INTERNAL MEDICINE

## 2021-07-08 PROCEDURE — 86140 C-REACTIVE PROTEIN: CPT | Performed by: INTERNAL MEDICINE

## 2021-07-08 PROCEDURE — 25010000003 CEFAZOLIN SODIUM-DEXTROSE 2-3 GM-%(50ML) RECONSTITUTED SOLUTION: Performed by: INTERNAL MEDICINE

## 2021-07-08 PROCEDURE — 84132 ASSAY OF SERUM POTASSIUM: CPT | Performed by: PHYSICIAN ASSISTANT

## 2021-07-08 PROCEDURE — 25010000002 ENOXAPARIN PER 10 MG: Performed by: INTERNAL MEDICINE

## 2021-07-08 PROCEDURE — 80048 BASIC METABOLIC PNL TOTAL CA: CPT | Performed by: INTERNAL MEDICINE

## 2021-07-08 PROCEDURE — 85025 COMPLETE CBC W/AUTO DIFF WBC: CPT | Performed by: INTERNAL MEDICINE

## 2021-07-08 RX ADMIN — Medication 150 MG: at 08:26

## 2021-07-08 RX ADMIN — ENOXAPARIN SODIUM 40 MG: 40 INJECTION SUBCUTANEOUS at 08:27

## 2021-07-08 RX ADMIN — SODIUM CHLORIDE, PRESERVATIVE FREE 3 ML: 5 INJECTION INTRAVENOUS at 19:56

## 2021-07-08 RX ADMIN — SODIUM CHLORIDE, PRESERVATIVE FREE 10 ML: 5 INJECTION INTRAVENOUS at 08:27

## 2021-07-08 RX ADMIN — Medication 1 TABLET: at 08:26

## 2021-07-08 RX ADMIN — HYDROCODONE BITARTRATE AND ACETAMINOPHEN 1 TABLET: 7.5; 325 TABLET ORAL at 20:01

## 2021-07-08 RX ADMIN — CEFAZOLIN SODIUM 2 G: 2 SOLUTION INTRAVENOUS at 03:47

## 2021-07-08 RX ADMIN — FOLIC ACID 1 MG: 1 TABLET ORAL at 08:27

## 2021-07-08 RX ADMIN — CEFAZOLIN SODIUM 2 G: 2 SOLUTION INTRAVENOUS at 19:55

## 2021-07-08 RX ADMIN — CEFAZOLIN SODIUM 2 G: 2 SOLUTION INTRAVENOUS at 12:00

## 2021-07-08 RX ADMIN — Medication 1 CAPSULE: at 08:26

## 2021-07-08 RX ADMIN — DOCUSATE SODIUM 100 MG: 100 CAPSULE, LIQUID FILLED ORAL at 08:26

## 2021-07-08 RX ADMIN — Medication 5 MG: at 20:01

## 2021-07-08 NOTE — PROGRESS NOTES
Patient developed sinus tachycardia.  The nurse went in and she states she saw a black spot as it fell on her and she swatted at it.  Her heart rate got up to 180 but this appeared to be sinus.  By the time I saw her she was down in the 150s, I had her vagal and this dropped her heart rate into the 90s and it was definitely sinus rhythm.  Blood pressure was 150/100.  She adamantly denies that she is used any drugs, although her boyfriend was here last night she states he does not use and she offered to let me test her however she has been truthful to this point therefore I am going to hold on this.  Heart rate is slowly trending down more, will continue to follow.  Skin was warm and dry and in fact she was on the phone talking to family member when I walked in and seemed in no distress.

## 2021-07-08 NOTE — DISCHARGE PLACEMENT REQUEST
"Tran Santiago (31 y.o. Female)     Date of Birth Social Security Number Address Home Phone MRN    1990  [address] [facility] [address]  [id number]    Congregational Marital Status          Non-Christianity Single       Admission Date Admission Type Admitting Provider Attending Provider Department, Room/Bed    21 Emergency Nicci Valdez MD Heinss, Karl F, MD [facility] 3 [facility], 3314/2S    Discharge Date Discharge Disposition Discharge Destination                       Attending Provider: Deng Sanchez MD    Allergies: Cinnamon    Isolation: None   Infection: COVID (History) (21)   Code Status: CPR    Ht: 167.6 cm (66\")   Wt: 57 kg (125 lb 9.6 oz)    Admission Cmt: None   Principal Problem: Endocarditis [I38]                 Active Insurance as of 2021     Primary Coverage     Payor Plan Insurance Group Employer/Plan Group    [facility]      Payor Plan Address Payor Plan Phone Number Payor Plan Fax Number Effective Dates    [address] [address]   2019 - None Entered    [place] [address]       Subscriber Name Subscriber Birth Date Member ID       TRAN SANTIAGO 1990 [id number]                 Emergency Contacts      (Rel.) Home Phone Work Phone Mobile Phone    GILDARDO LEIVA (Father) -- -- [phone]               History & Physical      Nicci Valdez MD at 21 0534              [facility] HOSPITALIST HISTORY AND PHYSICAL    Patient Identification:  Name:  Tran Santiago  Age:  31 y.o.  Sex:  female  :  1990  MRN:  [id number]   Visit Number:  [id number]  Admit Date: 2021   Room number:  P220/1P  Primary Care Physician:  Provider, No Known    Date of Admission: 2021     Subjective     Chief complaint:    Chief Complaint   Patient presents with   • Leg Pain   • Arm Pain     History of presenting illness:  31 y.o. female who was admitted on 2021 from the ED with fatigue and a " painful left ankle.  The patient states that she was trying to rescue her From a roof top and she fell and landed on her entire left side.  She came to our emergency department on 6/11/2021; however, the patient denied jumping from any heights at that time.  She was diagnosed with sciatica and discharged home with some Norco.  She then went to [facility] emergency department on 6/12/2021; she states that she received a shot in her left hip for sciatica.  The patient continued to hurt and then she noticed left ankle swelling and redness; she decided to come to the emergency department to be evaluated.  The patient was found to have severe sepsis with chest x-ray showing right basilar pneumonia.  Her COVID-19 screening was positive; the patient denies any coughing, loss of taste or smell, or trouble breathing.  Because the patient is a known IV drug user, we did perform a CT scan with PE protocol and septic emboli were seen.  Thus, we are admitting the patient to the progressive care unit.  Currently, the patient is not requiring oxygen.  The last time that she injected drugs was 2 days ago and she injected them into her right AC area.  She denies injecting drugs into her feet and ankles.  She denies any recent methamphetamine use and states that she injected pain medication.  The patient does not know how she has a positive amphetamine drug screen during this admission.  ---------------------------------------------------------------------------------------------------------------------   Review of Systems   Constitutional: Positive for chills and fever.   HENT: Negative for congestion and rhinorrhea.    Eyes: Negative for discharge and redness.   Respiratory: Positive for chest tightness. Negative for cough and shortness of breath.    Cardiovascular: Negative for chest pain and leg swelling.   Gastrointestinal: Negative for diarrhea, nausea and vomiting.   Genitourinary: Positive for difficulty urinating.  Negative for dysuria, flank pain and hematuria.   Musculoskeletal: Positive for joint swelling (Left ankle). Negative for arthralgias.   Skin: Positive for color change and wound.   Neurological: Positive for syncope and numbness. Negative for seizures, facial asymmetry, speech difficulty and headaches.     ---------------------------------------------------------------------------------------------------------------------   Past Medical History:   Diagnosis Date   • IV drug abuse (CMS/[facility])    • MRSA cellulitis 2019    Left knee     Past Surgical History:   Procedure Laterality Date   • TONSILLECTOMY       Family History   Family history unknown: Yes     Social History     Socioeconomic History   • Marital status: Single   Tobacco Use   • Smoking status: Former Smoker     Packs/day: 0.25     Years: 10.00     Pack years: 2.50     Types: Cigarettes   • Smokeless tobacco: Never Used   Vaping Use   • Vaping Use: Every day   • Substances: Nicotine, Flavoring   • Devices: [employer] tank   Substance and Sexual Activity   • Alcohol use: Never   • Drug use: Yes     Types: Oxycodone, Opium, IV, Methamphetamines   • Sexual activity: Defer     ---------------------------------------------------------------------------------------------------------------------   Allergies:  Cinnamon  ---------------------------------------------------------------------------------------------------------------------   Medications below are reported home medications pulling from within the system; at this time, these medications have not been reconciled unless otherwise specified and are in the verification process for further verifcation as current home medications.      Prior to Admission Medications     None        Objective     Vital Signs:  Temp:  [97.7 °F (36.5 °C)-99.4 °F (37.4 °C)] 99.4 °F (37.4 °C)  Heart Rate:  [] 124  Resp:  [15-20] 15  BP: ()/(52-79) 99/53    Mean Arterial Pressure (Non-Invasive) for the past 24 hrs (Last  3 readings):   Noninvasive MAP (mmHg)   06/18/21 0753 68   06/18/21 0602 86   06/18/21 0531 82     SpO2:  [78 %-100 %] 99 %  on   ;   Device (Oxygen Therapy): room air  Body mass index is 20.82 kg/m².    Wt Readings from Last 3 Encounters:   06/18/21 58.5 kg (129 lb)   10/30/19 63.5 kg (140 lb)      ----------------------------------------------------------------------------------------------------------------------  Physical Exam  Vitals reviewed.   Constitutional:       Appearance: She is well-developed and normal weight. She is not ill-appearing.   HENT:      Head: Normocephalic and atraumatic.      Right Ear: External ear normal.      Left Ear: External ear normal.      Nose: Nose normal.   Eyes:      General: No scleral icterus.        Right eye: No discharge.         Left eye: No discharge.      Pupils: Pupils are equal, round, and reactive to light.   Cardiovascular:      Rate and Rhythm: Normal rate and regular rhythm.      Pulses: Normal pulses.      Heart sounds: No murmur heard.     Pulmonary:      Effort: Pulmonary effort is normal. No respiratory distress.      Breath sounds: Normal breath sounds. No wheezing or rales.   Abdominal:      General: Abdomen is flat. There is no distension.      Palpations: Abdomen is soft.      Tenderness: There is no abdominal tenderness.   Musculoskeletal:         General: No deformity.      Comments: Left lateral ankle is swollen, erythematous, and warm to touch.  There are no skin lesions around this area and there is no drainage.   Skin:     Capillary Refill: Capillary refill takes less than 2 seconds.      Coloration: Skin is not jaundiced or pale.      Comments: See the left ankle exam in the musculoskeletal part of the physical examination.   Neurological:      General: No focal deficit present.      Mental Status: She is alert and oriented to person, place, and time. Mental status is at baseline.      Cranial Nerves: No cranial nerve deficit.   Psychiatric:          Mood and Affect: Mood normal.         Behavior: Behavior normal. Behavior is cooperative.         Thought Content: Thought content normal.         Judgment: Judgment normal.       ---------------------------------------------------------------------------------------------------------------------  EKG: Sinus tachycardia with a heart rate of 108 and a QTC of 442 ms.  There are no ischemic changes.  Please note that there is not a comparison EKG in our computer system.  Please note that I have personally looked at the EKG from this admission and the above is my interpretation of this admission's EKG; I await the final cardiology read.    Telemetry:  Sinus tachycardia with heart rates of 105-110.  Please note that I personally looked at the telemetry strips.    Last echocardiogram: Transthoracic echocardiogram has been ordered  --------------------------------------------------------------------------------------------------------------------  LABS:    CBC and coagulation:  Results from last 7 days   Lab Units 06/18/21  0153 06/17/21  2253   LACTATE mmol/L 1.3 2.4*   CRP mg/dL  --  29.71*   WBC 10*3/mm3  --  16.09*   HEMOGLOBIN g/dL  --  11.7*   HEMATOCRIT %  --  34.7   MCV fL  --  87.0   MCHC g/dL  --  33.7   PLATELETS 10*3/mm3  --  105*   D DIMER QUANT MCGFEU/mL 7.66*  --      Renal and electrolytes:  Results from last 7 days   Lab Units 06/17/21  2253   SODIUM mmol/L 136   POTASSIUM mmol/L 2.8*   MAGNESIUM mg/dL 2.1   CHLORIDE mmol/L 100   CO2 mmol/L 24.1   BUN mg/dL 24*   CREATININE mg/dL 1.00   EGFR IF NONAFRICN AM mL/min/1.73 65   CALCIUM mg/dL 8.2*   GLUCOSE mg/dL 127*     Estimated Creatinine Clearance: 76 mL/min (by C-G formula based on SCr of 1 mg/dL).    Liver and pancreatic function:  Results from last 7 days   Lab Units 06/17/21  2253   ALBUMIN g/dL 2.37*   BILIRUBIN mg/dL 1.0   ALK PHOS U/L 304*   AST (SGOT) U/L 37*   ALT (SGPT) U/L 179*     Endocrine function:  Lab Results   Component Value Date     HGBA1C 5.60 06/17/2021       Lab Results   Component Value Date    TSH 0.718 06/17/2021     Cardiac:  Results from last 7 days   Lab Units 06/18/21  0054 06/17/21  2253   CK TOTAL U/L  --  54   TROPONIN T ng/mL <0.010 <0.010   PROBNP pg/mL  --  509.8*       Cultures:  Lab Results   Component Value Date    COLORU Dark Yellow (A) 06/17/2021    CLARITYU Cloudy (A) 06/17/2021    PHUR 6.0 06/17/2021    GLUCOSEU Negative 06/17/2021    KETONESU Negative 06/17/2021    BLOODU Large (3+) (A) 06/17/2021    NITRITEU Positive (A) 06/17/2021    LEUKOCYTESUR Moderate (2+) (A) 06/17/2021    BILIRUBINUR Negative 06/17/2021    UROBILINOGEN 1.0 E.U./dL 06/17/2021    RBCUA Too Numerous to Count (A) 06/17/2021    WBCUA Too Numerous to Count (A) 06/17/2021    BACTERIA 4+ (A) 06/17/2021     Microbiology Results (last 10 days)     Procedure Component Value - Date/Time    COVID-19 and FLU A/B PCR - Swab, Nasopharynx [[id number]]  (Abnormal) Collected: 06/18/21 0049    Lab Status: Final result Specimen: Swab from Nasopharynx Updated: 06/18/21 0119     COVID19 Detected     Influenza A PCR Not Detected     Influenza B PCR Not Detected    Narrative:      Fact sheet for providers: https://www.fda.gov/media/357799/download    Fact sheet for patients: https://www.fda.gov/media/638629/download    Test performed by PCR.  Influenza A and Influenza B negative results should be considered presumptive in samples that have a positive SARS-CoV-2 result.    Competitive inhibition studies showed that SARS-CoV-2 virus, when present at concentrations above 3.6E+04 copies/mL, can inhibit the detection and amplification of influenza A and influenza B virus RNA if present at or below 1.8E+02 copies/mL or 4.9E+02 copies/mL, respectively, and may lead to false negative influenza virus results. If co-infection with influenza A or influenza B virus is suspected in samples with a positive SARS-CoV-2 result, the sample should be re-tested with another FDA cleared,  approved, or authorized influenza test, if influenza virus detection would change clinical management.        Lab Results   Component Value Date    PREGTESTUR Negative 06/17/2021         I have personally looked at the labs and they are summarized above.  ----------------------------------------------------------------------------------------------------------------------  Detailed radiology reports for the last 24 hours:    Imaging Results (Last 24 Hours)     Procedure Component Value Units Date/Time    CT Chest Pulmonary Embolism [[id number]] Collected: 06/18/21 0420     Updated: 06/18/21 0422    Narrative:      CT CHEST PULMONARY EMBOLISM W CONTRAST    INDICATION:   Patient feeling sick and dehydrated. Subjective fever and chills.    TECHNIQUE:   CT angiogram of the chest with IV contrast. 3-D reconstructions were obtained and reviewed.   Radiation dose reduction techniques included automated exposure control or exposure modulation based on body size. Count of known CT and cardiac nuc med studies  performed in previous 12 months: 0.     COMPARISON:   None available.    FINDINGS:   Exam is degraded by motion artifact and streak artifact from the patient's arms. No definite pulmonary embolism is seen. There is no aortic dissection. There is a trace amount of right pleural fluid. No pericardial effusion is seen. There is no  adenopathy. Upper abdominal images are unremarkable.    Lung window images show septal thickening in the lungs with some mild groundglass opacity compatible with volume overload. Additionally, there are multiple poorly defined nodular infiltrates on both sides of the chest concerning for septic emboli. None  of these are cavitary at this time. One of the larger is in the superior segment of the left lower lobe measuring about 1.3 cm. Imaging features are atypical or uncommonly reported for COVID-19 pneumonia. Alternative diagnoses should be considered.      Impression:        1. Technically  degraded exam as above, but no definite pulmonary embolism is seen, and there is no aortic dissection.  2. Pulmonary edema with a trace amount of right pleural fluid.  3. Poorly defined nodular infiltrates on both sides of the chest suspicious for septic emboli. Continued follow-up is recommended.    Signer Name: Gerard Elias MD   Signed: 6/18/2021 4:20 AM   Workstation Name: [facility]    CT Head Without Contrast [[id number]] Collected: 06/18/21 0404     Updated: 06/18/21 0406    Narrative:      CT Head WO    HISTORY:   Headache today.    TECHNIQUE:   Axial unenhanced head CT with multiplanar reformats. Radiation dose reduction techniques included automated exposure control or exposure modulation based on body size. Count of known CT and cardiac nuc med studies performed in previous 12 months: 0.     COMPARISON:   None.    FINDINGS:   Ventricular size and configuration are normal. No acute infarct or hemorrhage is identified. There are no masses. There is no skull fracture.      Impression:      Normal, negative unenhanced head CT.    Signer Name: Gerard Elias MD   Signed: 6/18/2021 4:04 AM   Workstation Name: [facility]    US Venous Doppler Lower Extremity Left (duplex) [[id number]] Collected: 06/18/21 0335     Updated: 06/18/21 0337    Narrative:      US Veins LE Duplex LTD LT    HISTORY:   Leg pain and redness with swelling.    TECHNIQUE:    Real-time ultrasound was performed of the left lower extremity utilizing spectral and color Doppler with compression and augmentation techniques.     COMPARISON:  None available.    FINDINGS:  No evidence of a left lower extremity deep vein thrombosis. The common femoral vein through the popliteal vein are widely patent. There is normal compressibility with spontaneous and phasic waveforms. No calf vein thrombus.       Impression:      No deep venous thrombus in the left lower extremity.      Signer Name: Gerard Elias MD   Signed: 6/18/2021 3:35 AM   Workstation Name: [facility]    [facility]    XR Foot 2 View Left [[id number]] Collected: 06/18/21 0216     Updated: 06/18/21 0218    Narrative:      CR Foot 2 Vws LT    INDICATION:   Foot pain and swelling. No trauma.    COMPARISON:   None available    FINDINGS:   2 views of the left foot.  No fracture or dislocation.  No bone erosion or destruction.  No foreign body.      Impression:      Negative left foot.    Signer Name: Gerard Elias MD   Signed: 6/18/2021 2:16 AM   Workstation Name: [facility]    Radiology [facility]    XR Chest 1 View [[id number]] Collected: 06/18/21 0030     Updated: 06/18/21 0032    Narrative:      CR Chest 1 Vw    INDICATION:   Debility.     COMPARISON:    None available.    FINDINGS:  Single portable AP view(s) of the chest.  Cardiac and mediastinal contours are normal. There is some mild haziness at the right lung base which could reflect artifact although a mild infiltrate is not excluded. The lungs are otherwise clear. No  pneumothorax is seen.      Impression:      Mild haziness at the right base could be artifact or could reflect a mild infiltrate. Otherwise negative.    Signer Name: Gerard Elias MD   Signed: 6/18/2021 12:30 AM   Workstation Name: [facility]    Radiology [facility]        I have personally looked at the radiology images and I have read the available final reports.    Assessment & Plan       -Severe sepsis that was present admission (lactic acid 2.4, white blood cell count 16,090, CRP 29.71, heart rate 135) due to suspected endocarditis causing septic emboli in the lungs versus community-acquired pneumonia versus aspiration pneumonia  -Left lateral ankle cellulitis  -Urinanalysis suspicious for an acute urinary tract infection  -COVID-19 positive on screening test in the emergency department, currently without any acute hypoxia  -D-dimer  7.66  -Elevated liver enzymes, viral induced from COVID-19 versus a different hepatic virus  -Acute hypokalemia  -Normocytic anemia and thrombocytopenia, suspect bone marrow suppression from COVID-19  -History of IV drug abuse  -History of MRSA skin infection of her left knee    Admitted to the progressive care unit.  Blood cultures were obtained.  We will start her on empiric Zosyn and vancomycin per the pneumonia sepsis protocol.  I have ordered a sputum culture, Mycoplasma testing, and Streptococcal pneumoniae testing.  The patient is not hypoxic and thus I will not start her on any remdesivir.  I have a high suspicion that the patient has infective endocarditis; I have ordered a transthoracic echocardiogram.  Ideally, if this did not show any endocarditis, we would want to perform a KATHRIN.  However, since the patient is COVID-19 positive, a KATHRIN may not be possible while she is in isolation.  I will trend the liver enzymes.  Since she has a history of IV drug abuse, I will send hepatitis and HIV testing.  The liver enzymes may also be elevated due to COVID-19.  Her D-dimer is elevated; because it is over 3, she meets level 3 Bertrand criteria and thus I will start her on therapeutic dose Lovenox.  For the hypokalemia, I have written for our potassium replacement protocol.  We will monitor her hemoglobin levels and her platelet levels to see if they improve with watchful waiting as these could be decreased in COVID-19 patients.  A urine culture has been sent; the patient is asymptomatic but her current antibiotics will cover the most common causes of urinary tract infections.  Also, the antibiotics will cover common causes of cellulitis.  There is a possibility that all the areas of infection are all related to her endocarditis but these could be separate as well because of the patient's lifestyle choices.    VTE Prophylaxis:   Mechanical Order History:     None      Pharmalogical Order History:      Ordered     Dose  Route Frequency Stop    06/18/21 0528  enoxaparin (LOVENOX) syringe 50 mg      1 mg/kg SC Daily --    06/18/21 0524  Pharmacy to Dose enoxaparin (LOVENOX)     Question:  Indication of use  Answer:  Prophylaxis    -- XX Continuous PRN --              The patient is high risk due to the following diagnoses/reasons: Severe sepsis and suspected endocarditis    Disposition: Long-term acute care hospital versus [facility] bed    Nicci Valdez MD  [facility]  06/18/21  08:02 EDT        Electronically signed by Nicci Valdez MD at 06/18/21 0812       Vital Signs (last day)     Date/Time   Temp   Temp src   Pulse   Resp   BP   Patient Position   SpO2    07/08/21 1413   97.6 (36.4)   Axillary   115   16   131/97   Lying   94    07/08/21 1013   97.4 (36.3)   Axillary   77   16   120/71   Lying   93    07/08/21 0613   97.3 (36.3)   Oral   69   16   111/63   Lying   --    07/08/21 0315   98.2 (36.8)   Oral   74   18   164/82   Lying   99    07/07/21 1830   98.4 (36.9)   Oral   106   18   141/96   Sitting   99    07/07/21 1503   98.2 (36.8)   Oral   110   18   137/96   Sitting   99    07/07/21 0641   97.5 (36.4)   Oral   75   18   113/77   Lying   96    07/07/21 0300   97.9 (36.6)   Oral   93   18   118/72   Sitting   97                Current Facility-Administered Medications   Medication Dose Route Frequency Provider Last Rate Last Admin   • bisacodyl (DULCOLAX) suppository 10 mg  10 mg Rectal Once PRN Otis De La Fuente MD       • ceFAZolin Sodium-Dextrose (ANCEF) IVPB (duplex) 2 g  2 g Intravenous Q8H Otis De La Fuente MD 0 mL/hr at 07/05/21 1150 2 g at 07/08/21 1200   • docusate sodium (COLACE) capsule 100 mg  100 mg Oral BID Otis De La Fuente MD   100 mg at 07/08/21 0826   • enoxaparin (LOVENOX) syringe 40 mg  40 mg Subcutaneous Daily Deng Sanchez MD   40 mg at 07/08/21 0827   • folic acid (FOLVITE) tablet 1 mg  1 mg Oral Daily Otis De La Fuente MD   1 mg at 07/08/21 0827   • HYDROcodone-acetaminophen  (NORCO) 7.5-325 MG per tablet 1 tablet  1 tablet Oral BID PRN Deng Sanchez MD   1 tablet at 07/07/21 2048   • ibuprofen (ADVIL,MOTRIN) tablet 400 mg  400 mg Oral Q6H PRN Otis De La Fuente MD   400 mg at 07/07/21 1736   • iron polysaccharides (NIFEREX) capsule 150 mg  150 mg Oral Daily Otis De La Fuente MD   150 mg at 07/08/21 0826   • lactobacillus acidophilus (RISAQUAD) capsule 1 capsule  1 capsule Oral Daily Otis De La Fuente MD   1 capsule at 07/08/21 0826   • melatonin tablet 5 mg  5 mg Oral Nightly PRN Otis De La Fuente MD   5 mg at 07/01/21 2229   • multivitamin with minerals 1 tablet  1 tablet Oral Daily Otis De La Fuente MD   1 tablet at 07/08/21 0826   • ondansetron (ZOFRAN) injection 4 mg  4 mg Intravenous Q6H PRN Otis De La Fuente MD   4 mg at 06/25/21 0946   • polyethylene glycol (MIRALAX) packet 17 g  17 g Oral Daily Otis De La Fuente MD   17 g at 06/30/21 0908   • sodium chloride 0.9 % flush 10 mL  10 mL Intravenous PRN Otis De La Fuente MD       • sodium chloride 0.9 % flush 10 mL  10 mL Intravenous Q12H Otis De La Fuente MD   10 mL at 07/08/21 0827   • sodium chloride 0.9 % flush 10 mL  10 mL Intravenous PRN Otis De La Fuente MD       • sodium chloride 0.9 % flush 3 mL  3 mL Intravenous Q12H Otis De La Fuente MD   3 mL at 07/07/21 2040   • sodium chloride 0.9 % flush 3-10 mL  3-10 mL Intravenous PRN Otis De La Fuente MD         Lab Results (most recent)     Procedure Component Value Units Date/Time    Basic Metabolic Panel [[id number]]  (Abnormal) Collected: 07/08/21 0114    Specimen: Blood Updated: 07/08/21 0229     Glucose 96 mg/dL      BUN 15 mg/dL      Creatinine 0.59 mg/dL      Sodium 138 mmol/L      Potassium 4.0 mmol/L      Chloride 103 mmol/L      CO2 26.6 mmol/L      Calcium 8.5 mg/dL      eGFR Non African Amer 120 mL/min/1.73      BUN/Creatinine Ratio 25.4     Anion Gap 8.4 mmol/L     Narrative:      GFR Normal >60  Chronic Kidney Disease <60  Kidney Failure <15      C-reactive Protein [[id number]]  (Normal)  Collected: 07/08/21 0114    Specimen: Blood Updated: 07/08/21 0229     C-Reactive Protein <0.30 mg/dL     CBC & Differential [[id number]]  (Abnormal) Collected: 07/08/21 0114    Specimen: Blood Updated: 07/08/21 0211    Narrative:      The following orders were created for panel order CBC & Differential.  Procedure                               Abnormality         Status                     ---------                               -----------         ------                     CBC Auto Differential[[id number]]        Abnormal            Final result                 Please view results for these tests on the individual orders.    CBC Auto Differential [[id number]]  (Abnormal) Collected: 07/08/21 0114    Specimen: Blood Updated: 07/08/21 0211     WBC 10.77 10*3/mm3      RBC 3.27 10*6/mm3      Hemoglobin 9.4 g/dL      Hematocrit 31.1 %      MCV 95.1 fL      MCH 28.7 pg      MCHC 30.2 g/dL      RDW 12.8 %      RDW-SD 44.3 fl      MPV 9.6 fL      Platelets 438 10*3/mm3      Neutrophil % 65.0 %      Lymphocyte % 24.4 %      Monocyte % 7.3 %      Eosinophil % 1.5 %      Basophil % 1.0 %      Immature Grans % 0.8 %      Neutrophils, Absolute 6.99 10*3/mm3      Lymphocytes, Absolute 2.63 10*3/mm3      Monocytes, Absolute 0.79 10*3/mm3      Eosinophils, Absolute 0.16 10*3/mm3      Basophils, Absolute 0.11 10*3/mm3      Immature Grans, Absolute 0.09 10*3/mm3      nRBC 0.0 /100 WBC     C-reactive Protein [[id number]]  (Normal) Collected: 07/07/21 0250    Specimen: Blood Updated: 07/07/21 0356     C-Reactive Protein 0.35 mg/dL     Comprehensive Metabolic Panel [[id number]]  (Abnormal) Collected: 07/06/21 0036    Specimen: Blood Updated: 07/06/21 0233     Glucose 81 mg/dL      BUN 18 mg/dL      Creatinine 0.72 mg/dL      Sodium 139 mmol/L      Potassium 3.8 mmol/L      Chloride 104 mmol/L      CO2 24.5 mmol/L      Calcium 8.8 mg/dL      Total Protein 7.4 g/dL      Albumin 3.26 g/dL      ALT (SGPT) 11 U/L      AST (SGOT) 19 U/L       Alkaline Phosphatase 130 U/L      Total Bilirubin 0.2 mg/dL      eGFR Non African Amer 95 mL/min/1.73      Globulin 4.1 gm/dL      A/G Ratio 0.8 g/dL      BUN/Creatinine Ratio 25.0     Anion Gap 10.5 mmol/L     Narrative:      GFR Normal >60  Chronic Kidney Disease <60  Kidney Failure <15      CBC & Differential [[id number]]  (Abnormal) Collected: 07/06/21 0036    Specimen: Blood Updated: 07/06/21 0127    Narrative:      The following orders were created for panel order CBC & Differential.  Procedure                               Abnormality         Status                     ---------                               -----------         ------                     CBC Auto Differential[[id number]]        Abnormal            Final result                 Please view results for these tests on the individual orders.    CBC Auto Differential [[id number]]  (Abnormal) Collected: 07/06/21 0036    Specimen: Blood Updated: 07/06/21 0127     WBC 11.09 10*3/mm3      RBC 3.13 10*6/mm3      Hemoglobin 9.2 g/dL      Hematocrit 29.3 %      MCV 93.6 fL      MCH 29.4 pg      MCHC 31.4 g/dL      RDW 12.7 %      RDW-SD 43.3 fl      MPV 9.4 fL      Platelets 526 10*3/mm3      Neutrophil % 59.2 %      Lymphocyte % 29.0 %      Monocyte % 7.5 %      Eosinophil % 1.9 %      Basophil % 1.1 %      Immature Grans % 1.3 %      Neutrophils, Absolute 6.57 10*3/mm3      Lymphocytes, Absolute 3.22 10*3/mm3      Monocytes, Absolute 0.83 10*3/mm3      Eosinophils, Absolute 0.21 10*3/mm3      Basophils, Absolute 0.12 10*3/mm3      Immature Grans, Absolute 0.14 10*3/mm3      nRBC 0.0 /100 WBC     Basic Metabolic Panel [[id number]]  (Normal) Collected: 07/05/21 0100    Specimen: Blood Updated: 07/05/21 0212     Glucose 92 mg/dL      BUN 14 mg/dL      Creatinine 0.66 mg/dL      Sodium 138 mmol/L      Potassium 3.5 mmol/L      Chloride 104 mmol/L      CO2 26.0 mmol/L      Calcium 8.6 mg/dL      eGFR Non African Amer 105 mL/min/1.73      BUN/Creatinine Ratio  21.2     Anion Gap 8.0 mmol/L     Narrative:      GFR Normal >60  Chronic Kidney Disease <60  Kidney Failure <15      Urine Drug Screen - Urine, Clean Catch [[id number]]  (Normal) Collected: 07/04/21 1031    Specimen: Urine, Clean Catch Updated: 07/04/21 1049     Amphetamine Screen, Urine Negative     Barbiturates Screen, Urine Negative     Benzodiazepine Screen, Urine Negative     Cocaine Screen, Urine Negative     Methadone Screen, Urine Negative     Opiate Screen Negative     Phencyclidine (PCP), Urine Negative     THC, Screen, Urine Negative     6-ACETYL MORPHINE Negative     Buprenorphine, Screen, Urine Negative     Oxycodone Screen, Urine Negative    Narrative:      Negative Thresholds For Drugs Screened:                  Amphetamines              1000 ng/ml               Barbiturates               200 ng/ml               Benzodiazepines            200 ng/ml              Cocaine                    300 ng/ml              Methadone                  300 ng/ml              Opiates                    300 ng/ml               Phencyclidine               25 ng/ml               THC                         50 ng/ml              6-Acetyl Morphine           10 ng/ml              Buprenorphine                5 ng/ml              Oxycodone                  300 ng/ml    The reference range for all drugs tested is negative. This report includes final unconfirmed qualitative results to be used for medical treatment purposes only. Unconfirmed results must not be used for non-medical purposes such as employment or legal testing. Clinical consideration should be applied to any drug of abuse test, especially when unconfirmed quantitative results are used.        Magnesium [[id number]]  (Normal) Collected: 07/03/21 1600    Specimen: Blood Updated: 07/03/21 1619     Magnesium 1.8 mg/dL     Comprehensive Metabolic Panel [[id number]]  (Abnormal) Collected: 07/03/21 0408    Specimen: Blood Updated: 07/03/21 0439     Glucose 96 mg/dL       BUN 15 mg/dL      Creatinine 0.72 mg/dL      Sodium 137 mmol/L      Potassium 4.2 mmol/L      Chloride 103 mmol/L      CO2 25.1 mmol/L      Calcium 8.5 mg/dL      Total Protein 6.8 g/dL      Albumin 2.84 g/dL      ALT (SGPT) 11 U/L      AST (SGOT) 17 U/L      Alkaline Phosphatase 124 U/L      Total Bilirubin 0.2 mg/dL      eGFR Non African Amer 95 mL/min/1.73      Globulin 4.0 gm/dL      A/G Ratio 0.7 g/dL      BUN/Creatinine Ratio 20.8     Anion Gap 8.9 mmol/L     Narrative:      GFR Normal >60  Chronic Kidney Disease <60  Kidney Failure <15      Blood Culture - Blood, Arm, Right [[id number]] Collected: 06/24/21 1227    Specimen: Blood from Arm, Right Updated: 06/29/21 1315     Blood Culture No growth at 5 days    Blood Culture - Blood, Hand, Right [[id number]] Collected: 06/24/21 1227    Specimen: Blood from Hand, Right Updated: 06/29/21 1315     Blood Culture No growth at 5 days    Magnesium [[id number]]  (Normal) Collected: 06/27/21 0317    Specimen: Blood Updated: 06/27/21 0344     Magnesium 2.5 mg/dL     HCV RNA By PCR, Qn Rfx Lucía [[id number]] Collected: 06/24/21 0232    Specimen: Blood Updated: 06/26/21 1609     Hepatitis C Genotype 1a     Please note Comment     Comment: This test was developed and its performance characteristics determined  by [facility].  It has not been cleared or approved by the U.[place]. Food and  Drug Administration.  The FDA has determined that such clearance or approval is not  necessary. This test is used for clinical purposes.  It should not be  regarded as investigational or for research.       Narrative:      Performed at:  [address]  [id number]  : Jarad Pride MD, Phone:  [phone]    HCV RNA By PCR, Qn Rfx Lucía [[id number]] Collected: 06/24/21 0232    Specimen: Blood Updated: 06/26/21 1609     Hepatitis C Quantitation [id number] IU/mL      HCV log10 6.650 log10 IU/mL      HCV Test Information Comment     Comment: The quantitative  range of this assay is 15 IU/mL to 100 million IU/mL.        HCV Genotype Comment     Comment: To be performed on this specimen.       Narrative:      Performed at:  [address] [place] [address]  [id number]  : Jarad Pride MD, Phone:  [phone]    Uric Acid [[id number]]  (Normal) Collected: 06/26/21 0258    Specimen: Blood Updated: 06/26/21 1410     Uric Acid 4.4 mg/dL     Scan Slide [[id number]] Collected: 06/26/21 0258    Specimen: Blood Updated: 06/26/21 0337     Hypochromia Slight/1+     Platelet Estimate Increased    Blood Culture ID, PCR - Blood, Arm, Left [[id number]]  (Abnormal) Collected: 06/22/21 0156    Specimen: Blood from Arm, Left Updated: 06/24/21 0433     BCID, PCR Staphylococcus aureus, not MRSA. Identification by BCID PCR.     BOTTLE TYPE Pediatric Bottle    D-dimer, Quantitative [[id number]]  (Abnormal) Collected: 06/24/21 0231    Specimen: Blood Updated: 06/24/21 0300     D-Dimer, Quantitative 3.02 MCGFEU/mL     Narrative:      d-Dimer assay result is to be used in conjunction with a non-high clinical pretest probability (PTP) assessment tool to exclude PE and aid in diagnosis of VTE with cutoff of 0.5 MCGFEU/mL.    Folate [[id number]]  (Normal) Collected: 06/23/21 0122    Specimen: Blood Updated: 06/23/21 1311     Folate 6.78 ng/mL     Narrative:      Results may be falsely increased if patient taking Biotin.      Vitamin B12 [[id number]]  (Abnormal) Collected: 06/23/21 0122    Specimen: Blood Updated: 06/23/21 1311     Vitamin B-12 1,050 pg/mL     Narrative:      Results may be falsely increased if patient taking Biotin.      Iron Profile [[id number]]  (Abnormal) Collected: 06/23/21 0122    Specimen: Blood Updated: 06/23/21 0206     Iron 26 mcg/dL      Iron Saturation 13 %      Transferrin 135 mg/dL      TIBC 201 mcg/dL     Lactate Dehydrogenase [[id number]]  (Normal) Collected: 06/23/21 0122    Specimen: Blood Updated: 06/23/21 0206      U/L      Ferritin [[id number]]  (Abnormal) Collected: 06/23/21 0122    Specimen: Blood Updated: 06/23/21 0205     Ferritin 400.20 ng/mL     Narrative:      Results may be falsely decreased if patient taking Biotin.      Reticulocytes [[id number]]  (Normal) Collected: 06/23/21 0122    Specimen: Blood Updated: 06/23/21 0131     Reticulocyte % 1.83 %      Reticulocyte Absolute 0.0584 10*6/mm3     Haptoglobin [[id number]]  (Abnormal) Collected: 06/22/21 1002    Specimen: Blood Updated: 06/22/21 1855     Haptoglobin 311 mg/dL     D-dimer, Quantitative [[id number]]  (Abnormal) Collected: 06/22/21 0156    Specimen: Blood Updated: 06/22/21 0235     D-Dimer, Quantitative 3.70 MCGFEU/mL     Narrative:      d-Dimer assay result is to be used in conjunction with a non-high clinical pretest probability (PTP) assessment tool to exclude PE and aid in diagnosis of VTE with cutoff of 0.5 MCGFEU/mL.    Urine Culture - Urine, Urine, Clean Catch [[id number]]  (Abnormal)  (Susceptibility) Collected: 06/17/21 2253    Specimen: Urine, Clean Catch Updated: 06/21/21 1156     Urine Culture >100,000 CFU/mL Escherichia coli      >100,000 CFU/mL Staphylococcus aureus    Susceptibility      Escherichia coli Staphylococcus aureus      RUBI RUBI      Ampicillin Resistant       Ampicillin + Sulbactam Resistant       Cefazolin Susceptible       Cefepime Susceptible       Ceftazidime Susceptible       Ceftriaxone Susceptible       Gentamicin Susceptible       Levofloxacin Intermediate       Nitrofurantoin Susceptible Susceptible      Oxacillin  Susceptible      Piperacillin + Tazobactam Susceptible       Tetracycline Susceptible Susceptible      Trimethoprim + Sulfamethoxazole Resistant Susceptible      Vancomycin  Susceptible                 Linear View               Susceptibility Comments     Staphylococcus aureus    This isolate does not demonstrate inducible clindamycin resistance in vitro.               Vancomycin, Trough [[id number]]  (Normal) Collected:  06/20/21 1103    Specimen: Blood Updated: 06/20/21 1135     Vancomycin Trough 9.80 mcg/mL     Narrative:      Therapeutic Ranges for Vancomycin    Vancomycin Random   5.0-40.0 mcg/mL  Vancomycin Trough   5.0-20.0 mcg/mL  Vancomycin Peak     20.0-40.0 mcg/mL    Troponin [[id number]]  (Normal) Collected: 06/19/21 1953    Specimen: Blood Updated: 06/19/21 2022     Troponin T <0.010 ng/mL     Narrative:      Troponin T Reference Range:  <= 0.03 ng/mL-   Negative for AMI  >0.03 ng/mL-     Abnormal for myocardial necrosis.  Clinicians would have to utilize clinical acumen, EKG, Troponin and serial changes to determine if it is an Acute Myocardial Infarction or myocardial injury due to an underlying chronic condition.       Results may be falsely decreased if patient taking Biotin.      Hepatic Function Panel [[id number]]  (Abnormal) Collected: 06/19/21 1028    Specimen: Blood Updated: 06/19/21 1727     Total Protein 6.5 g/dL      Albumin 1.79 g/dL      ALT (SGPT) 101 U/L      AST (SGOT) 44 U/L      Alkaline Phosphatase 240 U/L      Total Bilirubin 1.0 mg/dL      Bilirubin, Direct 0.4 mg/dL      Bilirubin, Indirect 0.6 mg/dL     Potassium [[id number]]  (Normal) Collected: 06/19/21 1028    Specimen: Blood Updated: 06/19/21 1109     Potassium 5.0 mmol/L     Vancomycin, Trough [[id number]]  (Normal) Collected: 06/19/21 1028    Specimen: Blood Updated: 06/19/21 1053     Vancomycin Trough 7.20 mcg/mL     Narrative:      Therapeutic Ranges for Vancomycin    Vancomycin Random   5.0-40.0 mcg/mL  Vancomycin Trough   5.0-20.0 mcg/mL  Vancomycin Peak     20.0-40.0 mcg/mL    Protime-INR [[id number]]  (Normal) Collected: 06/19/21 1028    Specimen: Blood Updated: 06/19/21 1048     Protime 14.4 Seconds      Comment: Note new Reference Range        INR 1.08    Narrative:      Suggested INR therapeutic range for stable oral anticoagulant therapy:    Low Intensity therapy:   1.5-2.0  Moderate Intensity therapy:   2.0-3.0  High Intensity therapy:    2.5-4.0    Gamma GT [[id number]]  (Abnormal) Collected: 06/18/21 0054    Specimen: Blood from Hand, Right Updated: 06/18/21 1850      U/L     Blood Culture ID, PCR - Blood, Arm, Left [[id number]]  (Abnormal) Collected: 06/17/21 2318    Specimen: Blood from Arm, Left Updated: 06/18/21 1544     BCID, PCR Staphylococcus aureus, not MRSA. Identification by BCID PCR.     BOTTLE TYPE Aerobic Bottle    Hepatitis Panel, Acute [[id number]]  (Abnormal) Collected: 06/18/21 1427    Specimen: Blood Updated: 06/18/21 1534     Hepatitis B Surface Ag Non-Reactive     Hep A IgM Non-Reactive     Hep B C IgM Non-Reactive     Hepatitis C Ab Reactive    Narrative:      Results may be falsely decreased if patient taking Biotin.     Mycoplasma Pneumoniae Antibody, IgM - Blood, [[id number]]  (Normal) Collected: 06/18/21 1427    Specimen: Blood Updated: 06/18/21 1531     Mycoplasma pneumo IgM Negative    HIV-1 / O / 2 Ag / Antibody 4th Generation [[id number]]  (Normal) Collected: 06/18/21 1427    Specimen: Blood Updated: 06/18/21 1510     HIV-1/ HIV-2 Non-Reactive     Comment: A non-reactive test result does not preclude the possibility of exposure to HIV or infection with HIV. An antibody response to recent exposure may take several months to reach detectable levels.       Narrative:      The HIV antibody/antigen combo assay is a qualitative assay for HIV that includes the p24 antigen as well as antibodies to HIV types 1 and 2. This test is intended to be used as a screening assay in the diagnosis of HIV infection in patients over the age of 2.  Results may be falsely decreased if patient taking Biotin.      Potassium [[id number]]  (Abnormal) Collected: 06/18/21 1427    Specimen: Blood Updated: 06/18/21 1449     Potassium 3.1 mmol/L      Comment: Slight hemolysis detected by analyzer. Results may be affected.       Phosphorus [[id number]]  (Normal) Collected: 06/18/21 1427    Specimen: Blood Updated: 06/18/21 1449     Phosphorus 2.9  mg/dL     S. Pneumo Ag Urine or CSF - Urine, Urine, Clean Catch [[id number]]  (Normal) Collected: 06/18/21 0622    Specimen: Urine, Clean Catch Updated: 06/18/21 1351     Strep Pneumo Ag Negative    Hemoglobin A1c [[id number]]  (Normal) Collected: 06/17/21 2253    Specimen: Blood Updated: 06/18/21 0630     Hemoglobin A1C 5.60 %     Narrative:      Hemoglobin A1C Ranges:    Increased Risk for Diabetes  5.7% to 6.4%  Diabetes                     >= 6.5%  Diabetic Goal                < 7.0%    Timed Lactic Acid, Reflex [[id number]]  (Normal) Collected: 06/18/21 0153    Specimen: Blood from Hand, Right Updated: 06/18/21 0215     Lactate 1.3 mmol/L     Troponin [[id number]]  (Normal) Collected: 06/18/21 0054    Specimen: Blood from Hand, Right Updated: 06/18/21 0121     Troponin T <0.010 ng/mL     Narrative:      Troponin T Reference Range:  <= 0.03 ng/mL-   Negative for AMI  >0.03 ng/mL-     Abnormal for myocardial necrosis.  Clinicians would have to utilize clinical acumen, EKG, Troponin and serial changes to determine if it is an Acute Myocardial Infarction or myocardial injury due to an underlying chronic condition.       Results may be falsely decreased if patient taking Biotin.      COVID-19 and FLU A/B PCR - Swab, Nasopharynx [[id number]]  (Abnormal) Collected: 06/18/21 0049    Specimen: Swab from Nasopharynx Updated: 06/18/21 0119     COVID19 Detected     Influenza A PCR Not Detected     Influenza B PCR Not Detected    Narrative:      Fact sheet for providers: https://www.fda.gov/media/553296/download    Fact sheet for patients: https://www.fda.gov/media/399441/download    Test performed by PCR.  Influenza A and Influenza B negative results should be considered presumptive in samples that have a positive SARS-CoV-2 result.    Competitive inhibition studies showed that SARS-CoV-2 virus, when present at concentrations above 3.6E+04 copies/mL, can inhibit the detection and amplification of influenza A and influenza B  virus RNA if present at or below 1.8E+02 copies/mL or 4.9E+02 copies/mL, respectively, and may lead to false negative influenza virus results. If co-infection with influenza A or influenza B virus is suspected in samples with a positive SARS-CoV-2 result, the sample should be re-tested with another FDA cleared, approved, or authorized influenza test, if influenza virus detection would change clinical management.    Urine Drug Screen - Urine, Clean Catch [[id number]]  (Abnormal) Collected: 06/17/21 2253    Specimen: Urine, Clean Catch Updated: 06/17/21 2337     Amphetamine Screen, Urine Positive     Barbiturates Screen, Urine Negative     Benzodiazepine Screen, Urine Negative     Cocaine Screen, Urine Negative     Methadone Screen, Urine Negative     Opiate Screen Positive     Phencyclidine (PCP), Urine Negative     THC, Screen, Urine Negative     6-ACETYL MORPHINE Negative     Buprenorphine, Screen, Urine Negative     Oxycodone Screen, Urine Negative    Narrative:      Negative Thresholds For Drugs Screened:                  Amphetamines              1000 ng/ml               Barbiturates               200 ng/ml               Benzodiazepines            200 ng/ml              Cocaine                    300 ng/ml              Methadone                  300 ng/ml              Opiates                    300 ng/ml               Phencyclidine               25 ng/ml               THC                         50 ng/ml              6-Acetyl Morphine           10 ng/ml              Buprenorphine                5 ng/ml              Oxycodone                  300 ng/ml    The reference range for all drugs tested is negative. This report includes final unconfirmed qualitative results to be used for medical treatment purposes only. Unconfirmed results must not be used for non-medical purposes such as employment or legal testing. Clinical consideration should be applied to any drug of abuse test, especially when unconfirmed  quantitative results are used.        Lactic Acid, Plasma [[id number]]  (Abnormal) Collected: 06/17/21 2253    Specimen: Blood Updated: 06/17/21 2337     Lactate 2.4 mmol/L     BNP [[id number]]  (Abnormal) Collected: 06/17/21 2253    Specimen: Blood Updated: 06/17/21 2329     proBNP 509.8 pg/mL     Narrative:      Among patients with dyspnea, NT-proBNP is highly sensitive for the detection of acute congestive heart failure. In addition NT-proBNP of <300 pg/ml effectively rules out acute congestive heart failure with 99% negative predictive value.    Results may be falsely decreased if patient taking Biotin.      TSH [[id number]]  (Normal) Collected: 06/17/21 2253    Specimen: Blood Updated: 06/17/21 2329     TSH 0.718 uIU/mL     CK [[id number]]  (Normal) Collected: 06/17/21 2253    Specimen: Blood Updated: 06/17/21 2324     Creatine Kinase 54 U/L     Ethanol [[id number]] Collected: 06/17/21 2253    Specimen: Blood Updated: 06/17/21 2324     Ethanol <10 mg/dL      Ethanol % <0.010 %     Narrative:      >/= 80.0 legally intoxicated    Urinalysis, Microscopic Only - Urine, Clean Catch [[id number]]  (Abnormal) Collected: 06/17/21 2253    Specimen: Urine, Clean Catch Updated: 06/17/21 2307     RBC, UA Too Numerous to Count /HPF      WBC, UA Too Numerous to Count /HPF      Bacteria, UA 4+ /HPF      Squamous Epithelial Cells, UA 0-2 /HPF      Hyaline Casts, UA 3-6 /LPF      Methodology Automated Microscopy    Urinalysis With Microscopic If Indicated (No Culture) - Urine, Clean Catch [[id number]]  (Abnormal) Collected: 06/17/21 2253    Specimen: Urine, Clean Catch Updated: 06/17/21 2307     Color, UA Dark Yellow     Appearance, UA Cloudy     pH, UA 6.0     Specific Gravity, UA 1.014     Glucose, UA Negative     Ketones, UA Negative     Bilirubin, UA Negative     Blood, UA Large (3+)     Protein, UA 30 mg/dL (1+)     Leuk Esterase, UA Moderate (2+)     Nitrite, UA Positive     Urobilinogen, UA 1.0 E.U./dL    Pregnancy, Urine -  Urine, Clean Catch [[id number]]  (Normal) Collected: 06/17/21 2253    Specimen: Urine, Clean Catch Updated: 06/17/21 2303     HCG, Urine QL Negative    Narrative:      Diluted specimens may cause false negative results.          Imaging Results (Most Recent)     Procedure Component Value Units Date/Time    US Venous Doppler Lower Extremity Right (duplex) [[id number]] Collected: 07/05/21 1719     Updated: 07/05/21 1721    Narrative:      US VENOUS DOPPLER LOWER EXTREMITY RIGHT (DUPLEX)-     CLINICAL INDICATION: edema; I33.0-Acute and subacute infective  endocarditis; U07.1-COVID-19; A41.9-Sepsis, unspecified organism        COMPARISON: None available      TECHNIQUE: Color Doppler imaging was used with compression and  augmentation to evaluate the right lower extremity deep venous system.     FINDINGS:   There is patent spontaneous flow from the common femoral vein through  the posterior tibial veins.  There was no internal clot or area of noncompressibility in the right  lower extremity.  Normal augmentation was elicited where applicable.          Impression:      No DVT in the right lower extremity on today's exam.      This report was finalized on 7/5/2021 5:19 PM by Dr. Hakeem Morgan MD.       MRI Lumbar Spine Without Contrast [[id number]] Collected: 07/02/21 1217     Updated: 07/02/21 1224    Narrative:      EXAM:    MR Lumbar Spine Without Intravenous Contrast     EXAM DATE:    7/2/2021 11:43 AM     CLINICAL HISTORY:    Low back pain, infection suspected; I33.0-Acute and subacute infective  endocarditis; U07.1-COVID-19; A41.9-Sepsis, unspecified organism     TECHNIQUE:    Magnetic resonance images of the lumbar spine without intravenous  contrast in multiple planes.     COMPARISON:    No relevant prior studies available.     FINDINGS:    VERTEBRAE:  Unremarkable.  No acute fracture.    SPINAL CORD:  Unremarkable.  Normal signal.    SOFT TISSUES:  Unremarkable.      DISCS/SPINAL CANAL/NEURAL FORAMINA:    L1-L2:   Unremarkable.  No significant disc disease.  No stenosis.    L2-L3:  Unremarkable.  No significant disc disease.  No stenosis.    L3-L4:  Unremarkable.  No significant disc disease.  No stenosis.    L4-L5:  Unremarkable.  No significant disc disease.  No stenosis.    L5-S1:  Unremarkable.  No significant disc disease.  No stenosis.       Impression:        No acute findings in the lumbar spine.     This report was finalized on 7/2/2021 12:17 PM by Dr. Bill Hawkins MD.       XR Foot 3+ View Left [[id number]] Collected: 06/20/21 1052     Updated: 06/20/21 1055    Narrative:      EXAMINATION: XR FOOT 3+ VW LEFT-      CLINICAL INDICATION: recent fall, swelling, unable to move toes;  I33.0-Acute and subacute infective endocarditis; U07.1-COVID-19;  A41.9-Sepsis, unspecified organism        COMPARISON: None available     FINDINGS: 3 views of the left foot show no fracture or dislocation.  There are no blastic or lytic lesions.       Impression:      No acute bony abnormality      This report was finalized on 6/20/2021 10:53 AM by Dr. Hakeem Morgan MD.       XR Tibia Fibula 2 View Left [[id number]] Collected: 06/18/21 1717     Updated: 06/18/21 1719    Narrative:      EXAMINATION: XR TIBIA FIBULA 2 VW LEFT-      CLINICAL INDICATION: fall from height, reports pain and inability to  move; I33.0-Acute and subacute infective endocarditis; U07.1-COVID-19;  A41.9-Sepsis, unspecified organism        COMPARISON: None available     FINDINGS: 3 views of the left tibia and fibula show no fracture or  dislocation. There are no blastic or lytic lesions.       Impression:      No acute bony abnormality      This report was finalized on 6/18/2021 5:17 PM by Dr. Hakeem Morgan MD.       XR Knee 1 or 2 View Left [[id number]] Collected: 06/18/21 1715     Updated: 06/18/21 1719    Narrative:      EXAMINATION: XR KNEE 1 OR 2 VW LEFT-      CLINICAL INDICATION: fall from height, reports pain and inability to  move; I33.0-Acute and subacute  infective endocarditis; U07.1-COVID-19;  A41.9-Sepsis, unspecified organism        COMPARISON: None available     FINDINGS: 2 views of the left knee show no fracture or dislocation     No blastic or lytic lesions.       Impression:      No acute bony abnormality      This report was finalized on 6/18/2021 5:17 PM by Dr. Hakeem Morgan MD.       XR Hip With or Without Pelvis 2 - 3 View Left [[id number]] Collected: 06/18/21 1715     Updated: 06/18/21 1717    Narrative:      EXAMINATION: XR HIP W OR WO PELVIS 2-3 VIEW LEFT-      CLINICAL INDICATION: fall from height, reports pain and inability to  move; I33.0-Acute and subacute infective endocarditis; U07.1-COVID-19;  A41.9-Sepsis, unspecified organism        COMPARISON: None available     FINDINGS: One view of the pelvis and 2 views of the left hip show no  abnormal disruption of the bony ring of the pelvis     No evidence of an acute fracture or dislocation. There are no blastic or  lytic lesions.       Impression:      No acute bony abnormality      This report was finalized on 6/18/2021 5:15 PM by Dr. Hakeem Morgan MD.       XR Forearm 2 View Left [[id number]] Collected: 06/18/21 1714     Updated: 06/18/21 1717    Narrative:      EXAMINATION: XR FOREARM 2 VW LEFT-      CLINICAL INDICATION: fall from height, reports pain and inability to  move; I33.0-Acute and subacute infective endocarditis; U07.1-COVID-19;  A41.9-Sepsis, unspecified organism        COMPARISON: None available     FINDINGS: 2 views of the left radius and ulna show no fracture or  dislocation. There are no blastic or lytic lesions.       Impression:      No acute bony abnormality      This report was finalized on 6/18/2021 5:14 PM by Dr. Hakeem Morgan MD.       XR Humerus Left [[id number]] Collected: 06/18/21 1714     Updated: 06/18/21 1716    Narrative:      EXAMINATION: XR HUMERUS LEFT-      CLINICAL INDICATION: fall from height, reports pain and inability to  move; I33.0-Acute and subacute infective  endocarditis; U07.1-COVID-19;  A41.9-Sepsis, unspecified organism        COMPARISON: None available     FINDINGS: 2 views of the left humerus show no fracture or dislocation.  There are no blastic or lytic lesions.       Impression:      No acute bony abnormality.      This report was finalized on 6/18/2021 5:14 PM by Dr. Hakeem Morgan MD.       CT Chest Pulmonary Embolism [[id number]] Collected: 06/18/21 0420     Updated: 06/18/21 0422    Narrative:      CT CHEST PULMONARY EMBOLISM W CONTRAST    INDICATION:   Patient feeling sick and dehydrated. Subjective fever and chills.    TECHNIQUE:   CT angiogram of the chest with IV contrast. 3-D reconstructions were obtained and reviewed.   Radiation dose reduction techniques included automated exposure control or exposure modulation based on body size. Count of known CT and cardiac nuc med studies  performed in previous 12 months: 0.     COMPARISON:   None available.    FINDINGS:   Exam is degraded by motion artifact and streak artifact from the patient's arms. No definite pulmonary embolism is seen. There is no aortic dissection. There is a trace amount of right pleural fluid. No pericardial effusion is seen. There is no  adenopathy. Upper abdominal images are unremarkable.    Lung window images show septal thickening in the lungs with some mild groundglass opacity compatible with volume overload. Additionally, there are multiple poorly defined nodular infiltrates on both sides of the chest concerning for septic emboli. None  of these are cavitary at this time. One of the larger is in the superior segment of the left lower lobe measuring about 1.3 cm. Imaging features are atypical or uncommonly reported for COVID-19 pneumonia. Alternative diagnoses should be considered.      Impression:        1. Technically degraded exam as above, but no definite pulmonary embolism is seen, and there is no aortic dissection.  2. Pulmonary edema with a trace amount of right pleural  fluid.  3. Poorly defined nodular infiltrates on both sides of the chest suspicious for septic emboli. Continued follow-up is recommended.    Signer Name: Gerard Elias MD   Signed: 6/18/2021 4:20 AM   Workstation Name: [facility]    CT Head Without Contrast [[id number]] Collected: 06/18/21 0404     Updated: 06/18/21 0406    Narrative:      CT Head WO    HISTORY:   Headache today.    TECHNIQUE:   Axial unenhanced head CT with multiplanar reformats. Radiation dose reduction techniques included automated exposure control or exposure modulation based on body size. Count of known CT and cardiac nuc med studies performed in previous 12 months: 0.     COMPARISON:   None.    FINDINGS:   Ventricular size and configuration are normal. No acute infarct or hemorrhage is identified. There are no masses. There is no skull fracture.      Impression:      Normal, negative unenhanced head CT.    Signer Name: Gerard Elias MD   Signed: 6/18/2021 4:04 AM   Workstation Name: [facility]    US Venous Doppler Lower Extremity Left (duplex) [[id number]] Collected: 06/18/21 0335     Updated: 06/18/21 0337    Narrative:      US Veins LE Duplex LTD     HISTORY:   Leg pain and redness with swelling.    TECHNIQUE:    Real-time ultrasound was performed of the left lower extremity utilizing spectral and color Doppler with compression and augmentation techniques.     COMPARISON:  None available.    FINDINGS:  No evidence of a left lower extremity deep vein thrombosis. The common femoral vein through the popliteal vein are widely patent. There is normal compressibility with spontaneous and phasic waveforms. No calf vein thrombus.       Impression:      No deep venous thrombus in the left lower extremity.     Signer Name: Gerard Elias MD   Signed: 6/18/2021 3:35 AM   Workstation Name: [facility]    XR Foot 2 View Left  [[id number]] Collected: 06/18/21 0216     Updated: 06/18/21 0218    Narrative:      CR Foot 2 Vws LT    INDICATION:   Foot pain and swelling. No trauma.    COMPARISON:   None available    FINDINGS:   2 views of the left foot.  No fracture or dislocation.  No bone erosion or destruction.  No foreign body.      Impression:      Negative left foot.    Signer Name: Gerard Elias MD   Signed: 6/18/2021 2:16 AM   Workstation Name: [facility]    XR Chest 1 View [[id number]] Collected: 06/18/21 0030     Updated: 06/18/21 0032    Narrative:      CR Chest 1 Vw    INDICATION:   Debility.     COMPARISON:    None available.    FINDINGS:  Single portable AP view(s) of the chest.  Cardiac and mediastinal contours are normal. There is some mild haziness at the right lung base which could reflect artifact although a mild infiltrate is not excluded. The lungs are otherwise clear. No  pneumothorax is seen.      Impression:      Mild haziness at the right base could be artifact or could reflect a mild infiltrate. Otherwise negative.    Signer Name: Gerard Elias MD   Signed: 6/18/2021 12:30 AM   Workstation Name: [facility]        Operative/Procedure Notes (most recent note)    No notes of this type exist for this encounter.            Physician Progress Notes (most recent note)      Deng Sanchez MD at 07/08/21 1332        Patient developed sinus tachycardia.  The nurse went in and she states she saw a black spot as it fell on her and she swatted at it.  Her heart rate got up to 180 but this appeared to be sinus.  By the time I saw her she was down in the 150s, I had her vagal and this dropped her heart rate into the 90s and it was definitely sinus rhythm.  Blood pressure was 150/100.  She adamantly denies that she is used any drugs, although her boyfriend was here last night she states he does not use and she offered to let me test her however she has  been truthful to this point therefore I am going to hold on this.  Heart rate is slowly trending down more, will continue to follow.  Skin was warm and dry and in fact she was on the phone talking to family member when I walked in and seemed in no distress.    Electronically signed by Deng Sanchez MD at 21 1333          Consult Notes (most recent note)      West Enriquez APRN at 21 1207     Attestation signed by Lex Barnett DO at 21 1240    I have reviewed this documentation and agree.  Lex Barnett D.O.                    Consults    Patient Identification:  Name:  Marce Gutierrez  Age:  31 y.o.  Sex:  female  :  1990  MRN:  [id number]  Visit Number:  [id number]  Primary care provider:  Provider, No Known    History of presenting illness: Pt seen today for left ankle swelling and erythema possible septic joint.  Pt states about 5 days ago she fell off roof trying to get her cat down.  She denies any problems with the ankle prior to that.  She also has documentation of IV Drug use.  Pt states that yesterday her ankle and foot was severly swollen and erythemic and painful unable to move it.  However, today she states today it is much improved the pain is a lot better, swelling is down, and erythema has got better.  She denies numbness or tingling.  ---------------------------------------------------------------------------------------------------------------------  Review of Systems     Constitutional: no fever, chills and night sweats. No appetite change or unexpected weight change. No fatigue.  Eyes: no eye drainage, itching or redness.  HEENT: no mouth sores, dysphagia or nose bleed.  Respiratory: no for shortness of breath, cough or production of sputum.  Cardiovascular: no chest pain, no palpitations, no orthopnea.  Gastrointestinal: no nausea, vomiting or diarrhea. No abdominal pain, hematemesis or rectal bleeding.  Genitourinary: no dysuria or  polyuria.  Hematologic/lymphatic: no lymph node abnormalities, no easy bruising or easy bleeding.  Musculoskeletal: Pain to the left ankle.  Skin: redness to the left ankle.  Neurological: no loss of consciousness, no seizure, no headache.  Behavioral/Psych: no depression or suicidal ideation.  Endocrine: no hot flashes.  Immunologic: negative.  ---------------------------------------------------------------------------------------------------------------------   Past History:  Family History   Family history unknown: Yes     Past Medical History:   Diagnosis Date   • IV drug abuse (CMS/[facility])    • MRSA cellulitis 2019    Left knee     Past Surgical History:   Procedure Laterality Date   • TONSILLECTOMY       Social History     Socioeconomic History   • Marital status: Single     Spouse name: Not on file   • Number of children: Not on file   • Years of education: Not on file   • Highest education level: Not on file   Tobacco Use   • Smoking status: Former Smoker     Packs/day: 0.25     Years: 10.00     Pack years: 2.50     Types: Cigarettes   • Smokeless tobacco: Never Used   Vaping Use   • Vaping Use: Every day   • Substances: Nicotine, Flavoring   • Devices: Refillable tank   Substance and Sexual Activity   • Alcohol use: Never   • Drug use: Yes     Types: Oxycodone, Opium, IV, Methamphetamines   • Sexual activity: Defer     ---------------------------------------------------------------------------------------------------------------------   Allergies:  Cinnamon  ---------------------------------------------------------------------------------------------------------------------   Prior to Admission Medications     [facility] Meds:  ceFAZolin, 2 g, Intravenous, Q8H  docusate sodium, 100 mg, Oral, BID  enoxaparin, 1 mg/kg, Subcutaneous, Daily  lactobacillus acidophilus, 1 capsule, Oral, Daily  polyethylene glycol, 17 g, Oral, Daily  sodium chloride, 10 mL, Intravenous, Q12H  sodium chloride, 3 mL,  Intravenous, Q12H         ---------------------------------------------------------------------------------------------------------------------   Vital Signs:  Temp:  [98 °F (36.7 °C)-99.6 °F (37.6 °C)] 98.7 °F (37.1 °C)  Heart Rate:  [] 106  Resp:  [14-25] 22  BP: ()/(63-84) 107/73      06/20/21  0500 06/21/21  0403 06/22/21  0403   Weight: 57.3 kg (126 lb 6.4 oz) 56.7 kg (125 lb) 55.3 kg (122 lb)     Body mass index is 19.69 kg/m².  ---------------------------------------------------------------------------------------------------------------------   Physical exam:  Constitutional:  Well-developed and well-nourished.  No respiratory distress.      HENT:  Head: Normocephalic and atraumatic.  Mouth:  Moist mucous membranes.    Eyes:  Conjunctivae and EOM are normal.  Pupils are equal, round, and reactive to light.  No scleral icterus.  Neck:  Neck supple.  No JVD present.    Cardiovascular:  Normal rate,  Pulmonary/Chest:  No respiratory distress,    Musculoskeletal:  Left ankle edema and mild pain on palpation to the anterior ankle.  Full rom of the left ankle mild discomfort with active and passive rom.    Neurological:  Alert and oriented to person, place, and time.  No cranial nerve deficit.  No tongue deviation.  No facial droop.  No slurred speech.   Skin:  Left ankle erythema but much improved from the previous day markers on the the foot and ankle.  Psychiatric:  Normal mood and affect.  Behavior is normal.  Judgment and thought content normal.   Peripheral vascular:  No edema and strong pulses on all 4 extremities.    ---------------------------------------------------------------------------------------------------------------------   .  ---------------------------------------------------------------------------------------------------------------------   Results from last 7 days   Lab Units 06/22/21  0156 06/21/21  1009 06/19/21  1028 06/18/21  0153 06/17/21  3013   CRP mg/dL 11.11* 13.79*  --    --  29.71*   LACTATE mmol/L  --   --   --  1.3 2.4*   WBC 10*3/mm3 11.94*  --  13.06*  --  16.09*   HEMOGLOBIN g/dL 9.6*  --  11.6*  --  11.7*   HEMATOCRIT % 29.0*  --  36.8  --  34.7   MCV fL 89.2  --  92.5  --  87.0   MCHC g/dL 33.1  --  31.5  --  33.7   PLATELETS 10*3/mm3 455*  --  209  --  105*   INR   --   --  1.08  --   --          Results from last 7 days   Lab Units 06/22/21  0156 06/21/21  1009 06/19/21  1028 06/18/21  1427 06/17/21  2253 06/17/21  2253   SODIUM mmol/L 132* 133* 132*  --   --  136   POTASSIUM mmol/L 4.5 4.6 5.0  5.0 3.1*   < > 2.8*   MAGNESIUM mg/dL  --   --   --  2.0  --  2.1   CHLORIDE mmol/L 104 104 106  --   --  100   CO2 mmol/L 22.4 22.9 15.1*  --   --  24.1   BUN mg/dL 16 14 18  --   --  24*   CREATININE mg/dL 0.85 0.61 0.81  --   --  1.00   EGFR IF NONAFRICN AM mL/min/1.73 79 115 83  --   --  65   CALCIUM mg/dL 7.7* 8.0* 8.0*  --   --  8.2*   GLUCOSE mg/dL 90 96 72  --   --  127*   ALBUMIN g/dL 1.82*  --  1.79*  --   --  2.37*   BILIRUBIN mg/dL 0.3  --  1.0  --   --  1.0   ALK PHOS U/L 145*  --  240*  --   --  304*   AST (SGOT) U/L 22  --  44*  --   --  37*   ALT (SGPT) U/L 39*  --  101*  --   --  179*    < > = values in this interval not displayed.   Estimated Creatinine Clearance: 84.5 mL/min (by C-G formula based on SCr of 0.85 mg/dL).  No results found for: AMMONIA  Results from last 7 days   Lab Units 06/19/21  1953 06/18/21  0054 06/17/21 2253   CK TOTAL U/L  --   --  54   TROPONIN T ng/mL <0.010 <0.010 <0.010     Results from last 7 days   Lab Units 06/17/21  2253   PROBNP pg/mL 509.8*     Lab Results   Component Value Date    HGBA1C 5.60 06/17/2021     Lab Results   Component Value Date    TSH 0.718 06/17/2021     Lab Results   Component Value Date    PREGTESTUR Negative 06/17/2021     Pain Management Panel     Pain Management Panel Latest Ref Rng & Units 6/17/2021 10/30/2019    AMPHETAMINES SCREEN, URINE Negative Positive(A) Negative    BARBITURATES SCREEN Negative  Negative Negative    BENZODIAZEPINE SCREEN, URINE Negative Negative Negative    BUPRENORPHINEUR Negative Negative Negative    COCAINE SCREEN, URINE Negative Negative Negative    METHADONE SCREEN, URINE Negative Negative Negative        Blood Culture   Date Value Ref Range Status   06/20/2021 Staphylococcus aureus (C)  Final     Comment:     Infectious disease consultation is highly recommended to rule out distant foci of infection.   06/19/2021 Staphylococcus aureus (C)  Final     Comment:     Infectious disease consultation is highly recommended to rule out distant foci of infection.   06/17/2021 Staphylococcus aureus (C)  Final     Comment:     Infectious disease consultation is highly recommended to rule out distant foci of infection.   06/17/2021 Staphylococcus aureus (C)  Final     Comment:     Infectious disease consultation is highly recommended to rule out distant foci of infection.  Refer to previous blood culture collected on 6/17/2021 2318 for MICs.     Urine Culture   Date Value Ref Range Status   06/17/2021 >100,000 CFU/mL Escherichia coli (A)  Final   06/17/2021 >100,000 CFU/mL Staphylococcus aureus (A)  Corrected     No results found for: WOUNDCX  No results found for: STOOLCX      ---------------------------------------------------------------------------------------------------------------------   Imaging Results (Last 7 Days)     Procedure Component Value Units Date/Time    XR Foot 3+ View Left [[id number]] Collected: 06/20/21 1052     Updated: 06/20/21 1055    Narrative:      EXAMINATION: XR FOOT 3+ VW LEFT-      CLINICAL INDICATION: recent fall, swelling, unable to move toes;  I33.0-Acute and subacute infective endocarditis; U07.1-COVID-19;  A41.9-Sepsis, unspecified organism        COMPARISON: None available     FINDINGS: 3 views of the left foot show no fracture or dislocation.  There are no blastic or lytic lesions.       Impression:      No acute bony abnormality      This report was finalized  on 6/20/2021 10:53 AM by Dr. Hakeem Morgan MD.       XR Tibia Fibula 2 View Left [[id number]] Collected: 06/18/21 1717     Updated: 06/18/21 1719    Narrative:      EXAMINATION: XR TIBIA FIBULA 2 VW LEFT-      CLINICAL INDICATION: fall from height, reports pain and inability to  move; I33.0-Acute and subacute infective endocarditis; U07.1-COVID-19;  A41.9-Sepsis, unspecified organism        COMPARISON: None available     FINDINGS: 3 views of the left tibia and fibula show no fracture or  dislocation. There are no blastic or lytic lesions.       Impression:      No acute bony abnormality      This report was finalized on 6/18/2021 5:17 PM by Dr. Hakeem Morgan MD.       XR Knee 1 or 2 View Left [[id number]] Collected: 06/18/21 1715     Updated: 06/18/21 1719    Narrative:      EXAMINATION: XR KNEE 1 OR 2 VW LEFT-      CLINICAL INDICATION: fall from height, reports pain and inability to  move; I33.0-Acute and subacute infective endocarditis; U07.1-COVID-19;  A41.9-Sepsis, unspecified organism        COMPARISON: None available     FINDINGS: 2 views of the left knee show no fracture or dislocation     No blastic or lytic lesions.       Impression:      No acute bony abnormality      This report was finalized on 6/18/2021 5:17 PM by Dr. Hakeem Morgan MD.       XR Hip With or Without Pelvis 2 - 3 View Left [[id number]] Collected: 06/18/21 1715     Updated: 06/18/21 1717    Narrative:      EXAMINATION: XR HIP W OR WO PELVIS 2-3 VIEW LEFT-      CLINICAL INDICATION: fall from height, reports pain and inability to  move; I33.0-Acute and subacute infective endocarditis; U07.1-COVID-19;  A41.9-Sepsis, unspecified organism        COMPARISON: None available     FINDINGS: One view of the pelvis and 2 views of the left hip show no  abnormal disruption of the bony ring of the pelvis     No evidence of an acute fracture or dislocation. There are no blastic or  lytic lesions.       Impression:      No acute bony abnormality      This  report was finalized on 6/18/2021 5:15 PM by Dr. Hakeem Morgan MD.       XR Forearm 2 View Left [[id number]] Collected: 06/18/21 1714     Updated: 06/18/21 1717    Narrative:      EXAMINATION: XR FOREARM 2 VW LEFT-      CLINICAL INDICATION: fall from height, reports pain and inability to  move; I33.0-Acute and subacute infective endocarditis; U07.1-COVID-19;  A41.9-Sepsis, unspecified organism        COMPARISON: None available     FINDINGS: 2 views of the left radius and ulna show no fracture or  dislocation. There are no blastic or lytic lesions.       Impression:      No acute bony abnormality      This report was finalized on 6/18/2021 5:14 PM by Dr. Hakeem Morgan MD.       XR Humerus Left [[id number]] Collected: 06/18/21 1714     Updated: 06/18/21 1716    Narrative:      EXAMINATION: XR HUMERUS LEFT-      CLINICAL INDICATION: fall from height, reports pain and inability to  move; I33.0-Acute and subacute infective endocarditis; U07.1-COVID-19;  A41.9-Sepsis, unspecified organism        COMPARISON: None available     FINDINGS: 2 views of the left humerus show no fracture or dislocation.  There are no blastic or lytic lesions.       Impression:      No acute bony abnormality.      This report was finalized on 6/18/2021 5:14 PM by Dr. Hakeem Morgan MD.       CT Chest Pulmonary Embolism [[id number]] Collected: 06/18/21 0420     Updated: 06/18/21 0422    Narrative:      CT CHEST PULMONARY EMBOLISM W CONTRAST    INDICATION:   Patient feeling sick and dehydrated. Subjective fever and chills.    TECHNIQUE:   CT angiogram of the chest with IV contrast. 3-D reconstructions were obtained and reviewed.   Radiation dose reduction techniques included automated exposure control or exposure modulation based on body size. Count of known CT and cardiac nuc med studies  performed in previous 12 months: 0.     COMPARISON:   None available.    FINDINGS:   Exam is degraded by motion artifact and streak artifact from the patient's arms.  No definite pulmonary embolism is seen. There is no aortic dissection. There is a trace amount of right pleural fluid. No pericardial effusion is seen. There is no  adenopathy. Upper abdominal images are unremarkable.    Lung window images show septal thickening in the lungs with some mild groundglass opacity compatible with volume overload. Additionally, there are multiple poorly defined nodular infiltrates on both sides of the chest concerning for septic emboli. None  of these are cavitary at this time. One of the larger is in the superior segment of the left lower lobe measuring about 1.3 cm. Imaging features are atypical or uncommonly reported for COVID-19 pneumonia. Alternative diagnoses should be considered.      Impression:        1. Technically degraded exam as above, but no definite pulmonary embolism is seen, and there is no aortic dissection.  2. Pulmonary edema with a trace amount of right pleural fluid.  3. Poorly defined nodular infiltrates on both sides of the chest suspicious for septic emboli. Continued follow-up is recommended.    Signer Name: Gerard Elias MD   Signed: 6/18/2021 4:20 AM   Workstation Name: [email or web address]    [facility]    CT Head Without Contrast [[id number]] Collected: 06/18/21 0404     Updated: 06/18/21 0406    Narrative:      CT Head WO    HISTORY:   Headache today.    TECHNIQUE:   Axial unenhanced head CT with multiplanar reformats. Radiation dose reduction techniques included automated exposure control or exposure modulation based on body size. Count of known CT and cardiac nuc med studies performed in previous 12 months: 0.     COMPARISON:   None.    FINDINGS:   Ventricular size and configuration are normal. No acute infarct or hemorrhage is identified. There are no masses. There is no skull fracture.      Impression:      Normal, negative unenhanced head CT.    Signer Name: Gerard Elias MD   Signed: 6/18/2021 4:04 AM   Workstation Name: [email or web address]     [facility] [facility]    US Venous Doppler Lower Extremity Left (duplex) [[id number]] Collected: 06/18/21 0335     Updated: 06/18/21 0337    Narrative:      US Veins LE Duplex LTD LT    HISTORY:   Leg pain and redness with swelling.    TECHNIQUE:    Real-time ultrasound was performed of the left lower extremity utilizing spectral and color Doppler with compression and augmentation techniques.     COMPARISON:  None available.    FINDINGS:  No evidence of a left lower extremity deep vein thrombosis. The common femoral vein through the popliteal vein are widely patent. There is normal compressibility with spontaneous and phasic waveforms. No calf vein thrombus.       Impression:      No deep venous thrombus in the left lower extremity.     Signer Name: Gerard Elias MD   Signed: 6/18/2021 3:35 AM   Workstation Name: [facility]    Radiology [facility]    XR Foot 2 View Left [[id number]] Collected: 06/18/21 0216     Updated: 06/18/21 0218    Narrative:      CR Foot 2 Vws LT    INDICATION:   Foot pain and swelling. No trauma.    COMPARISON:   None available    FINDINGS:   2 views of the left foot.  No fracture or dislocation.  No bone erosion or destruction.  No foreign body.      Impression:      Negative left foot.    Signer Name: Gerard Elias MD   Signed: 6/18/2021 2:16 AM   Workstation Name: [facility]    Radiology [facility]    XR Chest 1 View [[id number]] Collected: 06/18/21 0030     Updated: 06/18/21 0032    Narrative:      CR Chest 1 Vw    INDICATION:   Debility.     COMPARISON:    None available.    FINDINGS:  Single portable AP view(s) of the chest.  Cardiac and mediastinal contours are normal. There is some mild haziness at the right lung base which could reflect artifact although a mild infiltrate is not excluded. The lungs are otherwise clear. No  pneumothorax is seen.      Impression:      Mild haziness at the right base could be artifact or could reflect a mild  infiltrate. Otherwise negative.    Signer Name: Gerard Elias MD   Signed: 6/18/2021 12:30 AM   Workstation Name: [id number]    [facility]        ----------------------------------------------------------------------------------------------------------------------  Assessment and Plan: Left ankle sprain and cellulitis - Pt was explained no fractures are noted on x-rays and with the significant improve of swelling and erythema overnight no surgical intervention is needed.  Pt is to continue current treatment. Would recommend the use of walking boot during mobilization for next 2 weeks  and wt bear as tolerated. May remove boot while not walking.    Thank you for the consult.    ANGELICA Kearns  06/22/21  12:07 EDT    Electronically signed by Lex Mclean DO at 06/27/21 1240       ADL Documentation (most recent)      Most Recent Value   Transferring  0 - independent   Toileting  0 - independent   Bathing  0 - independent   Dressing  0 - independent   Eating  0 - independent   Communication  0 - understands/communicates without difficulty   Swallowing  0 - swallows foods/liquids without difficulty   Equipment Currently Used at Home  none

## 2021-07-08 NOTE — CASE MANAGEMENT/SOCIAL WORK
Discharge Planning Assessment   [place]     Patient Name: Marce Gutierrez  MRN: [id number]  Today's Date: 7/8/2021    Admit Date: 6/17/2021    Discharge Plan     Row Name 07/08/21 1504       Plan    Plan SS informed pt that insurance denied swing bed. SS also discussed substance abuse rehab facilties with pt. Pt requested [facility] in [place], facility would not be able to accomodate IV abx. SS spoke with Shanthi at [facility] [phone] who states they would be able to accomodate IV abx at their [place] facility and that the facility did have female beds available. SS spoke with pt who was agreeable to [facility] in [place] if she had assistance with transportation to get there. SS faxed pt information to [facility] [phone] for review. SS to assist with transportation. SS following.    Patient/Family in Agreement with Plan  yes        NADIYA Camp

## 2021-07-08 NOTE — PROGRESS NOTES
LOS: 20 days     Name: Marce Gutierrez  Age/Sex: 31 y.o. female  :  1990        PCP: Provider, No Known    Principal Problem:    Endocarditis      Admission Information: Marce Gutierrez is a 31 year old female who presented to [facility] on 2021 with pain in her left ankle. She had fallen previously and landed on her left side. She was evaluated here in the ED on 21 and [facility] ED on 21 and treated for hip pain. Upon return to our ED she was diagnosed with severe sepsis. WBC was 16.09. Chest x-ray revealed bibasilar pneumonia. CT chest with PE protocol revealed septic emboli. Upon further questioning the patient reported history of IV drug use with last use 2 days ago. Incidentally COVID-19 screen was positive. Transthoracic echocardiogram yesterday revealed EF 66-70% with grade I diastolic dysfunction and no vegitations seen. Cardiology has been consulted for consideration of KATHRIN. Upon evaluation today, via iPad due to Covid-19, she denies any chest pain or shortness of breath.     Following for: bacteremia eval for KATHRIN    Telemetry Monitoring: sinus in the 80s    Interval history:     Subjective     ROS    Vital Signs  Vitals:    21 1830 21 0315 21 0500 21 0613   BP: 141/96 164/82  111/63   BP Location: Left arm Left arm  Left arm   Patient Position: Sitting Lying  Lying   Pulse: 106 74  69   Resp: 18 18  16   Temp: 98.4 °F (36.9 °C) 98.2 °F (36.8 °C)  97.3 °F (36.3 °C)   TempSrc: Oral Oral  Oral   SpO2: 99% 99%     Weight:   57 kg (125 lb 9.6 oz)    Height:         Body mass index is 20.27 kg/m².      Intake/Output Summary (Last 24 hours) at 2021 0924  Last data filed at 2021 0900  Gross per 24 hour   Intake 1440 ml   Output --   Net 1440 ml       Physical Exam    Results Review:     Results from last 7 days   Lab Units 21  0114 21  0036 21  0100 21  0408 21  0109   WBC 10*3/mm3 10.77 11.09* 11.25* 11.49* 7.97    HEMOGLOBIN g/dL 9.4* 9.2* 9.2* 9.6* 8.9*   PLATELETS 10*3/mm3 438 526* 518* 600* 619*     Results from last 7 days   Lab Units 07/08/21  0114 07/06/21  0036 07/05/21  0100 07/03/21  0408 07/02/21  0109   SODIUM mmol/L 138 139 138 137 138   POTASSIUM mmol/L 4.0 3.8 3.5 4.2 3.9   CHLORIDE mmol/L 103 104 104 103 105   CO2 mmol/L 26.6 24.5 26.0 25.1 25.4   BUN mg/dL 15 18 14 15 15   CREATININE mg/dL 0.59 0.72 0.66 0.72 0.56*   CALCIUM mg/dL 8.5* 8.8 8.6 8.5* 8.5*   GLUCOSE mg/dL 96 81 92 96 96   ALT (SGPT) U/L  --  11  --  11 13   AST (SGOT) U/L  --  19  --  17 20                 I reviewed the patient's new clinical results.  I reviewed the patient's new imaging results and agree with the interpretation.  I personally viewed and interpreted the patient's EKG/Telemetry data    Medication Review:   ceFAZolin, 2 g, Intravenous, Q8H  docusate sodium, 100 mg, Oral, BID  enoxaparin, 40 mg, Subcutaneous, Daily  folic acid, 1 mg, Oral, Daily  iron polysaccharides, 150 mg, Oral, Daily  lactobacillus acidophilus, 1 capsule, Oral, Daily  multivitamin with minerals, 1 tablet, Oral, Daily  polyethylene glycol, 17 g, Oral, Daily  sodium chloride, 10 mL, Intravenous, Q12H  sodium chloride, 3 mL, Intravenous, Q12H           Assessment:  1.       Recommendations:  1.     I discussed the patients findings and my recommendations with patient and family      signed by Haseeb Rabago PA-C, 07/08/21, 9:24 AM EDT.

## 2021-07-08 NOTE — PLAN OF CARE
Goal Outcome Evaluation:  Pt resting in room. HR got up to 180s this shift after she reported seeing a big hairy spider fall from the curtain. MD made aware, went to see pt. HR now improved. No complaints voiced. Pt instructed not to have anything to eat or drink after midnight for KATHRIN in AM. Consent signed and in chart. Will cont to follow plan of care.

## 2021-07-08 NOTE — PROGRESS NOTES
Patient without new complaints, she states her knee is doing well., exam unchanged, seen with Jerel ARELLANO.  111/63, room air saturations good, temperature 97.3 pulse 69 respiratory rate is 16.  Mood remains good.  Reportedly the patient has been turned down for [facility].  I have placed a swing bed consult.  Continue Lovenox for DVT prophylaxis.  She continues on Ancef for her bacteremia, CRP has normalized.  Discussed with cardiology possible KATHRIN tomorrow.  Anemia stable.  Again she will need outpatient follow-up for hepatitis C.

## 2021-07-09 ENCOUNTER — ANESTHESIA EVENT (OUTPATIENT)
Dept: CARDIOLOGY | Facility: HOSPITAL | Age: 31
End: 2021-07-09

## 2021-07-09 ENCOUNTER — APPOINTMENT (OUTPATIENT)
Dept: CARDIOLOGY | Facility: HOSPITAL | Age: 31
End: 2021-07-09

## 2021-07-09 ENCOUNTER — ANESTHESIA (OUTPATIENT)
Dept: CARDIOLOGY | Facility: HOSPITAL | Age: 31
End: 2021-07-09

## 2021-07-09 LAB
BH CV ECHO MEAS - BSA(HAYCOCK): 1.6 M^2
BH CV ECHO MEAS - BSA: 1.6 M^2
BH CV ECHO MEAS - BZI_BMI: 19.9 KILOGRAMS/M^2
BH CV ECHO MEAS - BZI_METRIC_HEIGHT: 167.6 CM
BH CV ECHO MEAS - BZI_METRIC_WEIGHT: 55.8 KG
CRP SERPL-MCNC: <0.3 MG/DL (ref 0–0.5)
MAXIMAL PREDICTED HEART RATE: 190 BPM
QT INTERVAL: 336 MS
QT INTERVAL: 336 MS
QTC INTERVAL: 440 MS
QTC INTERVAL: 444 MS
STRESS TARGET HR: 162 BPM
T4 FREE SERPL-MCNC: 1.16 NG/DL (ref 0.93–1.7)

## 2021-07-09 PROCEDURE — 93312 ECHO TRANSESOPHAGEAL: CPT

## 2021-07-09 PROCEDURE — 93325 DOPPLER ECHO COLOR FLOW MAPG: CPT

## 2021-07-09 PROCEDURE — 25010000002 ENOXAPARIN PER 10 MG: Performed by: INTERNAL MEDICINE

## 2021-07-09 PROCEDURE — 25010000003 CEFAZOLIN SODIUM-DEXTROSE 2-3 GM-%(50ML) RECONSTITUTED SOLUTION: Performed by: INTERNAL MEDICINE

## 2021-07-09 PROCEDURE — 25010000003 MAGNESIUM SULFATE 4 GM/100ML SOLUTION: Performed by: PHYSICIAN ASSISTANT

## 2021-07-09 PROCEDURE — 25010000002 PROPOFOL 10 MG/ML EMULSION: Performed by: NURSE ANESTHETIST, CERTIFIED REGISTERED

## 2021-07-09 PROCEDURE — 86140 C-REACTIVE PROTEIN: CPT | Performed by: INTERNAL MEDICINE

## 2021-07-09 PROCEDURE — 93312 ECHO TRANSESOPHAGEAL: CPT | Performed by: INTERNAL MEDICINE

## 2021-07-09 PROCEDURE — 93325 DOPPLER ECHO COLOR FLOW MAPG: CPT | Performed by: INTERNAL MEDICINE

## 2021-07-09 PROCEDURE — 99231 SBSQ HOSP IP/OBS SF/LOW 25: CPT | Performed by: INTERNAL MEDICINE

## 2021-07-09 PROCEDURE — 94799 UNLISTED PULMONARY SVC/PX: CPT

## 2021-07-09 PROCEDURE — B24BZZ4 ULTRASONOGRAPHY OF HEART WITH AORTA, TRANSESOPHAGEAL: ICD-10-PCS | Performed by: INTERNAL MEDICINE

## 2021-07-09 PROCEDURE — 84439 ASSAY OF FREE THYROXINE: CPT | Performed by: PHYSICIAN ASSISTANT

## 2021-07-09 RX ORDER — LIDOCAINE HYDROCHLORIDE 20 MG/ML
INJECTION, SOLUTION INFILTRATION; PERINEURAL AS NEEDED
Status: DISCONTINUED | OUTPATIENT
Start: 2021-07-09 | End: 2021-07-09 | Stop reason: SURG

## 2021-07-09 RX ORDER — MAGNESIUM SULFATE HEPTAHYDRATE 40 MG/ML
4 INJECTION, SOLUTION INTRAVENOUS AS NEEDED
Status: DISCONTINUED | OUTPATIENT
Start: 2021-07-09 | End: 2021-07-23 | Stop reason: HOSPADM

## 2021-07-09 RX ORDER — MAGNESIUM SULFATE HEPTAHYDRATE 40 MG/ML
2 INJECTION, SOLUTION INTRAVENOUS AS NEEDED
Status: DISCONTINUED | OUTPATIENT
Start: 2021-07-09 | End: 2021-07-23 | Stop reason: HOSPADM

## 2021-07-09 RX ORDER — SODIUM CHLORIDE 9 MG/ML
INJECTION, SOLUTION INTRAVENOUS CONTINUOUS PRN
Status: DISCONTINUED | OUTPATIENT
Start: 2021-07-09 | End: 2021-07-09 | Stop reason: SURG

## 2021-07-09 RX ORDER — MAGNESIUM SULFATE HEPTAHYDRATE 40 MG/ML
4 INJECTION, SOLUTION INTRAVENOUS ONCE
Status: COMPLETED | OUTPATIENT
Start: 2021-07-09 | End: 2021-07-09

## 2021-07-09 RX ADMIN — FOLIC ACID 1 MG: 1 TABLET ORAL at 08:12

## 2021-07-09 RX ADMIN — HYDROCODONE BITARTRATE AND ACETAMINOPHEN 1 TABLET: 7.5; 325 TABLET ORAL at 21:12

## 2021-07-09 RX ADMIN — LIDOCAINE HYDROCHLORIDE 60 MG: 20 INJECTION, SOLUTION INFILTRATION; PERINEURAL at 10:45

## 2021-07-09 RX ADMIN — DOCUSATE SODIUM 100 MG: 100 CAPSULE, LIQUID FILLED ORAL at 08:12

## 2021-07-09 RX ADMIN — Medication 1 CAPSULE: at 08:12

## 2021-07-09 RX ADMIN — CEFAZOLIN SODIUM 2 G: 2 SOLUTION INTRAVENOUS at 13:20

## 2021-07-09 RX ADMIN — CEFAZOLIN SODIUM 2 G: 2 SOLUTION INTRAVENOUS at 04:47

## 2021-07-09 RX ADMIN — SODIUM CHLORIDE, PRESERVATIVE FREE 10 ML: 5 INJECTION INTRAVENOUS at 20:19

## 2021-07-09 RX ADMIN — POLYETHYLENE GLYCOL (3350) 17 G: 17 POWDER, FOR SOLUTION ORAL at 08:13

## 2021-07-09 RX ADMIN — PROPOFOL 160 MCG/KG/MIN: 10 INJECTION, EMULSION INTRAVENOUS at 10:45

## 2021-07-09 RX ADMIN — SODIUM CHLORIDE, PRESERVATIVE FREE 3 ML: 5 INJECTION INTRAVENOUS at 08:14

## 2021-07-09 RX ADMIN — SODIUM CHLORIDE, PRESERVATIVE FREE 10 ML: 5 INJECTION INTRAVENOUS at 08:14

## 2021-07-09 RX ADMIN — Medication 150 MG: at 08:12

## 2021-07-09 RX ADMIN — SODIUM CHLORIDE: 0.9 INJECTION, SOLUTION INTRAVENOUS at 10:33

## 2021-07-09 RX ADMIN — ENOXAPARIN SODIUM 40 MG: 40 INJECTION SUBCUTANEOUS at 08:11

## 2021-07-09 RX ADMIN — Medication 1 TABLET: at 08:12

## 2021-07-09 RX ADMIN — MAGNESIUM SULFATE HEPTAHYDRATE 4 G: 40 INJECTION, SOLUTION INTRAVENOUS at 08:11

## 2021-07-09 RX ADMIN — SODIUM CHLORIDE, PRESERVATIVE FREE 3 ML: 5 INJECTION INTRAVENOUS at 20:19

## 2021-07-09 RX ADMIN — CEFAZOLIN SODIUM 2 G: 2 SOLUTION INTRAVENOUS at 20:18

## 2021-07-09 NOTE — ANESTHESIA POSTPROCEDURE EVALUATION
Patient: Marce Gutierrez    Procedure Summary     Date: 07/09/21 Room / Location: [facility] NONINVASIVE LAB    Anesthesia Start: 1033 Anesthesia Stop: 1055    Procedure: ADULT TRANSESOPHAGEAL ECHO (KATHRIN) W/ CONT IF NECESSARY PER PROTOCOL Diagnosis: (Endocarditis)    Scheduled Providers: Not recorded Provider: Elan Healy DO    Anesthesia Type: general ASA Status: 2          Anesthesia Type: general    Vitals  Vitals Value Taken Time   /98 07/09/21 1425   Temp 98.8 °F (37.1 °C) 07/09/21 1425   Pulse 124 07/09/21 1425   Resp 16 07/09/21 1425   SpO2 95 % 07/09/21 1425           Post Anesthesia Care and Evaluation    Patient location during evaluation: bedside  Patient participation: complete - patient participated  Level of consciousness: awake and alert  Pain score: 0  Pain management: adequate  Airway patency: patent  Anesthetic complications: No anesthetic complications  PONV Status: controlled  Cardiovascular status: acceptable  Respiratory status: acceptable and nasal cannula  Hydration status: euvolemic  No anesthesia care post op

## 2021-07-09 NOTE — DISCHARGE PLACEMENT REQUEST
"Tran Santiago (31 y.o. Female)     Date of Birth Social Security Number Address Home Phone MRN    1990  [address] [address]  [id number]    Holiness Marital Status          Non-Quaker Single       Admission Date Admission Type Admitting Provider Attending Provider Department, Room/Bed    21 Emergency Nicci Valdez MD Heinss, Karl F, MD [facility] 3 [facility], 3314/2S    Discharge Date Discharge Disposition Discharge Destination                       Attending Provider: Deng Sanchez MD    Allergies: Cinnamon    Isolation: None   Infection: COVID (History) (21)   Code Status: CPR    Ht: 167.6 cm (66\")   Wt: 55.8 kg (123 lb)    Admission Cmt: None   Principal Problem: Endocarditis [I38]                 Active Insurance as of 2021     Primary Coverage     Payor Plan Insurance Group Employer/Plan Group    [facility]      Payor Plan Address Payor Plan Phone Number Payor Plan Fax Number Effective Dates    [address] [address]   2019 - None Entered    [place] [address]       Subscriber Name Subscriber Birth Date Member ID       TRAN SANTIAGO 1990 [id number]                 Emergency Contacts      (Rel.) Home Phone Work Phone Mobile Phone    GILDARDO LEIVA (Father) -- -- [phone]               History & Physical      Nicci Valdez MD at 21 0534              [facility] HOSPITALIST HISTORY AND PHYSICAL    Patient Identification:  Name:  Tran Santiago  Age:  31 y.o.  Sex:  female  :  1990  MRN:  [id number]   Visit Number:  [id number]  Admit Date: 2021   Room number:  P220/1P  Primary Care Physician:  Provider, No Known    Date of Admission: 2021     Subjective     Chief complaint:    Chief Complaint   Patient presents with   • Leg Pain   • Arm Pain     History of presenting illness:  31 y.o. female who was admitted on 2021 from the ED with fatigue and a painful " left ankle.  The patient states that she was trying to rescue her From a roof top and she fell and landed on her entire left side.  She came to our emergency department on 6/11/2021; however, the patient denied jumping from any heights at that time.  She was diagnosed with sciatica and discharged home with some Norco.  She then went to [facility] emergency department on 6/12/2021; she states that she received a shot in her left hip for sciatica.  The patient continued to hurt and then she noticed left ankle swelling and redness; she decided to come to the emergency department to be evaluated.  The patient was found to have severe sepsis with chest x-ray showing right basilar pneumonia.  Her COVID-19 screening was positive; the patient denies any coughing, loss of taste or smell, or trouble breathing.  Because the patient is a known IV drug user, we did perform a CT scan with PE protocol and septic emboli were seen.  Thus, we are admitting the patient to the progressive care unit.  Currently, the patient is not requiring oxygen.  The last time that she injected drugs was 2 days ago and she injected them into her right AC area.  She denies injecting drugs into her feet and ankles.  She denies any recent methamphetamine use and states that she injected pain medication.  The patient does not know how she has a positive amphetamine drug screen during this admission.  ---------------------------------------------------------------------------------------------------------------------   Review of Systems   Constitutional: Positive for chills and fever.   HENT: Negative for congestion and rhinorrhea.    Eyes: Negative for discharge and redness.   Respiratory: Positive for chest tightness. Negative for cough and shortness of breath.    Cardiovascular: Negative for chest pain and leg swelling.   Gastrointestinal: Negative for diarrhea, nausea and vomiting.   Genitourinary: Positive for difficulty urinating. Negative  for dysuria, flank pain and hematuria.   Musculoskeletal: Positive for joint swelling (Left ankle). Negative for arthralgias.   Skin: Positive for color change and wound.   Neurological: Positive for syncope and numbness. Negative for seizures, facial asymmetry, speech difficulty and headaches.     ---------------------------------------------------------------------------------------------------------------------   Past Medical History:   Diagnosis Date   • IV drug abuse (CMS/[facility])    • MRSA cellulitis 2019    Left knee     Past Surgical History:   Procedure Laterality Date   • TONSILLECTOMY       Family History   Family history unknown: Yes     Social History     Socioeconomic History   • Marital status: Single   Tobacco Use   • Smoking status: Former Smoker     Packs/day: 0.25     Years: 10.00     Pack years: 2.50     Types: Cigarettes   • Smokeless tobacco: Never Used   Vaping Use   • Vaping Use: Every day   • Substances: Nicotine, Flavoring   • Devices: Coiney tank   Substance and Sexual Activity   • Alcohol use: Never   • Drug use: Yes     Types: Oxycodone, Opium, IV, Methamphetamines   • Sexual activity: Defer     ---------------------------------------------------------------------------------------------------------------------   Allergies:  Cinnamon  ---------------------------------------------------------------------------------------------------------------------   Medications below are reported home medications pulling from within the system; at this time, these medications have not been reconciled unless otherwise specified and are in the verification process for further verifcation as current home medications.      Prior to Admission Medications     None        Objective     Vital Signs:  Temp:  [97.7 °F (36.5 °C)-99.4 °F (37.4 °C)] 99.4 °F (37.4 °C)  Heart Rate:  [] 124  Resp:  [15-20] 15  BP: ()/(52-79) 99/53    Mean Arterial Pressure (Non-Invasive) for the past 24 hrs (Last 3  readings):   Noninvasive MAP (mmHg)   06/18/21 0753 68   06/18/21 0602 86   06/18/21 0531 82     SpO2:  [78 %-100 %] 99 %  on   ;   Device (Oxygen Therapy): room air  Body mass index is 20.82 kg/m².    Wt Readings from Last 3 Encounters:   06/18/21 58.5 kg (129 lb)   10/30/19 63.5 kg (140 lb)      ----------------------------------------------------------------------------------------------------------------------  Physical Exam  Vitals reviewed.   Constitutional:       Appearance: She is well-developed and normal weight. She is not ill-appearing.   HENT:      Head: Normocephalic and atraumatic.      Right Ear: External ear normal.      Left Ear: External ear normal.      Nose: Nose normal.   Eyes:      General: No scleral icterus.        Right eye: No discharge.         Left eye: No discharge.      Pupils: Pupils are equal, round, and reactive to light.   Cardiovascular:      Rate and Rhythm: Normal rate and regular rhythm.      Pulses: Normal pulses.      Heart sounds: No murmur heard.     Pulmonary:      Effort: Pulmonary effort is normal. No respiratory distress.      Breath sounds: Normal breath sounds. No wheezing or rales.   Abdominal:      General: Abdomen is flat. There is no distension.      Palpations: Abdomen is soft.      Tenderness: There is no abdominal tenderness.   Musculoskeletal:         General: No deformity.      Comments: Left lateral ankle is swollen, erythematous, and warm to touch.  There are no skin lesions around this area and there is no drainage.   Skin:     Capillary Refill: Capillary refill takes less than 2 seconds.      Coloration: Skin is not jaundiced or pale.      Comments: See the left ankle exam in the musculoskeletal part of the physical examination.   Neurological:      General: No focal deficit present.      Mental Status: She is alert and oriented to person, place, and time. Mental status is at baseline.      Cranial Nerves: No cranial nerve deficit.   Psychiatric:          Mood and Affect: Mood normal.         Behavior: Behavior normal. Behavior is cooperative.         Thought Content: Thought content normal.         Judgment: Judgment normal.       ---------------------------------------------------------------------------------------------------------------------  EKG: Sinus tachycardia with a heart rate of 108 and a QTC of 442 ms.  There are no ischemic changes.  Please note that there is not a comparison EKG in our computer system.  Please note that I have personally looked at the EKG from this admission and the above is my interpretation of this admission's EKG; I await the final cardiology read.    Telemetry:  Sinus tachycardia with heart rates of 105-110.  Please note that I personally looked at the telemetry strips.    Last echocardiogram: Transthoracic echocardiogram has been ordered  --------------------------------------------------------------------------------------------------------------------  LABS:    CBC and coagulation:  Results from last 7 days   Lab Units 06/18/21  0153 06/17/21  2253   LACTATE mmol/L 1.3 2.4*   CRP mg/dL  --  29.71*   WBC 10*3/mm3  --  16.09*   HEMOGLOBIN g/dL  --  11.7*   HEMATOCRIT %  --  34.7   MCV fL  --  87.0   MCHC g/dL  --  33.7   PLATELETS 10*3/mm3  --  105*   D DIMER QUANT MCGFEU/mL 7.66*  --      Renal and electrolytes:  Results from last 7 days   Lab Units 06/17/21  2253   SODIUM mmol/L 136   POTASSIUM mmol/L 2.8*   MAGNESIUM mg/dL 2.1   CHLORIDE mmol/L 100   CO2 mmol/L 24.1   BUN mg/dL 24*   CREATININE mg/dL 1.00   EGFR IF NONAFRICN AM mL/min/1.73 65   CALCIUM mg/dL 8.2*   GLUCOSE mg/dL 127*     Estimated Creatinine Clearance: 76 mL/min (by C-G formula based on SCr of 1 mg/dL).    Liver and pancreatic function:  Results from last 7 days   Lab Units 06/17/21  2253   ALBUMIN g/dL 2.37*   BILIRUBIN mg/dL 1.0   ALK PHOS U/L 304*   AST (SGOT) U/L 37*   ALT (SGPT) U/L 179*     Endocrine function:  Lab Results   Component Value Date     HGBA1C 5.60 06/17/2021       Lab Results   Component Value Date    TSH 0.718 06/17/2021     Cardiac:  Results from last 7 days   Lab Units 06/18/21  0054 06/17/21  2253   CK TOTAL U/L  --  54   TROPONIN T ng/mL <0.010 <0.010   PROBNP pg/mL  --  509.8*       Cultures:  Lab Results   Component Value Date    COLORU Dark Yellow (A) 06/17/2021    CLARITYU Cloudy (A) 06/17/2021    PHUR 6.0 06/17/2021    GLUCOSEU Negative 06/17/2021    KETONESU Negative 06/17/2021    BLOODU Large (3+) (A) 06/17/2021    NITRITEU Positive (A) 06/17/2021    LEUKOCYTESUR Moderate (2+) (A) 06/17/2021    BILIRUBINUR Negative 06/17/2021    UROBILINOGEN 1.0 E.U./dL 06/17/2021    RBCUA Too Numerous to Count (A) 06/17/2021    WBCUA Too Numerous to Count (A) 06/17/2021    BACTERIA 4+ (A) 06/17/2021     Microbiology Results (last 10 days)     Procedure Component Value - Date/Time    COVID-19 and FLU A/B PCR - Swab, Nasopharynx [[id number]]  (Abnormal) Collected: 06/18/21 0049    Lab Status: Final result Specimen: Swab from Nasopharynx Updated: 06/18/21 0119     COVID19 Detected     Influenza A PCR Not Detected     Influenza B PCR Not Detected    Narrative:      Fact sheet for providers: https://www.fda.gov/media/918911/download    Fact sheet for patients: https://www.fda.gov/media/269603/download    Test performed by PCR.  Influenza A and Influenza B negative results should be considered presumptive in samples that have a positive SARS-CoV-2 result.    Competitive inhibition studies showed that SARS-CoV-2 virus, when present at concentrations above 3.6E+04 copies/mL, can inhibit the detection and amplification of influenza A and influenza B virus RNA if present at or below 1.8E+02 copies/mL or 4.9E+02 copies/mL, respectively, and may lead to false negative influenza virus results. If co-infection with influenza A or influenza B virus is suspected in samples with a positive SARS-CoV-2 result, the sample should be re-tested with another FDA cleared,  approved, or authorized influenza test, if influenza virus detection would change clinical management.        Lab Results   Component Value Date    PREGTESTUR Negative 06/17/2021         I have personally looked at the labs and they are summarized above.  ----------------------------------------------------------------------------------------------------------------------  Detailed radiology reports for the last 24 hours:    Imaging Results (Last 24 Hours)     Procedure Component Value Units Date/Time    CT Chest Pulmonary Embolism [[id number]] Collected: 06/18/21 0420     Updated: 06/18/21 0422    Narrative:      CT CHEST PULMONARY EMBOLISM W CONTRAST    INDICATION:   Patient feeling sick and dehydrated. Subjective fever and chills.    TECHNIQUE:   CT angiogram of the chest with IV contrast. 3-D reconstructions were obtained and reviewed.   Radiation dose reduction techniques included automated exposure control or exposure modulation based on body size. Count of known CT and cardiac nuc med studies  performed in previous 12 months: 0.     COMPARISON:   None available.    FINDINGS:   Exam is degraded by motion artifact and streak artifact from the patient's arms. No definite pulmonary embolism is seen. There is no aortic dissection. There is a trace amount of right pleural fluid. No pericardial effusion is seen. There is no  adenopathy. Upper abdominal images are unremarkable.    Lung window images show septal thickening in the lungs with some mild groundglass opacity compatible with volume overload. Additionally, there are multiple poorly defined nodular infiltrates on both sides of the chest concerning for septic emboli. None  of these are cavitary at this time. One of the larger is in the superior segment of the left lower lobe measuring about 1.3 cm. Imaging features are atypical or uncommonly reported for COVID-19 pneumonia. Alternative diagnoses should be considered.      Impression:        1. Technically  degraded exam as above, but no definite pulmonary embolism is seen, and there is no aortic dissection.  2. Pulmonary edema with a trace amount of right pleural fluid.  3. Poorly defined nodular infiltrates on both sides of the chest suspicious for septic emboli. Continued follow-up is recommended.    Signer Name: Gerard Elias MD   Signed: 6/18/2021 4:20 AM   Workstation Name: [facility]    CT Head Without Contrast [[id number]] Collected: 06/18/21 0404     Updated: 06/18/21 0406    Narrative:      CT Head WO    HISTORY:   Headache today.    TECHNIQUE:   Axial unenhanced head CT with multiplanar reformats. Radiation dose reduction techniques included automated exposure control or exposure modulation based on body size. Count of known CT and cardiac nuc med studies performed in previous 12 months: 0.     COMPARISON:   None.    FINDINGS:   Ventricular size and configuration are normal. No acute infarct or hemorrhage is identified. There are no masses. There is no skull fracture.      Impression:      Normal, negative unenhanced head CT.    Signer Name: Gerard Elias MD   Signed: 6/18/2021 4:04 AM   Workstation Name: [facility]    US Venous Doppler Lower Extremity Left (duplex) [[id number]] Collected: 06/18/21 0335     Updated: 06/18/21 0337    Narrative:      US Veins LE Duplex LTD LT    HISTORY:   Leg pain and redness with swelling.    TECHNIQUE:    Real-time ultrasound was performed of the left lower extremity utilizing spectral and color Doppler with compression and augmentation techniques.     COMPARISON:  None available.    FINDINGS:  No evidence of a left lower extremity deep vein thrombosis. The common femoral vein through the popliteal vein are widely patent. There is normal compressibility with spontaneous and phasic waveforms. No calf vein thrombus.       Impression:      No deep venous thrombus in the left lower extremity.      Signer Name: Gerard Elias MD   Signed: 6/18/2021 3:35 AM   Workstation Name: [facility]    [facility]    XR Foot 2 View Left [[id number]] Collected: 06/18/21 0216     Updated: 06/18/21 0218    Narrative:      CR Foot 2 Vws LT    INDICATION:   Foot pain and swelling. No trauma.    COMPARISON:   None available    FINDINGS:   2 views of the left foot.  No fracture or dislocation.  No bone erosion or destruction.  No foreign body.      Impression:      Negative left foot.    Signer Name: Gerard Elias MD   Signed: 6/18/2021 2:16 AM   Workstation Name: [facility]    Radiology [facility]    XR Chest 1 View [[id number]] Collected: 06/18/21 0030     Updated: 06/18/21 0032    Narrative:      CR Chest 1 Vw    INDICATION:   Debility.     COMPARISON:    None available.    FINDINGS:  Single portable AP view(s) of the chest.  Cardiac and mediastinal contours are normal. There is some mild haziness at the right lung base which could reflect artifact although a mild infiltrate is not excluded. The lungs are otherwise clear. No  pneumothorax is seen.      Impression:      Mild haziness at the right base could be artifact or could reflect a mild infiltrate. Otherwise negative.    Signer Name: Gerard Elias MD   Signed: 6/18/2021 12:30 AM   Workstation Name: [facility]    Radiology [facility]        I have personally looked at the radiology images and I have read the available final reports.    Assessment & Plan       -Severe sepsis that was present admission (lactic acid 2.4, white blood cell count 16,090, CRP 29.71, heart rate 135) due to suspected endocarditis causing septic emboli in the lungs versus community-acquired pneumonia versus aspiration pneumonia  -Left lateral ankle cellulitis  -Urinanalysis suspicious for an acute urinary tract infection  -COVID-19 positive on screening test in the emergency department, currently without any acute hypoxia  -D-dimer  7.66  -Elevated liver enzymes, viral induced from COVID-19 versus a different hepatic virus  -Acute hypokalemia  -Normocytic anemia and thrombocytopenia, suspect bone marrow suppression from COVID-19  -History of IV drug abuse  -History of MRSA skin infection of her left knee    Admitted to the progressive care unit.  Blood cultures were obtained.  We will start her on empiric Zosyn and vancomycin per the pneumonia sepsis protocol.  I have ordered a sputum culture, Mycoplasma testing, and Streptococcal pneumoniae testing.  The patient is not hypoxic and thus I will not start her on any remdesivir.  I have a high suspicion that the patient has infective endocarditis; I have ordered a transthoracic echocardiogram.  Ideally, if this did not show any endocarditis, we would want to perform a KATHRIN.  However, since the patient is COVID-19 positive, a KATHRIN may not be possible while she is in isolation.  I will trend the liver enzymes.  Since she has a history of IV drug abuse, I will send hepatitis and HIV testing.  The liver enzymes may also be elevated due to COVID-19.  Her D-dimer is elevated; because it is over 3, she meets level 3 Bertrand criteria and thus I will start her on therapeutic dose Lovenox.  For the hypokalemia, I have written for our potassium replacement protocol.  We will monitor her hemoglobin levels and her platelet levels to see if they improve with watchful waiting as these could be decreased in COVID-19 patients.  A urine culture has been sent; the patient is asymptomatic but her current antibiotics will cover the most common causes of urinary tract infections.  Also, the antibiotics will cover common causes of cellulitis.  There is a possibility that all the areas of infection are all related to her endocarditis but these could be separate as well because of the patient's lifestyle choices.    VTE Prophylaxis:   Mechanical Order History:     None      Pharmalogical Order History:      Ordered     Dose  Route Frequency Stop    06/18/21 0528  enoxaparin (LOVENOX) syringe 50 mg      1 mg/kg SC Daily --    06/18/21 0524  Pharmacy to Dose enoxaparin (LOVENOX)     Question:  Indication of use  Answer:  Prophylaxis    -- XX Continuous PRN --              The patient is high risk due to the following diagnoses/reasons: Severe sepsis and suspected endocarditis    Disposition: Long-term acute care hospital versus [facility]    Nicci Valdez MD  [facility]  06/18/21  08:02 EDT        Electronically signed by Nicci Valdez MD at 06/18/21 0812         Current Facility-Administered Medications   Medication Dose Route Frequency Provider Last Rate Last Admin   • bisacodyl (DULCOLAX) suppository 10 mg  10 mg Rectal Once PRN Otis De La Fuente MD       • ceFAZolin Sodium-Dextrose (ANCEF) IVPB (duplex) 2 g  2 g Intravenous Q8H Otis De La Fuente MD 0 mL/hr at 07/05/21 1150 2 g at 07/09/21 1320   • docusate sodium (COLACE) capsule 100 mg  100 mg Oral BID Otis De La Fuente MD   100 mg at 07/09/21 0812   • enoxaparin (LOVENOX) syringe 40 mg  40 mg Subcutaneous Daily Deng Sanchez MD   40 mg at 07/09/21 0811   • folic acid (FOLVITE) tablet 1 mg  1 mg Oral Daily Otis De La Fuente MD   1 mg at 07/09/21 0812   • HYDROcodone-acetaminophen (NORCO) 7.5-325 MG per tablet 1 tablet  1 tablet Oral BID PRN Deng Sanchez MD   1 tablet at 07/08/21 2001   • ibuprofen (ADVIL,MOTRIN) tablet 400 mg  400 mg Oral Q6H PRN Otis De La Fuente MD   400 mg at 07/07/21 1736   • iron polysaccharides (NIFEREX) capsule 150 mg  150 mg Oral Daily Otis De La Fuente MD   150 mg at 07/09/21 0812   • lactobacillus acidophilus (RISAQUAD) capsule 1 capsule  1 capsule Oral Daily Otis De La Fuente MD   1 capsule at 07/09/21 0812   • Magnesium Sulfate 2 gram Bolus, followed by 8 gram infusion (total Mg dose 10 grams)- Mg less than or equal to 1mg/dL  2 g Intravenous PRN Marilee Rock PA-C        Or   • Magnesium Sulfate 2 gram / 50mL Infusion (GIVE  X 3 BAGS TO EQUAL 6GM TOTAL DOSE) - Mg 1.1 - 1.5 mg/dl  2 g Intravenous PRN Marilee Rock PA-C        Or   • Magnesium Sulfate 4 gram infusion- Mg 1.6-1.9 mg/dL  4 g Intravenous PRN Marilee Rock PA-C       • melatonin tablet 5 mg  5 mg Oral Nightly PRN Otis De La Fuente MD   5 mg at 07/08/21 2001   • multivitamin with minerals 1 tablet  1 tablet Oral Daily Otis De La Fuente MD   1 tablet at 07/09/21 0812   • ondansetron (ZOFRAN) injection 4 mg  4 mg Intravenous Q6H PRN Otis De La Fuente MD   4 mg at 06/25/21 0946   • polyethylene glycol (MIRALAX) packet 17 g  17 g Oral Daily Otis De La Fuente MD   17 g at 07/09/21 0813   • sodium chloride 0.9 % flush 10 mL  10 mL Intravenous PRN Otis De La Fuente MD       • sodium chloride 0.9 % flush 10 mL  10 mL Intravenous Q12H Otis De La Fuente MD   10 mL at 07/09/21 0814   • sodium chloride 0.9 % flush 10 mL  10 mL Intravenous PRN Otis De La Fuente MD       • sodium chloride 0.9 % flush 3 mL  3 mL Intravenous Q12H Otis De La Fuente MD   3 mL at 07/09/21 0814   • sodium chloride 0.9 % flush 3-10 mL  3-10 mL Intravenous PRN Otis De La Fuente MD         Lab Results (most recent)     Procedure Component Value Units Date/Time    T4, Free [[id number]]  (Normal) Collected: 07/09/21 1154    Specimen: Blood Updated: 07/09/21 1232     Free T4 1.16 ng/dL     Narrative:      Results may be falsely increased if patient taking Biotin.      C-reactive Protein [[id number]]  (Normal) Collected: 07/09/21 0142    Specimen: Blood Updated: 07/09/21 0252     C-Reactive Protein <0.30 mg/dL     Potassium [[id number]]  (Normal) Collected: 07/08/21 2136    Specimen: Blood Updated: 07/08/21 2159     Potassium 3.9 mmol/L      Comment: Slight hemolysis detected by analyzer. Results may be affected.       Magnesium [[id number]]  (Normal) Collected: 07/08/21 2136    Specimen: Blood Updated: 07/08/21 2159     Magnesium 1.7 mg/dL     Basic Metabolic Panel [[id number]]  (Abnormal) Collected: 07/08/21 0114     Specimen: Blood Updated: 07/08/21 0229     Glucose 96 mg/dL      BUN 15 mg/dL      Creatinine 0.59 mg/dL      Sodium 138 mmol/L      Potassium 4.0 mmol/L      Chloride 103 mmol/L      CO2 26.6 mmol/L      Calcium 8.5 mg/dL      eGFR Non African Amer 120 mL/min/1.73      BUN/Creatinine Ratio 25.4     Anion Gap 8.4 mmol/L     Narrative:      GFR Normal >60  Chronic Kidney Disease <60  Kidney Failure <15      C-reactive Protein [[id number]]  (Normal) Collected: 07/08/21 0114    Specimen: Blood Updated: 07/08/21 0229     C-Reactive Protein <0.30 mg/dL     CBC & Differential [[id number]]  (Abnormal) Collected: 07/08/21 0114    Specimen: Blood Updated: 07/08/21 0211    Narrative:      The following orders were created for panel order CBC & Differential.  Procedure                               Abnormality         Status                     ---------                               -----------         ------                     CBC Auto Differential[[id number]]        Abnormal            Final result                 Please view results for these tests on the individual orders.    CBC Auto Differential [[id number]]  (Abnormal) Collected: 07/08/21 0114    Specimen: Blood Updated: 07/08/21 0211     WBC 10.77 10*3/mm3      RBC 3.27 10*6/mm3      Hemoglobin 9.4 g/dL      Hematocrit 31.1 %      MCV 95.1 fL      MCH 28.7 pg      MCHC 30.2 g/dL      RDW 12.8 %      RDW-SD 44.3 fl      MPV 9.6 fL      Platelets 438 10*3/mm3      Neutrophil % 65.0 %      Lymphocyte % 24.4 %      Monocyte % 7.3 %      Eosinophil % 1.5 %      Basophil % 1.0 %      Immature Grans % 0.8 %      Neutrophils, Absolute 6.99 10*3/mm3      Lymphocytes, Absolute 2.63 10*3/mm3      Monocytes, Absolute 0.79 10*3/mm3      Eosinophils, Absolute 0.16 10*3/mm3      Basophils, Absolute 0.11 10*3/mm3      Immature Grans, Absolute 0.09 10*3/mm3      nRBC 0.0 /100 WBC     Comprehensive Metabolic Panel [[id number]]  (Abnormal) Collected: 07/06/21 0036    Specimen: Blood  Updated: 07/06/21 0233     Glucose 81 mg/dL      BUN 18 mg/dL      Creatinine 0.72 mg/dL      Sodium 139 mmol/L      Potassium 3.8 mmol/L      Chloride 104 mmol/L      CO2 24.5 mmol/L      Calcium 8.8 mg/dL      Total Protein 7.4 g/dL      Albumin 3.26 g/dL      ALT (SGPT) 11 U/L      AST (SGOT) 19 U/L      Alkaline Phosphatase 130 U/L      Total Bilirubin 0.2 mg/dL      eGFR Non African Amer 95 mL/min/1.73      Globulin 4.1 gm/dL      A/G Ratio 0.8 g/dL      BUN/Creatinine Ratio 25.0     Anion Gap 10.5 mmol/L     Narrative:      GFR Normal >60  Chronic Kidney Disease <60  Kidney Failure <15      CBC & Differential [[id number]]  (Abnormal) Collected: 07/06/21 0036    Specimen: Blood Updated: 07/06/21 0127    Narrative:      The following orders were created for panel order CBC & Differential.  Procedure                               Abnormality         Status                     ---------                               -----------         ------                     CBC Auto Differential[[id number]]        Abnormal            Final result                 Please view results for these tests on the individual orders.    CBC Auto Differential [[id number]]  (Abnormal) Collected: 07/06/21 0036    Specimen: Blood Updated: 07/06/21 0127     WBC 11.09 10*3/mm3      RBC 3.13 10*6/mm3      Hemoglobin 9.2 g/dL      Hematocrit 29.3 %      MCV 93.6 fL      MCH 29.4 pg      MCHC 31.4 g/dL      RDW 12.7 %      RDW-SD 43.3 fl      MPV 9.4 fL      Platelets 526 10*3/mm3      Neutrophil % 59.2 %      Lymphocyte % 29.0 %      Monocyte % 7.5 %      Eosinophil % 1.9 %      Basophil % 1.1 %      Immature Grans % 1.3 %      Neutrophils, Absolute 6.57 10*3/mm3      Lymphocytes, Absolute 3.22 10*3/mm3      Monocytes, Absolute 0.83 10*3/mm3      Eosinophils, Absolute 0.21 10*3/mm3      Basophils, Absolute 0.12 10*3/mm3      Immature Grans, Absolute 0.14 10*3/mm3      nRBC 0.0 /100 WBC     Basic Metabolic Panel [[id number]]  (Normal) Collected:  07/05/21 0100    Specimen: Blood Updated: 07/05/21 0212     Glucose 92 mg/dL      BUN 14 mg/dL      Creatinine 0.66 mg/dL      Sodium 138 mmol/L      Potassium 3.5 mmol/L      Chloride 104 mmol/L      CO2 26.0 mmol/L      Calcium 8.6 mg/dL      eGFR Non African Amer 105 mL/min/1.73      BUN/Creatinine Ratio 21.2     Anion Gap 8.0 mmol/L     Narrative:      GFR Normal >60  Chronic Kidney Disease <60  Kidney Failure <15      Urine Drug Screen - Urine, Clean Catch [[id number]]  (Normal) Collected: 07/04/21 1031    Specimen: Urine, Clean Catch Updated: 07/04/21 1049     Amphetamine Screen, Urine Negative     Barbiturates Screen, Urine Negative     Benzodiazepine Screen, Urine Negative     Cocaine Screen, Urine Negative     Methadone Screen, Urine Negative     Opiate Screen Negative     Phencyclidine (PCP), Urine Negative     THC, Screen, Urine Negative     6-ACETYL MORPHINE Negative     Buprenorphine, Screen, Urine Negative     Oxycodone Screen, Urine Negative    Narrative:      Negative Thresholds For Drugs Screened:                  Amphetamines              1000 ng/ml               Barbiturates               200 ng/ml               Benzodiazepines            200 ng/ml              Cocaine                    300 ng/ml              Methadone                  300 ng/ml              Opiates                    300 ng/ml               Phencyclidine               25 ng/ml               THC                         50 ng/ml              6-Acetyl Morphine           10 ng/ml              Buprenorphine                5 ng/ml              Oxycodone                  300 ng/ml    The reference range for all drugs tested is negative. This report includes final unconfirmed qualitative results to be used for medical treatment purposes only. Unconfirmed results must not be used for non-medical purposes such as employment or legal testing. Clinical consideration should be applied to any drug of abuse test, especially when unconfirmed  quantitative results are used.        Magnesium [[id number]]  (Normal) Collected: 07/03/21 1600    Specimen: Blood Updated: 07/03/21 1619     Magnesium 1.8 mg/dL     Comprehensive Metabolic Panel [[id number]]  (Abnormal) Collected: 07/03/21 0408    Specimen: Blood Updated: 07/03/21 0439     Glucose 96 mg/dL      BUN 15 mg/dL      Creatinine 0.72 mg/dL      Sodium 137 mmol/L      Potassium 4.2 mmol/L      Chloride 103 mmol/L      CO2 25.1 mmol/L      Calcium 8.5 mg/dL      Total Protein 6.8 g/dL      Albumin 2.84 g/dL      ALT (SGPT) 11 U/L      AST (SGOT) 17 U/L      Alkaline Phosphatase 124 U/L      Total Bilirubin 0.2 mg/dL      eGFR Non African Amer 95 mL/min/1.73      Globulin 4.0 gm/dL      A/G Ratio 0.7 g/dL      BUN/Creatinine Ratio 20.8     Anion Gap 8.9 mmol/L     Narrative:      GFR Normal >60  Chronic Kidney Disease <60  Kidney Failure <15      Blood Culture - Blood, Arm, Right [[id number]] Collected: 06/24/21 1227    Specimen: Blood from Arm, Right Updated: 06/29/21 1315     Blood Culture No growth at 5 days    Blood Culture - Blood, Hand, Right [[id number]] Collected: 06/24/21 1227    Specimen: Blood from Hand, Right Updated: 06/29/21 1315     Blood Culture No growth at 5 days    HCV RNA By PCR, Qn Rfx Lucía [[id number]] Collected: 06/24/21 0232    Specimen: Blood Updated: 06/26/21 1609     Hepatitis C Genotype 1a     Please note Comment     Comment: This test was developed and its performance characteristics determined  by [facility].  It has not been cleared or approved by the [place]. Food and  Drug Administration.  The FDA has determined that such clearance or approval is not  necessary. This test is used for clinical purposes.  It should not be  regarded as investigational or for research.       Narrative:      Performed at:  [address]  [id number]  : Jarad Pride MD, Phone:  [phone]    HCV RNA By PCR, Qn Rfx Lucía [[id number]] Collected: 06/24/21  0232    Specimen: Blood Updated: 06/26/21 1609     Hepatitis C Quantitation [id number] IU/mL      HCV log10 6.650 log10 IU/mL      HCV Test Information Comment     Comment: The quantitative range of this assay is 15 IU/mL to 100 million IU/mL.        HCV Genotype Comment     Comment: To be performed on this specimen.       Narrative:      Performed at:  [facility]  [id number]  : Jarad Pride MD, Phone:  [phone]    Uric Acid [[id number]]  (Normal) Collected: 06/26/21 0258    Specimen: Blood Updated: 06/26/21 1410     Uric Acid 4.4 mg/dL     Scan Slide [[id number]] Collected: 06/26/21 0258    Specimen: Blood Updated: 06/26/21 0337     Hypochromia Slight/1+     Platelet Estimate Increased    Blood Culture ID, PCR - Blood, Arm, Left [[id number]]  (Abnormal) Collected: 06/22/21 0156    Specimen: Blood from Arm, Left Updated: 06/24/21 0433     BCID, PCR Staphylococcus aureus, not MRSA. Identification by BCID PCR.     BOTTLE TYPE Pediatric Bottle    D-dimer, Quantitative [[id number]]  (Abnormal) Collected: 06/24/21 0231    Specimen: Blood Updated: 06/24/21 0300     D-Dimer, Quantitative 3.02 MCGFEU/mL     Narrative:      d-Dimer assay result is to be used in conjunction with a non-high clinical pretest probability (PTP) assessment tool to exclude PE and aid in diagnosis of VTE with cutoff of 0.5 MCGFEU/mL.    Folate [[id number]]  (Normal) Collected: 06/23/21 0122    Specimen: Blood Updated: 06/23/21 1311     Folate 6.78 ng/mL     Narrative:      Results may be falsely increased if patient taking Biotin.      Vitamin B12 [[id number]]  (Abnormal) Collected: 06/23/21 0122    Specimen: Blood Updated: 06/23/21 1311     Vitamin B-12 1,050 pg/mL     Narrative:      Results may be falsely increased if patient taking Biotin.      Iron Profile [[id number]]  (Abnormal) Collected: 06/23/21 0122    Specimen: Blood Updated: 06/23/21 0206     Iron 26 mcg/dL      Iron Saturation 13 %       Transferrin 135 mg/dL      TIBC 201 mcg/dL     Lactate Dehydrogenase [[id number]]  (Normal) Collected: 06/23/21 0122    Specimen: Blood Updated: 06/23/21 0206      U/L     Ferritin [[id number]]  (Abnormal) Collected: 06/23/21 0122    Specimen: Blood Updated: 06/23/21 0205     Ferritin 400.20 ng/mL     Narrative:      Results may be falsely decreased if patient taking Biotin.      Reticulocytes [[id number]]  (Normal) Collected: 06/23/21 0122    Specimen: Blood Updated: 06/23/21 0131     Reticulocyte % 1.83 %      Reticulocyte Absolute 0.0584 10*6/mm3     Haptoglobin [[id number]]  (Abnormal) Collected: 06/22/21 1002    Specimen: Blood Updated: 06/22/21 1855     Haptoglobin 311 mg/dL     D-dimer, Quantitative [[id number]]  (Abnormal) Collected: 06/22/21 0156    Specimen: Blood Updated: 06/22/21 0235     D-Dimer, Quantitative 3.70 MCGFEU/mL     Narrative:      d-Dimer assay result is to be used in conjunction with a non-high clinical pretest probability (PTP) assessment tool to exclude PE and aid in diagnosis of VTE with cutoff of 0.5 MCGFEU/mL.    Urine Culture - Urine, Urine, Clean Catch [[id number]]  (Abnormal)  (Susceptibility) Collected: 06/17/21 0583    Specimen: Urine, Clean Catch Updated: 06/21/21 1156     Urine Culture >100,000 CFU/mL Escherichia coli      >100,000 CFU/mL Staphylococcus aureus    Susceptibility      Escherichia coli Staphylococcus aureus      RUBI RUBI      Ampicillin Resistant       Ampicillin + Sulbactam Resistant       Cefazolin Susceptible       Cefepime Susceptible       Ceftazidime Susceptible       Ceftriaxone Susceptible       Gentamicin Susceptible       Levofloxacin Intermediate       Nitrofurantoin Susceptible Susceptible      Oxacillin  Susceptible      Piperacillin + Tazobactam Susceptible       Tetracycline Susceptible Susceptible      Trimethoprim + Sulfamethoxazole Resistant Susceptible      Vancomycin  Susceptible                 Linear View               Susceptibility  Comments     Staphylococcus aureus    This isolate does not demonstrate inducible clindamycin resistance in vitro.               Vancomycin, Trough [[id number]]  (Normal) Collected: 06/20/21 1103    Specimen: Blood Updated: 06/20/21 1135     Vancomycin Trough 9.80 mcg/mL     Narrative:      Therapeutic Ranges for Vancomycin    Vancomycin Random   5.0-40.0 mcg/mL  Vancomycin Trough   5.0-20.0 mcg/mL  Vancomycin Peak     20.0-40.0 mcg/mL    Troponin [[id number]]  (Normal) Collected: 06/19/21 1953    Specimen: Blood Updated: 06/19/21 2022     Troponin T <0.010 ng/mL     Narrative:      Troponin T Reference Range:  <= 0.03 ng/mL-   Negative for AMI  >0.03 ng/mL-     Abnormal for myocardial necrosis.  Clinicians would have to utilize clinical acumen, EKG, Troponin and serial changes to determine if it is an Acute Myocardial Infarction or myocardial injury due to an underlying chronic condition.       Results may be falsely decreased if patient taking Biotin.      Hepatic Function Panel [[id number]]  (Abnormal) Collected: 06/19/21 1028    Specimen: Blood Updated: 06/19/21 1727     Total Protein 6.5 g/dL      Albumin 1.79 g/dL      ALT (SGPT) 101 U/L      AST (SGOT) 44 U/L      Alkaline Phosphatase 240 U/L      Total Bilirubin 1.0 mg/dL      Bilirubin, Direct 0.4 mg/dL      Bilirubin, Indirect 0.6 mg/dL     Potassium [[id number]]  (Normal) Collected: 06/19/21 1028    Specimen: Blood Updated: 06/19/21 1109     Potassium 5.0 mmol/L     Vancomycin, Trough [[id number]]  (Normal) Collected: 06/19/21 1028    Specimen: Blood Updated: 06/19/21 1053     Vancomycin Trough 7.20 mcg/mL     Narrative:      Therapeutic Ranges for Vancomycin    Vancomycin Random   5.0-40.0 mcg/mL  Vancomycin Trough   5.0-20.0 mcg/mL  Vancomycin Peak     20.0-40.0 mcg/mL    Protime-INR [[id number]]  (Normal) Collected: 06/19/21 1028    Specimen: Blood Updated: 06/19/21 1048     Protime 14.4 Seconds      Comment: Note new Reference Range        INR 1.08     Narrative:      Suggested INR therapeutic range for stable oral anticoagulant therapy:    Low Intensity therapy:   1.5-2.0  Moderate Intensity therapy:   2.0-3.0  High Intensity therapy:   2.5-4.0    Gamma GT [[id number]]  (Abnormal) Collected: 06/18/21 0054    Specimen: Blood from Hand, Right Updated: 06/18/21 1850      U/L     Blood Culture ID, PCR - Blood, Arm, Left [[id number]]  (Abnormal) Collected: 06/17/21 2318    Specimen: Blood from Arm, Left Updated: 06/18/21 1544     BCID, PCR Staphylococcus aureus, not MRSA. Identification by BCID PCR.     BOTTLE TYPE Aerobic Bottle    Hepatitis Panel, Acute [[id number]]  (Abnormal) Collected: 06/18/21 1427    Specimen: Blood Updated: 06/18/21 1534     Hepatitis B Surface Ag Non-Reactive     Hep A IgM Non-Reactive     Hep B C IgM Non-Reactive     Hepatitis C Ab Reactive    Narrative:      Results may be falsely decreased if patient taking Biotin.     Mycoplasma Pneumoniae Antibody, IgM - Blood, [[id number]]  (Normal) Collected: 06/18/21 1427    Specimen: Blood Updated: 06/18/21 1531     Mycoplasma pneumo IgM Negative    HIV-1 / O / 2 Ag / Antibody 4th Generation [[id number]]  (Normal) Collected: 06/18/21 1427    Specimen: Blood Updated: 06/18/21 1510     HIV-1/ HIV-2 Non-Reactive     Comment: A non-reactive test result does not preclude the possibility of exposure to HIV or infection with HIV. An antibody response to recent exposure may take several months to reach detectable levels.       Narrative:      The HIV antibody/antigen combo assay is a qualitative assay for HIV that includes the p24 antigen as well as antibodies to HIV types 1 and 2. This test is intended to be used as a screening assay in the diagnosis of HIV infection in patients over the age of 2.  Results may be falsely decreased if patient taking Biotin.      Phosphorus [[id number]]  (Normal) Collected: 06/18/21 1427    Specimen: Blood Updated: 06/18/21 1449     Phosphorus 2.9 mg/dL     S. Pneumo Ag  Urine or CSF - Urine, Urine, Clean Catch [[id number]]  (Normal) Collected: 06/18/21 0622    Specimen: Urine, Clean Catch Updated: 06/18/21 1351     Strep Pneumo Ag Negative    Hemoglobin A1c [[id number]]  (Normal) Collected: 06/17/21 2253    Specimen: Blood Updated: 06/18/21 0630     Hemoglobin A1C 5.60 %     Narrative:      Hemoglobin A1C Ranges:    Increased Risk for Diabetes  5.7% to 6.4%  Diabetes                     >= 6.5%  Diabetic Goal                < 7.0%    Timed Lactic Acid, Reflex [[id number]]  (Normal) Collected: 06/18/21 0153    Specimen: Blood from Hand, Right Updated: 06/18/21 0215     Lactate 1.3 mmol/L     Troponin [[id number]]  (Normal) Collected: 06/18/21 0054    Specimen: Blood from Hand, Right Updated: 06/18/21 0121     Troponin T <0.010 ng/mL     Narrative:      Troponin T Reference Range:  <= 0.03 ng/mL-   Negative for AMI  >0.03 ng/mL-     Abnormal for myocardial necrosis.  Clinicians would have to utilize clinical acumen, EKG, Troponin and serial changes to determine if it is an Acute Myocardial Infarction or myocardial injury due to an underlying chronic condition.       Results may be falsely decreased if patient taking Biotin.      COVID-19 and FLU A/B PCR - Swab, Nasopharynx [[id number]]  (Abnormal) Collected: 06/18/21 0049    Specimen: Swab from Nasopharynx Updated: 06/18/21 0119     COVID19 Detected     Influenza A PCR Not Detected     Influenza B PCR Not Detected    Narrative:      Fact sheet for providers: https://www.fda.gov/media/319620/download    Fact sheet for patients: https://www.fda.gov/media/879645/download    Test performed by PCR.  Influenza A and Influenza B negative results should be considered presumptive in samples that have a positive SARS-CoV-2 result.    Competitive inhibition studies showed that SARS-CoV-2 virus, when present at concentrations above 3.6E+04 copies/mL, can inhibit the detection and amplification of influenza A and influenza B virus RNA if present at or  below 1.8E+02 copies/mL or 4.9E+02 copies/mL, respectively, and may lead to false negative influenza virus results. If co-infection with influenza A or influenza B virus is suspected in samples with a positive SARS-CoV-2 result, the sample should be re-tested with another FDA cleared, approved, or authorized influenza test, if influenza virus detection would change clinical management.    Urine Drug Screen - Urine, Clean Catch [[id number]]  (Abnormal) Collected: 06/17/21 2253    Specimen: Urine, Clean Catch Updated: 06/17/21 2337     Amphetamine Screen, Urine Positive     Barbiturates Screen, Urine Negative     Benzodiazepine Screen, Urine Negative     Cocaine Screen, Urine Negative     Methadone Screen, Urine Negative     Opiate Screen Positive     Phencyclidine (PCP), Urine Negative     THC, Screen, Urine Negative     6-ACETYL MORPHINE Negative     Buprenorphine, Screen, Urine Negative     Oxycodone Screen, Urine Negative    Narrative:      Negative Thresholds For Drugs Screened:                  Amphetamines              1000 ng/ml               Barbiturates               200 ng/ml               Benzodiazepines            200 ng/ml              Cocaine                    300 ng/ml              Methadone                  300 ng/ml              Opiates                    300 ng/ml               Phencyclidine               25 ng/ml               THC                         50 ng/ml              6-Acetyl Morphine           10 ng/ml              Buprenorphine                5 ng/ml              Oxycodone                  300 ng/ml    The reference range for all drugs tested is negative. This report includes final unconfirmed qualitative results to be used for medical treatment purposes only. Unconfirmed results must not be used for non-medical purposes such as employment or legal testing. Clinical consideration should be applied to any drug of abuse test, especially when unconfirmed quantitative results are used.         Lactic Acid, Plasma [[id number]]  (Abnormal) Collected: 06/17/21 2253    Specimen: Blood Updated: 06/17/21 2337     Lactate 2.4 mmol/L     BNP [[id number]]  (Abnormal) Collected: 06/17/21 2253    Specimen: Blood Updated: 06/17/21 2329     proBNP 509.8 pg/mL     Narrative:      Among patients with dyspnea, NT-proBNP is highly sensitive for the detection of acute congestive heart failure. In addition NT-proBNP of <300 pg/ml effectively rules out acute congestive heart failure with 99% negative predictive value.    Results may be falsely decreased if patient taking Biotin.      TSH [[id number]]  (Normal) Collected: 06/17/21 2253    Specimen: Blood Updated: 06/17/21 2329     TSH 0.718 uIU/mL     CK [[id number]]  (Normal) Collected: 06/17/21 2253    Specimen: Blood Updated: 06/17/21 2324     Creatine Kinase 54 U/L     Ethanol [[id number]] Collected: 06/17/21 2253    Specimen: Blood Updated: 06/17/21 2324     Ethanol <10 mg/dL      Ethanol % <0.010 %     Narrative:      >/= 80.0 legally intoxicated    Urinalysis, Microscopic Only - Urine, Clean Catch [[id number]]  (Abnormal) Collected: 06/17/21 2253    Specimen: Urine, Clean Catch Updated: 06/17/21 2307     RBC, UA Too Numerous to Count /HPF      WBC, UA Too Numerous to Count /HPF      Bacteria, UA 4+ /HPF      Squamous Epithelial Cells, UA 0-2 /HPF      Hyaline Casts, UA 3-6 /LPF      Methodology Automated Microscopy    Urinalysis With Microscopic If Indicated (No Culture) - Urine, Clean Catch [[id number]]  (Abnormal) Collected: 06/17/21 2253    Specimen: Urine, Clean Catch Updated: 06/17/21 2307     Color, UA Dark Yellow     Appearance, UA Cloudy     pH, UA 6.0     Specific Gravity, UA 1.014     Glucose, UA Negative     Ketones, UA Negative     Bilirubin, UA Negative     Blood, UA Large (3+)     Protein, UA 30 mg/dL (1+)     Leuk Esterase, UA Moderate (2+)     Nitrite, UA Positive     Urobilinogen, UA 1.0 E.U./dL    Pregnancy, Urine - Urine, Clean Catch [[id number]]   (Normal) Collected: 06/17/21 2253    Specimen: Urine, Clean Catch Updated: 06/17/21 2303     HCG, Urine QL Negative    Narrative:      Diluted specimens may cause false negative results.          Operative/Procedure Notes (most recent note)    No notes of this type exist for this encounter.            Physician Progress Notes (most recent note)      Deng Sanchez MD at 07/09/21 1446          Assisted By: Ivy ARELLANO    CC: Follow-up on bacteremia    Interview History/HPI: Patient states she feels okay, she underwent her KATHRIN this a.m. without difficulty, there were no vegetations.  Patient had some tachycardia last night and earlier this morning and she again vague cold and this seemed to help.  The valves look good by KATHRIN.  She states she has some mild nausea this morning but otherwise feels okay.  She was not able to eat this morning got her antibiotic and she states this tends to make her nauseated.    ROS:     Vitals:    07/09/21 1325   BP: 142/95   Pulse: 107   Resp: 16   Temp:    SpO2:          Intake/Output Summary (Last 24 hours) at 7/9/2021 1446  Last data filed at 7/9/2021 1325  Gross per 24 hour   Intake 1035 ml   Output 1300 ml   Net -265 ml       EXAM: Temperature is 98.4, saturation is 100%.  She is in no distress, pressure 142/95, current heart rate 103 when I was in the room and she was sinus rhythm.  Lungs have bilateral breath sounds are clear no rhonchi rales or wheezing heard, heart mildly tachycardic regular rhythm without murmur rub or gallop.  Abdomen soft bowel sounds are active nondistended nontender, extremities are without edema.      EKG: Image reviewed EKG yesterday essentially normal except for mild tachycardia    LABS:     Lab Results (last 48 hours)     Procedure Component Value Units Date/Time    T4, Free [[id number]]  (Normal) Collected: 07/09/21 1154    Specimen: Blood Updated: 07/09/21 1232     Free T4 1.16 ng/dL     Narrative:      Results may be falsely increased if patient  taking Biotin.      C-reactive Protein [[id number]]  (Normal) Collected: 07/09/21 0142    Specimen: Blood Updated: 07/09/21 0252     C-Reactive Protein <0.30 mg/dL     Potassium [[id number]]  (Normal) Collected: 07/08/21 2136    Specimen: Blood Updated: 07/08/21 2159     Potassium 3.9 mmol/L      Comment: Slight hemolysis detected by analyzer. Results may be affected.       Magnesium [[id number]]  (Normal) Collected: 07/08/21 2136    Specimen: Blood Updated: 07/08/21 2159     Magnesium 1.7 mg/dL     Basic Metabolic Panel [[id number]]  (Abnormal) Collected: 07/08/21 0114    Specimen: Blood Updated: 07/08/21 0229     Glucose 96 mg/dL      BUN 15 mg/dL      Creatinine 0.59 mg/dL      Sodium 138 mmol/L      Potassium 4.0 mmol/L      Chloride 103 mmol/L      CO2 26.6 mmol/L      Calcium 8.5 mg/dL      eGFR Non African Amer 120 mL/min/1.73      BUN/Creatinine Ratio 25.4     Anion Gap 8.4 mmol/L     Narrative:      GFR Normal >60  Chronic Kidney Disease <60  Kidney Failure <15      C-reactive Protein [[id number]]  (Normal) Collected: 07/08/21 0114    Specimen: Blood Updated: 07/08/21 0229     C-Reactive Protein <0.30 mg/dL     CBC & Differential [[id number]]  (Abnormal) Collected: 07/08/21 0114    Specimen: Blood Updated: 07/08/21 0211    Narrative:      The following orders were created for panel order CBC & Differential.  Procedure                               Abnormality         Status                     ---------                               -----------         ------                     CBC Auto Differential[[id number]]        Abnormal            Final result                 Please view results for these tests on the individual orders.    CBC Auto Differential [[id number]]  (Abnormal) Collected: 07/08/21 0114    Specimen: Blood Updated: 07/08/21 0211     WBC 10.77 10*3/mm3      RBC 3.27 10*6/mm3      Hemoglobin 9.4 g/dL      Hematocrit 31.1 %      MCV 95.1 fL      MCH 28.7 pg      MCHC 30.2 g/dL      RDW 12.8 %      RDW-SD  44.3 fl      MPV 9.6 fL      Platelets 438 10*3/mm3      Neutrophil % 65.0 %      Lymphocyte % 24.4 %      Monocyte % 7.3 %      Eosinophil % 1.5 %      Basophil % 1.0 %      Immature Grans % 0.8 %      Neutrophils, Absolute 6.99 10*3/mm3      Lymphocytes, Absolute 2.63 10*3/mm3      Monocytes, Absolute 0.79 10*3/mm3      Eosinophils, Absolute 0.16 10*3/mm3      Basophils, Absolute 0.11 10*3/mm3      Immature Grans, Absolute 0.09 10*3/mm3      nRBC 0.0 /100 WBC                Radiology:    Imaging Results (Last 72 Hours)     ** No results found for the last 72 hours. **          Results for orders placed during the hospital encounter of 06/17/21    Adult Transthoracic Echo Limited W/ Cont if Necessary Per Protocol    Interpretation Summary  · Left ventricular ejection fraction appears to be 61 - 65%. Left ventricular systolic function is normal.  · No significant functional valvular abnormalities noted  · There is no evidence of pericardial effusion  Discussed KATHRIN results with Dr. Christianson, no vegetations    Assessment/Plan:   Bacteremia, she since she does not have vegetations but had a complicated bacteremia needs 4 weeks of IV Ancef.  This being the case this will complete through July 22 to my count.  This would mean she can be discharged home on July 23.   is working on a possible place for the patient to go to continue to get this, she is too high risk considering her IV drug use to go home with a line.    Tachycardia, TSH and free T4 have been normal, follow    Hepatitis C, will need outpatient follow-up, possible treatment in the future once this is followed and confirmed to stay positive however her transaminases are normal.    Anemia, continue iron and folic acid    COVID-19, and now she is 21 days out from diagnosis she did not ever have symptoms.    DVT prophylaxis, continue subcu Lovenox    Borderline magnesium, being supplemented, follow.      Disposition yet to be determined    Deng LOYD  MD Daniel       Electronically signed by Deng Sanchez MD at [phone]          Consult Notes (most recent note)      West Enriquez APRN at [phone]     Attestation signed by Lex Branett DO at 21 [phone]    I have reviewed this documentation and agree.  Lex Barnett D.O.                    Consults    Patient Identification:  Name:  Marce Gutierrez  Age:  31 y.o.  Sex:  female  :  1990  MRN:  [id number]  Visit Number:  [id number]  Primary care provider:  Provider, No Known    History of presenting illness: Pt seen today for left ankle swelling and erythema possible septic joint.  Pt states about 5 days ago she fell off roof trying to get her cat down.  She denies any problems with the ankle prior to that.  She also has documentation of IV Drug use.  Pt states that yesterday her ankle and foot was severly swollen and erythemic and painful unable to move it.  However, today she states today it is much improved the pain is a lot better, swelling is down, and erythema has got better.  She denies numbness or tingling.  ---------------------------------------------------------------------------------------------------------------------  Review of Systems     Constitutional: no fever, chills and night sweats. No appetite change or unexpected weight change. No fatigue.  Eyes: no eye drainage, itching or redness.  HEENT: no mouth sores, dysphagia or nose bleed.  Respiratory: no for shortness of breath, cough or production of sputum.  Cardiovascular: no chest pain, no palpitations, no orthopnea.  Gastrointestinal: no nausea, vomiting or diarrhea. No abdominal pain, hematemesis or rectal bleeding.  Genitourinary: no dysuria or polyuria.  Hematologic/lymphatic: no lymph node abnormalities, no easy bruising or easy bleeding.  Musculoskeletal: Pain to the left ankle.  Skin: redness to the left ankle.  Neurological: no loss of consciousness, no seizure, no headache.  Behavioral/Psych: no  depression or suicidal ideation.  Endocrine: no hot flashes.  Immunologic: negative.  ---------------------------------------------------------------------------------------------------------------------   Past History:  Family History   Family history unknown: Yes     Past Medical History:   Diagnosis Date   • IV drug abuse (CMS/[facility])    • MRSA cellulitis 2019    Left knee     Past Surgical History:   Procedure Laterality Date   • TONSILLECTOMY       Social History     Socioeconomic History   • Marital status: Single     Spouse name: Not on file   • Number of children: Not on file   • Years of education: Not on file   • Highest education level: Not on file   Tobacco Use   • Smoking status: Former Smoker     Packs/day: 0.25     Years: 10.00     Pack years: 2.50     Types: Cigarettes   • Smokeless tobacco: Never Used   Vaping Use   • Vaping Use: Every day   • Substances: Nicotine, Flavoring   • Devices: [employer] tank   Substance and Sexual Activity   • Alcohol use: Never   • Drug use: Yes     Types: Oxycodone, Opium, IV, Methamphetamines   • Sexual activity: Defer     ---------------------------------------------------------------------------------------------------------------------   Allergies:  Cinnamon  ---------------------------------------------------------------------------------------------------------------------   Prior to Admission Medications     [facility] Meds:  ceFAZolin, 2 g, Intravenous, Q8H  docusate sodium, 100 mg, Oral, BID  enoxaparin, 1 mg/kg, Subcutaneous, Daily  lactobacillus acidophilus, 1 capsule, Oral, Daily  polyethylene glycol, 17 g, Oral, Daily  sodium chloride, 10 mL, Intravenous, Q12H  sodium chloride, 3 mL, Intravenous, Q12H         ---------------------------------------------------------------------------------------------------------------------   Vital Signs:  Temp:  [98 °F (36.7 °C)-99.6 °F (37.6 °C)] 98.7 °F (37.1 °C)  Heart Rate:  [] 106  Resp:  [14-25]  22  BP: ()/(63-84) 107/73      06/20/21  0500 06/21/21  0403 06/22/21  0403   Weight: 57.3 kg (126 lb 6.4 oz) 56.7 kg (125 lb) 55.3 kg (122 lb)     Body mass index is 19.69 kg/m².  ---------------------------------------------------------------------------------------------------------------------   Physical exam:  Constitutional:  Well-developed and well-nourished.  No respiratory distress.      HENT:  Head: Normocephalic and atraumatic.  Mouth:  Moist mucous membranes.    Eyes:  Conjunctivae and EOM are normal.  Pupils are equal, round, and reactive to light.  No scleral icterus.  Neck:  Neck supple.  No JVD present.    Cardiovascular:  Normal rate,  Pulmonary/Chest:  No respiratory distress,    Musculoskeletal:  Left ankle edema and mild pain on palpation to the anterior ankle.  Full rom of the left ankle mild discomfort with active and passive rom.    Neurological:  Alert and oriented to person, place, and time.  No cranial nerve deficit.  No tongue deviation.  No facial droop.  No slurred speech.   Skin:  Left ankle erythema but much improved from the previous day markers on the the foot and ankle.  Psychiatric:  Normal mood and affect.  Behavior is normal.  Judgment and thought content normal.   Peripheral vascular:  No edema and strong pulses on all 4 extremities.    ---------------------------------------------------------------------------------------------------------------------   .  ---------------------------------------------------------------------------------------------------------------------   Results from last 7 days   Lab Units 06/22/21  0156 06/21/21  1009 06/19/21  1028 06/18/21  0153 06/17/21  2253   CRP mg/dL 11.11* 13.79*  --   --  29.71*   LACTATE mmol/L  --   --   --  1.3 2.4*   WBC 10*3/mm3 11.94*  --  13.06*  --  16.09*   HEMOGLOBIN g/dL 9.6*  --  11.6*  --  11.7*   HEMATOCRIT % 29.0*  --  36.8  --  34.7   MCV fL 89.2  --  92.5  --  87.0   MCHC g/dL 33.1  --  31.5  --  33.7    PLATELETS 10*3/mm3 455*  --  209  --  105*   INR   --   --  1.08  --   --          Results from last 7 days   Lab Units 06/22/21  0156 06/21/21  1009 06/19/21  1028 06/18/21  1427 06/17/21 2253 06/17/21 2253   SODIUM mmol/L 132* 133* 132*  --   --  136   POTASSIUM mmol/L 4.5 4.6 5.0  5.0 3.1*   < > 2.8*   MAGNESIUM mg/dL  --   --   --  2.0  --  2.1   CHLORIDE mmol/L 104 104 106  --   --  100   CO2 mmol/L 22.4 22.9 15.1*  --   --  24.1   BUN mg/dL 16 14 18  --   --  24*   CREATININE mg/dL 0.85 0.61 0.81  --   --  1.00   EGFR IF NONAFRICN AM mL/min/1.73 79 115 83  --   --  65   CALCIUM mg/dL 7.7* 8.0* 8.0*  --   --  8.2*   GLUCOSE mg/dL 90 96 72  --   --  127*   ALBUMIN g/dL 1.82*  --  1.79*  --   --  2.37*   BILIRUBIN mg/dL 0.3  --  1.0  --   --  1.0   ALK PHOS U/L 145*  --  240*  --   --  304*   AST (SGOT) U/L 22  --  44*  --   --  37*   ALT (SGPT) U/L 39*  --  101*  --   --  179*    < > = values in this interval not displayed.   Estimated Creatinine Clearance: 84.5 mL/min (by C-G formula based on SCr of 0.85 mg/dL).  No results found for: AMMONIA  Results from last 7 days   Lab Units 06/19/21  1953 06/18/21  0054 06/17/21 2253   CK TOTAL U/L  --   --  54   TROPONIN T ng/mL <0.010 <0.010 <0.010     Results from last 7 days   Lab Units 06/17/21 2253   PROBNP pg/mL 509.8*     Lab Results   Component Value Date    HGBA1C 5.60 06/17/2021     Lab Results   Component Value Date    TSH 0.718 06/17/2021     Lab Results   Component Value Date    PREGTESTUR Negative 06/17/2021     Pain Management Panel     Pain Management Panel Latest Ref Rng & Units 6/17/2021 10/30/2019    AMPHETAMINES SCREEN, URINE Negative Positive(A) Negative    BARBITURATES SCREEN Negative Negative Negative    BENZODIAZEPINE SCREEN, URINE Negative Negative Negative    BUPRENORPHINEUR Negative Negative Negative    COCAINE SCREEN, URINE Negative Negative Negative    METHADONE SCREEN, URINE Negative Negative Negative        Blood Culture   Date Value  Ref Range Status   06/20/2021 Staphylococcus aureus (C)  Final     Comment:     Infectious disease consultation is highly recommended to rule out distant foci of infection.   06/19/2021 Staphylococcus aureus (C)  Final     Comment:     Infectious disease consultation is highly recommended to rule out distant foci of infection.   06/17/2021 Staphylococcus aureus (C)  Final     Comment:     Infectious disease consultation is highly recommended to rule out distant foci of infection.   06/17/2021 Staphylococcus aureus (C)  Final     Comment:     Infectious disease consultation is highly recommended to rule out distant foci of infection.  Refer to previous blood culture collected on 6/17/2021 2318 for MICs.     Urine Culture   Date Value Ref Range Status   06/17/2021 >100,000 CFU/mL Escherichia coli (A)  Final   06/17/2021 >100,000 CFU/mL Staphylococcus aureus (A)  Corrected     No results found for: WOUNDCX  No results found for: STOOLCX      ---------------------------------------------------------------------------------------------------------------------   Imaging Results (Last 7 Days)     Procedure Component Value Units Date/Time    XR Foot 3+ View Left [[id number]] Collected: 06/20/21 1052     Updated: 06/20/21 1055    Narrative:      EXAMINATION: XR FOOT 3+ VW LEFT-      CLINICAL INDICATION: recent fall, swelling, unable to move toes;  I33.0-Acute and subacute infective endocarditis; U07.1-COVID-19;  A41.9-Sepsis, unspecified organism        COMPARISON: None available     FINDINGS: 3 views of the left foot show no fracture or dislocation.  There are no blastic or lytic lesions.       Impression:      No acute bony abnormality      This report was finalized on 6/20/2021 10:53 AM by Dr. Haekem Morgan MD.       XR Tibia Fibula 2 View Left [[id number]] Collected: 06/18/21 1717     Updated: 06/18/21 1719    Narrative:      EXAMINATION: XR TIBIA FIBULA 2 VW LEFT-      CLINICAL INDICATION: fall from height, reports pain  and inability to  move; I33.0-Acute and subacute infective endocarditis; U07.1-COVID-19;  A41.9-Sepsis, unspecified organism        COMPARISON: None available     FINDINGS: 3 views of the left tibia and fibula show no fracture or  dislocation. There are no blastic or lytic lesions.       Impression:      No acute bony abnormality      This report was finalized on 6/18/2021 5:17 PM by Dr. Hakeem Morgan MD.       XR Knee 1 or 2 View Left [[id number]] Collected: 06/18/21 1715     Updated: 06/18/21 1719    Narrative:      EXAMINATION: XR KNEE 1 OR 2 VW LEFT-      CLINICAL INDICATION: fall from height, reports pain and inability to  move; I33.0-Acute and subacute infective endocarditis; U07.1-COVID-19;  A41.9-Sepsis, unspecified organism        COMPARISON: None available     FINDINGS: 2 views of the left knee show no fracture or dislocation     No blastic or lytic lesions.       Impression:      No acute bony abnormality      This report was finalized on 6/18/2021 5:17 PM by Dr. Hakeem Morgan MD.       XR Hip With or Without Pelvis 2 - 3 View Left [[id number]] Collected: 06/18/21 1715     Updated: 06/18/21 1717    Narrative:      EXAMINATION: XR HIP W OR WO PELVIS 2-3 VIEW LEFT-      CLINICAL INDICATION: fall from height, reports pain and inability to  move; I33.0-Acute and subacute infective endocarditis; U07.1-COVID-19;  A41.9-Sepsis, unspecified organism        COMPARISON: None available     FINDINGS: One view of the pelvis and 2 views of the left hip show no  abnormal disruption of the bony ring of the pelvis     No evidence of an acute fracture or dislocation. There are no blastic or  lytic lesions.       Impression:      No acute bony abnormality      This report was finalized on 6/18/2021 5:15 PM by Dr. Hakeem Morgan MD.       XR Forearm 2 View Left [[id number]] Collected: 06/18/21 1714     Updated: 06/18/21 1717    Narrative:      EXAMINATION: XR FOREARM 2 VW LEFT-      CLINICAL INDICATION: fall from height, reports  pain and inability to  move; I33.0-Acute and subacute infective endocarditis; U07.1-COVID-19;  A41.9-Sepsis, unspecified organism        COMPARISON: None available     FINDINGS: 2 views of the left radius and ulna show no fracture or  dislocation. There are no blastic or lytic lesions.       Impression:      No acute bony abnormality      This report was finalized on 6/18/2021 5:14 PM by Dr. Hakeem Morgan MD.       XR Humerus Left [[id number]] Collected: 06/18/21 1714     Updated: 06/18/21 1716    Narrative:      EXAMINATION: XR HUMERUS LEFT-      CLINICAL INDICATION: fall from height, reports pain and inability to  move; I33.0-Acute and subacute infective endocarditis; U07.1-COVID-19;  A41.9-Sepsis, unspecified organism        COMPARISON: None available     FINDINGS: 2 views of the left humerus show no fracture or dislocation.  There are no blastic or lytic lesions.       Impression:      No acute bony abnormality.      This report was finalized on 6/18/2021 5:14 PM by Dr. Hakeem Morgan MD.       CT Chest Pulmonary Embolism [[id number]] Collected: 06/18/21 0420     Updated: 06/18/21 0422    Narrative:      CT CHEST PULMONARY EMBOLISM W CONTRAST    INDICATION:   Patient feeling sick and dehydrated. Subjective fever and chills.    TECHNIQUE:   CT angiogram of the chest with IV contrast. 3-D reconstructions were obtained and reviewed.   Radiation dose reduction techniques included automated exposure control or exposure modulation based on body size. Count of known CT and cardiac nuc med studies  performed in previous 12 months: 0.     COMPARISON:   None available.    FINDINGS:   Exam is degraded by motion artifact and streak artifact from the patient's arms. No definite pulmonary embolism is seen. There is no aortic dissection. There is a trace amount of right pleural fluid. No pericardial effusion is seen. There is no  adenopathy. Upper abdominal images are unremarkable.    Lung window images show septal thickening in  the lungs with some mild groundglass opacity compatible with volume overload. Additionally, there are multiple poorly defined nodular infiltrates on both sides of the chest concerning for septic emboli. None  of these are cavitary at this time. One of the larger is in the superior segment of the left lower lobe measuring about 1.3 cm. Imaging features are atypical or uncommonly reported for COVID-19 pneumonia. Alternative diagnoses should be considered.      Impression:        1. Technically degraded exam as above, but no definite pulmonary embolism is seen, and there is no aortic dissection.  2. Pulmonary edema with a trace amount of right pleural fluid.  3. Poorly defined nodular infiltrates on both sides of the chest suspicious for septic emboli. Continued follow-up is recommended.    Signer Name: Gerard Elias MD   Signed: 6/18/2021 4:20 AM   Workstation Name: [facility]    CT Head Without Contrast [[id number]] Collected: 06/18/21 0404     Updated: 06/18/21 0406    Narrative:      CT Head WO    HISTORY:   Headache today.    TECHNIQUE:   Axial unenhanced head CT with multiplanar reformats. Radiation dose reduction techniques included automated exposure control or exposure modulation based on body size. Count of known CT and cardiac nuc med studies performed in previous 12 months: 0.     COMPARISON:   None.    FINDINGS:   Ventricular size and configuration are normal. No acute infarct or hemorrhage is identified. There are no masses. There is no skull fracture.      Impression:      Normal, negative unenhanced head CT.    Signer Name: Gerard Elias MD   Signed: 6/18/2021 4:04 AM   Workstation Name: [facility]    Radiology [facility] Venous Doppler Lower Extremity Left (duplex) [[id number]] Collected: 06/18/21 0335     Updated: 06/18/21 0337    Narrative:      US Veins LE Duplex LTD     HISTORY:   Leg pain and redness with swelling.    TECHNIQUE:     Real-time ultrasound was performed of the left lower extremity utilizing spectral and color Doppler with compression and augmentation techniques.     COMPARISON:  None available.    FINDINGS:  No evidence of a left lower extremity deep vein thrombosis. The common femoral vein through the popliteal vein are widely patent. There is normal compressibility with spontaneous and phasic waveforms. No calf vein thrombus.       Impression:      No deep venous thrombus in the left lower extremity.     Signer Name: Gerard Elias MD   Signed: 6/18/2021 3:35 AM   Workstation Name: [facility]    XR Foot 2 View Left [[id number]] Collected: 06/18/21 0216     Updated: 06/18/21 0218    Narrative:      CR Foot 2 Vws LT    INDICATION:   Foot pain and swelling. No trauma.    COMPARISON:   None available    FINDINGS:   2 views of the left foot.  No fracture or dislocation.  No bone erosion or destruction.  No foreign body.      Impression:      Negative left foot.    Signer Name: Gerard Elias MD   Signed: 6/18/2021 2:16 AM   Workstation Name: [facility]    XR Chest 1 View [[id number]] Collected: 06/18/21 0030     Updated: 06/18/21 0032    Narrative:      CR Chest 1 Vw    INDICATION:   Debility.     COMPARISON:    None available.    FINDINGS:  Single portable AP view(s) of the chest.  Cardiac and mediastinal contours are normal. There is some mild haziness at the right lung base which could reflect artifact although a mild infiltrate is not excluded. The lungs are otherwise clear. No  pneumothorax is seen.      Impression:      Mild haziness at the right base could be artifact or could reflect a mild infiltrate. Otherwise negative.    Signer Name: Gerard Elias MD   Signed: 6/18/2021 12:30 AM   Workstation Name: [facility]         ----------------------------------------------------------------------------------------------------------------------  Assessment and Plan: Left ankle sprain and cellulitis - Pt was explained no fractures are noted on x-rays and with the significant improve of swelling and erythema overnight no surgical intervention is needed.  Pt is to continue current treatment. Would recommend the use of walking boot during mobilization for next 2 weeks  and wt bear as tolerated. May remove boot while not walking.    Thank you for the consult.    West Enriquez, ANGELICA  06/22/21  12:07 EDT    Electronically signed by Lex Mclean DO at 06/27/21 1084

## 2021-07-09 NOTE — CASE MANAGEMENT/SOCIAL WORK
Discharge Planning Assessment   [place]     Patient Name: Marce Gutierrez  MRN: [id number]  Today's Date: 7/9/2021    Admit Date: 6/17/2021    Discharge Plan     Row Name 07/09/21 1033       Plan    Plan SS receieved call back from Zeenat at [facility] who states pt would need two negative cultures to go to their facility. She also states RN will need to call report before they can schedule a phone assessment with pt. SS provided RN number to call report to [facility], [phone]. SS following.    Patient/Family in Agreement with Plan  yes        NADIYA Camp

## 2021-07-09 NOTE — NURSING NOTE
Report called to [facility], patients info to be called to intake and should be accepted in the next couple days.

## 2021-07-09 NOTE — PROGRESS NOTES
Antimicrobial Length of Therapy:    Day 19 of cefazolin (duration of therapy pending KAHTRIN results)    Thank you,   Anna Virgen, PharmD  7/9/2021  13:16 EDT

## 2021-07-09 NOTE — PLAN OF CARE
Problem: Fall Injury Risk  Goal: Absence of Fall and Fall-Related Injury  Outcome: Ongoing, Progressing  Intervention: Identify and Manage Contributors to Fall Injury Risk  Recent Flowsheet Documentation  Taken 7/9/2021 0900 by Ivy Lipscomb RN  Medication Review/Management: medications reviewed  Taken 7/9/2021 0745 by Ivy Lipscomb, RN  Medication Review/Management: medications reviewed  Intervention: Promote Injury-Free Environment  Recent Flowsheet Documentation  Taken 7/9/2021 0900 by Ivy Lipscomb, RN  Safety Promotion/Fall Prevention: safety round/check completed  Taken 7/9/2021 0745 by Ivy Lipscomb, RN  Safety Promotion/Fall Prevention: safety round/check completed   Goal Outcome Evaluation:

## 2021-07-09 NOTE — CASE MANAGEMENT/SOCIAL WORK
Discharge Planning Assessment   [place]     Patient Name: Marce Gutierrez  MRN: [id number]  Today's Date: 7/9/2021    Admit Date: 6/17/2021    Discharge Plan     Row Name 07/09/21 1241       Plan    Plan SS spoke with [facility] per Shweta who states pt's Medicaid would cover transportation to [facility]. SS will follow up with [facility] regarding approval status when RN calls report and pt assessment has been completed. SS following.    13:59: SS spoke with Glenys at [facility], [phone], who states IV abx would need to be arranged through [facility]. SS notified CM.     15:38: SS followed up with Glenys at [facility]. RN has still not called report at this time. When report is called, [facility] will complete pt assessment to determine if pt can be accepted at facility. Pt will need [facility] arranged for transportation at d/c.    16:20: SS notified that RN called report to [facility]. SS provided pt with information to complete intake assessment, may take a couple days to be reviewed. SS updated Dr. Sanchez who states pt will need to stay at least through the weekend. SS following.      Patient/Family in Agreement with Plan  yes     NADIYA Camp

## 2021-07-09 NOTE — PROGRESS NOTES
PROGRESS NOTE         Patient Identification:  Name:  Marce Gutierrez  Age:  31 y.o.  Sex:  female  :  1990  MRN:  [id number]  Visit Number:  [id number]  Primary Care Provider:  Provider, No Known         LOS: 21 days       ----------------------------------------------------------------------------------------------------------------------  Subjective       Chief Complaints:    Leg Pain and Arm Pain        Interval History:      Patient sitting up on side of bed this morning.  No issues or complaints.  CRP normal.  Afebrile, no diarrhea.  KATHRIN planned for today.    Review of Systems:    Constitutional: no fever, chills and night sweats. No appetite change or unexpected weight change. No fatigue.  Eyes: no eye drainage, itching or redness.  HEENT: no mouth sores, dysphagia or nose bleed.  Respiratory: no for shortness of breath, cough or production of sputum.  Cardiovascular: no chest pain and no orthopnea.  Palpitations.  Gastrointestinal: no nausea, vomiting or diarrhea. No abdominal pain, hematemesis or rectal bleeding.  Genitourinary: no dysuria or polyuria.  Hematologic/lymphatic: no lymph node abnormalities, no easy bruising or easy bleeding.  Musculoskeletal:  No muscle or joint pain.  Skin: No rash and no itching.  Neurological: no loss of consciousness, no seizure, no headache.  Behavioral/Psych: no depression or suicidal ideation.  Endocrine: no hot flashes.  Immunologic: negative.     ----------------------------------------------------------------------------------------------------------------------      Objective       Current [facility] Meds:  ceFAZolin, 2 g, Intravenous, Q8H  docusate sodium, 100 mg, Oral, BID  enoxaparin, 40 mg, Subcutaneous, Daily  folic acid, 1 mg, Oral, Daily  iron polysaccharides, 150 mg, Oral, Daily  lactobacillus acidophilus, 1 capsule, Oral, Daily  magnesium sulfate, 4 g, Intravenous, Once  multivitamin with minerals, 1 tablet, Oral, Daily  polyethylene glycol,  17 g, Oral, Daily  sodium chloride, 10 mL, Intravenous, Q12H  sodium chloride, 3 mL, Intravenous, Q12H         ----------------------------------------------------------------------------------------------------------------------    Vital Signs:  Temp:  [97.6 °F (36.4 °C)-98.9 °F (37.2 °C)] 98.6 °F (37 °C)  Heart Rate:  [102-137] 121  Resp:  [16-18] 16  BP: (130-148)/() 137/105  Mean Arterial Pressure (Non-Invasive) for the past 24 hrs (Last 3 readings):   Noninvasive MAP (mmHg)   07/09/21 0954 108   07/09/21 0637 98   07/09/21 0235 106     SpO2 Percentage    07/09/21 1113 07/09/21 1117 07/09/21 1120   SpO2: 100% 100% 100%     SpO2:  [94 %-100 %] 100 %  on   ;   Device (Oxygen Therapy): room air    Body mass index is 19.85 kg/m².  Wt Readings from Last 3 Encounters:   07/09/21 55.8 kg (123 lb)   10/30/19 63.5 kg (140 lb)        Intake/Output Summary (Last 24 hours) at 7/9/2021 1143  Last data filed at 7/9/2021 1051  Gross per 24 hour   Intake 915 ml   Output 1350 ml   Net -435 ml     NPO Diet  ----------------------------------------------------------------------------------------------------------------------      Physical Exam:    Constitutional:  Well-developed and well-nourished.  No respiratory distress.      HENT:  Head: Normocephalic and atraumatic.  Mouth:  Moist mucous membranes.    Eyes:  Conjunctivae and EOM are normal.  No scleral icterus.  Neck:  Neck supple.  No JVD present.  Cardiovascular:  Normal rate, regular rhythm and normal heart sounds with no murmur. No edema.  Pulmonary/Chest:  No respiratory distress, no wheezes, no crackles, with normal breath sounds and good air movement.  Abdominal:  Soft.  Bowel sounds are normal.  No distension and no tenderness.   Musculoskeletal:  No edema, no tenderness, and no deformity.  No swelling or redness of joints.    Neurological:  Alert and oriented to person, place, and time.  No facial droop.  No slurred speech.   Skin:  Skin is warm and dry.  No  rash noted.  No pallor.   Psychiatric:  Normal mood and affect.  Behavior is normal.  ----------------------------------------------------------------------------------------------------------------------              Results from last 7 days   Lab Units 07/09/21  0142 07/08/21  0114 07/07/21  0250 07/06/21  0036 07/05/21  0100   CRP mg/dL <0.30 <0.30 0.35 0.62* 0.71*   WBC 10*3/mm3  --  10.77  --  11.09* 11.25*   HEMOGLOBIN g/dL  --  9.4*  --  9.2* 9.2*   HEMATOCRIT %  --  31.1*  --  29.3* 29.5*   MCV fL  --  95.1  --  93.6 95.2   MCHC g/dL  --  30.2*  --  31.4* 31.2*   PLATELETS 10*3/mm3  --  438  --  526* 518*     Results from last 7 days   Lab Units 07/08/21  2136 07/08/21  0114 07/06/21  0036 07/05/21  0100 07/03/21  1600 07/03/21  0408   SODIUM mmol/L  --  138 139 138  --  137   POTASSIUM mmol/L 3.9 4.0 3.8 3.5  --  4.2   MAGNESIUM mg/dL 1.7  --   --   --  1.8  --    CHLORIDE mmol/L  --  103 104 104  --  103   CO2 mmol/L  --  26.6 24.5 26.0  --  25.1   BUN mg/dL  --  15 18 14  --  15   CREATININE mg/dL  --  0.59 0.72 0.66  --  0.72   EGFR IF NONAFRICN AM mL/min/1.73  --  120 95 105  --  95   CALCIUM mg/dL  --  8.5* 8.8 8.6  --  8.5*   GLUCOSE mg/dL  --  96 81 92  --  96   ALBUMIN g/dL  --   --  3.26*  --   --  2.84*   BILIRUBIN mg/dL  --   --  0.2  --   --  0.2   ALK PHOS U/L  --   --  130*  --   --  124*   AST (SGOT) U/L  --   --  19  --   --  17   ALT (SGPT) U/L  --   --  11  --   --  11   Estimated Creatinine Clearance: 122.8 mL/min (by C-G formula based on SCr of 0.59 mg/dL).  No results found for: AMMONIA    No results found for: HGBA1C, POCGLU  Lab Results   Component Value Date    HGBA1C 5.60 06/17/2021     Lab Results   Component Value Date    TSH 0.718 06/17/2021       Blood Culture   Date Value Ref Range Status   06/20/2021 Abnormal Stain (C)  Preliminary   06/19/2021 Staphylococcus aureus (C)  Final     Comment:     Infectious disease consultation is highly recommended to rule out distant foci of  infection.   06/17/2021 Staphylococcus aureus (C)  Final     Comment:     Infectious disease consultation is highly recommended to rule out distant foci of infection.   06/17/2021 Staphylococcus aureus (C)  Final     Comment:     Infectious disease consultation is highly recommended to rule out distant foci of infection.  Refer to previous blood culture collected on 6/17/2021 2318 for MICs.     Urine Culture   Date Value Ref Range Status   06/17/2021 >100,000 CFU/mL Escherichia coli (A)  Final   06/17/2021 >100,000 CFU/mL Staphylococcus aureus (A)  Corrected     No results found for: WOUNDCX  No results found for: STOOLCX  No results found for: RESPCX  Pain Management Panel       Pain Management Panel Latest Ref Rng & Units 7/4/2021 6/17/2021    AMPHETAMINES SCREEN, URINE Negative Negative Positive(A)    BARBITURATES SCREEN Negative Negative Negative    BENZODIAZEPINE SCREEN, URINE Negative Negative Negative    BUPRENORPHINEUR Negative Negative Negative    COCAINE SCREEN, URINE Negative Negative Negative    METHADONE SCREEN, URINE Negative Negative Negative              ----------------------------------------------------------------------------------------------------------------------  Imaging Results (Last 24 Hours)       ** No results found for the last 24 hours. **            ----------------------------------------------------------------------------------------------------------------------    Assessment/Plan       Assessment/Plan     ASSESSMENT:    1.  Severe sepsis with lactic acid greater than 2 on admission  2.  Complicated MSSA bacteremia  3.  Likely underlying endocarditis  4.  Septic emboli    PLAN:     Patient sitting up on side of bed this morning.  No issues or complaints.  CRP normal.  Afebrile, no diarrhea.  KATHRIN planned for today.    Blood cultures from 6/24/2021 finalized as no growth. Blood culture from 6/22/2021 1 out of 1 set positive for MSSA.    Urinalysis on 6/17/2021 was positive and urine  culture finalized as greater than 100,000 colonies of E. coli and greater than 100,000 colonies of MRSA.  Blood cultures on 6/17/2021 finalized as MSSA.  Repeat blood cultures on 6/19/2021 and 6/20/2021 are still showing growth.  COVID-19 and flu A/B PCR on 6/18/2021 detected COVID-19.  Mycoplasma pneumoniae antibody on 6/18/2021 was negative.  Strep pneumo antigen on 6/18/2021 was negative.  CT chest with PE protocol on 6/18/2021 showed technically degraded exam, but no definite PE is seen, and there is no aortic dissection.  Pulmonary edema with a trace amount of right pleural fluid.  Poorly defined nodular infiltrates on both sides of the chest suspicious for septic emboli.  Continued follow-up is recommended. MRI of the lumbar spine on 7/2/2021 revealed no acute findings.     Contacted micro lab regarding urine culture on 6/17/2021.  Urine culture has finalized as MSSA, but MRSA verbiage was included underneath and micro lab corrected.    COVID-19 diagnosis was incidental and patient remains asymptomatic.  Patient is now out of isolation as of 6/28/2021.    Recommend to continue Cefazolin monotherapy. We will follow closely and adjust antibiotic therapy as appropriate.  Continue to recommend KATHRIN and will follow up on results.  If KATHRIN is clear of vegetations or abscesses, patient will need 4 weeks of IV antibiotics from blood culture clearing date.  If KATHRIN shows any vegetations or abscesses, patient would require 4 to 6 weeks of IV antibiotic therapy from blood culture clearing date.    Code Status:   Code Status and Medical Interventions:   Ordered at: 06/18/21 0446     Level Of Support Discussed With:    Patient     Code Status:    CPR     Medical Interventions (Level of Support Prior to Arrest):    Full           Donna Carcamo, ANGELICA  07/09/21  11:43 EDT

## 2021-07-09 NOTE — CASE MANAGEMENT/SOCIAL WORK
Continued Stay Note  TAYLOR Antonio     Patient Name: Marce Gutierrez  MRN: [id number]  Today's Date: 7/9/2021    Admit Date: 6/17/2021    Discharge Plan     Row Name 07/09/21 1609       Plan    Plan  Sent fax to [facility] [phone] and [facility] daniel Colunga to contact me re: order and meds will need to be sent to [facility] in [place] when patient is discharged.  Final MD order will need to be sent at discharge.        Discharge Codes    No documentation.       Expected Discharge Date and Time     Expected Discharge Date Expected Discharge Time    Jun 22, 2021             Lexi Stafford RN

## 2021-07-09 NOTE — ANESTHESIA PREPROCEDURE EVALUATION
Anesthesia Evaluation     Patient summary reviewed and Nursing notes reviewed   no history of anesthetic complications:  NPO Solid Status: > 8 hours  NPO Liquid Status: > 8 hours           Airway   Mallampati: II  TM distance: >3 FB  Neck ROM: full  Dental - normal exam     Pulmonary - normal exam   (+) pneumonia resolved , a smoker Current,   Cardiovascular - negative cardio ROS and normal exam    ECG reviewed        Neuro/Psych- negative ROS  GI/Hepatic/Renal/Endo - negative ROS     Musculoskeletal (-) negative ROS    Abdominal    Substance History   (+) drug use     OB/GYN negative ob/gyn ROS         Other   blood dyscrasia anemia,     ROS/Med Hx Other: Hx COVID-19 21 days ago                  Anesthesia Plan    ASA 2     general   total IV anesthesia  intravenous induction     Anesthetic plan, all risks, benefits, and alternatives have been provided, discussed and informed consent has been obtained with: patient.    Plan discussed with CRNA.

## 2021-07-09 NOTE — PLAN OF CARE
Goal Outcome Evaluation:  Pt resting in bed. Pt did have some episodes of high heart rate. Shweta STREETER notified and continuing to monitor. Pt otherwise no complaints. Will continue to follow plan of care.

## 2021-07-09 NOTE — PROGRESS NOTES
LOS: 21 days     Name: Marce Gutierrez  Age/Sex: 31 y.o. female  :  1990        PCP: Provider, No Known  REF: No ref. provider found    Principal Problem:    Endocarditis      Reason for follow-up: possible KATHRIN for endocarditis    Subjective       Subjective Ms. Gutierrez is a 31-year-old  female who was admitted with fever and was diagnosed to have MSSA bacteremia.      Interval History: Mrs. Gutierrez reports that she is doing well. She reports chronic tachycardia with heart rate remaining 105-120. Denies shortness of breath or chest pains.  She denies any fever or chills.    ROS    Vital Signs  Temp:  [97.4 °F (36.3 °C)-98.9 °F (37.2 °C)] 98.6 °F (37 °C)  Heart Rate:  [] 104  Resp:  [16-18] 16  BP: (120-148)/(71-97) 131/94  Vital Signs (last 72 hrs)        0700  -   0659  0700  -   0659  07  -   0659  0700  -   0956   Most Recent    Temp (°F) 97.5 -  98.7    97.3 -  98.4    97.4 -  98.9      98.6     98.6 (37)    Heart Rate 75 -  109    69 -  110    77 -  115      104     104    Resp   18    16 -  18    16 -  18      16     16    /77 -  137/89    111/63 -  164/82    120/71 -  148/94      131/94     131/94    SpO2 (%) 96 -  98      99    93 -  96       96        Documented weights    21 2228 21 0531 21 0458 21 0500   Weight: 54.4 kg (120 lb) 58.5 kg (129 lb) 58.3 kg (128 lb 9.6 oz) 57.3 kg (126 lb 6.4 oz)    21 0403 21 0403 21 0502 21 0500   Weight: 56.7 kg (125 lb) 55.3 kg (122 lb) 56.2 kg (123 lb 12.8 oz) 55.9 kg (123 lb 4.8 oz)    21 0502 21 0500 21 0502 21 0502   Weight: 56.5 kg (124 lb 9 oz) 59.8 kg (131 lb 12.8 oz) 60.6 kg (133 lb 9.6 oz) 61.5 kg (135 lb 9.3 oz)    21 0500 21 0502 21 0200 21 0500   Weight: 61.1 kg (134 lb 12.8 oz) 60.2 kg (132 lb 12.8 oz) 60.6 kg (133 lb 9.6 oz) 60.2 kg (132 lb 12.8 oz)    21 1628 21 0406 21 0457  07/05/21 0500   Weight: 56.3 kg (124 lb 3.2 oz) 56.1 kg (123 lb 9.6 oz) 55.6 kg (122 lb 9.6 oz) 56.3 kg (124 lb 3.2 oz)    07/06/21 0500 07/07/21 0300 07/08/21 0500 07/09/21 0500   Weight: 57 kg (125 lb 9.6 oz) 56.8 kg (125 lb 3.2 oz) 57 kg (125 lb 9.6 oz) 55.8 kg (123 lb)      Body mass index is 19.85 kg/m².    Intake/Output Summary (Last 24 hours) at 7/9/2021 0956  Last data filed at 7/9/2021 0851  Gross per 24 hour   Intake 840 ml   Output 1100 ml   Net -260 ml     Objective    Objective     I have seen and examined Ms. Gutierrez.  Physical Exam:     General Appearance:    Alert, cooperative, in no acute distress   Head:    Normocephalic, without obvious abnormality, atraumatic   Eyes:            Conjunctivae and sclerae normal, no   icterus, no pallor, corneas clear.   Neck:   No adenopathy, supple, trachea midline, no thyromegaly, no   carotid bruit, no JVD   Lungs:     Clear to auscultation,respirations regular, even and                  unlabored    Heart:    Regular rhythm and normal rate, normal S1 and S2, no            murmur, no gallop, no rub, no click   Chest Wall:    No abnormalities observed   Abdomen:     Normal bowel sounds, no masses, no organomegaly, soft        non-tender, non-distended, no guarding, no rebound                tenderness   Extremities:   Moves all extremities well, no edema, no cyanosis, no             redness   Pulses:   Pulses palpable and equal bilaterally   Skin:   No bleeding, bruising or rash       Neurologic:  No focal neurological deficits noted.          Procedures    Results review       Results Review:   Results from last 7 days   Lab Units 07/08/21  0114 07/06/21  0036 07/05/21  0100 07/03/21  0408   WBC 10*3/mm3 10.77 11.09* 11.25* 11.49*   HEMOGLOBIN g/dL 9.4* 9.2* 9.2* 9.6*   PLATELETS 10*3/mm3 438 526* 518* 600*     Results from last 7 days   Lab Units 07/08/21  2136 07/08/21  0114 07/06/21  0036 07/05/21  0100 07/03/21  0408   SODIUM mmol/L  --  138 139 138 137    POTASSIUM mmol/L 3.9 4.0 3.8 3.5 4.2   CHLORIDE mmol/L  --  103 104 104 103   CO2 mmol/L  --  26.6 24.5 26.0 25.1   BUN mg/dL  --  15 18 14 15   CREATININE mg/dL  --  0.59 0.72 0.66 0.72   CALCIUM mg/dL  --  8.5* 8.8 8.6 8.5*   GLUCOSE mg/dL  --  96 81 92 96   ALT (SGPT) U/L  --   --  11  --  11   AST (SGOT) U/L  --   --  19  --  17         Lab Results   Component Value Date    INR 1.08 06/19/2021     Lab Results   Component Value Date    MG 1.7 07/08/2021    MG 1.8 07/03/2021    MG 2.5 06/27/2021     Lab Results   Component Value Date    TSH 0.718 06/17/2021      Imaging Results (Last 48 Hours)     ** No results found for the last 48 hours. **        No results found for: BNP           Echo   Results for orders placed during the hospital encounter of 06/17/21    Adult Transthoracic Echo Limited W/ Cont if Necessary Per Protocol    Interpretation Summary  · Left ventricular ejection fraction appears to be 61 - 65%. Left ventricular systolic function is normal.  · No significant functional valvular abnormalities noted  · There is no evidence of pericardial effusion         I reviewed the patient's new clinical results.    Medication Review:   ceFAZolin, 2 g, Intravenous, Q8H  docusate sodium, 100 mg, Oral, BID  enoxaparin, 40 mg, Subcutaneous, Daily  folic acid, 1 mg, Oral, Daily  iron polysaccharides, 150 mg, Oral, Daily  lactobacillus acidophilus, 1 capsule, Oral, Daily  magnesium sulfate, 4 g, Intravenous, Once  multivitamin with minerals, 1 tablet, Oral, Daily  polyethylene glycol, 17 g, Oral, Daily  sodium chloride, 10 mL, Intravenous, Q12H  sodium chloride, 3 mL, Intravenous, Q12H           Assessment      Assessment:  MSSA bacteremia with suspected endocarditis.      Plan     Recommendations:  We will try to go ahead and do the KATHRIN today.    I discussed the patients findings and my recommendations with patient and family      07/09/21  09:56 EDT    Electronically signed by Haseeb Rabago PA-C, 07/09/21,  9:56 AM EDT.

## 2021-07-10 LAB
ALBUMIN SERPL-MCNC: 3.42 G/DL (ref 3.5–5.2)
ALBUMIN/GLOB SERPL: 0.9 G/DL
ALP SERPL-CCNC: 129 U/L (ref 39–117)
ALT SERPL W P-5'-P-CCNC: 8 U/L (ref 1–33)
ANION GAP SERPL CALCULATED.3IONS-SCNC: 10.6 MMOL/L (ref 5–15)
ANION GAP SERPL CALCULATED.3IONS-SCNC: 11.5 MMOL/L (ref 5–15)
AST SERPL-CCNC: 18 U/L (ref 1–32)
BASOPHILS # BLD AUTO: 0.09 10*3/MM3 (ref 0–0.2)
BASOPHILS NFR BLD AUTO: 1 % (ref 0–1.5)
BILIRUB SERPL-MCNC: 0.5 MG/DL (ref 0–1.2)
BUN SERPL-MCNC: 14 MG/DL (ref 6–20)
BUN SERPL-MCNC: 16 MG/DL (ref 6–20)
BUN/CREAT SERPL: 22.2 (ref 7–25)
BUN/CREAT SERPL: 22.2 (ref 7–25)
CALCIUM SPEC-SCNC: 8.5 MG/DL (ref 8.6–10.5)
CALCIUM SPEC-SCNC: 8.8 MG/DL (ref 8.6–10.5)
CHLORIDE SERPL-SCNC: 103 MMOL/L (ref 98–107)
CHLORIDE SERPL-SCNC: 105 MMOL/L (ref 98–107)
CO2 SERPL-SCNC: 23.5 MMOL/L (ref 22–29)
CO2 SERPL-SCNC: 24.4 MMOL/L (ref 22–29)
CREAT SERPL-MCNC: 0.63 MG/DL (ref 0.57–1)
CREAT SERPL-MCNC: 0.72 MG/DL (ref 0.57–1)
CRP SERPL-MCNC: 0.49 MG/DL (ref 0–0.5)
CRP SERPL-MCNC: 0.62 MG/DL (ref 0–0.5)
DEPRECATED RDW RBC AUTO: 43.8 FL (ref 37–54)
EOSINOPHIL # BLD AUTO: 0.12 10*3/MM3 (ref 0–0.4)
EOSINOPHIL NFR BLD AUTO: 1.3 % (ref 0.3–6.2)
ERYTHROCYTE [DISTWIDTH] IN BLOOD BY AUTOMATED COUNT: 13.2 % (ref 12.3–15.4)
GFR SERPL CREATININE-BSD FRML MDRD: 111 ML/MIN/1.73
GFR SERPL CREATININE-BSD FRML MDRD: 95 ML/MIN/1.73
GLOBULIN UR ELPH-MCNC: 3.9 GM/DL
GLUCOSE SERPL-MCNC: 85 MG/DL (ref 65–99)
GLUCOSE SERPL-MCNC: 98 MG/DL (ref 65–99)
HCT VFR BLD AUTO: 30.6 % (ref 34–46.6)
HGB BLD-MCNC: 9.9 G/DL (ref 12–15.9)
IMM GRANULOCYTES # BLD AUTO: 0.05 10*3/MM3 (ref 0–0.05)
IMM GRANULOCYTES NFR BLD AUTO: 0.6 % (ref 0–0.5)
LYMPHOCYTES # BLD AUTO: 2.47 10*3/MM3 (ref 0.7–3.1)
LYMPHOCYTES NFR BLD AUTO: 27.3 % (ref 19.6–45.3)
MAGNESIUM SERPL-MCNC: 1.7 MG/DL (ref 1.6–2.6)
MAGNESIUM SERPL-MCNC: 2 MG/DL (ref 1.6–2.6)
MCH RBC QN AUTO: 29.6 PG (ref 26.6–33)
MCHC RBC AUTO-ENTMCNC: 32.4 G/DL (ref 31.5–35.7)
MCV RBC AUTO: 91.3 FL (ref 79–97)
MONOCYTES # BLD AUTO: 0.78 10*3/MM3 (ref 0.1–0.9)
MONOCYTES NFR BLD AUTO: 8.6 % (ref 5–12)
NEUTROPHILS NFR BLD AUTO: 5.53 10*3/MM3 (ref 1.7–7)
NEUTROPHILS NFR BLD AUTO: 61.2 % (ref 42.7–76)
NRBC BLD AUTO-RTO: 0 /100 WBC (ref 0–0.2)
PLATELET # BLD AUTO: 430 10*3/MM3 (ref 140–450)
PMV BLD AUTO: 9.2 FL (ref 6–12)
POTASSIUM SERPL-SCNC: 4 MMOL/L (ref 3.5–5.2)
POTASSIUM SERPL-SCNC: 4.2 MMOL/L (ref 3.5–5.2)
PROT SERPL-MCNC: 7.3 G/DL (ref 6–8.5)
RBC # BLD AUTO: 3.35 10*6/MM3 (ref 3.77–5.28)
SODIUM SERPL-SCNC: 138 MMOL/L (ref 136–145)
SODIUM SERPL-SCNC: 140 MMOL/L (ref 136–145)
WBC # BLD AUTO: 9.04 10*3/MM3 (ref 3.4–10.8)

## 2021-07-10 PROCEDURE — 99231 SBSQ HOSP IP/OBS SF/LOW 25: CPT | Performed by: FAMILY MEDICINE

## 2021-07-10 PROCEDURE — 85025 COMPLETE CBC W/AUTO DIFF WBC: CPT | Performed by: INTERNAL MEDICINE

## 2021-07-10 PROCEDURE — 25010000002 ENOXAPARIN PER 10 MG: Performed by: INTERNAL MEDICINE

## 2021-07-10 PROCEDURE — 86140 C-REACTIVE PROTEIN: CPT | Performed by: INTERNAL MEDICINE

## 2021-07-10 PROCEDURE — 80053 COMPREHEN METABOLIC PANEL: CPT | Performed by: INTERNAL MEDICINE

## 2021-07-10 PROCEDURE — 93005 ELECTROCARDIOGRAM TRACING: CPT | Performed by: HOSPITALIST

## 2021-07-10 PROCEDURE — 83735 ASSAY OF MAGNESIUM: CPT | Performed by: HOSPITALIST

## 2021-07-10 PROCEDURE — 83735 ASSAY OF MAGNESIUM: CPT | Performed by: INTERNAL MEDICINE

## 2021-07-10 PROCEDURE — 25010000003 CEFAZOLIN SODIUM-DEXTROSE 2-3 GM-%(50ML) RECONSTITUTED SOLUTION: Performed by: INTERNAL MEDICINE

## 2021-07-10 PROCEDURE — 25010000003 CEFAZOLIN SODIUM-DEXTROSE 2-3 GM-%(50ML) RECONSTITUTED SOLUTION: Performed by: NURSE PRACTITIONER

## 2021-07-10 RX ORDER — MAGNESIUM SULFATE HEPTAHYDRATE 40 MG/ML
4 INJECTION, SOLUTION INTRAVENOUS ONCE
Status: COMPLETED | OUTPATIENT
Start: 2021-07-11 | End: 2021-07-11

## 2021-07-10 RX ADMIN — Medication 150 MG: at 08:43

## 2021-07-10 RX ADMIN — SODIUM CHLORIDE, PRESERVATIVE FREE 10 ML: 5 INJECTION INTRAVENOUS at 20:00

## 2021-07-10 RX ADMIN — FOLIC ACID 1 MG: 1 TABLET ORAL at 08:42

## 2021-07-10 RX ADMIN — SODIUM CHLORIDE, PRESERVATIVE FREE 3 ML: 5 INJECTION INTRAVENOUS at 20:00

## 2021-07-10 RX ADMIN — CEFAZOLIN SODIUM 2 G: 2 SOLUTION INTRAVENOUS at 11:13

## 2021-07-10 RX ADMIN — SODIUM CHLORIDE, PRESERVATIVE FREE 10 ML: 5 INJECTION INTRAVENOUS at 08:43

## 2021-07-10 RX ADMIN — SODIUM CHLORIDE, PRESERVATIVE FREE 3 ML: 5 INJECTION INTRAVENOUS at 08:42

## 2021-07-10 RX ADMIN — Medication 1 CAPSULE: at 08:42

## 2021-07-10 RX ADMIN — Medication 1 TABLET: at 08:42

## 2021-07-10 RX ADMIN — CEFAZOLIN SODIUM 2 G: 2 SOLUTION INTRAVENOUS at 03:16

## 2021-07-10 RX ADMIN — ENOXAPARIN SODIUM 40 MG: 40 INJECTION SUBCUTANEOUS at 08:42

## 2021-07-10 RX ADMIN — POLYETHYLENE GLYCOL (3350) 17 G: 17 POWDER, FOR SOLUTION ORAL at 08:42

## 2021-07-10 RX ADMIN — CEFAZOLIN SODIUM 2 G: 2 SOLUTION INTRAVENOUS at 20:00

## 2021-07-10 RX ADMIN — DOCUSATE SODIUM 100 MG: 100 CAPSULE, LIQUID FILLED ORAL at 08:42

## 2021-07-10 RX ADMIN — IBUPROFEN 400 MG: 400 TABLET, FILM COATED ORAL at 22:46

## 2021-07-10 RX ADMIN — HYDROCODONE BITARTRATE AND ACETAMINOPHEN 1 TABLET: 7.5; 325 TABLET ORAL at 11:16

## 2021-07-10 NOTE — PROGRESS NOTES
[facility] HOSPITALIST PROGRESS NOTE     Patient Identification:  Name:  Marce Gutierrez  Age:  31 y.o.  Sex:  female  :  1990  MRN:  [id number]  Visit Number:  [id number]  ROOM: [address] [place] [address]     Primary Care Provider:  Provider, No Known    Length of stay in inpatient status:  22    Subjective     Chief Compliant:    Chief Complaint   Patient presents with   • Leg Pain   • Arm Pain       History of Presenting Illness: Patient is a 31-year-old female who has a history of methicillin sensitive staph aureus bacteremia.  Patient has had recent KATHRIN which showed no evidence of vegetations.  Patient is scheduled for another couple of weeks of IV antibiotics.  Patient has no new complaints at this time.  Patient has had some intermittent tachycardia.  No new complaints otherwise    Objective     Current Hospital Meds:ceFAZolin, 2 g, Intravenous, Q8H  docusate sodium, 100 mg, Oral, BID  enoxaparin, 40 mg, Subcutaneous, Daily  folic acid, 1 mg, Oral, Daily  iron polysaccharides, 150 mg, Oral, Daily  lactobacillus acidophilus, 1 capsule, Oral, Daily  multivitamin with minerals, 1 tablet, Oral, Daily  polyethylene glycol, 17 g, Oral, Daily  sodium chloride, 10 mL, Intravenous, Q12H  sodium chloride, 3 mL, Intravenous, Q12H       ----------------------------------------------------------------------------------------------------------------------  Vital Signs:  Temp:  [97.6 °F (36.4 °C)-98.8 °F (37.1 °C)] 97.6 °F (36.4 °C)  Heart Rate:  [] 84  Resp:  [16-18] 16  BP: (115-141)/(75-96) 120/77  SpO2:  [93 %-98 %] 93 %  on   ;   Device (Oxygen Therapy): room air  Body mass index is 19.4 kg/m².    Wt Readings from Last 3 Encounters:   07/10/21 54.5 kg (120 lb 3.2 oz)   10/30/19 63.5 kg (140 lb)     Intake & Output (last 3 days)       701 -  - 07/09 0700 07/09 0701 - 07/10 0700 07/10 0701 - 07/11 07    P.O. 7031 602 0880 600    I.V. (mL/kg)   75 (1.4)     Total Intake(mL/kg) 1800  (31.6) 840 (15.1) 1155 (21.2) 600 (11)    Urine (mL/kg/hr)  1100 (0.8) 1300 (1)     Total Output  1100 1300     Net +1800 -260 -145 +600            Urine Unmeasured Occurrence 8 x 3 x 5 x 1 x    Stool Unmeasured Occurrence  1 x          Diet Regular  ----------------------------------------------------------------------------------------------------------------------  Physical exam:  Constitutional:   No acute distress  HEENT: Normocephalic atraumatic  Neck: Supple   Cardiovascular: Sinus tachycardia with a rate approximately 105  Pulmonary/Chest: Clear to auscultation  Abdominal: Positive bowel sounds soft  Musculoskeletal: No arthropathy  Neurological: No focal deficits  Skin: No rash  Peripheral vascular:  Genitourinary:  ----------------------------------------------------------------------------------------------------------------------    Last echocardiogram:  Results for orders placed during the hospital encounter of 06/17/21    Adult Transesophageal Echo (KATHRIN) W/ Cont if Necessary Per Protocol    Interpretation Summary  · Normal left ventricular cavity size and wall thickness noted. All left ventricular wall segments contract normally.  · Left ventricular ejection fraction appears to be 56 - 60%.  · The aortic valve is structurally normal with no regurgitation or stenosis present.  · The mitral valve is structurally normal with no significant stenosis present. Mild mitral valve regurgitation is present.  · The tricuspid valve is structurally normal with no significant regurgitation or significant stenosis present  · There is no evidence of pericardial effusion. .  · Saline test results are negative.  · There is no echocardiographic evidence of atrial or ventricular septal defect noted on the saline test.  · There is no echocardiographic evidence of any endocardial vegetations or aortic root abscess.  · Comments: There is no echocardiograph evidence of endocarditis noted on the  study.    ----------------------------------------------------------------------------------------------------------------------  Results from last 7 days   Lab Units 07/10/21  0447 07/09/21  0142 07/08/21 0114 07/06/21 0036   CRP mg/dL 0.62* <0.30 <0.30 0.62*   WBC 10*3/mm3 9.04  --  10.77 11.09*   HEMOGLOBIN g/dL 9.9*  --  9.4* 9.2*   HEMATOCRIT % 30.6*  --  31.1* 29.3*   MCV fL 91.3  --  95.1 93.6   MCHC g/dL 32.4  --  30.2* 31.4*   PLATELETS 10*3/mm3 430  --  438 526*         Results from last 7 days   Lab Units 07/10/21  0447 07/08/21  2136 07/08/21 0114 07/06/21  0036 07/03/21  1600   SODIUM mmol/L 140  --  138 139  --    POTASSIUM mmol/L 4.0 3.9 4.0 3.8  --    MAGNESIUM mg/dL 2.0 1.7  --   --  1.8   CHLORIDE mmol/L 105  --  103 104  --    CO2 mmol/L 24.4  --  26.6 24.5  --    BUN mg/dL 14  --  15 18  --    CREATININE mg/dL 0.63  --  0.59 0.72  --    EGFR IF NONAFRICN AM mL/min/1.73 111  --  120 95  --    CALCIUM mg/dL 8.5*  --  8.5* 8.8  --    GLUCOSE mg/dL 98  --  96 81  --    ALBUMIN g/dL 3.42*  --   --  3.26*  --    BILIRUBIN mg/dL 0.5  --   --  0.2  --    ALK PHOS U/L 129*  --   --  130*  --    AST (SGOT) U/L 18  --   --  19  --    ALT (SGPT) U/L 8  --   --  11  --    Estimated Creatinine Clearance: 112.3 mL/min (by C-G formula based on SCr of 0.63 mg/dL).  No results found for: AMMONIA              No results found for: HGBA1C, POCGLU  Lab Results   Component Value Date    TSH 0.718 06/17/2021    FREET4 1.16 07/09/2021     Lab Results   Component Value Date    PREGTESTUR Negative 06/17/2021     Pain Management Panel     Pain Management Panel Latest Ref Rng & Units 7/4/2021 6/17/2021    AMPHETAMINES SCREEN, URINE Negative Negative Positive(A)    BARBITURATES SCREEN Negative Negative Negative    BENZODIAZEPINE SCREEN, URINE Negative Negative Negative    BUPRENORPHINEUR Negative Negative Negative    COCAINE SCREEN, URINE Negative Negative Negative    METHADONE SCREEN, URINE Negative Negative Negative         Brief Urine Lab Results  (Last result in the past 365 days)      Color   Clarity   Blood   Leuk Est   Nitrite   Protein   CREAT   Urine HCG        06/17/21 2253 Dark Yellow Cloudy Large (3+) Moderate (2+) Positive 30 mg/dL (1+)         06/17/21 2253               Negative         No results found for: BLOODCX      No results found for: URINECX  No results found for: WOUNDCX  No results found for: STOOLCX  Results from last 7 days   Lab Units 07/10/21  0447 07/09/21  0142 07/08/21  0114 07/07/21  0250 07/06/21  0036 07/05/21  0100 07/04/21  0401   CRP mg/dL 0.62* <0.30 <0.30 0.35 0.62* 0.71* 0.70*       I have personally looked at the labs and they are summarized above.  ----------------------------------------------------------------------------------------------------------------------  Detailed radiology reports for the last 24 hours:    Imaging Results (Last 24 Hours)     ** No results found for the last 24 hours. **        Final impressions for the last 30 days of radiology reports:    Adult Transesophageal Echo (KATHRIN) W/ Cont if Necessary Per Protocol    Addendum Date: 7/9/2021    · Normal left ventricular cavity size and wall thickness noted. All left ventricular wall segments contract normally. · Left ventricular ejection fraction appears to be 56 - 60%. · The aortic valve is structurally normal with no regurgitation or stenosis present. · The mitral valve is structurally normal with no significant stenosis present. Mild mitral valve regurgitation is present. · The tricuspid valve is structurally normal with no significant regurgitation or significant stenosis present · There is no evidence of pericardial effusion. . · Saline test results are negative. · There is no echocardiographic evidence of atrial or ventricular septal defect noted on the saline test. · There is no echocardiographic evidence of any endocardial vegetations or aortic root abscess. · Comments: There is no echocardiograph evidence of  endocarditis noted on the study.      XR Humerus Left    Result Date: 6/18/2021  No acute bony abnormality.  This report was finalized on 6/18/2021 5:14 PM by Dr. Hakeem Morgan MD.      XR Forearm 2 View Left    Result Date: 6/18/2021  No acute bony abnormality  This report was finalized on 6/18/2021 5:14 PM by Dr. Hakeem Morgan MD.      XR Knee 1 or 2 View Left    Result Date: 6/18/2021  No acute bony abnormality  This report was finalized on 6/18/2021 5:17 PM by Dr. Hakeem Morgan MD.      XR Tibia Fibula 2 View Left    Result Date: 6/18/2021  No acute bony abnormality  This report was finalized on 6/18/2021 5:17 PM by Dr. Hakeem Morgan MD.      XR Foot 2 View Left    Result Date: 6/18/2021  Negative left foot. Signer Name: Gerard Elias MD  Signed: 6/18/2021 2:16 AM  Workstation Name: [facility]    XR Foot 3+ View Left    Result Date: 6/20/2021  No acute bony abnormality  This report was finalized on 6/20/2021 10:53 AM by Dr. Hakeem Morgan MD.      CT Head Without Contrast    Result Date: 6/18/2021  Normal, negative unenhanced head CT. Signer Name: Gerard Elias MD  Signed: 6/18/2021 4:04 AM  Workstation Name: [facility]    MRI Lumbar Spine Without Contrast    Result Date: 7/2/2021    No acute findings in the lumbar spine.  This report was finalized on 7/2/2021 12:17 PM by Dr. Bill Hawkins MD.      XR Chest 1 View    Result Date: 6/18/2021  Mild haziness at the right base could be artifact or could reflect a mild infiltrate. Otherwise negative. Signer Name: Gerard Elias MD  Signed: 6/18/2021 12:30 AM  Workstation Name: [facility]    CT Chest Pulmonary Embolism    Result Date: 6/18/2021  1. Technically degraded exam as above, but no definite pulmonary embolism is seen, and there is no aortic dissection. 2. Pulmonary edema with a trace amount of right pleural fluid. 3. Poorly defined nodular  infiltrates on both sides of the chest suspicious for septic emboli. Continued follow-up is recommended. Signer Name: Gerard Elias MD  Signed: 6/18/2021 4:20 AM  Workstation Name: [facility]  Radiology [facility] Venous Doppler Lower Extremity Left (duplex)    Result Date: 6/18/2021  No deep venous thrombus in the left lower extremity. Signer Name: Gerard Elias MD  Signed: 6/18/2021 3:35 AM  Workstation Name: [facility]  Radiology [facility] Venous Doppler Lower Extremity Right (duplex)    Result Date: 7/5/2021  No DVT in the right lower extremity on today's exam.  This report was finalized on 7/5/2021 5:19 PM by Dr. Hakeem Morgan MD.      XR Hip With or Without Pelvis 2 - 3 View Left    Result Date: 6/18/2021  No acute bony abnormality  This report was finalized on 6/18/2021 5:15 PM by Dr. Hakeem Morgan MD.      I have personally looked at the radiology images and read the final radiology report.    Assessment & Plan    Methicillin staph aureus bacteremia patient has had a complicated bacteremia but fortunately has no evidence of vegetations noted on KATHRIN.  Patient required a total of 4 weeks of IV Ancef and she had completed her course by July 22.    Intermittent tachycardia--we will continue to monitor at this time.  Patient tolerating well        Hepatitis C--recommend outpatient evaluation    COVID-19--patient is 21 days out from diagnosis and does not been symptomatic.  Patient no longer in isolation    Anemia--continue iron and folic acid.        VTE Prophylaxis:   Mechanical Order History:     None      Pharmalogical Order History:      Ordered     Dose Route Frequency Stop    07/05/21 1542  enoxaparin (LOVENOX) syringe 40 mg      40 mg SC Daily --    06/21/21 1157  enoxaparin (LOVENOX) syringe 60 mg  Status:  Discontinued      1 mg/kg SC Daily 07/05/21 1542    06/18/21 0528  enoxaparin (LOVENOX) syringe 50 mg  Status:  Discontinued      1 mg/kg SC Daily  06/21/21 1157    06/18/21 0524  Pharmacy to Dose enoxaparin (LOVENOX)  Status:  Discontinued     Question:  Indication of use  Answer:  Prophylaxis    -- XX Continuous PRN 06/20/21 1211                    Christopher Balderas MD  [facility] Hospitalist  07/10/21  14:34 EDT

## 2021-07-10 NOTE — PLAN OF CARE
Goal Outcome Evaluation:      Patient is currently resting in bed. From time to time, patient's HR has jumped up into the 140s while patient was ambulating but was not sustained. Otherwise, patient has not had any complaints. VS stable. Will continue to monitor and follow plan of care.

## 2021-07-10 NOTE — PROGRESS NOTES
PROGRESS NOTE         Patient Identification:  Name:  Marce Gutierrez  Age:  31 y.o.  Sex:  female  :  1990  MRN:  [id number]  Visit Number:  [id number]  Primary Care Provider:  Provider, No Known         LOS: 22 days       ----------------------------------------------------------------------------------------------------------------------  Subjective       Chief Complaints:    Leg Pain and Arm Pain        Interval History:      Patient resting comfortably in bed this morning.  No issues or complaints.  Afebrile, no diarrhea.  WBC normal.  CRP 0.62.  KATHRIN reports no evidence of vegetation.     Review of Systems:    Constitutional: no fever, chills and night sweats. No appetite change or unexpected weight change. No fatigue.  Eyes: no eye drainage, itching or redness.  HEENT: no mouth sores, dysphagia or nose bleed.  Respiratory: no for shortness of breath, cough or production of sputum.  Cardiovascular: no chest pain and no orthopnea.  Palpitations.  Gastrointestinal: no nausea, vomiting or diarrhea. No abdominal pain, hematemesis or rectal bleeding.  Genitourinary: no dysuria or polyuria.  Hematologic/lymphatic: no lymph node abnormalities, no easy bruising or easy bleeding.  Musculoskeletal:  No muscle or joint pain.  Skin: No rash and no itching.  Neurological: no loss of consciousness, no seizure, no headache.  Behavioral/Psych: no depression or suicidal ideation.  Endocrine: no hot flashes.  Immunologic: negative.     ----------------------------------------------------------------------------------------------------------------------      Objective       Current [facility] Meds:  ceFAZolin, 2 g, Intravenous, Q8H  docusate sodium, 100 mg, Oral, BID  enoxaparin, 40 mg, Subcutaneous, Daily  folic acid, 1 mg, Oral, Daily  iron polysaccharides, 150 mg, Oral, Daily  lactobacillus acidophilus, 1 capsule, Oral, Daily  multivitamin with minerals, 1 tablet, Oral, Daily  polyethylene glycol, 17 g, Oral,  Daily  sodium chloride, 10 mL, Intravenous, Q12H  sodium chloride, 3 mL, Intravenous, Q12H         ----------------------------------------------------------------------------------------------------------------------    Vital Signs:  Temp:  [97.6 °F (36.4 °C)-98.8 °F (37.1 °C)] 97.6 °F (36.4 °C)  Heart Rate:  [] 84  Resp:  [16-18] 16  BP: (115-152)/(65-98) 120/77  Mean Arterial Pressure (Non-Invasive) for the past 24 hrs (Last 3 readings):   Noninvasive MAP (mmHg)   07/10/21 0646 87   07/10/21 0252 90   07/09/21 1817 108     SpO2 Percentage    07/09/21 2125 07/10/21 0646 07/10/21 1000   SpO2: 97% 98% 93%     SpO2:  [93 %-100 %] 93 %  on   ;   Device (Oxygen Therapy): room air    Body mass index is 19.4 kg/m².  Wt Readings from Last 3 Encounters:   07/10/21 54.5 kg (120 lb 3.2 oz)   10/30/19 63.5 kg (140 lb)        Intake/Output Summary (Last 24 hours) at 7/10/2021 1133  Last data filed at 7/10/2021 1000  Gross per 24 hour   Intake 1260 ml   Output 700 ml   Net 560 ml     Diet Regular  ----------------------------------------------------------------------------------------------------------------------      Physical Exam:    Constitutional:  Well-developed and well-nourished.  No respiratory distress.      HENT:  Head: Normocephalic and atraumatic.  Mouth:  Moist mucous membranes.    Eyes:  Conjunctivae and EOM are normal.  No scleral icterus.  Neck:  Neck supple.  No JVD present.  Cardiovascular:  Normal rate, regular rhythm and normal heart sounds with no murmur. No edema.  Pulmonary/Chest:  No respiratory distress, no wheezes, no crackles, with normal breath sounds and good air movement.  Abdominal:  Soft.  Bowel sounds are normal.  No distension and no tenderness.   Musculoskeletal:  No edema, no tenderness, and no deformity.  No swelling or redness of joints.    Neurological:  Alert and oriented to person, place, and time.  No facial droop.  No slurred speech.   Skin:  Skin is warm and dry.  No rash  noted.  No pallor.   Psychiatric:  Normal mood and affect.  Behavior is normal.  ----------------------------------------------------------------------------------------------------------------------              Results from last 7 days   Lab Units 07/10/21  0447 07/09/21  0142 07/08/21 0114 07/06/21 0036   CRP mg/dL 0.62* <0.30 <0.30 0.62*   WBC 10*3/mm3 9.04  --  10.77 11.09*   HEMOGLOBIN g/dL 9.9*  --  9.4* 9.2*   HEMATOCRIT % 30.6*  --  31.1* 29.3*   MCV fL 91.3  --  95.1 93.6   MCHC g/dL 32.4  --  30.2* 31.4*   PLATELETS 10*3/mm3 430  --  438 526*     Results from last 7 days   Lab Units 07/10/21  0447 07/08/21  2136 07/08/21  0114 07/06/21  0036 07/03/21  1600   SODIUM mmol/L 140  --  138 139  --    POTASSIUM mmol/L 4.0 3.9 4.0 3.8  --    MAGNESIUM mg/dL 2.0 1.7  --   --  1.8   CHLORIDE mmol/L 105  --  103 104  --    CO2 mmol/L 24.4  --  26.6 24.5  --    BUN mg/dL 14  --  15 18  --    CREATININE mg/dL 0.63  --  0.59 0.72  --    EGFR IF NONAFRICN AM mL/min/1.73 111  --  120 95  --    CALCIUM mg/dL 8.5*  --  8.5* 8.8  --    GLUCOSE mg/dL 98  --  96 81  --    ALBUMIN g/dL 3.42*  --   --  3.26*  --    BILIRUBIN mg/dL 0.5  --   --  0.2  --    ALK PHOS U/L 129*  --   --  130*  --    AST (SGOT) U/L 18  --   --  19  --    ALT (SGPT) U/L 8  --   --  11  --    Estimated Creatinine Clearance: 112.3 mL/min (by C-G formula based on SCr of 0.63 mg/dL).  No results found for: AMMONIA    No results found for: HGBA1C, POCGLU  Lab Results   Component Value Date    HGBA1C 5.60 06/17/2021     Lab Results   Component Value Date    TSH 0.718 06/17/2021    FREET4 1.16 07/09/2021       Blood Culture   Date Value Ref Range Status   06/20/2021 Abnormal Stain (C)  Preliminary   06/19/2021 Staphylococcus aureus (C)  Final     Comment:     Infectious disease consultation is highly recommended to rule out distant foci of infection.   06/17/2021 Staphylococcus aureus (C)  Final     Comment:     Infectious disease consultation is highly  recommended to rule out distant foci of infection.   06/17/2021 Staphylococcus aureus (C)  Final     Comment:     Infectious disease consultation is highly recommended to rule out distant foci of infection.  Refer to previous blood culture collected on 6/17/2021 2318 for MICs.     Urine Culture   Date Value Ref Range Status   06/17/2021 >100,000 CFU/mL Escherichia coli (A)  Final   06/17/2021 >100,000 CFU/mL Staphylococcus aureus (A)  Corrected     No results found for: WOUNDCX  No results found for: STOOLCX  No results found for: RESPCX  Pain Management Panel       Pain Management Panel Latest Ref Rng & Units 7/4/2021 6/17/2021    AMPHETAMINES SCREEN, URINE Negative Negative Positive(A)    BARBITURATES SCREEN Negative Negative Negative    BENZODIAZEPINE SCREEN, URINE Negative Negative Negative    BUPRENORPHINEUR Negative Negative Negative    COCAINE SCREEN, URINE Negative Negative Negative    METHADONE SCREEN, URINE Negative Negative Negative              ----------------------------------------------------------------------------------------------------------------------  Imaging Results (Last 24 Hours)       ** No results found for the last 24 hours. **            ----------------------------------------------------------------------------------------------------------------------    Assessment/Plan       Assessment/Plan     ASSESSMENT:    1.  Severe sepsis with lactic acid greater than 2 on admission  2.  Complicated MSSA bacteremia  3.  Likely underlying endocarditis  4.  Septic emboli    PLAN:    Patient resting comfortably in bed this morning.  No issues or complaints.  Afebrile, no diarrhea.  WBC normal.  CRP 0.62.  KATHRIN reports no evidence of vegetation.     Blood cultures from 6/24/2021 finalized as no growth. Blood culture from 6/22/2021 1 out of 1 set positive for MSSA.    Urinalysis on 6/17/2021 was positive and urine culture finalized as greater than 100,000 colonies of E. coli and greater than 100,000  colonies of MRSA.  Blood cultures on 6/17/2021 finalized as MSSA.  Repeat blood cultures on 6/19/2021 and 6/20/2021 are still showing growth.  COVID-19 and flu A/B PCR on 6/18/2021 detected COVID-19.  Mycoplasma pneumoniae antibody on 6/18/2021 was negative.  Strep pneumo antigen on 6/18/2021 was negative.  CT chest with PE protocol on 6/18/2021 showed technically degraded exam, but no definite PE is seen, and there is no aortic dissection.  Pulmonary edema with a trace amount of right pleural fluid.  Poorly defined nodular infiltrates on both sides of the chest suspicious for septic emboli.  Continued follow-up is recommended. MRI of the lumbar spine on 7/2/2021 revealed no acute findings.     Contacted micro lab regarding urine culture on 6/17/2021.  Urine culture has finalized as MSSA, but MRSA verbiage was included underneath and micro lab corrected.    COVID-19 diagnosis was incidental and patient remains asymptomatic.  Patient is now out of isolation as of 6/28/2021.    In the setting of negative KATHRIN recommend to continue cefazolin 2 g IV every 8 hours through 7/22/2021 in the setting of persistent bacteremia.  Patient stable from ID standpoint.    Code Status:   Code Status and Medical Interventions:   Ordered at: 06/18/21 0241     Level Of Support Discussed With:    Patient     Code Status:    CPR     Medical Interventions (Level of Support Prior to Arrest):    Full           Donna Carcamo, ANGELICA  07/10/21  11:33 EDT

## 2021-07-11 PROCEDURE — 99232 SBSQ HOSP IP/OBS MODERATE 35: CPT | Performed by: FAMILY MEDICINE

## 2021-07-11 PROCEDURE — 25010000003 CEFAZOLIN SODIUM-DEXTROSE 2-3 GM-%(50ML) RECONSTITUTED SOLUTION: Performed by: NURSE PRACTITIONER

## 2021-07-11 PROCEDURE — 25010000002 ENOXAPARIN PER 10 MG: Performed by: INTERNAL MEDICINE

## 2021-07-11 PROCEDURE — 25010000003 MAGNESIUM SULFATE 4 GM/100ML SOLUTION: Performed by: PHYSICIAN ASSISTANT

## 2021-07-11 RX ORDER — IBUPROFEN 400 MG/1
400 TABLET ORAL EVERY 6 HOURS PRN
Status: DISCONTINUED | OUTPATIENT
Start: 2021-07-11 | End: 2021-07-23 | Stop reason: HOSPADM

## 2021-07-11 RX ADMIN — SODIUM CHLORIDE, PRESERVATIVE FREE 3 ML: 5 INJECTION INTRAVENOUS at 20:30

## 2021-07-11 RX ADMIN — METOPROLOL TARTRATE 12.5 MG: 25 TABLET, FILM COATED ORAL at 20:29

## 2021-07-11 RX ADMIN — MAGNESIUM SULFATE HEPTAHYDRATE 4 G: 40 INJECTION, SOLUTION INTRAVENOUS at 00:44

## 2021-07-11 RX ADMIN — Medication 150 MG: at 08:35

## 2021-07-11 RX ADMIN — Medication 1 TABLET: at 08:34

## 2021-07-11 RX ADMIN — CEFAZOLIN SODIUM 2 G: 2 SOLUTION INTRAVENOUS at 03:37

## 2021-07-11 RX ADMIN — ENOXAPARIN SODIUM 40 MG: 40 INJECTION SUBCUTANEOUS at 08:35

## 2021-07-11 RX ADMIN — CEFAZOLIN SODIUM 2 G: 2 SOLUTION INTRAVENOUS at 12:26

## 2021-07-11 RX ADMIN — Medication 1 CAPSULE: at 08:34

## 2021-07-11 RX ADMIN — FOLIC ACID 1 MG: 1 TABLET ORAL at 08:34

## 2021-07-11 RX ADMIN — CEFAZOLIN SODIUM 2 G: 2 SOLUTION INTRAVENOUS at 20:29

## 2021-07-11 RX ADMIN — IBUPROFEN 400 MG: 400 TABLET, FILM COATED ORAL at 09:49

## 2021-07-11 RX ADMIN — HYDROCODONE BITARTRATE AND ACETAMINOPHEN 1 TABLET: 7.5; 325 TABLET ORAL at 21:26

## 2021-07-11 RX ADMIN — SODIUM CHLORIDE, PRESERVATIVE FREE 10 ML: 5 INJECTION INTRAVENOUS at 20:29

## 2021-07-11 NOTE — PLAN OF CARE
Goal Outcome Evaluation:  Pt had some changes on telemetry monitor. Put in for a mag and potassium as well as a stat ekg. Ekg came back with normal sinus rhythm. Pt mag is currently being replaced. No complaints at this time. Will continue to follow plan of care.

## 2021-07-11 NOTE — PROGRESS NOTES
[facility] HOSPITALIST PROGRESS NOTE     Patient Identification:  Name:  Marce Gutierrez  Age:  31 y.o.  Sex:  female  :  1990  MRN:  [id number]  Visit Number:  [id number]  ROOM: [address] [place] [address]     Primary Care Provider:  Provider, No Known    Length of stay in inpatient status:  23    Subjective     Chief Compliant:    Chief Complaint   Patient presents with   • Leg Pain   • Arm Pain       History of Presenting Illness: 31-year-old female who is been treated for complicated bacteremia (MSSA).  Patient had recent transesophageal echo which showed no evidence of any vegetations and no structural abnormalities noted.  Patient has had a couple of episodes of aberrant conduction captured on the telemetry unit this morning.  On review it looks like patient having brief episodes of SVT.  Patient when questioned states she occasionally feels slight flutter that is brief in duration.  Patient states it causes no pain and states she has had these in the past off and on.    Objective     Current Hospital Meds:ceFAZolin, 2 g, Intravenous, Q8H  docusate sodium, 100 mg, Oral, BID  enoxaparin, 40 mg, Subcutaneous, Daily  folic acid, 1 mg, Oral, Daily  iron polysaccharides, 150 mg, Oral, Daily  lactobacillus acidophilus, 1 capsule, Oral, Daily  multivitamin with minerals, 1 tablet, Oral, Daily  polyethylene glycol, 17 g, Oral, Daily  sodium chloride, 10 mL, Intravenous, Q12H  sodium chloride, 3 mL, Intravenous, Q12H       ----------------------------------------------------------------------------------------------------------------------  Vital Signs:  Temp:  [97.6 °F (36.4 °C)-99.1 °F (37.3 °C)] 98 °F (36.7 °C)  Heart Rate:  [] 55  Resp:  [17-20] 18  BP: (105-126)/(65-85) 126/68  SpO2:  [98 %] 98 %  on   ;   Device (Oxygen Therapy): room air  Body mass index is 19.76 kg/m².    Wt Readings from Last 3 Encounters:   21 55.5 kg (122 lb 6.4 oz)   10/30/19 63.5 kg (140 lb)     Intake & Output (last 3 days)        07/08 0701 - 07/09 0700 07/09 0701 - 07/10 0700 07/10 0701 - 07/11 0700 07/11 0701 - 07/12 0700    P.O. 840 1080 1200 480    I.V. (mL/kg)  75 (1.4)      Total Intake(mL/kg) 840 (15.1) 1155 (21.2) 1200 (21.6) 480 (8.6)    Urine (mL/kg/hr) 1100 (0.8) 1300 (1)      Total Output 1100 1300      Net -260 -145 +1200 +480            Urine Unmeasured Occurrence 3 x 5 x 5 x 1 x    Stool Unmeasured Occurrence 1 x           Diet Regular  ----------------------------------------------------------------------------------------------------------------------  Physical exam:  Constitutional:   No acute distress  HEENT: Normocephalic atraumatic  Neck: Supple   Cardiovascular: Regular rate and rhythm  Pulmonary/Chest: Clear to auscultation  Abdominal: Positive bowel sounds soft.   Musculoskeletal: No arthropathy  Neurological: No focal deficits  Skin: No rash  Peripheral vascular:  Genitourinary:  ----------------------------------------------------------------------------------------------------------------------    Last echocardiogram:  Results for orders placed during the hospital encounter of 06/17/21    Adult Transesophageal Echo (KATHRIN) W/ Cont if Necessary Per Protocol    Interpretation Summary  · Normal left ventricular cavity size and wall thickness noted. All left ventricular wall segments contract normally.  · Left ventricular ejection fraction appears to be 56 - 60%.  · The aortic valve is structurally normal with no regurgitation or stenosis present.  · The mitral valve is structurally normal with no significant stenosis present. Mild mitral valve regurgitation is present.  · The tricuspid valve is structurally normal with no significant regurgitation or significant stenosis present  · There is no evidence of pericardial effusion. .  · Saline test results are negative.  · There is no echocardiographic evidence of atrial or ventricular septal defect noted on the saline test.  · There is no echocardiographic evidence of  any endocardial vegetations or aortic root abscess.  · Comments: There is no echocardiograph evidence of endocarditis noted on the study.    ----------------------------------------------------------------------------------------------------------------------  Results from last 7 days   Lab Units 07/10/21  2218 07/10/21  0447 07/09/21  0142 07/08/21  0114 07/06/21  0036   CRP mg/dL 0.49 0.62* <0.30 <0.30 0.62*   WBC 10*3/mm3  --  9.04  --  10.77 11.09*   HEMOGLOBIN g/dL  --  9.9*  --  9.4* 9.2*   HEMATOCRIT %  --  30.6*  --  31.1* 29.3*   MCV fL  --  91.3  --  95.1 93.6   MCHC g/dL  --  32.4  --  30.2* 31.4*   PLATELETS 10*3/mm3  --  430  --  438 526*         Results from last 7 days   Lab Units 07/10/21  2218 07/10/21  0447 07/08/21  2136 07/08/21 0114 07/06/21  0036   SODIUM mmol/L 138 140  --  138 139   POTASSIUM mmol/L 4.2 4.0 3.9 4.0 3.8   MAGNESIUM mg/dL 1.7 2.0 1.7  --   --    CHLORIDE mmol/L 103 105  --  103 104   CO2 mmol/L 23.5 24.4  --  26.6 24.5   BUN mg/dL 16 14  --  15 18   CREATININE mg/dL 0.72 0.63  --  0.59 0.72   EGFR IF NONAFRICN AM mL/min/1.73 95 111  --  120 95   CALCIUM mg/dL 8.8 8.5*  --  8.5* 8.8   GLUCOSE mg/dL 85 98  --  96 81   ALBUMIN g/dL  --  3.42*  --   --  3.26*   BILIRUBIN mg/dL  --  0.5  --   --  0.2   ALK PHOS U/L  --  129*  --   --  130*   AST (SGOT) U/L  --  18  --   --  19   ALT (SGPT) U/L  --  8  --   --  11   Estimated Creatinine Clearance: 100.1 mL/min (by C-G formula based on SCr of 0.72 mg/dL).  No results found for: AMMONIA              No results found for: HGBA1C, POCGLU  Lab Results   Component Value Date    TSH 0.718 06/17/2021    FREET4 1.16 07/09/2021     Lab Results   Component Value Date    PREGTESTUR Negative 06/17/2021     Pain Management Panel     Pain Management Panel Latest Ref Rng & Units 7/4/2021 6/17/2021    AMPHETAMINES SCREEN, URINE Negative Negative Positive(A)    BARBITURATES SCREEN Negative Negative Negative    BENZODIAZEPINE SCREEN, URINE Negative  Negative Negative    BUPRENORPHINEUR Negative Negative Negative    COCAINE SCREEN, URINE Negative Negative Negative    METHADONE SCREEN, URINE Negative Negative Negative        Brief Urine Lab Results  (Last result in the past 365 days)      Color   Clarity   Blood   Leuk Est   Nitrite   Protein   CREAT   Urine HCG        06/17/21 2253 Dark Yellow Cloudy Large (3+) Moderate (2+) Positive 30 mg/dL (1+)         06/17/21 2253               Negative         No results found for: BLOODCX      No results found for: URINECX  No results found for: WOUNDCX  No results found for: STOOLCX  Results from last 7 days   Lab Units 07/10/21  2218 07/10/21  0447 07/09/21  0142 07/08/21  0114 07/07/21  0250 07/06/21  0036 07/05/21  0100   CRP mg/dL 0.49 0.62* <0.30 <0.30 0.35 0.62* 0.71*       I have personally looked at the labs and they are summarized above.  ----------------------------------------------------------------------------------------------------------------------  Detailed radiology reports for the last 24 hours:    Imaging Results (Last 24 Hours)     ** No results found for the last 24 hours. **        Final impressions for the last 30 days of radiology reports:    Adult Transesophageal Echo (KATHRIN) W/ Cont if Necessary Per Protocol    Addendum Date: 7/9/2021    · Normal left ventricular cavity size and wall thickness noted. All left ventricular wall segments contract normally. · Left ventricular ejection fraction appears to be 56 - 60%. · The aortic valve is structurally normal with no regurgitation or stenosis present. · The mitral valve is structurally normal with no significant stenosis present. Mild mitral valve regurgitation is present. · The tricuspid valve is structurally normal with no significant regurgitation or significant stenosis present · There is no evidence of pericardial effusion. . · Saline test results are negative. · There is no echocardiographic evidence of atrial or ventricular septal defect noted  on the saline test. · There is no echocardiographic evidence of any endocardial vegetations or aortic root abscess. · Comments: There is no echocardiograph evidence of endocarditis noted on the study.      XR Humerus Left    Result Date: 6/18/2021  No acute bony abnormality.  This report was finalized on 6/18/2021 5:14 PM by Dr. Hakeem Morgan MD.      XR Forearm 2 View Left    Result Date: 6/18/2021  No acute bony abnormality  This report was finalized on 6/18/2021 5:14 PM by Dr. Hakeem Morgan MD.      XR Knee 1 or 2 View Left    Result Date: 6/18/2021  No acute bony abnormality  This report was finalized on 6/18/2021 5:17 PM by Dr. Hakeem Morgan MD.      XR Tibia Fibula 2 View Left    Result Date: 6/18/2021  No acute bony abnormality  This report was finalized on 6/18/2021 5:17 PM by Dr. Hakeem Morgan MD.      XR Foot 2 View Left    Result Date: 6/18/2021  Negative left foot. Signer Name: Gerard Elias MD  Signed: 6/18/2021 2:16 AM  Workstation Name: [facility]    XR Foot 3+ View Left    Result Date: 6/20/2021  No acute bony abnormality  This report was finalized on 6/20/2021 10:53 AM by Dr. Hakeem Morgan MD.      CT Head Without Contrast    Result Date: 6/18/2021  Normal, negative unenhanced head CT. Signer Name: Gerard Elias MD  Signed: 6/18/2021 4:04 AM  Workstation Name: [facility]    MRI Lumbar Spine Without Contrast    Result Date: 7/2/2021    No acute findings in the lumbar spine.  This report was finalized on 7/2/2021 12:17 PM by Dr. Bill Hawkins MD.      XR Chest 1 View    Result Date: 6/18/2021  Mild haziness at the right base could be artifact or could reflect a mild infiltrate. Otherwise negative. Signer Name: Gerard Elias MD  Signed: 6/18/2021 12:30 AM  Workstation Name: [facility]    CT Chest Pulmonary Embolism    Result Date: 6/18/2021  1. Technically degraded exam as above, but no  definite pulmonary embolism is seen, and there is no aortic dissection. 2. Pulmonary edema with a trace amount of right pleural fluid. 3. Poorly defined nodular infiltrates on both sides of the chest suspicious for septic emboli. Continued follow-up is recommended. Signer Name: Gerard Elias MD  Signed: 6/18/2021 4:20 AM  Workstation Name: [facility]  [facility] Venous Doppler Lower Extremity Left (duplex)    Result Date: 6/18/2021  No deep venous thrombus in the left lower extremity. Signer Name: Gerard Elias MD  Signed: 6/18/2021 3:35 AM  Workstation Name: [facility]  Radiology [facility] Venous Doppler Lower Extremity Right (duplex)    Result Date: 7/5/2021  No DVT in the right lower extremity on today's exam.  This report was finalized on 7/5/2021 5:19 PM by Dr. Hakeem Morgan MD.      XR Hip With or Without Pelvis 2 - 3 View Left    Result Date: 6/18/2021  No acute bony abnormality  This report was finalized on 6/18/2021 5:15 PM by Dr. Hakeem Morgan MD.      I have personally looked at the radiology images and read the final radiology report.    Assessment & Plan    MSSA bacteremia--continue IV cefazolin through July 22    Aberrant conduction--appears to have brief episodes of SVT--consider beta-blocker.  Will obtain cardiology consultation    Hepatitis C--recommend outpatient evaluation    COVID-19--patient 42 days out and has been asymptomatic.  No longer requiring isolation    Anemia--continue iron and folic acid  VTE Prophylaxis:   Mechanical Order History:     None      Pharmalogical Order History:      Ordered     Dose Route Frequency Stop    07/05/21 1542  enoxaparin (LOVENOX) syringe 40 mg      40 mg SC Daily --    06/21/21 1157  enoxaparin (LOVENOX) syringe 60 mg  Status:  Discontinued      1 mg/kg SC Daily 07/05/21 1542    06/18/21 0528  enoxaparin (LOVENOX) syringe 50 mg  Status:  Discontinued      1 mg/kg SC Daily 06/21/21 1157    06/18/21 0524   Pharmacy to Dose enoxaparin (LOVENOX)  Status:  Discontinued     Question:  Indication of use  Answer:  Prophylaxis    -- XX Continuous PRN 06/20/21 1211                    Christopher Balderas MD  [facility]  07/11/21  11:18 EDT

## 2021-07-11 NOTE — PLAN OF CARE
Goal Outcome Evaluation:   Pt goes off floor frequently, HR gets elevated when coming back to unit. Several runs of Abberant  Conduction. Cardiology consulted, new orders placed. Will cont to follow plan of care

## 2021-07-11 NOTE — CONSULTS
Consults  Date of Admit: 6/17/2021  Date of Consult: 07/11/21  No ref. provider found  Marce Gutierrez  1990    Consulting Physician: Tariq Mccullough MD    Cardiology consultation    Reason for consultation: Short runs of aberrant conduction and SVT    Assessment:  1. Short runs of SVT and aberrant conduction  2. MSSA bacteremia and sepsis, endocarditis ruled out with KATHRIN on 7/9/2021  3. History of IV drug use        Recommendations:  1. We will start the patient on a low-dose of beta-blocker with holding parameters for the short runs of SVT and aberrant conduction.  Continue to monitor on telemetry.        History of Present Illness    Subjective     Chief Complaint   Patient presents with   • Leg Pain   • Arm Pain       Marce Gutierrez is a 31 y.o. female with past medical history significant for IV drug use and MRSA cellulitis.  She initially presented to the ED on 6/17/2021 with complaint of leg and arm pain.  She reportedly fell on her left side while trying to rescue her cat up on a roof.  In the ED her chest x-ray revealed bibasilar pneumonia and CT of the chest with PE protocol revealed septic emboli.  COVID-19 screen was positive.  Cardiology was initially consulted for KATHRIN.  It showed no evidence of vegetation on the valves.  Cardiology was reconsulted today due to frequent 6-8 beat runs of aberrant conduction and SVT.    The patient was seen and examined.  She was sitting up in her bed when we entered the room she denies any chest pain or palpitations.  She was unaware that she had occasional fast heart rate.    Past Medical History:   Diagnosis Date   • IV drug abuse (CMS/[facility])    • MRSA cellulitis 2019    Left knee     Past Surgical History:   Procedure Laterality Date   • TONSILLECTOMY       Family History   Family history unknown: Yes     Social History     Tobacco Use   • Smoking status: Former Smoker     Packs/day: 0.25     Years: 10.00     Pack years: 2.50     Types: Cigarettes   • Smokeless tobacco:  Never Used   Vaping Use   • Vaping Use: Every day   • Substances: Nicotine, Flavoring   • Devices: Refillable tank   Substance Use Topics   • Alcohol use: Never   • Drug use: Yes     Types: Oxycodone, Opium, IV, Methamphetamines     No medications prior to admission.     Allergies:  Cinnamon    Review of Systems   Constitutional: Negative for fatigue.   Respiratory: Negative for shortness of breath.    Cardiovascular: Negative for chest pain, palpitations and leg swelling.   Gastrointestinal: Negative for blood in stool.   Neurological: Negative for dizziness, syncope, weakness and light-headedness.   Hematological: Does not bruise/bleed easily.   All other systems reviewed and are negative.        Objective      Vital Signs  Temp:  [97.6 °F (36.4 °C)-99.1 °F (37.3 °C)] 98 °F (36.7 °C)  Heart Rate:  [] 55  Resp:  [17-20] 18  BP: (105-126)/(65-85) 126/68  Body mass index is 19.76 kg/m².    Intake/Output Summary (Last 24 hours) at 7/11/2021 1415  Last data filed at 7/11/2021 0900  Gross per 24 hour   Intake 1080 ml   Output --   Net 1080 ml       Physical Exam  Vitals reviewed.   Constitutional:       Appearance: Normal appearance. She is well-developed.   Cardiovascular:      Rate and Rhythm: Normal rate and regular rhythm.      Heart sounds: No murmur heard.   No friction rub. No gallop.    Pulmonary:      Effort: Pulmonary effort is normal. No respiratory distress.      Breath sounds: Normal breath sounds. No wheezing or rales.   Skin:     General: Skin is warm and dry.   Neurological:      Mental Status: She is alert and oriented to person, place, and time.   Psychiatric:         Mood and Affect: Mood normal.         Behavior: Behavior normal.         Telemetry: Sinus tachycardia 100s with frequent 6-8 beat runs of aberrant conduction and SVT    Results Review:   I reviewed the patient's new clinical results.      Results from last 7 days   Lab Units 07/10/21  0447 07/08/21  0114 07/06/21  0036  07/05/21  0100   WBC 10*3/mm3 9.04 10.77 11.09* 11.25*   HEMOGLOBIN g/dL 9.9* 9.4* 9.2* 9.2*   PLATELETS 10*3/mm3 430 438 526* 518*     Results from last 7 days   Lab Units 07/10/21  2218 07/10/21  0447 07/08/21  2136 07/08/21  0114 07/06/21  0036 07/05/21  0100   SODIUM mmol/L 138 140  --  138 139 138   POTASSIUM mmol/L 4.2 4.0 3.9 4.0 3.8 3.5   CHLORIDE mmol/L 103 105  --  103 104 104   CO2 mmol/L 23.5 24.4  --  26.6 24.5 26.0   BUN mg/dL 16 14  --  15 18 14   CREATININE mg/dL 0.72 0.63  --  0.59 0.72 0.66   CALCIUM mg/dL 8.8 8.5*  --  8.5* 8.8 8.6   GLUCOSE mg/dL 85 98  --  96 81 92   ALT (SGPT) U/L  --  8  --   --  11  --    AST (SGOT) U/L  --  18  --   --  19  --      Lab Results   Component Value Date    INR 1.08 06/19/2021     Lab Results   Component Value Date    MG 1.7 07/10/2021    MG 2.0 07/10/2021    MG 1.7 07/08/2021     Lab Results   Component Value Date    TSH 0.718 06/17/2021      No results found for: BNP     EKG:     Imaging Results (Last 72 Hours)     ** No results found for the last 72 hours. **             Thank you very much for asking us to be involved in this patient's care.  We will follow along with you.    ANGELICA Rodriguez   07/11/21  14:15 EDT

## 2021-07-11 NOTE — PROGRESS NOTES
PROGRESS NOTE         Patient Identification:  Name:  Marce Gutierrez  Age:  31 y.o.  Sex:  female  :  1990  MRN:  [id number]  Visit Number:  [id number]  Primary Care Provider:  Provider, No Known         LOS: 23 days       ----------------------------------------------------------------------------------------------------------------------  Subjective       Chief Complaints:    Leg Pain and Arm Pain        Interval History:      Patient resting comfortably in bed this morning.  Significant other at bedside.  CRP improving at 0.49.  Afebrile, no diarrhea.    Review of Systems:    Constitutional: no fever, chills and night sweats. No appetite change or unexpected weight change. No fatigue.  Eyes: no eye drainage, itching or redness.  HEENT: no mouth sores, dysphagia or nose bleed.  Respiratory: no for shortness of breath, cough or production of sputum.  Cardiovascular: no chest pain and no orthopnea.  Palpitations.  Gastrointestinal: no nausea, vomiting or diarrhea. No abdominal pain, hematemesis or rectal bleeding.  Genitourinary: no dysuria or polyuria.  Hematologic/lymphatic: no lymph node abnormalities, no easy bruising or easy bleeding.  Musculoskeletal:  No muscle or joint pain.  Skin: No rash and no itching.  Neurological: no loss of consciousness, no seizure, no headache.  Behavioral/Psych: no depression or suicidal ideation.  Endocrine: no hot flashes.  Immunologic: negative.     ----------------------------------------------------------------------------------------------------------------------      Objective       Current [facility] Meds:  ceFAZolin, 2 g, Intravenous, Q8H  docusate sodium, 100 mg, Oral, BID  enoxaparin, 40 mg, Subcutaneous, Daily  folic acid, 1 mg, Oral, Daily  iron polysaccharides, 150 mg, Oral, Daily  lactobacillus acidophilus, 1 capsule, Oral, Daily  multivitamin with minerals, 1 tablet, Oral, Daily  polyethylene glycol, 17 g, Oral, Daily  sodium chloride, 10 mL,  Intravenous, Q12H  sodium chloride, 3 mL, Intravenous, Q12H         ----------------------------------------------------------------------------------------------------------------------    Vital Signs:  Temp:  [97.6 °F (36.4 °C)-99.1 °F (37.3 °C)] 97.9 °F (36.6 °C)  Heart Rate:  [] 70  Resp:  [16-20] 18  BP: (105-125)/(65-85) 122/79  Mean Arterial Pressure (Non-Invasive) for the past 24 hrs (Last 3 readings):   Noninvasive MAP (mmHg)   07/11/21 0807 93   07/11/21 0300 86   07/10/21 1818 78     SpO2 Percentage    07/10/21 1436 07/10/21 1818 07/11/21 0807   SpO2: 98% 98% 98%     SpO2:  [93 %-98 %] 98 %  on   ;   Device (Oxygen Therapy): room air    Body mass index is 19.76 kg/m².  Wt Readings from Last 3 Encounters:   07/11/21 55.5 kg (122 lb 6.4 oz)   10/30/19 63.5 kg (140 lb)        Intake/Output Summary (Last 24 hours) at 7/11/2021 0905  Last data filed at 7/11/2021 0500  Gross per 24 hour   Intake 1200 ml   Output --   Net 1200 ml     Diet Regular  ----------------------------------------------------------------------------------------------------------------------      Physical Exam:    Constitutional:  Well-developed and well-nourished.  No respiratory distress.      HENT:  Head: Normocephalic and atraumatic.  Mouth:  Moist mucous membranes.    Eyes:  Conjunctivae and EOM are normal.  No scleral icterus.  Neck:  Neck supple.  No JVD present.  Cardiovascular:  Normal rate, regular rhythm and normal heart sounds with no murmur. No edema.  Pulmonary/Chest:  No respiratory distress, no wheezes, no crackles, with normal breath sounds and good air movement.  Abdominal:  Soft.  Bowel sounds are normal.  No distension and no tenderness.   Musculoskeletal:  No edema, no tenderness, and no deformity.  No swelling or redness of joints.    Neurological:  Alert and oriented to person, place, and time.  No facial droop.  No slurred speech.   Skin:  Skin is warm and dry.  No rash noted.  No pallor.   Psychiatric:   Normal mood and affect.  Behavior is normal.  ----------------------------------------------------------------------------------------------------------------------              Results from last 7 days   Lab Units 07/10/21  2218 07/10/21  0447 07/09/21  0142 07/08/21  0114 07/06/21  0036   CRP mg/dL 0.49 0.62* <0.30 <0.30 0.62*   WBC 10*3/mm3  --  9.04  --  10.77 11.09*   HEMOGLOBIN g/dL  --  9.9*  --  9.4* 9.2*   HEMATOCRIT %  --  30.6*  --  31.1* 29.3*   MCV fL  --  91.3  --  95.1 93.6   MCHC g/dL  --  32.4  --  30.2* 31.4*   PLATELETS 10*3/mm3  --  430  --  438 526*     Results from last 7 days   Lab Units 07/10/21  2218 07/10/21  0447 07/08/21  2136 07/08/21 0114 07/06/21  0036   SODIUM mmol/L 138 140  --  138 139   POTASSIUM mmol/L 4.2 4.0 3.9 4.0 3.8   MAGNESIUM mg/dL 1.7 2.0 1.7  --   --    CHLORIDE mmol/L 103 105  --  103 104   CO2 mmol/L 23.5 24.4  --  26.6 24.5   BUN mg/dL 16 14  --  15 18   CREATININE mg/dL 0.72 0.63  --  0.59 0.72   EGFR IF NONAFRICN AM mL/min/1.73 95 111  --  120 95   CALCIUM mg/dL 8.8 8.5*  --  8.5* 8.8   GLUCOSE mg/dL 85 98  --  96 81   ALBUMIN g/dL  --  3.42*  --   --  3.26*   BILIRUBIN mg/dL  --  0.5  --   --  0.2   ALK PHOS U/L  --  129*  --   --  130*   AST (SGOT) U/L  --  18  --   --  19   ALT (SGPT) U/L  --  8  --   --  11   Estimated Creatinine Clearance: 100.1 mL/min (by C-G formula based on SCr of 0.72 mg/dL).  No results found for: AMMONIA    No results found for: HGBA1C, POCGLU  Lab Results   Component Value Date    HGBA1C 5.60 06/17/2021     Lab Results   Component Value Date    TSH 0.718 06/17/2021    FREET4 1.16 07/09/2021       Blood Culture   Date Value Ref Range Status   06/20/2021 Abnormal Stain (C)  Preliminary   06/19/2021 Staphylococcus aureus (C)  Final     Comment:     Infectious disease consultation is highly recommended to rule out distant foci of infection.   06/17/2021 Staphylococcus aureus (C)  Final     Comment:     Infectious disease consultation is  highly recommended to rule out distant foci of infection.   06/17/2021 Staphylococcus aureus (C)  Final     Comment:     Infectious disease consultation is highly recommended to rule out distant foci of infection.  Refer to previous blood culture collected on 6/17/2021 2318 for MICs.     Urine Culture   Date Value Ref Range Status   06/17/2021 >100,000 CFU/mL Escherichia coli (A)  Final   06/17/2021 >100,000 CFU/mL Staphylococcus aureus (A)  Corrected     No results found for: WOUNDCX  No results found for: STOOLCX  No results found for: RESPCX  Pain Management Panel       Pain Management Panel Latest Ref Rng & Units 7/4/2021 6/17/2021    AMPHETAMINES SCREEN, URINE Negative Negative Positive(A)    BARBITURATES SCREEN Negative Negative Negative    BENZODIAZEPINE SCREEN, URINE Negative Negative Negative    BUPRENORPHINEUR Negative Negative Negative    COCAINE SCREEN, URINE Negative Negative Negative    METHADONE SCREEN, URINE Negative Negative Negative              ----------------------------------------------------------------------------------------------------------------------  Imaging Results (Last 24 Hours)       ** No results found for the last 24 hours. **            ----------------------------------------------------------------------------------------------------------------------    Assessment/Plan       Assessment/Plan     ASSESSMENT:    1.  Severe sepsis with lactic acid greater than 2 on admission  2.  Complicated MSSA bacteremia  3.  Likely underlying endocarditis  4.  Septic emboli    PLAN:    Patient resting comfortably in bed this morning.  Significant other at bedside.  CRP improving at 0.49.  Afebrile, no diarrhea.    KATHRIN reports no evidence of vegetation.     Blood cultures from 6/24/2021 finalized as no growth. Blood culture from 6/22/2021 1 out of 1 set positive for MSSA.    Urinalysis on 6/17/2021 was positive and urine culture finalized as greater than 100,000 colonies of E. coli and greater  than 100,000 colonies of MRSA.  Blood cultures on 6/17/2021 finalized as MSSA.  Repeat blood cultures on 6/19/2021 and 6/20/2021 are still showing growth.  COVID-19 and flu A/B PCR on 6/18/2021 detected COVID-19.  Mycoplasma pneumoniae antibody on 6/18/2021 was negative.  Strep pneumo antigen on 6/18/2021 was negative.  CT chest with PE protocol on 6/18/2021 showed technically degraded exam, but no definite PE is seen, and there is no aortic dissection.  Pulmonary edema with a trace amount of right pleural fluid.  Poorly defined nodular infiltrates on both sides of the chest suspicious for septic emboli.  Continued follow-up is recommended. MRI of the lumbar spine on 7/2/2021 revealed no acute findings.     Contacted micro lab regarding urine culture on 6/17/2021.  Urine culture has finalized as MSSA, but MRSA verbiage was included underneath and micro lab corrected.    COVID-19 diagnosis was incidental and patient remains asymptomatic.  Patient is now out of isolation as of 6/28/2021.    In the setting of negative KATHRIN recommend to continue cefazolin 2 g IV every 8 hours through 7/22/2021 in the setting of persistent bacteremia.  Patient stable from ID standpoint.    Code Status:   Code Status and Medical Interventions:   Ordered at: 06/18/21 0446     Level Of Support Discussed With:    Patient     Code Status:    CPR     Medical Interventions (Level of Support Prior to Arrest):    Full           ANGELICA Anderson  07/11/21  09:05 EDT

## 2021-07-12 LAB
ANION GAP SERPL CALCULATED.3IONS-SCNC: 10.2 MMOL/L (ref 5–15)
BASOPHILS # BLD AUTO: 0.08 10*3/MM3 (ref 0–0.2)
BASOPHILS NFR BLD AUTO: 1 % (ref 0–1.5)
BUN SERPL-MCNC: 14 MG/DL (ref 6–20)
BUN/CREAT SERPL: 21.9 (ref 7–25)
CALCIUM SPEC-SCNC: 8.8 MG/DL (ref 8.6–10.5)
CHLORIDE SERPL-SCNC: 102 MMOL/L (ref 98–107)
CO2 SERPL-SCNC: 21.8 MMOL/L (ref 22–29)
CREAT SERPL-MCNC: 0.64 MG/DL (ref 0.57–1)
CRP SERPL-MCNC: 0.37 MG/DL (ref 0–0.5)
DEPRECATED RDW RBC AUTO: 44.4 FL (ref 37–54)
EOSINOPHIL # BLD AUTO: 0.16 10*3/MM3 (ref 0–0.4)
EOSINOPHIL NFR BLD AUTO: 1.9 % (ref 0.3–6.2)
ERYTHROCYTE [DISTWIDTH] IN BLOOD BY AUTOMATED COUNT: 13.2 % (ref 12.3–15.4)
GFR SERPL CREATININE-BSD FRML MDRD: 109 ML/MIN/1.73
GLUCOSE SERPL-MCNC: 99 MG/DL (ref 65–99)
HCT VFR BLD AUTO: 32.1 % (ref 34–46.6)
HGB BLD-MCNC: 10 G/DL (ref 12–15.9)
IMM GRANULOCYTES # BLD AUTO: 0.05 10*3/MM3 (ref 0–0.05)
IMM GRANULOCYTES NFR BLD AUTO: 0.6 % (ref 0–0.5)
LYMPHOCYTES # BLD AUTO: 2.99 10*3/MM3 (ref 0.7–3.1)
LYMPHOCYTES NFR BLD AUTO: 35.9 % (ref 19.6–45.3)
MAGNESIUM SERPL-MCNC: 1.9 MG/DL (ref 1.6–2.6)
MCH RBC QN AUTO: 28.9 PG (ref 26.6–33)
MCHC RBC AUTO-ENTMCNC: 31.2 G/DL (ref 31.5–35.7)
MCV RBC AUTO: 92.8 FL (ref 79–97)
MONOCYTES # BLD AUTO: 0.56 10*3/MM3 (ref 0.1–0.9)
MONOCYTES NFR BLD AUTO: 6.7 % (ref 5–12)
NEUTROPHILS NFR BLD AUTO: 4.49 10*3/MM3 (ref 1.7–7)
NEUTROPHILS NFR BLD AUTO: 53.9 % (ref 42.7–76)
NRBC BLD AUTO-RTO: 0 /100 WBC (ref 0–0.2)
PLATELET # BLD AUTO: 434 10*3/MM3 (ref 140–450)
PMV BLD AUTO: 9.5 FL (ref 6–12)
POTASSIUM SERPL-SCNC: 4.1 MMOL/L (ref 3.5–5.2)
RBC # BLD AUTO: 3.46 10*6/MM3 (ref 3.77–5.28)
SODIUM SERPL-SCNC: 134 MMOL/L (ref 136–145)
WBC # BLD AUTO: 8.33 10*3/MM3 (ref 3.4–10.8)

## 2021-07-12 PROCEDURE — 25010000003 CEFAZOLIN SODIUM-DEXTROSE 2-3 GM-%(50ML) RECONSTITUTED SOLUTION: Performed by: NURSE PRACTITIONER

## 2021-07-12 PROCEDURE — 85025 COMPLETE CBC W/AUTO DIFF WBC: CPT | Performed by: FAMILY MEDICINE

## 2021-07-12 PROCEDURE — 99232 SBSQ HOSP IP/OBS MODERATE 35: CPT | Performed by: INTERNAL MEDICINE

## 2021-07-12 PROCEDURE — 86140 C-REACTIVE PROTEIN: CPT | Performed by: INTERNAL MEDICINE

## 2021-07-12 PROCEDURE — 94799 UNLISTED PULMONARY SVC/PX: CPT

## 2021-07-12 PROCEDURE — 99232 SBSQ HOSP IP/OBS MODERATE 35: CPT | Performed by: NURSE PRACTITIONER

## 2021-07-12 PROCEDURE — 83735 ASSAY OF MAGNESIUM: CPT | Performed by: FAMILY MEDICINE

## 2021-07-12 PROCEDURE — 25010000002 ENOXAPARIN PER 10 MG: Performed by: INTERNAL MEDICINE

## 2021-07-12 PROCEDURE — 25010000003 MAGNESIUM SULFATE 4 GM/100ML SOLUTION: Performed by: PHYSICIAN ASSISTANT

## 2021-07-12 PROCEDURE — 80048 BASIC METABOLIC PNL TOTAL CA: CPT | Performed by: FAMILY MEDICINE

## 2021-07-12 RX ORDER — DOCOSANOL 100 MG/G
CREAM TOPICAL 3 TIMES DAILY PRN
Status: DISCONTINUED | OUTPATIENT
Start: 2021-07-12 | End: 2021-07-23 | Stop reason: HOSPADM

## 2021-07-12 RX ORDER — MAGNESIUM SULFATE HEPTAHYDRATE 40 MG/ML
4 INJECTION, SOLUTION INTRAVENOUS ONCE
Status: COMPLETED | OUTPATIENT
Start: 2021-07-12 | End: 2021-07-12

## 2021-07-12 RX ADMIN — SODIUM CHLORIDE, PRESERVATIVE FREE 3 ML: 5 INJECTION INTRAVENOUS at 20:20

## 2021-07-12 RX ADMIN — ENOXAPARIN SODIUM 40 MG: 40 INJECTION SUBCUTANEOUS at 08:23

## 2021-07-12 RX ADMIN — IBUPROFEN 400 MG: 400 TABLET, FILM COATED ORAL at 12:13

## 2021-07-12 RX ADMIN — FOLIC ACID 1 MG: 1 TABLET ORAL at 08:22

## 2021-07-12 RX ADMIN — Medication 1 TABLET: at 08:22

## 2021-07-12 RX ADMIN — SODIUM CHLORIDE, PRESERVATIVE FREE 10 ML: 5 INJECTION INTRAVENOUS at 08:24

## 2021-07-12 RX ADMIN — CEFAZOLIN SODIUM 2 G: 2 SOLUTION INTRAVENOUS at 04:57

## 2021-07-12 RX ADMIN — METOPROLOL TARTRATE 12.5 MG: 25 TABLET, FILM COATED ORAL at 08:22

## 2021-07-12 RX ADMIN — HYDROCODONE BITARTRATE AND ACETAMINOPHEN 1 TABLET: 7.5; 325 TABLET ORAL at 13:46

## 2021-07-12 RX ADMIN — METOPROLOL TARTRATE 12.5 MG: 25 TABLET, FILM COATED ORAL at 20:19

## 2021-07-12 RX ADMIN — Medication 1 CAPSULE: at 08:22

## 2021-07-12 RX ADMIN — CEFAZOLIN SODIUM 2 G: 2 SOLUTION INTRAVENOUS at 11:43

## 2021-07-12 RX ADMIN — MAGNESIUM SULFATE HEPTAHYDRATE 4 G: 40 INJECTION, SOLUTION INTRAVENOUS at 05:55

## 2021-07-12 RX ADMIN — Medication 150 MG: at 08:22

## 2021-07-12 RX ADMIN — CEFAZOLIN SODIUM 2 G: 2 SOLUTION INTRAVENOUS at 20:19

## 2021-07-12 NOTE — CASE MANAGEMENT/SOCIAL WORK
Discharge Planning Assessment   [facility]     Patient Name: Marce Gutierrez  MRN: [id number]  Today's Date: 7/12/2021    Admit Date: 6/17/2021    Discharge Needs Assessment    No documentation.       Discharge Plan     Row Name 07/12/21 1521       Plan    Plan Pt was admitted on 06/17/21. SS spoke with Pt about discharge planning. Pt remains agreeable to go to [facility] in [place] to complete IV antibiotics and substance abuse treatment. [facility] requested Pt to do an over the phone assessment. SS provided Pt with contact number to complete assessment at [phone]. SS to follow.          Leanne Merino

## 2021-07-12 NOTE — PAYOR COMM NOTE
"CONTACT:  JOSE VARGHESE MSN, APRN  UTILIZATION MANAGEMENT DEPT.  [facility]  [address] [facility], [address]  PHONE:  [phone]  FAX: [phone]    CLINICAL UPDATE.    REFER TO AUTH # [id number]    Tran Santiago (31 y.o. Female)     Date of Birth Social Security Number Address Home Phone MRN    1990  [address] [address]  [id number]    Scientologist Marital Status          Non-Moravian Single       Admission Date Admission Type Admitting Provider Attending Provider Department, Room/Bed    6/17/21 Emergency Nicci Valdez MD Perkins, Jimmye S, MD [facility] [address] [facility], 3314/2S    Discharge Date Discharge Disposition Discharge Destination                       Attending Provider: Nicci Valdez MD    Allergies: Cinnamon    Isolation: None   Infection: COVID (History) (06/28/21)   Code Status: CPR    Ht: 167.6 cm (66\")   Wt: 55 kg (121 lb 3.2 oz)    Admission Cmt: None   Principal Problem: Endocarditis [I38]                 Active Insurance as of 6/17/2021     Primary Coverage     Payor Plan Insurance Group Employer/Plan Group    [facility]      Payor Plan Address Payor Plan Phone Number Payor Plan Fax Number Effective Dates    [address] [address]   12/13/2019 - None Entered    [place] [address]       Subscriber Name Subscriber Birth Date Member ID       TRAN SANTIAGO 1990 [id number]                 Emergency Contacts      (Rel.) Home Phone Work Phone Mobile Phone    GILDARDO LEIVA (Father) -- -- [phone]            Vital Signs (last day)     Date/Time   Temp   Temp src   Pulse   Resp   BP   Patient Position   SpO2    07/12/21 1043   --   --   --   --   --   --   97    07/12/21 1009   98.2 (36.8)   Oral   75   20   127/84   Lying   97    07/12/21 0608   98.2 (36.8)   Oral   68   18   112/79   Lying   --    07/12/21 0255   97.6 (36.4)   Oral   79   16   114/65   Lying   --    07/11/21 2125   --   --   93   " --   134/90   --   --    07/11/21 2029   --   --   107   --   126/85   --   --    07/11/21 1830   98.6 (37)   Oral   107   16   126/86   Lying   --    07/11/21 1504   98.4 (36.9)   Axillary   96   18   120/82   Lying   97    07/11/21 1015   98 (36.7)   Axillary   55   18   126/68   Lying   98    07/11/21 0807   97.9 (36.6)   --   70   18   122/79   --   98    07/11/21 0300   98.1 (36.7)   Oral   72   17   112/69   Lying   --              Intake & Output (last day)       07/11 0701 - 07/12 0700 07/12 0701 - 07/13 0700    P.O. 1560 360    Total Intake(mL/kg) 1560 (28.4) 360 (6.5)    Net +1560 +360          Urine Unmeasured Occurrence 11 x         Current Facility-Administered Medications   Medication Dose Route Frequency Provider Last Rate Last Admin   • bisacodyl (DULCOLAX) suppository 10 mg  10 mg Rectal Once PRN Otis De La Fuente MD       • ceFAZolin Sodium-Dextrose (ANCEF) IVPB (duplex) 2 g  2 g Intravenous Q8H Donna Carcamo APRN 0 mL/hr at 07/05/21 1150 2 g at 07/12/21 1143   • docusate sodium (COLACE) capsule 100 mg  100 mg Oral BID Otis De La Fuente MD   100 mg at 07/10/21 0842   • enoxaparin (LOVENOX) syringe 40 mg  40 mg Subcutaneous Daily Deng Sanchez MD   40 mg at 07/12/21 0823   • folic acid (FOLVITE) tablet 1 mg  1 mg Oral Daily Otis De La Fuente MD   1 mg at 07/12/21 0822   • HYDROcodone-acetaminophen (NORCO) 7.5-325 MG per tablet 1 tablet  1 tablet Oral BID PRN Deng Sanchez MD   1 tablet at 07/11/21 2126   • ibuprofen (ADVIL,MOTRIN) tablet 400 mg  400 mg Oral Q6H PRN Otis De La Fuente MD   400 mg at 07/10/21 2246   • ibuprofen (ADVIL,MOTRIN) tablet 400 mg  400 mg Oral Q6H PRN Christopher Balderas MD   400 mg at 07/12/21 1213   • iron polysaccharides (NIFEREX) capsule 150 mg  150 mg Oral Daily Otis De La Fuente MD   150 mg at 07/12/21 0822   • lactobacillus acidophilus (RISAQUAD) capsule 1 capsule  1 capsule Oral Daily Otis D eLa Fuente MD   1 capsule at 07/12/21 0822   • Magnesium Sulfate 2 gram Bolus, followed by  8 gram infusion (total Mg dose 10 grams)- Mg less than or equal to 1mg/dL  2 g Intravenous PRN Marilee Rock PA-C        Or   • Magnesium Sulfate 2 gram / 50mL Infusion (GIVE X 3 BAGS TO EQUAL 6GM TOTAL DOSE) - Mg 1.1 - 1.5 mg/dl  2 g Intravenous PRN Marilee Rock PA-C        Or   • Magnesium Sulfate 4 gram infusion- Mg 1.6-1.9 mg/dL  4 g Intravenous PRN Marilee Rock PA-C       • melatonin tablet 5 mg  5 mg Oral Nightly PRN Otis De aL Fuente MD   5 mg at 07/08/21 2001   • metoprolol tartrate (LOPRESSOR) tablet 12.5 mg  12.5 mg Oral Q12H Renu Kuhn APRN   12.5 mg at 07/12/21 0822   • multivitamin with minerals 1 tablet  1 tablet Oral Daily Otis De La Fuente MD   1 tablet at 07/12/21 0822   • ondansetron (ZOFRAN) injection 4 mg  4 mg Intravenous Q6H PRN Otis De La Fuente MD   4 mg at 06/25/21 0946   • polyethylene glycol (MIRALAX) packet 17 g  17 g Oral Daily Otis De La Fuente MD   17 g at 07/10/21 0842   • sodium chloride 0.9 % flush 10 mL  10 mL Intravenous PRN Otis De La Fuente MD       • sodium chloride 0.9 % flush 10 mL  10 mL Intravenous Q12H Otis De La Fuente MD   10 mL at 07/12/21 0824   • sodium chloride 0.9 % flush 10 mL  10 mL Intravenous PRN Otis De La Fuente MD       • sodium chloride 0.9 % flush 3 mL  3 mL Intravenous Q12H Otis De La Fuente MD   3 mL at 07/11/21 2030   • sodium chloride 0.9 % flush 3-10 mL  3-10 mL Intravenous PRN Otis De La Fuente MD         Lab Results (last 24 hours)     Procedure Component Value Units Date/Time    Basic Metabolic Panel [[id number]]  (Abnormal) Collected: 07/12/21 0217    Specimen: Blood Updated: 07/12/21 0316     Glucose 99 mg/dL      BUN 14 mg/dL      Creatinine 0.64 mg/dL      Sodium 134 mmol/L      Potassium 4.1 mmol/L      Chloride 102 mmol/L      CO2 21.8 mmol/L      Calcium 8.8 mg/dL      eGFR Non African Amer 109 mL/min/1.73      BUN/Creatinine Ratio 21.9     Anion Gap 10.2 mmol/L     Narrative:      GFR Normal >60  Chronic Kidney Disease  <60  Kidney Failure <15      C-reactive Protein [[id number]]  (Normal) Collected: 07/12/21 0217    Specimen: Blood Updated: 07/12/21 0316     C-Reactive Protein 0.37 mg/dL     Magnesium [[id number]]  (Normal) Collected: 07/12/21 0217    Specimen: Blood Updated: 07/12/21 0316     Magnesium 1.9 mg/dL     CBC & Differential [[id number]]  (Abnormal) Collected: 07/12/21 0217    Specimen: Blood Updated: 07/12/21 0257    Narrative:      The following orders were created for panel order CBC & Differential.  Procedure                               Abnormality         Status                     ---------                               -----------         ------                     CBC Auto Differential[[id number]]        Abnormal            Final result                 Please view results for these tests on the individual orders.    CBC Auto Differential [[id number]]  (Abnormal) Collected: 07/12/21 0217    Specimen: Blood Updated: 07/12/21 0257     WBC 8.33 10*3/mm3      RBC 3.46 10*6/mm3      Hemoglobin 10.0 g/dL      Hematocrit 32.1 %      MCV 92.8 fL      MCH 28.9 pg      MCHC 31.2 g/dL      RDW 13.2 %      RDW-SD 44.4 fl      MPV 9.5 fL      Platelets 434 10*3/mm3      Neutrophil % 53.9 %      Lymphocyte % 35.9 %      Monocyte % 6.7 %      Eosinophil % 1.9 %      Basophil % 1.0 %      Immature Grans % 0.6 %      Neutrophils, Absolute 4.49 10*3/mm3      Lymphocytes, Absolute 2.99 10*3/mm3      Monocytes, Absolute 0.56 10*3/mm3      Eosinophils, Absolute 0.16 10*3/mm3      Basophils, Absolute 0.08 10*3/mm3      Immature Grans, Absolute 0.05 10*3/mm3      nRBC 0.0 /100 WBC         Imaging Results (Last 24 Hours)     ** No results found for the last 24 hours. **        Orders (last 24 hrs)      Start     Ordered    07/12/21 0600  Magnesium  Morning Draw      07/11/21 0957    07/12/21 0600  CBC & Differential  Morning Draw      07/11/21 1117    07/12/21 0600  Basic Metabolic Panel  Morning Draw      07/11/21 1117 07/12/21 0600   Magnesium  Morning Draw,   Status:  Canceled      07/11/21 1117    07/12/21 0600  CBC Auto Differential  PROCEDURE ONCE      07/11/21 2202    07/12/21 0600  Magnesium Sulfate 4 gram infusion- Mg 1.6-1.9 mg/dL  Once      07/12/21 0449    07/11/21 2100  metoprolol tartrate (LOPRESSOR) tablet 12.5 mg  Every 12 Hours Scheduled      07/11/21 1423    07/11/21 0859  ibuprofen (ADVIL,MOTRIN) tablet 400 mg  Every 6 Hours PRN      07/11/21 0859    07/09/21 0453  Patient Currently On Electrolyte Replacement Protocol - Please Refer to MAR for Protocol Details  Misc Nursing Order (Specify)  Daily     Comments: Patient Currently On Electrolyte Replacement Protocol - Please Refer to MAR for Protocol Details    07/09/21 0452    07/09/21 0452  Magnesium Sulfate 2 gram Bolus, followed by 8 gram infusion (total Mg dose 10 grams)- Mg less than or equal to 1mg/dL  As Needed      07/09/21 0452    07/09/21 0452  Magnesium Sulfate 2 gram / 50mL Infusion (GIVE X 3 BAGS TO EQUAL 6GM TOTAL DOSE) - Mg 1.1 - 1.5 mg/dl  As Needed      07/09/21 0452    07/09/21 0452  Magnesium Sulfate 4 gram infusion- Mg 1.6-1.9 mg/dL  As Needed      07/09/21 0452    07/09/21 0452  Magnesium  As Needed,   Status:  Canceled      07/09/21 0452    07/07/21 1530  HYDROcodone-acetaminophen (NORCO) 7.5-325 MG per tablet 1 tablet  2 Times Daily PRN      07/07/21 1527    07/06/21 0900  enoxaparin (LOVENOX) syringe 40 mg  Daily      07/05/21 1542    06/26/21 1200  iron polysaccharides (NIFEREX) capsule 150 mg  Daily      06/26/21 1030    06/26/21 0600  C-reactive Protein  Daily,   Status:  Canceled      06/25/21 0750    06/24/21 1700  folic acid (FOLVITE) tablet 1 mg  Daily      06/24/21 1538    06/23/21 1100  multivitamin with minerals 1 tablet  Daily      06/23/21 0928    06/22/21 1200  Dietary Nutrition Supplements Boost Plus (Ensure Enlive, Ensure Plus)  Daily With Breakfast, Lunch & Dinner      06/22/21 0854    06/22/21 0961  polyethylene glycol (MIRALAX) packet 17 g   Daily      06/22/21 0851    06/21/21 2230  docusate sodium (COLACE) capsule 100 mg  2 Times Daily      06/21/21 2124 06/21/21 2220  melatonin tablet 5 mg  Nightly PRN      06/21/21 2220 06/21/21 2124  bisacodyl (DULCOLAX) suppository 10 mg  Once As Needed      06/21/21 2124 06/21/21 2100  sodium chloride 0.9 % flush 10 mL  Every 12 Hours Scheduled      06/21/21 1701    06/21/21 1701  sodium chloride 0.9 % flush 10 mL  As Needed      06/21/21 1701    06/21/21 1200  ceFAZolin Sodium-Dextrose (ANCEF) IVPB (duplex) 2 g  Every 8 Hours      06/21/21 1104    06/20/21 0706  ondansetron (ZOFRAN) injection 4 mg  Every 6 Hours PRN      06/20/21 0706    06/19/21 0003  ibuprofen (ADVIL,MOTRIN) tablet 400 mg  Every 6 Hours PRN      06/19/21 0003    06/18/21 2019  Patient Currently On Electrolyte Replacement Protocol - Please Refer to MAR for Protocol Details  Misc Nursing Order (Specify)  Daily     Comments: Patient Currently On Electrolyte Replacement Protocol - Please Refer to MAR for Protocol Details    06/18/21 2018 06/18/21 2018  Magnesium  As Needed,   Status:  Canceled      06/18/21 2018    06/18/21 1800  lactobacillus acidophilus (RISAQUAD) capsule 1 capsule  Daily      06/18/21 1459    06/18/21 0900  sodium chloride 0.9 % flush 3 mL  Every 12 Hours Scheduled      06/18/21 0524    06/18/21 0800  Neuro Checks  Every 4 Hours      06/18/21 0524    06/18/21 0555  Patient Currently On Electrolyte Replacement Protocol - Please Refer to MAR for Protocol Details  Misc Nursing Order (Specify)  Daily     Comments: Patient Currently On Electrolyte Replacement Protocol - Please Refer to MAR for Protocol Details    06/18/21 0554    06/18/21 0554  Potassium  As Needed,   Status:  Canceled      06/18/21 0554    06/18/21 0524  sodium chloride 0.9 % flush 3-10 mL  As Needed      06/18/21 0524    06/17/21 2243  sodium chloride 0.9 % flush 10 mL  As Needed      06/17/21 2243    Unscheduled  Oxygen Therapy- Nasal Cannula;  Titrate for SPO2: 90% - 95%  Continuous PRN     Comments: If Patient Develops Unresponsiveness, Acute Dyspnea, Cyanosis or Suspected Hypoxemia Start Continuous Pulse Ox Monitoring, Apply Oxygen & Notify Provider    06/18/21 0524    Unscheduled  ECG 12 Lead  As Needed     Comments: Nurse to Release if Patient Expericences Acute Chest Pain or Dysrhythmias    06/18/21 0524    Unscheduled  Blood Gas, Arterial -With Co-Ox Panel: Yes  As Needed     Comments: Per O2 PolicyNotify Physician      06/18/21 0524    Unscheduled  Magnesium  As Needed      06/18/21 0554    Unscheduled  Change Dressing to IV Site As Needed When Damp, Loose or Soiled  As Needed      06/21/21 1701    Unscheduled  Potassium  As Needed      07/09/21 0452    --  SCANNED - TELEMETRY        06/17/21 0000    --  SCANNED - TELEMETRY        06/17/21 0000    --  SCANNED - TELEMETRY        06/17/21 0000    --  SCANNED - TELEMETRY        06/17/21 0000    --  SCANNED - TELEMETRY        06/17/21 0000    --  SCANNED - TELEMETRY        06/17/21 0000    --  SCANNED - TELEMETRY        06/17/21 0000    --  SCANNED - TELEMETRY        06/17/21 0000    --  SCANNED - TELEMETRY        06/17/21 0000    --  SCANNED - TELEMETRY        06/17/21 0000    --  SCANNED - TELEMETRY        06/17/21 0000    --  SCANNED - TELEMETRY        06/17/21 0000    --  SCANNED - TELEMETRY        06/17/21 0000    --  SCANNED - TELEMETRY        06/17/21 0000    --  SCANNED - TELEMETRY        06/17/21 0000    --  SCANNED - TELEMETRY        06/17/21 0000    --  SCANNED - TELEMETRY        06/17/21 0000    --  SCANNED - TELEMETRY        06/17/21 0000    --  SCANNED - TELEMETRY        06/17/21 0000    --  SCANNED - TELEMETRY        06/17/21 0000    --  SCANNED - TELEMETRY        06/17/21 0000    --  SCANNED - TELEMETRY        06/17/21 0000    --  SCANNED - TELEMETRY        06/17/21 0000    --  SCANNED - TELEMETRY        06/17/21 0000    --  SCANNED - TELEMETRY        06/17/21 0000    --  SCANNED -  TELEMETRY        06/17/21 0000    --  SCANNED - TELEMETRY        06/17/21 0000    --  SCANNED - TELEMETRY        06/17/21 0000    --  SCANNED - TELEMETRY        06/17/21 0000    --  SCANNED - TELEMETRY        06/17/21 0000    --  SCANNED - TELEMETRY        06/17/21 0000    --  SCANNED - TELEMETRY        06/17/21 0000    --  SCANNED - TELEMETRY        06/17/21 0000    --  SCANNED - TELEMETRY        06/17/21 0000    --  SCANNED - TELEMETRY        06/17/21 0000    --  SCANNED - TELEMETRY        06/17/21 0000    --  SCANNED - TELEMETRY        06/17/21 0000    --  SCANNED - TELEMETRY        06/17/21 0000    --  SCANNED - TELEMETRY        06/17/21 0000    --  SCANNED - TELEMETRY        06/17/21 0000    --  SCANNED - TELEMETRY        06/17/21 0000    --  SCANNED - TELEMETRY        06/17/21 0000    --  SCANNED - TELEMETRY        06/17/21 0000    --  SCANNED - TELEMETRY        06/17/21 0000    --  SCANNED - TELEMETRY        06/17/21 0000    --  SCANNED - TELEMETRY        06/17/21 0000    --  SCANNED - TELEMETRY        06/17/21 0000    --  SCANNED - TELEMETRY        06/17/21 0000    --  SCANNED - TELEMETRY        06/17/21 0000    --  SCANNED - TELEMETRY        06/17/21 0000    --  SCANNED - TELEMETRY        06/17/21 0000    --  SCANNED - TELEMETRY        06/17/21 0000    --  SCANNED - TELEMETRY        06/17/21 0000    --  SCANNED - TELEMETRY        06/17/21 0000    --  SCANNED - TELEMETRY        06/17/21 0000    --  SCANNED - TELEMETRY        06/17/21 0000    --  SCANNED - TELEMETRY        06/17/21 0000    --  SCANNED - TELEMETRY        06/17/21 0000    --  SCANNED - TELEMETRY        06/17/21 0000    --  SCANNED - TELEMETRY        06/17/21 0000    --  SCANNED - TELEMETRY        06/17/21 0000    --  SCANNED - TELEMETRY        06/17/21 0000    --  SCANNED - TELEMETRY        06/17/21 0000    --  SCANNED - TELEMETRY        06/17/21 0000    --  SCANNED - TELEMETRY        06/17/21 0000    --  SCANNED - TELEMETRY        06/20/21  0000    --  SCANNED - TELEMETRY        06/20/21 0000    --  SCANNED - TELEMETRY        06/17/21 0000    --  SCANNED - TELEMETRY        06/17/21 0000    --  SCANNED - TELEMETRY        06/17/21 0000    --  SCANNED - TELEMETRY        06/17/21 0000    --  SCANNED - TELEMETRY        06/17/21 0000    --  SCANNED - TELEMETRY        06/17/21 0000    --  SCANNED - TELEMETRY        06/17/21 0000    --  SCANNED - TELEMETRY        06/17/21 0000    --  SCANNED - TELEMETRY        06/17/21 0000    --  SCANNED - TELEMETRY        06/17/21 0000    --  SCANNED - TELEMETRY        06/20/21 0000    --  SCANNED - TELEMETRY        06/20/21 0000    --  SCANNED - TELEMETRY        06/17/21 0000    --  SCANNED - TELEMETRY        06/17/21 0000    --  SCANNED - TELEMETRY        06/17/21 0000    --  SCANNED - TELEMETRY        06/17/21 0000    --  SCANNED - TELEMETRY        06/17/21 0000    --  SCANNED - TELEMETRY        06/17/21 0000    --  SCANNED - TELEMETRY        06/17/21 0000    --  SCANNED - TELEMETRY        06/17/21 0000    --  SCANNED - TELEMETRY        06/17/21 0000    --  SCANNED - TELEMETRY        06/17/21 0000    --  SCANNED - TELEMETRY        06/17/21 0000    --  SCANNED - TELEMETRY        06/17/21 0000    --  SCANNED - TELEMETRY        06/17/21 0000    --  SCANNED - TELEMETRY        06/17/21 0000    --  SCANNED - TELEMETRY        06/17/21 0000    --  SCANNED - TELEMETRY        06/17/21 0000    --  SCANNED - TELEMETRY        06/17/21 0000    --  SCANNED - TELEMETRY        06/17/21 0000    --  SCANNED - TELEMETRY        06/17/21 0000    --  SCANNED - TELEMETRY        06/17/21 0000    --  SCANNED - TELEMETRY        06/17/21 0000    --  SCANNED - TELEMETRY        06/17/21 0000    --  SCANNED - TELEMETRY        06/17/21 0000    --  SCANNED - TELEMETRY        06/17/21 0000                   Physician Progress Notes (last 24 hours) (Notes from 07/11/21 1219 through 07/12/21 1219)      Donna Carcamo, APRN at 07/12/21 6323                      PROGRESS NOTE         Patient Identification:  Name:  Marce Gutierrez  Age:  31 y.o.  Sex:  female  :  1990  MRN:  [id number]  Visit Number:  [id number]  Primary Care Provider:  Provider, No Known         LOS: 24 days       ----------------------------------------------------------------------------------------------------------------------  Subjective       Chief Complaints:    Leg Pain and Arm Pain        Interval History:      Patient resting in bed this morning.  No issues reported overnight.  Afebrile, no diarrhea.  WBC normal.  CRP improving at 0.37.    Review of Systems:    Constitutional: no fever, chills and night sweats. No appetite change or unexpected weight change. No fatigue.  Eyes: no eye drainage, itching or redness.  HEENT: no mouth sores, dysphagia or nose bleed.  Respiratory: no for shortness of breath, cough or production of sputum.  Cardiovascular: no chest pain and no orthopnea.  Palpitations.  Gastrointestinal: no nausea, vomiting or diarrhea. No abdominal pain, hematemesis or rectal bleeding.  Genitourinary: no dysuria or polyuria.  Hematologic/lymphatic: no lymph node abnormalities, no easy bruising or easy bleeding.  Musculoskeletal:  No muscle or joint pain.  Skin: No rash and no itching.  Neurological: no loss of consciousness, no seizure, no headache.  Behavioral/Psych: no depression or suicidal ideation.  Endocrine: no hot flashes.  Immunologic: negative.     ----------------------------------------------------------------------------------------------------------------------      Objective       Current [facility] Meds:  ceFAZolin, 2 g, Intravenous, Q8H  docusate sodium, 100 mg, Oral, BID  enoxaparin, 40 mg, Subcutaneous, Daily  folic acid, 1 mg, Oral, Daily  iron polysaccharides, 150 mg, Oral, Daily  lactobacillus acidophilus, 1 capsule, Oral, Daily  magnesium sulfate, 4 g, Intravenous, Once  metoprolol tartrate, 12.5 mg, Oral, Q12H  multivitamin with minerals, 1 tablet,  Oral, Daily  polyethylene glycol, 17 g, Oral, Daily  sodium chloride, 10 mL, Intravenous, Q12H  sodium chloride, 3 mL, Intravenous, Q12H         ----------------------------------------------------------------------------------------------------------------------    Vital Signs:  Temp:  [97.6 °F (36.4 °C)-98.6 °F (37 °C)] 98.2 °F (36.8 °C)  Heart Rate:  [] 68  Resp:  [16-18] 18  BP: (112-134)/(65-90) 112/79  Mean Arterial Pressure (Non-Invasive) for the past 24 hrs (Last 3 readings):   Noninvasive MAP (mmHg)   07/12/21 0608 93   07/12/21 0255 86   07/11/21 2125 105     SpO2 Percentage    07/11/21 0807 07/11/21 1015 07/11/21 1504   SpO2: 98% 98% 97%     SpO2:  [97 %-98 %] 97 %  on   ;   Device (Oxygen Therapy): room air    Body mass index is 19.56 kg/m².  Wt Readings from Last 3 Encounters:   07/12/21 55 kg (121 lb 3.2 oz)   10/30/19 63.5 kg (140 lb)        Intake/Output Summary (Last 24 hours) at 7/12/2021 0953  Last data filed at 7/12/2021 0848  Gross per 24 hour   Intake 1440 ml   Output --   Net 1440 ml     Diet Regular  ----------------------------------------------------------------------------------------------------------------------      Physical Exam:    Constitutional:  Well-developed and well-nourished.  No respiratory distress.      HENT:  Head: Normocephalic and atraumatic.  Mouth:  Moist mucous membranes.    Eyes:  Conjunctivae and EOM are normal.  No scleral icterus.  Neck:  Neck supple.  No JVD present.  Cardiovascular:  Normal rate, regular rhythm and normal heart sounds with no murmur. No edema.  Pulmonary/Chest:  No respiratory distress, no wheezes, no crackles, with normal breath sounds and good air movement.  Abdominal:  Soft.  Bowel sounds are normal.  No distension and no tenderness.   Musculoskeletal:  No edema, no tenderness, and no deformity.  No swelling or redness of joints.    Neurological:  Alert and oriented to person, place, and time.  No facial droop.  No slurred speech.    Skin:  Skin is warm and dry.  No rash noted.  No pallor.   Psychiatric:  Normal mood and affect.  Behavior is normal.  ----------------------------------------------------------------------------------------------------------------------              Results from last 7 days   Lab Units 07/12/21  0217 07/10/21  2218 07/10/21  0447 07/08/21  0114   CRP mg/dL 0.37 0.49 0.62* <0.30   WBC 10*3/mm3 8.33  --  9.04 10.77   HEMOGLOBIN g/dL 10.0*  --  9.9* 9.4*   HEMATOCRIT % 32.1*  --  30.6* 31.1*   MCV fL 92.8  --  91.3 95.1   MCHC g/dL 31.2*  --  32.4 30.2*   PLATELETS 10*3/mm3 434  --  430 438     Results from last 7 days   Lab Units 07/12/21  0217 07/10/21  2218 07/10/21  0447 07/08/21  0114 07/06/21  0036   SODIUM mmol/L 134* 138 140  --  139   POTASSIUM mmol/L 4.1 4.2 4.0  --  3.8   MAGNESIUM mg/dL 1.9 1.7 2.0   < >  --    CHLORIDE mmol/L 102 103 105  --  104   CO2 mmol/L 21.8* 23.5 24.4  --  24.5   BUN mg/dL 14 16 14  --  18   CREATININE mg/dL 0.64 0.72 0.63  --  0.72   EGFR IF NONAFRICN AM mL/min/1.73 109 95 111  --  95   CALCIUM mg/dL 8.8 8.8 8.5*  --  8.8   GLUCOSE mg/dL 99 85 98  --  81   ALBUMIN g/dL  --   --  3.42*  --  3.26*   BILIRUBIN mg/dL  --   --  0.5  --  0.2   ALK PHOS U/L  --   --  129*  --  130*   AST (SGOT) U/L  --   --  18  --  19   ALT (SGPT) U/L  --   --  8  --  11    < > = values in this interval not displayed.   Estimated Creatinine Clearance: 111.6 mL/min (by C-G formula based on SCr of 0.64 mg/dL).  No results found for: AMMONIA    No results found for: HGBA1C, POCGLU  Lab Results   Component Value Date    HGBA1C 5.60 06/17/2021     Lab Results   Component Value Date    TSH 0.718 06/17/2021    FREET4 1.16 07/09/2021       Blood Culture   Date Value Ref Range Status   06/20/2021 Abnormal Stain (C)  Preliminary   06/19/2021 Staphylococcus aureus (C)  Final     Comment:     Infectious disease consultation is highly recommended to rule out distant foci of infection.   06/17/2021 Staphylococcus  aureus (C)  Final     Comment:     Infectious disease consultation is highly recommended to rule out distant foci of infection.   06/17/2021 Staphylococcus aureus (C)  Final     Comment:     Infectious disease consultation is highly recommended to rule out distant foci of infection.  Refer to previous blood culture collected on 6/17/2021 2318 for MICs.     Urine Culture   Date Value Ref Range Status   06/17/2021 >100,000 CFU/mL Escherichia coli (A)  Final   06/17/2021 >100,000 CFU/mL Staphylococcus aureus (A)  Corrected     No results found for: WOUNDCX  No results found for: STOOLCX  No results found for: RESPCX  Pain Management Panel     Pain Management Panel Latest Ref Rng & Units 7/4/2021 6/17/2021    AMPHETAMINES SCREEN, URINE Negative Negative Positive(A)    BARBITURATES SCREEN Negative Negative Negative    BENZODIAZEPINE SCREEN, URINE Negative Negative Negative    BUPRENORPHINEUR Negative Negative Negative    COCAINE SCREEN, URINE Negative Negative Negative    METHADONE SCREEN, URINE Negative Negative Negative            ----------------------------------------------------------------------------------------------------------------------  Imaging Results (Last 24 Hours)     ** No results found for the last 24 hours. **          ----------------------------------------------------------------------------------------------------------------------    Assessment/Plan       Assessment/Plan     ASSESSMENT:    1.  Severe sepsis with lactic acid greater than 2 on admission  2.  Complicated MSSA bacteremia  3.  Likely underlying endocarditis  4.  Septic emboli    PLAN:    Patient resting in bed this morning.  No issues reported overnight.  Afebrile, no diarrhea.  WBC normal.  CRP improving at 0.37.    KATHRIN reports no evidence of vegetation.     Blood cultures from 6/24/2021 finalized as no growth. Blood culture from 6/22/2021 1 out of 1 set positive for MSSA.    Urinalysis on 6/17/2021 was positive and urine culture  finalized as greater than 100,000 colonies of E. coli and greater than 100,000 colonies of MRSA.  Blood cultures on 2021 finalized as MSSA.  Repeat blood cultures on 2021 and 2021 are still showing growth.  COVID-19 and flu A/B PCR on 2021 detected COVID-19.  Mycoplasma pneumoniae antibody on 2021 was negative.  Strep pneumo antigen on 2021 was negative.  CT chest with PE protocol on 2021 showed technically degraded exam, but no definite PE is seen, and there is no aortic dissection.  Pulmonary edema with a trace amount of right pleural fluid.  Poorly defined nodular infiltrates on both sides of the chest suspicious for septic emboli.  Continued follow-up is recommended. MRI of the lumbar spine on 2021 revealed no acute findings.     Contacted micro lab regarding urine culture on 2021.  Urine culture has finalized as MSSA, but MRSA verbiage was included underneath and micro lab corrected.    COVID-19 diagnosis was incidental and patient remains asymptomatic.  Patient is now out of isolation as of 2021.    In the setting of negative KATHRIN recommend to continue cefazolin 2 g IV every 8 hours through 2021 in the setting of persistent bacteremia.  Patient stable from ID standpoint.    Code Status:   Code Status and Medical Interventions:   Ordered at: 21 0446     Level Of Support Discussed With:    Patient     Code Status:    CPR     Medical Interventions (Level of Support Prior to Arrest):    Full           ANGELICA Anderson  21  09:53 EDT      Electronically signed by Donna Carcamo APRN at 21 0955     Nicci Valdez MD at 21 0911              [facility] HOSPITALIST PROGRESS NOTE     Patient Identification:  Name:  Marce Gutierrez  Age:  31 y.o.  Sex:  female  :  1990  MRN:  [id number]  Visit Number:  [id number]  ROOM: [address] [place] [address]     Primary Care Provider:  Provider, No Known     Date of Admission: 2021    Length of  stay in inpatient status:  24    Subjective     Chief Compliant:    Chief Complaint   Patient presents with   • Leg Pain   • Arm Pain     History of Presenting Illness:   In brief, 32 yo female admitted on 6/17/2021 with severe sepsis of unknown etiology with possible embolic bilateral pneumonia.  Blood cultures grew MSSA and urine grew MSSA and E coli.  2 TTE were negative for endocarditis and a KATHRIN (that was delayed due to incidentally found asymptomatic COVID-19) was also negative for endocarditis.  ID has recommended Cefazolin through 7/22/2021.  Today, the patient states that she feels much better.  She denies chest pain, denies shortness of air, denies coughing, denies nausea, denies emesis, denies diarrhea.  She is eating well.    Also, the patient has had persistent tachycardia; she was found to shout runs of SVT with aberrant conduction.  Cardiology started metoprolol and so far she has done well with this.    Consults:  Dr. Gold with infectious disease  Dr. Montiel with psychiatry  Chelsie Villanueva and Sammy with cardiology    Procedures:  6/30/2021:  Right basilic single lumen midline catheter  6/21/2021-6/30/2021:  Left basilic single lumen midline catheter  7/9/2021:  KATHRIN    Objective     Current Hospital Meds:ceFAZolin, 2 g, Intravenous, Q8H  docusate sodium, 100 mg, Oral, BID  enoxaparin, 40 mg, Subcutaneous, Daily  folic acid, 1 mg, Oral, Daily  iron polysaccharides, 150 mg, Oral, Daily  lactobacillus acidophilus, 1 capsule, Oral, Daily  magnesium sulfate, 4 g, Intravenous, Once  metoprolol tartrate, 12.5 mg, Oral, Q12H  multivitamin with minerals, 1 tablet, Oral, Daily  polyethylene glycol, 17 g, Oral, Daily  sodium chloride, 10 mL, Intravenous, Q12H  sodium chloride, 3 mL, Intravenous, Q12H       Current Antimicrobial Therapy:  Anti-Infectives (From admission, onward)    Ordered     Dose/Rate Route Frequency Start Stop    06/21/21 1104  ceFAZolin Sodium-Dextrose (ANCEF) IVPB (duplex) 2 g     Carlos Alberto  ANGELICA Thompson reviewed the order on 07/10/21 1153.   Ordering Provider: Donna Carcamo APRN    2 g  over 30 Minutes Intravenous Every 8 Hours 06/21/21 1200 07/22/21 2359    06/17/21 2305  vancomycin 1 g/250 mL 0.9% NS (vial-mate)     Ordering Provider: Basilia Villanueva DO    20 mg/kg × 54.4 kg Intravenous Once 06/17/21 2307 06/18/21 0207    06/17/21 2305  piperacillin-tazobactam (ZOSYN) 3.375 g/100 mL 0.9% NS IVPB (mbp)     Ordering Provider: Basilia Villanueva DO    3.375 g Intravenous Once 06/17/21 2307 06/18/21 0040        Current Diuretic Therapy:  Diuretics (From admission, onward)    None        ----------------------------------------------------------------------------------------------------------------------  Vital Signs:  Temp:  [97.6 °F (36.4 °C)-98.6 °F (37 °C)] 98.2 °F (36.8 °C)  Heart Rate:  [] 68  Resp:  [16-18] 18  BP: (112-134)/(65-90) 112/79  SpO2:  [97 %-98 %] 97 %  on   ;   Device (Oxygen Therapy): room air  Body mass index is 19.56 kg/m².    Wt Readings from Last 3 Encounters:   07/12/21 55 kg (121 lb 3.2 oz)   10/30/19 63.5 kg (140 lb)     Intake & Output (last 3 days)       07/09 0701 - 07/10 0700 07/10 0701 - 07/11 0700 07/11 0701 - 07/12 0700 07/12 0701 - 07/13 0700    P.O. 1080 1200 1560 360    I.V. (mL/kg) 75 (1.4)       Total Intake(mL/kg) 1155 (21.2) 1200 (21.6) 1560 (28.4) 360 (6.5)    Urine (mL/kg/hr) 1300 (1)       Total Output 1300       Net -145 +1200 +1560 +360            Urine Unmeasured Occurrence 5 x 5 x 11 x         Diet Regular  ----------------------------------------------------------------------------------------------------------------------  Physical Exam  Constitutional:       Appearance: Normal appearance. She is well-developed and underweight. She is not ill-appearing.   HENT:      Head: Normocephalic and atraumatic.      Right Ear: External ear normal.      Left Ear: External ear normal.      Nose: Nose normal.   Eyes:      General: No scleral icterus.         Right eye: No discharge.         Left eye: No discharge.      Pupils: Pupils are equal, round, and reactive to light.   Cardiovascular:      Rate and Rhythm: Normal rate and regular rhythm.      Pulses: Normal pulses.      Heart sounds: No murmur heard.     Pulmonary:      Effort: Pulmonary effort is normal. No respiratory distress.      Breath sounds: No wheezing or rales.   Abdominal:      General: Bowel sounds are normal. There is no distension.      Palpations: Abdomen is soft.   Musculoskeletal:         General: No swelling or signs of injury.   Skin:     Coloration: Skin is not jaundiced or pale.      Findings: No erythema.   Neurological:      Mental Status: She is alert and oriented to person, place, and time. Mental status is at baseline.      Cranial Nerves: No cranial nerve deficit.   Psychiatric:         Mood and Affect: Mood normal.         Behavior: Behavior normal.         Thought Content: Thought content normal.         Judgment: Judgment normal.       ----------------------------------------------------------------------------------------------------------------------  Tele: Normal sinus rhythm with heart rates 50s to 90s.  I personally reviewed the telemetry strips.      Last echocardiogram:  Results for orders placed during the hospital encounter of 06/17/21    Adult Transesophageal Echo (KATHRIN) W/ Cont if Necessary Per Protocol    Interpretation Summary  · Normal left ventricular cavity size and wall thickness noted. All left ventricular wall segments contract normally.  · Left ventricular ejection fraction appears to be 56 - 60%.  · The aortic valve is structurally normal with no regurgitation or stenosis present.  · The mitral valve is structurally normal with no significant stenosis present. Mild mitral valve regurgitation is present.  · The tricuspid valve is structurally normal with no significant regurgitation or significant stenosis present  · There is no evidence of pericardial effusion.  .  · Saline test results are negative.  · There is no echocardiographic evidence of atrial or ventricular septal defect noted on the saline test.  · There is no echocardiographic evidence of any endocardial vegetations or aortic root abscess.  · Comments: There is no echocardiograph evidence of endocarditis noted on the study.    I have personally read the ECHO final report.  ----------------------------------------------------------------------------------------------------------------------  LABS:      I have personally looked at the labs and they are summarized above.    Assessment & Plan      -Severe sepsis that was present admission (lactic acid 2.4, white blood cell count 16,090, CRP 29.71, heart rate 135) due to MSSA bacteremia and MSSA/E coli UTI  -Suspected endocarditis on admission causing septic emboli in the lungs versus community-acquired pneumonia versus aspiration pneumonia  -Runs of SVT with aberrant conduction, improved with metoprolol  -Left lateral ankle cellulitis and sprain, improved   -Persistent low back pain with a negative MRI of the back  -Right sided knee pain but no signs to suggest septic arthritis  -Hepatitis C, 1a genotype, HCV viral load 4,470,000  -COVID-19 positive on screening test in the emergency department, asymptomatic, not treated, now out of isolation  -D-dimer 7.66 on admission, now improved  -Elevated liver enzymes, viral induced from COVID-19 versus a different hepatic virus  -Acute hypokalemia and acute hypomagnesemia  -Normocytic anemia and thrombocytopenia, suspect bone marrow suppression from COVID-19  -History of IV drug abuse  -History of MRSA skin infection of her left knee    She is doing well.  Will hold off on labs for the next few days as the magnesium is in the low normal range and we do not necessarily need to check this daily at this moment.  Will continue the metoprolol and continue to monitor her on telemetry closely.  She is to also continue the cefazolin  through 7/22/2021.    VTE Prophylaxis:   Mechanical Order History:     None      Pharmalogical Order History:      Ordered     Dose Route Frequency Stop    07/05/21 1542  enoxaparin (LOVENOX) syringe 40 mg      40 mg SC Daily --    06/21/21 1157  enoxaparin (LOVENOX) syringe 60 mg  Status:  Discontinued      1 mg/kg SC Daily 07/05/21 1542    06/18/21 0528  enoxaparin (LOVENOX) syringe 50 mg  Status:  Discontinued      1 mg/kg SC Daily 06/21/21 1157    06/18/21 0524  Pharmacy to Dose enoxaparin (LOVENOX)  Status:  Discontinued     Question:  Indication of use  Answer:  Prophylaxis    -- XX Continuous PRN 06/20/21 1211              The patient is high risk due to the following diagnoses/reasons:  IVDA    Disposition: Home after she completes antibiotics    Nicci Valdez MD  [facility]  07/12/21  09:11 EDT      Electronically signed by Nicci Valdez MD at 07/12/21 1114          Consult Notes (last 24 hours) (Notes from 07/11/21 1219 through 07/12/21 1219)      Renu Kuhn, APRN at 07/11/21 1414      Consult Orders    1. Inpatient Cardiology Consult [[id number]] ordered by Christopher Balderas MD               Consults  Date of Admit: 6/17/2021  Date of Consult: 07/11/21  No ref. provider found  Marce Gutierrez  1990    Consulting Physician: Tariq Mccullough MD    Cardiology consultation    Reason for consultation: Short runs of aberrant conduction and SVT    Assessment:  1. Short runs of SVT and aberrant conduction  2. MSSA bacteremia and sepsis, endocarditis ruled out with KATHRIN on 7/9/2021  3. History of IV drug use        Recommendations:  1. We will start the patient on a low-dose of beta-blocker with holding parameters for the short runs of SVT and aberrant conduction.  Continue to monitor on telemetry.        History of Present Illness    Subjective     Chief Complaint   Patient presents with   • Leg Pain   • Arm Pain       Marce Gutierrez is a 31 y.o. female with past medical history  significant for IV drug use and MRSA cellulitis.  She initially presented to the ED on 6/17/2021 with complaint of leg and arm pain.  She reportedly fell on her left side while trying to rescue her cat up on a roof.  In the ED her chest x-ray revealed bibasilar pneumonia and CT of the chest with PE protocol revealed septic emboli.  COVID-19 screen was positive.  Cardiology was initially consulted for KATHRIN.  It showed no evidence of vegetation on the valves.  Cardiology was reconsulted today due to frequent 6-8 beat runs of aberrant conduction and SVT.    The patient was seen and examined.  She was sitting up in her bed when we entered the room she denies any chest pain or palpitations.  She was unaware that she had occasional fast heart rate.        Review of Systems   Constitutional: Negative for fatigue.   Respiratory: Negative for shortness of breath.    Cardiovascular: Negative for chest pain, palpitations and leg swelling.   Gastrointestinal: Negative for blood in stool.   Neurological: Negative for dizziness, syncope, weakness and light-headedness.   Hematological: Does not bruise/bleed easily.   All other systems reviewed and are negative.        Objective      Vital Signs  Temp:  [97.6 °F (36.4 °C)-99.1 °F (37.3 °C)] 98 °F (36.7 °C)  Heart Rate:  [] 55  Resp:  [17-20] 18  BP: (105-126)/(65-85) 126/68  Body mass index is 19.76 kg/m².    Intake/Output Summary (Last 24 hours) at 7/11/2021 1415  Last data filed at 7/11/2021 0900  Gross per 24 hour   Intake 1080 ml   Output --   Net 1080 ml       Physical Exam  Vitals reviewed.   Constitutional:       Appearance: Normal appearance. She is well-developed.   Cardiovascular:      Rate and Rhythm: Normal rate and regular rhythm.      Heart sounds: No murmur heard.   No friction rub. No gallop.    Pulmonary:      Effort: Pulmonary effort is normal. No respiratory distress.      Breath sounds: Normal breath sounds. No wheezing or rales.   Skin:     General: Skin  is warm and dry.   Neurological:      Mental Status: She is alert and oriented to person, place, and time.   Psychiatric:         Mood and Affect: Mood normal.         Behavior: Behavior normal.         Telemetry: Sinus tachycardia 100s with frequent 6-8 beat runs of aberrant conduction and SVT    Results Review:   I reviewed the patient's new clinical results.        EKG:     Imaging Results (Last 72 Hours)     ** No results found for the last 72 hours. **             Thank you very much for asking us to be involved in this patient's care.  We will follow along with you.    ANGELICA Rodriguez   07/11/21  14:15 EDT        Electronically signed by Renu Kuhn APRN at 07/11/21 5189

## 2021-07-12 NOTE — PROGRESS NOTES
"   LOS: 24 days     Name: Marce Gutierrez  Age/Sex: 31 y.o. female  :  1990        PCP: Provider, No Known    Principal Problem:    Endocarditis      Admission Information: Marce Gutierrez is a 31 y.o. female with a past medical history significant for  IV drug use and MRSA cellulitis.  She initially presented to the ED on 2021 with complaint of leg and arm pain.  She reportedly fell on her left side while trying to rescue her cat up on a roof.  In the ED her chest x-ray revealed bibasilar pneumonia and CT of the chest with PE protocol revealed septic emboli.  COVID-19 screen was positive.  Cardiology was initially consulted for KATHRIN.  It showed no evidence of vegetation on the valves.  Cardiology was reconsulted today due to frequent 6-8 beat runs of aberrant conduction and SVT.    Chief Complaint: Follow up SVT    Interval history: Telemetry reviewed.  Pt has not had any further runs of SVT.  Blood pressure is stable on beta blocker.       Pt seen and examined.  She states that she has slept well last night and her heart wasn't \"flip flopping\" as much as it does usually.  Denies chest pain.      Subjective     Vital Signs  Vital Signs (last 72 hrs)        0700  -  07/10 0659 07/10 07  -   0659  07  -   0659  07  -   1516   Most Recent    Temp (°F) 97.6 -  98.8    97.6 -  99.1    97.6 -  98.6    98.2 -  98.4     98.4 (36.9)    Heart Rate 65 -  137    72 -  105    55 -  107    75 -  80     80    Resp 16 -  18    16 -  20    16 -  18      20     20    /75 -  152/90    105/65 -  125/85    112/79 -  134/90    123/82 -  127/84     123/82    SpO2 (%) 95 -  100    93 -  98    97 -  98      97     97        Temp:  [97.6 °F (36.4 °C)-98.6 °F (37 °C)] 98.4 °F (36.9 °C)  Heart Rate:  [] 80  Resp:  [16-20] 20  BP: (112-134)/(65-90) 123/82  Body mass index is 19.56 kg/m².      Intake/Output Summary (Last 24 hours) at 2021 1516  Last data filed at 2021 1231  Gross " per 24 hour   Intake 1320 ml   Output --   Net 1320 ml       Constitutional:       Appearance: Normal appearance. Well-developed.   Eyes:      General: Lids are normal.      Conjunctiva/sclera: Conjunctivae normal.      Pupils: Pupils are equal, round, and reactive to light.   HENT:      Head: Normocephalic and atraumatic.   Neck:      Vascular: No carotid bruit or JVD.   Pulmonary:      Effort: Pulmonary effort is normal. No respiratory distress.      Breath sounds: Normal breath sounds. No decreased breath sounds. No wheezing. No rhonchi. No rales.   Cardiovascular:      Normal rate. Regular rhythm.      Comments: No lower extremity edema  Abdominal:      General: Bowel sounds are normal. There is no distension.      Palpations: Abdomen is soft.      Tenderness: There is no abdominal tenderness.   Skin:     General: Skin is warm and dry.   Neurological:      Mental Status: Alert and oriented to person, place, and time.   Psychiatric:         Behavior: Behavior is cooperative.         Telemetry:  Sinus 80s       Results Review:     Results from last 7 days   Lab Units 07/12/21  0217 07/10/21  0447 07/08/21  0114 07/06/21  0036   WBC 10*3/mm3 8.33 9.04 10.77 11.09*   HEMOGLOBIN g/dL 10.0* 9.9* 9.4* 9.2*   PLATELETS 10*3/mm3 434 430 438 526*     Results from last 7 days   Lab Units 07/12/21  0217 07/10/21  2218 07/10/21  0447 07/08/21  2136 07/08/21  0114 07/06/21  0036   SODIUM mmol/L 134* 138 140  --  138 139   POTASSIUM mmol/L 4.1 4.2 4.0 3.9 4.0 3.8   CHLORIDE mmol/L 102 103 105  --  103 104   CO2 mmol/L 21.8* 23.5 24.4  --  26.6 24.5   BUN mg/dL 14 16 14  --  15 18   CREATININE mg/dL 0.64 0.72 0.63  --  0.59 0.72   CALCIUM mg/dL 8.8 8.8 8.5*  --  8.5* 8.8   GLUCOSE mg/dL 99 85 98  --  96 81                 I reviewed the patient's new clinical results.  I reviewed the patient's new imaging results and agree with the interpretation.  I personally viewed and interpreted the patient's EKG/Telemetry  data      Medication Review:   ceFAZolin, 2 g, Intravenous, Q8H  docusate sodium, 100 mg, Oral, BID  enoxaparin, 40 mg, Subcutaneous, Daily  folic acid, 1 mg, Oral, Daily  iron polysaccharides, 150 mg, Oral, Daily  lactobacillus acidophilus, 1 capsule, Oral, Daily  metoprolol tartrate, 12.5 mg, Oral, Q12H  multivitamin with minerals, 1 tablet, Oral, Daily  polyethylene glycol, 17 g, Oral, Daily  sodium chloride, 10 mL, Intravenous, Q12H  sodium chloride, 3 mL, Intravenous, Q12H           Assessment:  1. Short runs of SVT and aberrant conduction  2. MSSA bacteremia and sepsis, endocarditis ruled out with KATHRIN on 7/9/2021  3. History of IV drug use      Recommendations:  1. Continue beta blocker.  Monitor heart rate/rhythm.  Monitor blood pressure.   2. Pt is stable from cardiac standpoint.  Please call if needed further.        I discussed the patients findings and my recommendations with patient and family      ANGELICA Rodriguez  07/12/21  15:16 EDT    Addendum:

## 2021-07-12 NOTE — CASE MANAGEMENT/SOCIAL WORK
Case Management/Social Work    Patient Name:  Marce Gutierrez  YOB: 1990  MRN: [id number]  Admit Date:  6/17/2021        Pt's IV antibiotics are covered per Keanu at [facility] and request final order to be faxed to [facility] at d/c.       Electronically signed by:  Riya Gtz RN  07/12/21 11:31 EDT

## 2021-07-12 NOTE — PLAN OF CARE
Goal Outcome Evaluation:   Pt resting in bed, no complaints at this time. Awaiting placement for [facility]. Will continue to monitor POC

## 2021-07-12 NOTE — PLAN OF CARE
Goal Outcome Evaluation:   Patient resting well during shift. She has no complaints or concerns at this time. No distress noted will continue to follow plan of care.

## 2021-07-12 NOTE — PROGRESS NOTES
[facility] HOSPITALIST PROGRESS NOTE     Patient Identification:  Name:  Marce Gutierrez  Age:  31 y.o.  Sex:  female  :  1990  MRN:  [id number]  Visit Number:  [id number]  ROOM: [address] [place] [address]     Primary Care Provider:  Provider, No Known     Date of Admission: 2021    Length of stay in inpatient status:  24    Subjective     Chief Compliant:    Chief Complaint   Patient presents with   • Leg Pain   • Arm Pain     History of Presenting Illness:   In brief, 30 yo female admitted on 2021 with severe sepsis of unknown etiology with possible embolic bilateral pneumonia.  Blood cultures grew MSSA and urine grew MSSA and E coli.  2 TTE were negative for endocarditis and a KATHRNI (that was delayed due to incidentally found asymptomatic COVID-19) was also negative for endocarditis.  ID has recommended Cefazolin through 2021.  Today, the patient states that she feels much better.  She denies chest pain, denies shortness of air, denies coughing, denies nausea, denies emesis, denies diarrhea.  She is eating well.    Also, the patient has had persistent tachycardia; she was found to shout runs of SVT with aberrant conduction.  Cardiology started metoprolol and so far she has done well with this.    Consults:  Dr. Gold with infectious disease  Dr. Montiel with psychiatry  Chelsie Villanueva and Sammy with cardiology    Procedures:  2021:  Right basilic single lumen midline catheter  2021-2021:  Left basilic single lumen midline catheter  2021:  KATHRIN    Objective     Current Hospital Meds:ceFAZolin, 2 g, Intravenous, Q8H  docusate sodium, 100 mg, Oral, BID  enoxaparin, 40 mg, Subcutaneous, Daily  folic acid, 1 mg, Oral, Daily  iron polysaccharides, 150 mg, Oral, Daily  lactobacillus acidophilus, 1 capsule, Oral, Daily  magnesium sulfate, 4 g, Intravenous, Once  metoprolol tartrate, 12.5 mg, Oral, Q12H  multivitamin with minerals, 1 tablet, Oral, Daily  polyethylene glycol, 17 g, Oral,  Daily  sodium chloride, 10 mL, Intravenous, Q12H  sodium chloride, 3 mL, Intravenous, Q12H       Current Antimicrobial Therapy:  Anti-Infectives (From admission, onward)    Ordered     Dose/Rate Route Frequency Start Stop    06/21/21 1104  ceFAZolin Sodium-Dextrose (ANCEF) IVPB (duplex) 2 g     Donna Carcamo APRN reviewed the order on 07/10/21 1153.   Ordering Provider: Donna Carcamo APRN    2 g  over 30 Minutes Intravenous Every 8 Hours 06/21/21 1200 07/22/21 2359    06/17/21 2305  vancomycin 1 g/250 mL 0.9% NS (vial-mate)     Ordering Provider: Basilia Villanueva DO    20 mg/kg × 54.4 kg Intravenous Once 06/17/21 2307 06/18/21 0207    06/17/21 2305  piperacillin-tazobactam (ZOSYN) 3.375 g/100 mL 0.9% NS IVPB (mbp)     Ordering Provider: Basilia Villanueva DO    3.375 g Intravenous Once 06/17/21 2307 06/18/21 0040        Current Diuretic Therapy:  Diuretics (From admission, onward)    None        ----------------------------------------------------------------------------------------------------------------------  Vital Signs:  Temp:  [97.6 °F (36.4 °C)-98.6 °F (37 °C)] 98.2 °F (36.8 °C)  Heart Rate:  [] 68  Resp:  [16-18] 18  BP: (112-134)/(65-90) 112/79  SpO2:  [97 %-98 %] 97 %  on   ;   Device (Oxygen Therapy): room air  Body mass index is 19.56 kg/m².    Wt Readings from Last 3 Encounters:   07/12/21 55 kg (121 lb 3.2 oz)   10/30/19 63.5 kg (140 lb)     Intake & Output (last 3 days)       07/09 0701 - 07/10 0700 07/10 0701 - 07/11 0700 07/11 0701 - 07/12 0700 07/12 0701 - 07/13 0700    P.O. 1080 1200 1560 360    I.V. (mL/kg) 75 (1.4)       Total Intake(mL/kg) 1155 (21.2) 1200 (21.6) 1560 (28.4) 360 (6.5)    Urine (mL/kg/hr) 1300 (1)       Total Output 1300       Net -145 +1200 +1560 +360            Urine Unmeasured Occurrence 5 x 5 x 11 x         Diet Regular  ----------------------------------------------------------------------------------------------------------------------  Physical  Exam  Constitutional:       Appearance: Normal appearance. She is well-developed and underweight. She is not ill-appearing.   HENT:      Head: Normocephalic and atraumatic.      Right Ear: External ear normal.      Left Ear: External ear normal.      Nose: Nose normal.   Eyes:      General: No scleral icterus.        Right eye: No discharge.         Left eye: No discharge.      Pupils: Pupils are equal, round, and reactive to light.   Cardiovascular:      Rate and Rhythm: Normal rate and regular rhythm.      Pulses: Normal pulses.      Heart sounds: No murmur heard.     Pulmonary:      Effort: Pulmonary effort is normal. No respiratory distress.      Breath sounds: No wheezing or rales.   Abdominal:      General: Bowel sounds are normal. There is no distension.      Palpations: Abdomen is soft.   Musculoskeletal:         General: No swelling or signs of injury.   Skin:     Coloration: Skin is not jaundiced or pale.      Findings: No erythema.   Neurological:      Mental Status: She is alert and oriented to person, place, and time. Mental status is at baseline.      Cranial Nerves: No cranial nerve deficit.   Psychiatric:         Mood and Affect: Mood normal.         Behavior: Behavior normal.         Thought Content: Thought content normal.         Judgment: Judgment normal.       ----------------------------------------------------------------------------------------------------------------------  Tele: Normal sinus rhythm with heart rates 50s to 90s.  I personally reviewed the telemetry strips.      Last echocardiogram:  Results for orders placed during the hospital encounter of 06/17/21    Adult Transesophageal Echo (KATHRIN) W/ Cont if Necessary Per Protocol    Interpretation Summary  · Normal left ventricular cavity size and wall thickness noted. All left ventricular wall segments contract normally.  · Left ventricular ejection fraction appears to be 56 - 60%.  · The aortic valve is structurally normal with no  regurgitation or stenosis present.  · The mitral valve is structurally normal with no significant stenosis present. Mild mitral valve regurgitation is present.  · The tricuspid valve is structurally normal with no significant regurgitation or significant stenosis present  · There is no evidence of pericardial effusion. .  · Saline test results are negative.  · There is no echocardiographic evidence of atrial or ventricular septal defect noted on the saline test.  · There is no echocardiographic evidence of any endocardial vegetations or aortic root abscess.  · Comments: There is no echocardiograph evidence of endocarditis noted on the study.    I have personally read the ECHO final report.  ----------------------------------------------------------------------------------------------------------------------  LABS:    CBC and coagulation:  Results from last 7 days   Lab Units 07/12/21  0217 07/10/21  2218 07/10/21  0447 07/09/21  0142 07/08/21  0114 07/07/21  0250 07/06/21  0036   CRP mg/dL 0.37 0.49 0.62* <0.30 <0.30 0.35 0.62*   WBC 10*3/mm3 8.33  --  9.04  --  10.77  --  11.09*   HEMOGLOBIN g/dL 10.0*  --  9.9*  --  9.4*  --  9.2*   HEMATOCRIT % 32.1*  --  30.6*  --  31.1*  --  29.3*   MCV fL 92.8  --  91.3  --  95.1  --  93.6   MCHC g/dL 31.2*  --  32.4  --  30.2*  --  31.4*   PLATELETS 10*3/mm3 434  --  430  --  438  --  526*     Renal and electrolytes:  Results from last 7 days   Lab Units 07/12/21  0217 07/10/21  2218 07/10/21  0447   SODIUM mmol/L 134* 138 140   POTASSIUM mmol/L 4.1 4.2 4.0   MAGNESIUM mg/dL 1.9 1.7 2.0   CHLORIDE mmol/L 102 103 105   CO2 mmol/L 21.8* 23.5 24.4   BUN mg/dL 14 16 14   CREATININE mg/dL 0.64 0.72 0.63   EGFR IF NONAFRICN AM mL/min/1.73 109 95 111   CALCIUM mg/dL 8.8 8.8 8.5*   GLUCOSE mg/dL 99 85 98     Estimated Creatinine Clearance: 111.6 mL/min (by C-G formula based on SCr of 0.64 mg/dL).    Liver and pancreatic function:  Results from last 7 days   Lab Units 07/10/21  9914  07/06/21  0036   ALBUMIN g/dL 3.42* 3.26*   BILIRUBIN mg/dL 0.5 0.2   ALK PHOS U/L 129* 130*   AST (SGOT) U/L 18 19   ALT (SGPT) U/L 8 11     Endocrine function:  Lab Results   Component Value Date    HGBA1C 5.60 06/17/2021     Glucose levels from the CMP:  Results from last 7 days   Lab Units 07/12/21  0217 07/10/21  2218 07/10/21  0447 07/08/21  0114 07/06/21  0036   GLUCOSE mg/dL 99 85 98 96 81     Lab Results   Component Value Date    TSH 0.718 06/17/2021    FREET4 1.16 07/09/2021     Cultures:  Lab Results   Component Value Date    COLORU Dark Yellow (A) 06/17/2021    CLARITYU Cloudy (A) 06/17/2021    PHUR 6.0 06/17/2021    GLUCOSEU Negative 06/17/2021    KETONESU Negative 06/17/2021    BLOODU Large (3+) (A) 06/17/2021    NITRITEU Positive (A) 06/17/2021    LEUKOCYTESUR Moderate (2+) (A) 06/17/2021    BILIRUBINUR Negative 06/17/2021    UROBILINOGEN 1.0 E.U./dL 06/17/2021    RBCUA Too Numerous to Count (A) 06/17/2021    WBCUA Too Numerous to Count (A) 06/17/2021    BACTERIA 4+ (A) 06/17/2021           I have personally looked at the labs and they are summarized above.    Assessment & Plan      -Severe sepsis that was present admission (lactic acid 2.4, white blood cell count 16,090, CRP 29.71, heart rate 135) due to MSSA bacteremia and MSSA/E coli UTI  -Suspected endocarditis on admission causing septic emboli in the lungs versus community-acquired pneumonia versus aspiration pneumonia  -Runs of SVT with aberrant conduction, improved with metoprolol  -Left lateral ankle cellulitis and sprain, improved   -Persistent low back pain with a negative MRI of the back  -Right sided knee pain but no signs to suggest septic arthritis  -Hepatitis C, 1a genotype, HCV viral load 4,470,000  -COVID-19 positive on screening test in the emergency department, asymptomatic, not treated, now out of isolation  -D-dimer 7.66 on admission, now improved  -Elevated liver enzymes, viral induced from COVID-19 versus a different hepatic  virus  -Acute hypokalemia and acute hypomagnesemia  -Normocytic anemia and thrombocytopenia, suspect bone marrow suppression from COVID-19  -History of IV drug abuse  -History of MRSA skin infection of her left knee    She is doing well.  Will hold off on labs for the next few days as the magnesium is in the low normal range and we do not necessarily need to check this daily at this moment.  Will continue the metoprolol and continue to monitor her on telemetry closely.  She is to also continue the cefazolin through 7/22/2021.    VTE Prophylaxis:   Mechanical Order History:     None      Pharmalogical Order History:      Ordered     Dose Route Frequency Stop    07/05/21 1542  enoxaparin (LOVENOX) syringe 40 mg      40 mg SC Daily --    06/21/21 1157  enoxaparin (LOVENOX) syringe 60 mg  Status:  Discontinued      1 mg/kg SC Daily 07/05/21 1542    06/18/21 0528  enoxaparin (LOVENOX) syringe 50 mg  Status:  Discontinued      1 mg/kg SC Daily 06/21/21 1157    06/18/21 0524  Pharmacy to Dose enoxaparin (LOVENOX)  Status:  Discontinued     Question:  Indication of use  Answer:  Prophylaxis    -- XX Continuous PRN 06/20/21 1211              The patient is high risk due to the following diagnoses/reasons:  IVDA    Disposition: Home after she completes antibiotics    Nicci Valdez MD  [facility] Hospitalist  07/12/21  09:11 EDT

## 2021-07-12 NOTE — PROGRESS NOTES
PROGRESS NOTE         Patient Identification:  Name:  Marce Gutierrez  Age:  31 y.o.  Sex:  female  :  1990  MRN:  [id number]  Visit Number:  [id number]  Primary Care Provider:  Provider, No Known         LOS: 24 days       ----------------------------------------------------------------------------------------------------------------------  Subjective       Chief Complaints:    Leg Pain and Arm Pain        Interval History:      Patient resting in bed this morning.  No issues reported overnight.  Afebrile, no diarrhea.  WBC normal.  CRP improving at 0.37.    Review of Systems:    Constitutional: no fever, chills and night sweats. No appetite change or unexpected weight change. No fatigue.  Eyes: no eye drainage, itching or redness.  HEENT: no mouth sores, dysphagia or nose bleed.  Respiratory: no for shortness of breath, cough or production of sputum.  Cardiovascular: no chest pain and no orthopnea.  Palpitations.  Gastrointestinal: no nausea, vomiting or diarrhea. No abdominal pain, hematemesis or rectal bleeding.  Genitourinary: no dysuria or polyuria.  Hematologic/lymphatic: no lymph node abnormalities, no easy bruising or easy bleeding.  Musculoskeletal:  No muscle or joint pain.  Skin: No rash and no itching.  Neurological: no loss of consciousness, no seizure, no headache.  Behavioral/Psych: no depression or suicidal ideation.  Endocrine: no hot flashes.  Immunologic: negative.     ----------------------------------------------------------------------------------------------------------------------      Objective       Current [facility] Meds:  ceFAZolin, 2 g, Intravenous, Q8H  docusate sodium, 100 mg, Oral, BID  enoxaparin, 40 mg, Subcutaneous, Daily  folic acid, 1 mg, Oral, Daily  iron polysaccharides, 150 mg, Oral, Daily  lactobacillus acidophilus, 1 capsule, Oral, Daily  magnesium sulfate, 4 g, Intravenous, Once  metoprolol tartrate, 12.5 mg, Oral, Q12H  multivitamin with minerals, 1  tablet, Oral, Daily  polyethylene glycol, 17 g, Oral, Daily  sodium chloride, 10 mL, Intravenous, Q12H  sodium chloride, 3 mL, Intravenous, Q12H         ----------------------------------------------------------------------------------------------------------------------    Vital Signs:  Temp:  [97.6 °F (36.4 °C)-98.6 °F (37 °C)] 98.2 °F (36.8 °C)  Heart Rate:  [] 68  Resp:  [16-18] 18  BP: (112-134)/(65-90) 112/79  Mean Arterial Pressure (Non-Invasive) for the past 24 hrs (Last 3 readings):   Noninvasive MAP (mmHg)   07/12/21 0608 93   07/12/21 0255 86   07/11/21 2125 105     SpO2 Percentage    07/11/21 0807 07/11/21 1015 07/11/21 1504   SpO2: 98% 98% 97%     SpO2:  [97 %-98 %] 97 %  on   ;   Device (Oxygen Therapy): room air    Body mass index is 19.56 kg/m².  Wt Readings from Last 3 Encounters:   07/12/21 55 kg (121 lb 3.2 oz)   10/30/19 63.5 kg (140 lb)        Intake/Output Summary (Last 24 hours) at 7/12/2021 0953  Last data filed at 7/12/2021 0848  Gross per 24 hour   Intake 1440 ml   Output --   Net 1440 ml     Diet Regular  ----------------------------------------------------------------------------------------------------------------------      Physical Exam:    Constitutional:  Well-developed and well-nourished.  No respiratory distress.      HENT:  Head: Normocephalic and atraumatic.  Mouth:  Moist mucous membranes.    Eyes:  Conjunctivae and EOM are normal.  No scleral icterus.  Neck:  Neck supple.  No JVD present.  Cardiovascular:  Normal rate, regular rhythm and normal heart sounds with no murmur. No edema.  Pulmonary/Chest:  No respiratory distress, no wheezes, no crackles, with normal breath sounds and good air movement.  Abdominal:  Soft.  Bowel sounds are normal.  No distension and no tenderness.   Musculoskeletal:  No edema, no tenderness, and no deformity.  No swelling or redness of joints.    Neurological:  Alert and oriented to person, place, and time.  No facial droop.  No slurred  speech.   Skin:  Skin is warm and dry.  No rash noted.  No pallor.   Psychiatric:  Normal mood and affect.  Behavior is normal.  ----------------------------------------------------------------------------------------------------------------------              Results from last 7 days   Lab Units 07/12/21  0217 07/10/21  2218 07/10/21  0447 07/08/21  0114   CRP mg/dL 0.37 0.49 0.62* <0.30   WBC 10*3/mm3 8.33  --  9.04 10.77   HEMOGLOBIN g/dL 10.0*  --  9.9* 9.4*   HEMATOCRIT % 32.1*  --  30.6* 31.1*   MCV fL 92.8  --  91.3 95.1   MCHC g/dL 31.2*  --  32.4 30.2*   PLATELETS 10*3/mm3 434  --  430 438     Results from last 7 days   Lab Units 07/12/21  0217 07/10/21  2218 07/10/21  0447 07/08/21  0114 07/06/21  0036   SODIUM mmol/L 134* 138 140  --  139   POTASSIUM mmol/L 4.1 4.2 4.0  --  3.8   MAGNESIUM mg/dL 1.9 1.7 2.0   < >  --    CHLORIDE mmol/L 102 103 105  --  104   CO2 mmol/L 21.8* 23.5 24.4  --  24.5   BUN mg/dL 14 16 14  --  18   CREATININE mg/dL 0.64 0.72 0.63  --  0.72   EGFR IF NONAFRICN AM mL/min/1.73 109 95 111  --  95   CALCIUM mg/dL 8.8 8.8 8.5*  --  8.8   GLUCOSE mg/dL 99 85 98  --  81   ALBUMIN g/dL  --   --  3.42*  --  3.26*   BILIRUBIN mg/dL  --   --  0.5  --  0.2   ALK PHOS U/L  --   --  129*  --  130*   AST (SGOT) U/L  --   --  18  --  19   ALT (SGPT) U/L  --   --  8  --  11    < > = values in this interval not displayed.   Estimated Creatinine Clearance: 111.6 mL/min (by C-G formula based on SCr of 0.64 mg/dL).  No results found for: AMMONIA    No results found for: HGBA1C, POCGLU  Lab Results   Component Value Date    HGBA1C 5.60 06/17/2021     Lab Results   Component Value Date    TSH 0.718 06/17/2021    FREET4 1.16 07/09/2021       Blood Culture   Date Value Ref Range Status   06/20/2021 Abnormal Stain (C)  Preliminary   06/19/2021 Staphylococcus aureus (C)  Final     Comment:     Infectious disease consultation is highly recommended to rule out distant foci of infection.   06/17/2021  Staphylococcus aureus (C)  Final     Comment:     Infectious disease consultation is highly recommended to rule out distant foci of infection.   06/17/2021 Staphylococcus aureus (C)  Final     Comment:     Infectious disease consultation is highly recommended to rule out distant foci of infection.  Refer to previous blood culture collected on 6/17/2021 2318 for MICs.     Urine Culture   Date Value Ref Range Status   06/17/2021 >100,000 CFU/mL Escherichia coli (A)  Final   06/17/2021 >100,000 CFU/mL Staphylococcus aureus (A)  Corrected     No results found for: WOUNDCX  No results found for: STOOLCX  No results found for: RESPCX  Pain Management Panel       Pain Management Panel Latest Ref Rng & Units 7/4/2021 6/17/2021    AMPHETAMINES SCREEN, URINE Negative Negative Positive(A)    BARBITURATES SCREEN Negative Negative Negative    BENZODIAZEPINE SCREEN, URINE Negative Negative Negative    BUPRENORPHINEUR Negative Negative Negative    COCAINE SCREEN, URINE Negative Negative Negative    METHADONE SCREEN, URINE Negative Negative Negative              ----------------------------------------------------------------------------------------------------------------------  Imaging Results (Last 24 Hours)       ** No results found for the last 24 hours. **            ----------------------------------------------------------------------------------------------------------------------    Assessment/Plan       Assessment/Plan     ASSESSMENT:    1.  Severe sepsis with lactic acid greater than 2 on admission  2.  Complicated MSSA bacteremia  3.  Likely underlying endocarditis  4.  Septic emboli    PLAN:    Patient resting in bed this morning.  No issues reported overnight.  Afebrile, no diarrhea.  WBC normal.  CRP improving at 0.37.    KATHRIN reports no evidence of vegetation.     Blood cultures from 6/24/2021 finalized as no growth. Blood culture from 6/22/2021 1 out of 1 set positive for MSSA.    Urinalysis on 6/17/2021 was  positive and urine culture finalized as greater than 100,000 colonies of E. coli and greater than 100,000 colonies of MRSA.  Blood cultures on 6/17/2021 finalized as MSSA.  Repeat blood cultures on 6/19/2021 and 6/20/2021 are still showing growth.  COVID-19 and flu A/B PCR on 6/18/2021 detected COVID-19.  Mycoplasma pneumoniae antibody on 6/18/2021 was negative.  Strep pneumo antigen on 6/18/2021 was negative.  CT chest with PE protocol on 6/18/2021 showed technically degraded exam, but no definite PE is seen, and there is no aortic dissection.  Pulmonary edema with a trace amount of right pleural fluid.  Poorly defined nodular infiltrates on both sides of the chest suspicious for septic emboli.  Continued follow-up is recommended. MRI of the lumbar spine on 7/2/2021 revealed no acute findings.     Contacted micro lab regarding urine culture on 6/17/2021.  Urine culture has finalized as MSSA, but MRSA verbiage was included underneath and micro lab corrected.    COVID-19 diagnosis was incidental and patient remains asymptomatic.  Patient is now out of isolation as of 6/28/2021.    In the setting of negative KATHRIN recommend to continue cefazolin 2 g IV every 8 hours through 7/22/2021 in the setting of persistent bacteremia.  Patient stable from ID standpoint.    Code Status:   Code Status and Medical Interventions:   Ordered at: 06/18/21 0446     Level Of Support Discussed With:    Patient     Code Status:    CPR     Medical Interventions (Level of Support Prior to Arrest):    Full           ANGELICA Anderson  07/12/21  09:53 EDT

## 2021-07-13 LAB — MAGNESIUM SERPL-MCNC: 1.8 MG/DL (ref 1.6–2.6)

## 2021-07-13 PROCEDURE — 25010000003 CEFAZOLIN SODIUM-DEXTROSE 2-3 GM-%(50ML) RECONSTITUTED SOLUTION: Performed by: NURSE PRACTITIONER

## 2021-07-13 PROCEDURE — 25010000002 ENOXAPARIN PER 10 MG: Performed by: INTERNAL MEDICINE

## 2021-07-13 PROCEDURE — 25010000003 CEFAZOLIN PER 500 MG: Performed by: INTERNAL MEDICINE

## 2021-07-13 PROCEDURE — 25010000003 MAGNESIUM SULFATE 4 GM/100ML SOLUTION: Performed by: PHYSICIAN ASSISTANT

## 2021-07-13 PROCEDURE — 83735 ASSAY OF MAGNESIUM: CPT | Performed by: INTERNAL MEDICINE

## 2021-07-13 PROCEDURE — 99232 SBSQ HOSP IP/OBS MODERATE 35: CPT | Performed by: INTERNAL MEDICINE

## 2021-07-13 RX ORDER — MAGNESIUM SULFATE HEPTAHYDRATE 40 MG/ML
4 INJECTION, SOLUTION INTRAVENOUS ONCE
Status: COMPLETED | OUTPATIENT
Start: 2021-07-13 | End: 2021-07-13

## 2021-07-13 RX ADMIN — CEFAZOLIN: 1 INJECTION, POWDER, FOR SOLUTION INTRAVENOUS at 12:15

## 2021-07-13 RX ADMIN — CEFAZOLIN: 1 INJECTION, POWDER, FOR SOLUTION INTRAVENOUS at 22:03

## 2021-07-13 RX ADMIN — Medication 1 CAPSULE: at 08:17

## 2021-07-13 RX ADMIN — ENOXAPARIN SODIUM 40 MG: 40 INJECTION SUBCUTANEOUS at 08:19

## 2021-07-13 RX ADMIN — METOPROLOL TARTRATE 12.5 MG: 25 TABLET, FILM COATED ORAL at 08:17

## 2021-07-13 RX ADMIN — Medication 1 TABLET: at 08:17

## 2021-07-13 RX ADMIN — Medication 150 MG: at 08:17

## 2021-07-13 RX ADMIN — DOCOSANOL: 100 CREAM TOPICAL at 00:29

## 2021-07-13 RX ADMIN — CEFAZOLIN SODIUM 2 G: 2 SOLUTION INTRAVENOUS at 03:46

## 2021-07-13 RX ADMIN — MAGNESIUM SULFATE HEPTAHYDRATE 4 G: 40 INJECTION, SOLUTION INTRAVENOUS at 04:50

## 2021-07-13 RX ADMIN — FOLIC ACID 1 MG: 1 TABLET ORAL at 08:17

## 2021-07-13 RX ADMIN — METOPROLOL TARTRATE 12.5 MG: 25 TABLET, FILM COATED ORAL at 22:03

## 2021-07-13 NOTE — PROGRESS NOTES
PROGRESS NOTE         Patient Identification:  Name:  Marce Gutierrez  Age:  31 y.o.  Sex:  female  :  1990  MRN:  [id number]  Visit Number:  [id number]  Primary Care Provider:  Provider, No Known         LOS: 25 days       ----------------------------------------------------------------------------------------------------------------------  Subjective       Chief Complaints:    Leg Pain and Arm Pain        Interval History:      Patient resting in bed this morning.  No issues reported overnight.  Afebrile, no diarrhea.  WBC normal.  CRP normal.     Review of Systems:    Constitutional: no fever, chills and night sweats. No appetite change or unexpected weight change. No fatigue.  Eyes: no eye drainage, itching or redness.  HEENT: no mouth sores, dysphagia or nose bleed.  Respiratory: no for shortness of breath, cough or production of sputum.  Cardiovascular: no chest pain and no orthopnea.    Gastrointestinal: no nausea, vomiting or diarrhea. No abdominal pain, hematemesis or rectal bleeding.  Genitourinary: no dysuria or polyuria.  Hematologic/lymphatic: no lymph node abnormalities, no easy bruising or easy bleeding.  Musculoskeletal:  No muscle or joint pain.  Skin: No rash and no itching.  Neurological: no loss of consciousness, no seizure, no headache.  Behavioral/Psych: no depression or suicidal ideation.  Endocrine: no hot flashes.  Immunologic: negative.     ----------------------------------------------------------------------------------------------------------------------      Objective       Current [facility] Meds:  ceFAZolin, 2 g, Intravenous, Q8H  docusate sodium, 100 mg, Oral, BID  enoxaparin, 40 mg, Subcutaneous, Daily  folic acid, 1 mg, Oral, Daily  iron polysaccharides, 150 mg, Oral, Daily  lactobacillus acidophilus, 1 capsule, Oral, Daily  metoprolol tartrate, 12.5 mg, Oral, Q12H  multivitamin with minerals, 1 tablet, Oral, Daily  polyethylene glycol, 17 g, Oral, Daily  sodium  chloride, 10 mL, Intravenous, Q12H  sodium chloride, 3 mL, Intravenous, Q12H         ----------------------------------------------------------------------------------------------------------------------    Vital Signs:  Temp:  [97.8 °F (36.6 °C)-99 °F (37.2 °C)] 97.8 °F (36.6 °C)  Heart Rate:  [] 74  Resp:  [18-20] 18  BP: ()/(57-86) 118/57  Mean Arterial Pressure (Non-Invasive) for the past 24 hrs (Last 3 readings):   Noninvasive MAP (mmHg)   07/13/21 0552 78   07/13/21 0337 75   07/12/21 1738 100     SpO2 Percentage    07/12/21 1419 07/12/21 1738 07/13/21 0337   SpO2: 97% 97% 97%     SpO2:  [97 %] 97 %  on   ;   Device (Oxygen Therapy): room air    Body mass index is 19.59 kg/m².  Wt Readings from Last 3 Encounters:   07/13/21 55.1 kg (121 lb 6.4 oz)   10/30/19 63.5 kg (140 lb)        Intake/Output Summary (Last 24 hours) at 7/13/2021 0956  Last data filed at 7/13/2021 0845  Gross per 24 hour   Intake 1120 ml   Output --   Net 1120 ml     Diet Regular  ----------------------------------------------------------------------------------------------------------------------      Physical Exam:    Constitutional:  Well-developed and well-nourished.  No respiratory distress.      HENT:  Head: Normocephalic and atraumatic.  Mouth:  Moist mucous membranes.    Eyes:  Conjunctivae and EOM are normal.  No scleral icterus.  Neck:  Neck supple.  No JVD present.  Cardiovascular:  Normal rate, regular rhythm and normal heart sounds with no murmur. No edema.  Pulmonary/Chest:  No respiratory distress, no wheezes, no crackles, with normal breath sounds and good air movement.  Abdominal:  Soft.  Bowel sounds are normal.  No distension and no tenderness.   Musculoskeletal:  No edema, no tenderness, and no deformity.  No swelling or redness of joints.    Neurological:  Alert and oriented to person, place, and time.  No facial droop.  No slurred speech.   Skin:  Skin is warm and dry.  No rash noted.  No pallor.    Psychiatric:  Normal mood and affect.  Behavior is normal.  ----------------------------------------------------------------------------------------------------------------------              Results from last 7 days   Lab Units 07/12/21  0217 07/10/21  2218 07/10/21  0447 07/08/21  0114   CRP mg/dL 0.37 0.49 0.62* <0.30   WBC 10*3/mm3 8.33  --  9.04 10.77   HEMOGLOBIN g/dL 10.0*  --  9.9* 9.4*   HEMATOCRIT % 32.1*  --  30.6* 31.1*   MCV fL 92.8  --  91.3 95.1   MCHC g/dL 31.2*  --  32.4 30.2*   PLATELETS 10*3/mm3 434  --  430 438     Results from last 7 days   Lab Units 07/13/21  0026 07/12/21  0217 07/10/21  2218 07/10/21  0447   SODIUM mmol/L  --  134* 138 140   POTASSIUM mmol/L  --  4.1 4.2 4.0   MAGNESIUM mg/dL 1.8 1.9 1.7 2.0   CHLORIDE mmol/L  --  102 103 105   CO2 mmol/L  --  21.8* 23.5 24.4   BUN mg/dL  --  14 16 14   CREATININE mg/dL  --  0.64 0.72 0.63   EGFR IF NONAFRICN AM mL/min/1.73  --  109 95 111   CALCIUM mg/dL  --  8.8 8.8 8.5*   GLUCOSE mg/dL  --  99 85 98   ALBUMIN g/dL  --   --   --  3.42*   BILIRUBIN mg/dL  --   --   --  0.5   ALK PHOS U/L  --   --   --  129*   AST (SGOT) U/L  --   --   --  18   ALT (SGPT) U/L  --   --   --  8   Estimated Creatinine Clearance: 111.8 mL/min (by C-G formula based on SCr of 0.64 mg/dL).  No results found for: AMMONIA    No results found for: HGBA1C, POCGLU  Lab Results   Component Value Date    HGBA1C 5.60 06/17/2021     Lab Results   Component Value Date    TSH 0.718 06/17/2021    FREET4 1.16 07/09/2021       Blood Culture   Date Value Ref Range Status   06/20/2021 Abnormal Stain (C)  Preliminary   06/19/2021 Staphylococcus aureus (C)  Final     Comment:     Infectious disease consultation is highly recommended to rule out distant foci of infection.   06/17/2021 Staphylococcus aureus (C)  Final     Comment:     Infectious disease consultation is highly recommended to rule out distant foci of infection.   06/17/2021 Staphylococcus aureus (C)  Final     Comment:      Infectious disease consultation is highly recommended to rule out distant foci of infection.  Refer to previous blood culture collected on 6/17/2021 2318 for MICs.     Urine Culture   Date Value Ref Range Status   06/17/2021 >100,000 CFU/mL Escherichia coli (A)  Final   06/17/2021 >100,000 CFU/mL Staphylococcus aureus (A)  Corrected     No results found for: WOUNDCX  No results found for: STOOLCX  No results found for: RESPCX  Pain Management Panel       Pain Management Panel Latest Ref Rng & Units 7/4/2021 6/17/2021    AMPHETAMINES SCREEN, URINE Negative Negative Positive(A)    BARBITURATES SCREEN Negative Negative Negative    BENZODIAZEPINE SCREEN, URINE Negative Negative Negative    BUPRENORPHINEUR Negative Negative Negative    COCAINE SCREEN, URINE Negative Negative Negative    METHADONE SCREEN, URINE Negative Negative Negative              ----------------------------------------------------------------------------------------------------------------------  Imaging Results (Last 24 Hours)       ** No results found for the last 24 hours. **            ----------------------------------------------------------------------------------------------------------------------    Assessment/Plan       Assessment/Plan     ASSESSMENT:    1.  Severe sepsis with lactic acid greater than 2 on admission  2.  Complicated MSSA bacteremia  3.  Likely underlying endocarditis  4.  Septic emboli    PLAN:    Patient resting in bed this morning.  No issues reported overnight.  Afebrile, no diarrhea.  WBC normal.  CRP normal.     KATHRIN reports no evidence of vegetation.     Blood cultures from 6/24/2021 finalized as no growth. Blood culture from 6/22/2021 1 out of 1 set positive for MSSA.    Urinalysis on 6/17/2021 was positive and urine culture finalized as greater than 100,000 colonies of E. coli and greater than 100,000 colonies of MRSA.  Blood cultures on 6/17/2021 finalized as MSSA.  Repeat blood cultures on 6/19/2021 and  6/20/2021 are still showing growth.  COVID-19 and flu A/B PCR on 6/18/2021 detected COVID-19.  Mycoplasma pneumoniae antibody on 6/18/2021 was negative.  Strep pneumo antigen on 6/18/2021 was negative.  CT chest with PE protocol on 6/18/2021 showed technically degraded exam, but no definite PE is seen, and there is no aortic dissection.  Pulmonary edema with a trace amount of right pleural fluid.  Poorly defined nodular infiltrates on both sides of the chest suspicious for septic emboli.  Continued follow-up is recommended. MRI of the lumbar spine on 7/2/2021 revealed no acute findings.     Contacted micro lab regarding urine culture on 6/17/2021.  Urine culture has finalized as MSSA, but MRSA verbiage was included underneath and micro lab corrected.    COVID-19 diagnosis was incidental and patient remains asymptomatic.  Patient is now out of isolation as of 6/28/2021.    In the setting of negative KATHRIN recommend to continue cefazolin 2 g IV every 8 hours through 7/22/2021 in the setting of persistent bacteremia.  Patient stable from ID standpoint.    Code Status:   Code Status and Medical Interventions:   Ordered at: 06/18/21 0446     Level Of Support Discussed With:    Patient     Code Status:    CPR     Medical Interventions (Level of Support Prior to Arrest):    Full           ANGELICA Anderson  07/13/21  09:56 EDT

## 2021-07-13 NOTE — CASE MANAGEMENT/SOCIAL WORK
Discharge Planning Assessment  TAYLOR Antonio     Patient Name: Marce Gutierrez  MRN: [id number]  Today's Date: 7/13/2021    Admit Date: 6/17/2021    Discharge Needs Assessment    No documentation.       Discharge Plan     Row Name 07/13/21 1248       Plan    Plan SS spoke with Physician on this date who states Pt is not agreeable to going to [facility] in [place] because she is concerned she would not have transportation back home once IV antibiotics are completed. SS to follow.          Leanne Merino

## 2021-07-13 NOTE — PLAN OF CARE
Goal Outcome Evaluation:   Patient sitting in bed on her cell phone. Frequently ambulates off of unit. Heart rates remain in NSR. Will cont to follow plan of care

## 2021-07-13 NOTE — PROGRESS NOTES
[facility] HOSPITALIST PROGRESS NOTE     Patient Identification:  Name:  Marce Gutierrez  Age:  31 y.o.  Sex:  female  :  1990  MRN:  [id number]  Visit Number:  [id number]  ROOM: [address] [place] [address]     Primary Care Provider:  Provider, No Known     Date of Admission: 2021    Length of stay in inpatient status:  25    Subjective     Chief Compliant:    Chief Complaint   Patient presents with   • Leg Pain   • Arm Pain     History of Presenting Illness:   In brief, 30 yo female admitted on 2021 with severe sepsis of unknown etiology with possible embolic bilateral pneumonia.  Blood cultures grew MSSA and urine grew MSSA and E coli.  2 TTE were negative for endocarditis and a KATHRIN (that was delayed due to incidentally found asymptomatic COVID-19) was also negative for endocarditis.  ID has recommended Cefazolin through 2021.  Today, the patient continues to do well.  She denies any chest pain, trouble breathing, coughing, edema, nausea, vomiting, and diarrhea.  The patient did discuss going to rehab in [place], [place], but then she thought more about it and she is afraid that she will not be able to get back from that [facility] after her antibiotics and inpatient rehab are completed.    Consults:  Dr. Gold with infectious disease  Dr. Montiel with psychiatry  Chelsie Villanueva and Sammy with cardiology     Procedures:  2021:  Right basilic single lumen midline catheter  2021-2021:  Left basilic single lumen midline catheter  2021:  KATHRIN    Objective     Current Hospital Meds:IVPB builder, , Intravenous, Q8H  docusate sodium, 100 mg, Oral, BID  enoxaparin, 40 mg, Subcutaneous, Daily  folic acid, 1 mg, Oral, Daily  iron polysaccharides, 150 mg, Oral, Daily  lactobacillus acidophilus, 1 capsule, Oral, Daily  metoprolol tartrate, 12.5 mg, Oral, Q12H  multivitamin with minerals, 1 tablet, Oral, Daily  polyethylene glycol, 17 g, Oral, Daily  sodium chloride, 10 mL, Intravenous,  Q12H  sodium chloride, 3 mL, Intravenous, Q12H       Current Antimicrobial Therapy:  Anti-Infectives (From admission, onward)    Ordered     Dose/Rate Route Frequency Start Stop    07/13/21 1000  ceFAZolin (ANCEF) 2 g in sodium chloride 0.9 % 100 mL IVPB     Ordering Provider: Ila Gold MD    200 mL/hr over 30 Minutes Intravenous Every 8 Hours 07/13/21 1200 07/22/21 1959    06/17/21 2305  vancomycin 1 g/250 mL 0.9% NS (vial-mate)     Ordering Provider: Basilia Villanueva DO    20 mg/kg × 54.4 kg Intravenous Once 06/17/21 2307 06/18/21 0207    06/17/21 2305  piperacillin-tazobactam (ZOSYN) 3.375 g/100 mL 0.9% NS IVPB (mbp)     Ordering Provider: Basilia Villanueva DO    3.375 g Intravenous Once 06/17/21 2307 06/18/21 0040        Current Diuretic Therapy:  Diuretics (From admission, onward)    None        ----------------------------------------------------------------------------------------------------------------------  Vital Signs:  Temp:  [97.6 °F (36.4 °C)-99 °F (37.2 °C)] 97.6 °F (36.4 °C)  Heart Rate:  [] 97  Resp:  [18-20] 20  BP: ()/(57-86) 126/83  SpO2:  [96 %-97 %] 96 %  on   ;   Device (Oxygen Therapy): room air  Body mass index is 19.59 kg/m².    Wt Readings from Last 3 Encounters:   07/13/21 55.1 kg (121 lb 6.4 oz)   10/30/19 63.5 kg (140 lb)     Intake & Output (last 3 days)       07/10 0701 - 07/11 0700 07/11 0701 - 07/12 0700 07/12 0701 - 07/13 0700 07/13 0701 - 07/14 0700    P.O. 1200 1560 1120 600    I.V. (mL/kg)        Total Intake(mL/kg) 1200 (21.6) 1560 (28.4) 1120 (20.3) 600 (10.9)    Urine (mL/kg/hr)        Total Output        Net +1200 +1560 +1120 +600            Urine Unmeasured Occurrence 5 x 11 x 4 x 3 x        Diet Regular  ----------------------------------------------------------------------------------------------------------------------  Physical Exam; basically, her exam is unchanged compared to yesterday.  Constitutional:       Appearance: Normal appearance.  She is well-developed and underweight. She is not ill-appearing.   HENT:      Head: Normocephalic and atraumatic.      Right Ear: External ear normal.      Left Ear: External ear normal.      Nose: Nose normal.   Eyes:      General: No scleral icterus.        Right eye: No discharge.         Left eye: No discharge.      Pupils: Pupils are equal, round, and reactive to light.   Cardiovascular:      Rate and Rhythm: Normal rate and regular rhythm.      Pulses: Normal pulses.      Heart sounds: No murmur heard.   Pulmonary:      Effort: Pulmonary effort is normal. No respiratory distress.      Breath sounds: No wheezing or rales.   Abdominal:      General: Bowel sounds are normal. There is no distension.      Palpations: Abdomen is soft.   Musculoskeletal:         General: No swelling or signs of injury.   Skin:     Coloration: Skin is not jaundiced or pale.      Findings: No erythema.   Neurological:      Mental Status: She is alert and oriented to person, place, and time. Mental status is at baseline.      Cranial Nerves: No cranial nerve deficit.   Psychiatric:         Mood and Affect: Mood normal.         Behavior: Behavior normal.         Thought Content: Thought content normal.         Judgment: Judgment normal.   ----------------------------------------------------------------------------------------------------------------------  Tele: Normal sinus rhythm with heart rate 70s to 90s.  I personally reviewed the telemetry strips.  ----------------------------------------------------------------------------------------------------------------------  LABS:    CBC and coagulation:  Results from last 7 days   Lab Units 07/12/21  0217 07/10/21  2218 07/10/21  0447 07/09/21  0142 07/08/21  0114 07/07/21  0250   CRP mg/dL 0.37 0.49 0.62* <0.30 <0.30 0.35   WBC 10*3/mm3 8.33  --  9.04  --  10.77  --    HEMOGLOBIN g/dL 10.0*  --  9.9*  --  9.4*  --    HEMATOCRIT % 32.1*  --  30.6*  --  31.1*  --    MCV fL 92.8  --  91.3   --  95.1  --    MCHC g/dL 31.2*  --  32.4  --  30.2*  --    PLATELETS 10*3/mm3 434  --  430  --  438  --      Renal and electrolytes:  Results from last 7 days   Lab Units 07/13/21  0026 07/12/21  0217 07/10/21  2218 07/10/21  0447   SODIUM mmol/L  --  134* 138 140   POTASSIUM mmol/L  --  4.1 4.2 4.0   MAGNESIUM mg/dL 1.8 1.9 1.7 2.0   CHLORIDE mmol/L  --  102 103 105   CO2 mmol/L  --  21.8* 23.5 24.4   BUN mg/dL  --  14 16 14   CREATININE mg/dL  --  0.64 0.72 0.63   EGFR IF NONAFRICN AM mL/min/1.73  --  109 95 111   CALCIUM mg/dL  --  8.8 8.8 8.5*   GLUCOSE mg/dL  --  99 85 98     Estimated Creatinine Clearance: 111.8 mL/min (by C-G formula based on SCr of 0.64 mg/dL).    Liver and pancreatic function:  Results from last 7 days   Lab Units 07/10/21  0447   ALBUMIN g/dL 3.42*   BILIRUBIN mg/dL 0.5   ALK PHOS U/L 129*   AST (SGOT) U/L 18   ALT (SGPT) U/L 8     Endocrine function:  Lab Results   Component Value Date    HGBA1C 5.60 06/17/2021     Glucose levels from the CMP:  Results from last 7 days   Lab Units 07/12/21  0217 07/10/21  2218 07/10/21  0447 07/08/21  0114   GLUCOSE mg/dL 99 85 98 96     Lab Results   Component Value Date    TSH 0.718 06/17/2021    FREET4 1.16 07/09/2021     Cultures:  Lab Results   Component Value Date    COLORU Dark Yellow (A) 06/17/2021    CLARITYU Cloudy (A) 06/17/2021    PHUR 6.0 06/17/2021    GLUCOSEU Negative 06/17/2021    KETONESU Negative 06/17/2021    BLOODU Large (3+) (A) 06/17/2021    NITRITEU Positive (A) 06/17/2021    LEUKOCYTESUR Moderate (2+) (A) 06/17/2021    BILIRUBINUR Negative 06/17/2021    UROBILINOGEN 1.0 E.U./dL 06/17/2021    RBCUA Too Numerous to Count (A) 06/17/2021    WBCUA Too Numerous to Count (A) 06/17/2021    BACTERIA 4+ (A) 06/17/2021       I have personally looked at the labs and they are summarized above.    Assessment & Plan      -Severe sepsis that was present admission (lactic acid 2.4, white blood cell count 16,090, CRP 29.71, heart rate 135) due to  MSSA bacteremia and MSSA/E coli UTI  -Suspected endocarditis on admission causing septic emboli in the lungs versus community-acquired pneumonia versus aspiration pneumonia  -Runs of SVT with aberrant conduction, improved with metoprolol  -Left lateral ankle cellulitis and sprain, improved   -Persistent low back pain with a negative MRI of the back  -Right sided knee pain but no signs to suggest septic arthritis  -Hepatitis C, 1a genotype, HCV viral load 4,470,000  -COVID-19 positive on screening test in the emergency department, asymptomatic, not treated, now out of isolation  -D-dimer 7.66 on admission, now improved  -Elevated liver enzymes, viral induced from COVID-19 versus a different hepatic virus  -Acute hypokalemia and acute hypomagnesemia  -Normocytic anemia and thrombocytopenia, suspect bone marrow suppression from COVID-19  -History of IV drug abuse  -History of MRSA skin infection of her left knee    We await the completion of her antibiotics.  So far, she does not appear to have any recurrent sepsis.  We will repeat her blood work in the morning.  The patient no longer needs telemetry; if needed, the patient can be moved to the medical surgical floor.  I have discussed the patient's desire to not go to inpatient rehab in [place], [place], with .    VTE Prophylaxis:   Mechanical Order History:     None      Pharmalogical Order History:      Ordered     Dose Route Frequency Stop    07/05/21 1542  enoxaparin (LOVENOX) syringe 40 mg      40 mg SC Daily --    06/21/21 1157  enoxaparin (LOVENOX) syringe 60 mg  Status:  Discontinued      1 mg/kg SC Daily 07/05/21 1542    06/18/21 0528  enoxaparin (LOVENOX) syringe 50 mg  Status:  Discontinued      1 mg/kg SC Daily 06/21/21 1157    06/18/21 0524  Pharmacy to Dose enoxaparin (LOVENOX)  Status:  Discontinued     Question:  Indication of use  Answer:  Prophylaxis    -- XX Continuous PRN 06/20/21 1211              Disposition: Home after IV  antibiotics have been completed versus another facility that will perform IV antibiotic administration    Nicci Valdez MD  [facility]  07/13/21  18:24 EDT

## 2021-07-13 NOTE — PLAN OF CARE
Goal Outcome Evaluation:   Pt resting in bed at this time. Complaints of fever blister; B Shweta STREETER aware; see orders. No distress noted. VSS. Will continue to follow plan of care.

## 2021-07-14 LAB
ANION GAP SERPL CALCULATED.3IONS-SCNC: 10.4 MMOL/L (ref 5–15)
BUN SERPL-MCNC: 14 MG/DL (ref 6–20)
BUN/CREAT SERPL: 21.5 (ref 7–25)
CALCIUM SPEC-SCNC: 9 MG/DL (ref 8.6–10.5)
CHLORIDE SERPL-SCNC: 103 MMOL/L (ref 98–107)
CO2 SERPL-SCNC: 22.6 MMOL/L (ref 22–29)
CREAT SERPL-MCNC: 0.65 MG/DL (ref 0.57–1)
DEPRECATED RDW RBC AUTO: 42.9 FL (ref 37–54)
ERYTHROCYTE [DISTWIDTH] IN BLOOD BY AUTOMATED COUNT: 13 % (ref 12.3–15.4)
GFR SERPL CREATININE-BSD FRML MDRD: 107 ML/MIN/1.73
GLUCOSE SERPL-MCNC: 96 MG/DL (ref 65–99)
HCT VFR BLD AUTO: 34.1 % (ref 34–46.6)
HGB BLD-MCNC: 10.8 G/DL (ref 12–15.9)
MAGNESIUM SERPL-MCNC: 1.8 MG/DL (ref 1.6–2.6)
MCH RBC QN AUTO: 28.8 PG (ref 26.6–33)
MCHC RBC AUTO-ENTMCNC: 31.7 G/DL (ref 31.5–35.7)
MCV RBC AUTO: 90.9 FL (ref 79–97)
PHOSPHATE SERPL-MCNC: 3.2 MG/DL (ref 2.5–4.5)
PLATELET # BLD AUTO: 387 10*3/MM3 (ref 140–450)
PMV BLD AUTO: 9.6 FL (ref 6–12)
POTASSIUM SERPL-SCNC: 3.9 MMOL/L (ref 3.5–5.2)
RBC # BLD AUTO: 3.75 10*6/MM3 (ref 3.77–5.28)
SODIUM SERPL-SCNC: 136 MMOL/L (ref 136–145)
WBC # BLD AUTO: 9.01 10*3/MM3 (ref 3.4–10.8)

## 2021-07-14 PROCEDURE — 25010000002 ENOXAPARIN PER 10 MG: Performed by: INTERNAL MEDICINE

## 2021-07-14 PROCEDURE — 80048 BASIC METABOLIC PNL TOTAL CA: CPT | Performed by: INTERNAL MEDICINE

## 2021-07-14 PROCEDURE — 83735 ASSAY OF MAGNESIUM: CPT | Performed by: INTERNAL MEDICINE

## 2021-07-14 PROCEDURE — 99232 SBSQ HOSP IP/OBS MODERATE 35: CPT | Performed by: INTERNAL MEDICINE

## 2021-07-14 PROCEDURE — 25010000003 MAGNESIUM SULFATE 4 GM/100ML SOLUTION: Performed by: PHYSICIAN ASSISTANT

## 2021-07-14 PROCEDURE — 84100 ASSAY OF PHOSPHORUS: CPT | Performed by: INTERNAL MEDICINE

## 2021-07-14 PROCEDURE — 85027 COMPLETE CBC AUTOMATED: CPT | Performed by: INTERNAL MEDICINE

## 2021-07-14 PROCEDURE — 25010000003 CEFAZOLIN PER 500 MG: Performed by: INTERNAL MEDICINE

## 2021-07-14 RX ORDER — MAGNESIUM SULFATE HEPTAHYDRATE 40 MG/ML
4 INJECTION, SOLUTION INTRAVENOUS ONCE
Status: COMPLETED | OUTPATIENT
Start: 2021-07-14 | End: 2021-07-14

## 2021-07-14 RX ADMIN — MAGNESIUM SULFATE IN WATER 4 G: 40 INJECTION, SOLUTION INTRAVENOUS at 10:38

## 2021-07-14 RX ADMIN — CEFAZOLIN: 1 INJECTION, POWDER, FOR SOLUTION INTRAVENOUS at 21:22

## 2021-07-14 RX ADMIN — CEFAZOLIN: 1 INJECTION, POWDER, FOR SOLUTION INTRAVENOUS at 11:25

## 2021-07-14 RX ADMIN — SODIUM CHLORIDE, PRESERVATIVE FREE 10 ML: 5 INJECTION INTRAVENOUS at 10:38

## 2021-07-14 RX ADMIN — FOLIC ACID 1 MG: 1 TABLET ORAL at 10:37

## 2021-07-14 RX ADMIN — METOPROLOL TARTRATE 12.5 MG: 25 TABLET, FILM COATED ORAL at 10:37

## 2021-07-14 RX ADMIN — CEFAZOLIN: 1 INJECTION, POWDER, FOR SOLUTION INTRAVENOUS at 04:11

## 2021-07-14 RX ADMIN — Medication 1 CAPSULE: at 10:37

## 2021-07-14 RX ADMIN — Medication 1 TABLET: at 10:37

## 2021-07-14 RX ADMIN — Medication 150 MG: at 10:36

## 2021-07-14 RX ADMIN — METOPROLOL TARTRATE 12.5 MG: 25 TABLET, FILM COATED ORAL at 21:22

## 2021-07-14 RX ADMIN — HYDROCODONE BITARTRATE AND ACETAMINOPHEN 1 TABLET: 7.5; 325 TABLET ORAL at 10:47

## 2021-07-14 RX ADMIN — SODIUM CHLORIDE, PRESERVATIVE FREE 3 ML: 5 INJECTION INTRAVENOUS at 10:36

## 2021-07-14 NOTE — PLAN OF CARE
Goal Outcome Evaluation: Improving    Pt currently walking around in room while talking on telephone. Denies any complaints at this time. Ambulates well independently in halls. V/S stable with no signs of respiratory distress present. Will continue to monitor pt and follow plan of care.

## 2021-07-14 NOTE — PROGRESS NOTES
PROGRESS NOTE         Patient Identification:  Name:  Marce Gutierrez  Age:  31 y.o.  Sex:  female  :  1990  MRN:  [id number]  Visit Number:  [id number]  Primary Care Provider:  Provider, No Known         LOS: 26 days       ----------------------------------------------------------------------------------------------------------------------  Subjective       Chief Complaints:    Leg Pain and Arm Pain        Interval History:      Patient sitting up in bed this morning.  No issues or complaints.  Afebrile, no diarrhea.  WBC normal.    Review of Systems:    Constitutional: no fever, chills and night sweats. No appetite change or unexpected weight change. No fatigue.  Eyes: no eye drainage, itching or redness.  HEENT: no mouth sores, dysphagia or nose bleed.  Respiratory: no for shortness of breath, cough or production of sputum.  Cardiovascular: no chest pain and no orthopnea.    Gastrointestinal: no nausea, vomiting or diarrhea. No abdominal pain, hematemesis or rectal bleeding.  Genitourinary: no dysuria or polyuria.  Hematologic/lymphatic: no lymph node abnormalities, no easy bruising or easy bleeding.  Musculoskeletal:  No muscle or joint pain.  Skin: No rash and no itching.  Neurological: no loss of consciousness, no seizure, no headache.  Behavioral/Psych: no depression or suicidal ideation.  Endocrine: no hot flashes.  Immunologic: negative.     ----------------------------------------------------------------------------------------------------------------------      Objective       Current [facility] Meds:  IVPB builder, , Intravenous, Q8H  docusate sodium, 100 mg, Oral, BID  enoxaparin, 40 mg, Subcutaneous, Daily  folic acid, 1 mg, Oral, Daily  iron polysaccharides, 150 mg, Oral, Daily  lactobacillus acidophilus, 1 capsule, Oral, Daily  magnesium sulfate, 4 g, Intravenous, Once  metoprolol tartrate, 12.5 mg, Oral, Q12H  multivitamin with minerals, 1 tablet, Oral, Daily  polyethylene glycol, 17  g, Oral, Daily  sodium chloride, 10 mL, Intravenous, Q12H  sodium chloride, 3 mL, Intravenous, Q12H         ----------------------------------------------------------------------------------------------------------------------    Vital Signs:  Temp:  [97.6 °F (36.4 °C)-99.4 °F (37.4 °C)] 98.5 °F (36.9 °C)  Heart Rate:  [] 86  Resp:  [18-20] 18  BP: (112-133)/(61-85) 112/61  Mean Arterial Pressure (Non-Invasive) for the past 24 hrs (Last 3 readings):   Noninvasive MAP (mmHg)   07/14/21 0737 77   07/13/21 1900 100   07/13/21 1413 96     SpO2 Percentage    07/13/21 1009 07/13/21 1900 07/14/21 0300   SpO2: 96% 98% 96%     SpO2:  [96 %-98 %] 96 %  on   ;   Device (Oxygen Therapy): room air    Body mass index is 19.3 kg/m².  Wt Readings from Last 3 Encounters:   07/14/21 54.3 kg (119 lb 9.6 oz)   10/30/19 63.5 kg (140 lb)        Intake/Output Summary (Last 24 hours) at 7/14/2021 0836  Last data filed at 7/14/2021 0500  Gross per 24 hour   Intake 682.64 ml   Output --   Net 682.64 ml     Diet Regular  ----------------------------------------------------------------------------------------------------------------------      Physical Exam:    Constitutional:  Well-developed and well-nourished.  No respiratory distress.      HENT:  Head: Normocephalic and atraumatic.  Mouth:  Moist mucous membranes.    Eyes:  Conjunctivae and EOM are normal.  No scleral icterus.  Neck:  Neck supple.  No JVD present.  Cardiovascular:  Normal rate, regular rhythm and normal heart sounds with no murmur. No edema.  Pulmonary/Chest:  No respiratory distress, no wheezes, no crackles, with normal breath sounds and good air movement.  Abdominal:  Soft.  Bowel sounds are normal.  No distension and no tenderness.   Musculoskeletal:  No edema, no tenderness, and no deformity.  No swelling or redness of joints.    Neurological:  Alert and oriented to person, place, and time.  No facial droop.  No slurred speech.   Skin:  Skin is warm and dry.  No  rash noted.  No pallor.   Psychiatric:  Normal mood and affect.  Behavior is normal.  ----------------------------------------------------------------------------------------------------------------------              Results from last 7 days   Lab Units 07/14/21  0138 07/12/21  0217 07/10/21  2218 07/10/21  0447   CRP mg/dL  --  0.37 0.49 0.62*   WBC 10*3/mm3 9.01 8.33  --  9.04   HEMOGLOBIN g/dL 10.8* 10.0*  --  9.9*   HEMATOCRIT % 34.1 32.1*  --  30.6*   MCV fL 90.9 92.8  --  91.3   MCHC g/dL 31.7 31.2*  --  32.4   PLATELETS 10*3/mm3 387 434  --  430     Results from last 7 days   Lab Units 07/14/21 0138 07/13/21 0026 07/12/21  0217 07/10/21  2218 07/10/21  0447   SODIUM mmol/L 136  --  134* 138 140   POTASSIUM mmol/L 3.9  --  4.1 4.2 4.0   MAGNESIUM mg/dL 1.8 1.8 1.9 1.7 2.0   CHLORIDE mmol/L 103  --  102 103 105   CO2 mmol/L 22.6  --  21.8* 23.5 24.4   BUN mg/dL 14  --  14 16 14   CREATININE mg/dL 0.65  --  0.64 0.72 0.63   EGFR IF NONAFRICN AM mL/min/1.73 107  --  109 95 111   CALCIUM mg/dL 9.0  --  8.8 8.8 8.5*   GLUCOSE mg/dL 96  --  99 85 98   ALBUMIN g/dL  --   --   --   --  3.42*   BILIRUBIN mg/dL  --   --   --   --  0.5   ALK PHOS U/L  --   --   --   --  129*   AST (SGOT) U/L  --   --   --   --  18   ALT (SGPT) U/L  --   --   --   --  8   Estimated Creatinine Clearance: 108.5 mL/min (by C-G formula based on SCr of 0.65 mg/dL).  No results found for: AMMONIA    No results found for: HGBA1C, POCGLU  Lab Results   Component Value Date    HGBA1C 5.60 06/17/2021     Lab Results   Component Value Date    TSH 0.718 06/17/2021    FREET4 1.16 07/09/2021       Blood Culture   Date Value Ref Range Status   06/20/2021 Abnormal Stain (C)  Preliminary   06/19/2021 Staphylococcus aureus (C)  Final     Comment:     Infectious disease consultation is highly recommended to rule out distant foci of infection.   06/17/2021 Staphylococcus aureus (C)  Final     Comment:     Infectious disease consultation is highly  recommended to rule out distant foci of infection.   06/17/2021 Staphylococcus aureus (C)  Final     Comment:     Infectious disease consultation is highly recommended to rule out distant foci of infection.  Refer to previous blood culture collected on 6/17/2021 2318 for MICs.     Urine Culture   Date Value Ref Range Status   06/17/2021 >100,000 CFU/mL Escherichia coli (A)  Final   06/17/2021 >100,000 CFU/mL Staphylococcus aureus (A)  Corrected     No results found for: WOUNDCX  No results found for: STOOLCX  No results found for: RESPCX  Pain Management Panel       Pain Management Panel Latest Ref Rng & Units 7/4/2021 6/17/2021    AMPHETAMINES SCREEN, URINE Negative Negative Positive(A)    BARBITURATES SCREEN Negative Negative Negative    BENZODIAZEPINE SCREEN, URINE Negative Negative Negative    BUPRENORPHINEUR Negative Negative Negative    COCAINE SCREEN, URINE Negative Negative Negative    METHADONE SCREEN, URINE Negative Negative Negative              ----------------------------------------------------------------------------------------------------------------------  Imaging Results (Last 24 Hours)       ** No results found for the last 24 hours. **            ----------------------------------------------------------------------------------------------------------------------    Assessment/Plan       Assessment/Plan     ASSESSMENT:    1.  Severe sepsis with lactic acid greater than 2 on admission  2.  Complicated MSSA bacteremia  3.  Likely underlying endocarditis  4.  Septic emboli    PLAN:    Patient sitting up in bed this morning.  No issues or complaints.  Afebrile, no diarrhea.  WBC normal.    KATHRIN reports no evidence of vegetation.     Blood cultures from 6/24/2021 finalized as no growth. Blood culture from 6/22/2021 1 out of 1 set positive for MSSA.    Urinalysis on 6/17/2021 was positive and urine culture finalized as greater than 100,000 colonies of E. coli and greater than 100,000 colonies of MRSA.   Blood cultures on 6/17/2021 finalized as MSSA.  Repeat blood cultures on 6/19/2021 and 6/20/2021 are still showing growth.  COVID-19 and flu A/B PCR on 6/18/2021 detected COVID-19.  Mycoplasma pneumoniae antibody on 6/18/2021 was negative.  Strep pneumo antigen on 6/18/2021 was negative.  CT chest with PE protocol on 6/18/2021 showed technically degraded exam, but no definite PE is seen, and there is no aortic dissection.  Pulmonary edema with a trace amount of right pleural fluid.  Poorly defined nodular infiltrates on both sides of the chest suspicious for septic emboli.  Continued follow-up is recommended. MRI of the lumbar spine on 7/2/2021 revealed no acute findings.     Contacted micro lab regarding urine culture on 6/17/2021.  Urine culture has finalized as MSSA, but MRSA verbiage was included underneath and micro lab corrected.    COVID-19 diagnosis was incidental and patient remains asymptomatic.  Patient is now out of isolation as of 6/28/2021.    In the setting of negative KATHRIN recommend to continue cefazolin 2 g IV every 8 hours through 7/22/2021 in the setting of persistent bacteremia.  Patient stable from ID standpoint.    Code Status:   Code Status and Medical Interventions:   Ordered at: 06/18/21 0446     Level Of Support Discussed With:    Patient     Code Status:    CPR     Medical Interventions (Level of Support Prior to Arrest):    Full           ANGELICA Anderson  07/14/21  08:54 EDT

## 2021-07-14 NOTE — PROGRESS NOTES
[facility] HOSPITALIST PROGRESS NOTE     Patient Identification:  Name:  Marce Gutierrez  Age:  31 y.o.  Sex:  female  :  1990  MRN:  [id number]  Visit Number:  [id number]  ROOM: [address] [place] [address]     Primary Care Provider:  Provider, No Known     Date of Admission: 2021    Length of stay in inpatient status:  26    Subjective     Chief Compliant:    Chief Complaint   Patient presents with   • Leg Pain   • Arm Pain      History of Presenting Illness:   In brief, 32 yo female admitted on 2021 with severe sepsis of unknown etiology with possible embolic bilateral pneumonia.  Blood cultures grew MSSA and urine grew MSSA and E coli.  2 TTE were negative for endocarditis and a KATHRIN (that was delayed due to incidentally found asymptomatic COVID-19) was also negative for endocarditis.  ID has recommended Cefazolin through 2021.   Today, the patient states that she has been walking around the hospital in order to improve her mood and get some exercise.  The patient denies chest pain, trouble breathing, edema, nausea, vomiting, and diarrhea.  The patient is still very motivated on going to rehab after she is discharged.    Consults:  Dr. Gold with infectious disease  Dr. Montiel with psychiatry  Chelsie Villanueva and Sammy with cardiology     Procedures:  2021:  Right basilic single lumen midline catheter  2021-2021:  Left basilic single lumen midline catheter  2021:  KATHRIN    Objective     Current Hospital Meds:IVPB builder, , Intravenous, Q8H  docusate sodium, 100 mg, Oral, BID  enoxaparin, 40 mg, Subcutaneous, Daily  folic acid, 1 mg, Oral, Daily  iron polysaccharides, 150 mg, Oral, Daily  lactobacillus acidophilus, 1 capsule, Oral, Daily  metoprolol tartrate, 12.5 mg, Oral, Q12H  multivitamin with minerals, 1 tablet, Oral, Daily  polyethylene glycol, 17 g, Oral, Daily  sodium chloride, 10 mL, Intravenous, Q12H  sodium chloride, 3 mL, Intravenous, Q12H       Current Antimicrobial  Therapy:  Anti-Infectives (From admission, onward)    Ordered     Dose/Rate Route Frequency Start Stop    07/13/21 1000  ceFAZolin (ANCEF) 2 g in sodium chloride 0.9 % 100 mL IVPB     Donna Carcamo APRN reviewed the order on 07/14/21 0855.   Ordering Provider: Ila Gold MD    200 mL/hr over 30 Minutes Intravenous Every 8 Hours 07/13/21 1200 07/22/21 1959    06/17/21 2305  vancomycin 1 g/250 mL 0.9% NS (vial-mate)     Ordering Provider: Basilia Villanueva DO    20 mg/kg × 54.4 kg Intravenous Once 06/17/21 2307 06/18/21 0207    06/17/21 2305  piperacillin-tazobactam (ZOSYN) 3.375 g/100 mL 0.9% NS IVPB (mbp)     Ordering Provider: Basilia Villanueva DO    3.375 g Intravenous Once 06/17/21 2307 06/18/21 0040        Current Diuretic Therapy:  Diuretics (From admission, onward)    None        ----------------------------------------------------------------------------------------------------------------------  Vital Signs:  Temp:  [97.3 °F (36.3 °C)-99.4 °F (37.4 °C)] 98 °F (36.7 °C)  Heart Rate:  [] 82  Resp:  [18-20] 18  BP: (112-144)/() 116/80  SpO2:  [96 %-99 %] 99 %  on   ;   Device (Oxygen Therapy): room air  Body mass index is 19.3 kg/m².    Wt Readings from Last 3 Encounters:   07/14/21 54.3 kg (119 lb 9.6 oz)   10/30/19 63.5 kg (140 lb)     Intake & Output (last 3 days)       07/11 0701 - 07/12 0700 07/12 0701 - 07/13 0700 07/13 0701 - 07/14 0700 07/14 0701 - 07/15 0700    P.O. 1560 1120 840 840    I.V. (mL/kg)   202.6 (3.7)     Total Intake(mL/kg) 1560 (28.4) 1120 (20.3) 1042.6 (19.2) 840 (15.5)    Net +1560 +1120 +1042.6 +840            Urine Unmeasured Occurrence 11 x 4 x 5 x 5 x    Stool Unmeasured Occurrence    1 x        Diet Regular  ----------------------------------------------------------------------------------------------------------------------  Physical Exam  Vitals reviewed.   Constitutional:       Appearance: Normal appearance. She is not ill-appearing, toxic-appearing or  diaphoretic.   HENT:      Head: Normocephalic and atraumatic.      Right Ear: External ear normal.      Left Ear: External ear normal.      Nose: Nose normal.   Eyes:      General: No scleral icterus.        Right eye: No discharge.         Left eye: No discharge.      Pupils: Pupils are equal, round, and reactive to light.   Skin:     Coloration: Skin is not jaundiced.      Findings: No erythema.   Neurological:      Mental Status: She is alert and oriented to person, place, and time. Mental status is at baseline.      Cranial Nerves: No cranial nerve deficit.      Comments: Normal gait.   Psychiatric:         Mood and Affect: Mood normal.         Behavior: Behavior normal.     ----------------------------------------------------------------------------------------------------------------------  Tele:  Normal sinus rhythm heart rate 70s to 90s.  I have personally reviewed/looked at the telemetry strips.  ----------------------------------------------------------------------------------------------------------------------  LABS:    CBC and coagulation:  Results from last 7 days   Lab Units 07/14/21  0138 07/12/21  0217 07/10/21  2218 07/10/21  0447 07/09/21  0142 07/08/21  0114   CRP mg/dL  --  0.37 0.49 0.62* <0.30 <0.30   WBC 10*3/mm3 9.01 8.33  --  9.04  --  10.77   HEMOGLOBIN g/dL 10.8* 10.0*  --  9.9*  --  9.4*   HEMATOCRIT % 34.1 32.1*  --  30.6*  --  31.1*   MCV fL 90.9 92.8  --  91.3  --  95.1   MCHC g/dL 31.7 31.2*  --  32.4  --  30.2*   PLATELETS 10*3/mm3 387 434  --  430  --  438     Acid/base balance:      Renal and electrolytes:  Results from last 7 days   Lab Units 07/14/21  0138 07/13/21  0026 07/12/21  0217 07/10/21  2218   SODIUM mmol/L 136  --  134* 138   POTASSIUM mmol/L 3.9  --  4.1 4.2   MAGNESIUM mg/dL 1.8 1.8 1.9 1.7   CHLORIDE mmol/L 103  --  102 103   CO2 mmol/L 22.6  --  21.8* 23.5   BUN mg/dL 14  --  14 16   CREATININE mg/dL 0.65  --  0.64 0.72   EGFR IF NONAFRICN AM mL/min/1.73 107  --   109 95   CALCIUM mg/dL 9.0  --  8.8 8.8   PHOSPHORUS mg/dL 3.2  --   --   --    GLUCOSE mg/dL 96  --  99 85     Estimated Creatinine Clearance: 108.5 mL/min (by C-G formula based on SCr of 0.65 mg/dL).    Liver and pancreatic function:  Results from last 7 days   Lab Units 07/10/21  0447   ALBUMIN g/dL 3.42*   BILIRUBIN mg/dL 0.5   ALK PHOS U/L 129*   AST (SGOT) U/L 18   ALT (SGPT) U/L 8     Endocrine function:  Lab Results   Component Value Date    HGBA1C 5.60 06/17/2021     Glucose levels from the CMP:  Results from last 7 days   Lab Units 07/14/21  0138 07/12/21  0217 07/10/21  2218 07/10/21  0447 07/08/21  0114   GLUCOSE mg/dL 96 99 85 98 96     Lab Results   Component Value Date    TSH 0.718 06/17/2021    FREET4 1.16 07/09/2021       I have personally looked at the labs and they are summarized above.    Assessment & Plan      -Severe sepsis that was present admission (lactic acid 2.4, white blood cell count 16,090, CRP 29.71, heart rate 135) due to MSSA bacteremia and MSSA/E coli UTI  -Suspected endocarditis on admission causing septic emboli in the lungs versus community-acquired pneumonia versus aspiration pneumonia  -Runs of SVT with aberrant conduction, improved with metoprolol  -Left lateral ankle cellulitis and sprain, improved   -Persistent low back pain with a negative MRI of the back  -Right sided knee pain but no signs to suggest septic arthritis  -Hepatitis C, 1a genotype, HCV viral load 4,470,000  -COVID-19 positive on screening test in the emergency department, asymptomatic, not treated, now out of isolation  -D-dimer 7.66 on admission, now improved  -Elevated liver enzymes, viral induced from COVID-19 versus a different hepatic virus  -Acute hypokalemia and acute hypomagnesemia  -Normocytic anemia and thrombocytopenia, suspect bone marrow suppression from COVID-19  -History of IV drug abuse  -History of MRSA skin infection of her left knee    We have been unsuccessful in obtaining the patient a  drug rehab that also accepts people that need IV antibiotics.  Patient has 9 more days of IV antibiotics.  We will continue with the antibiotics and see how she does.  Her blood work today has not changed compared to the last few times that she had blood drawn.  Therefore, I will not draw any blood in the morning.    VTE Prophylaxis:   Mechanical Order History:     None      Pharmalogical Order History:      Ordered     Dose Route Frequency Stop    07/05/21 1542  enoxaparin (LOVENOX) syringe 40 mg      40 mg SC Daily --    06/21/21 1157  enoxaparin (LOVENOX) syringe 60 mg  Status:  Discontinued      1 mg/kg SC Daily 07/05/21 1542    06/18/21 0528  enoxaparin (LOVENOX) syringe 50 mg  Status:  Discontinued      1 mg/kg SC Daily 06/21/21 1157    06/18/21 0524  Pharmacy to Dose enoxaparin (LOVENOX)  Status:  Discontinued     Question:  Indication of use  Answer:  Prophylaxis    -- XX Continuous PRN 06/20/21 1211              Disposition: Home and then drug rehab versus straight to drug rehab    Nicci Valdez MD   Hospitalist  07/14/21  18:51 EDT

## 2021-07-14 NOTE — PLAN OF CARE
Goal Outcome Evaluation:            Patient reports no new issues at this time. Will continue to monitor and continue plan of care.

## 2021-07-14 NOTE — CASE MANAGEMENT/SOCIAL WORK
Discharge Planning Assessment  TAYLOR Antonio     Patient Name: Marce Gutierrez  MRN: [id number]  Today's Date: 7/14/2021    Admit Date: 6/17/2021    Discharge Needs Assessment    No documentation.       Discharge Plan     Row Name 07/14/21 1540       Plan    Plan SS contacted [facility] [phone] but facility does not provide IV antibiotics, only inpatient substance abuse treatement. SS to follow.          Leanne Merino

## 2021-07-15 LAB — MAGNESIUM SERPL-MCNC: 1.8 MG/DL (ref 1.6–2.6)

## 2021-07-15 PROCEDURE — 25010000003 CEFAZOLIN PER 500 MG: Performed by: INTERNAL MEDICINE

## 2021-07-15 PROCEDURE — 83735 ASSAY OF MAGNESIUM: CPT | Performed by: INTERNAL MEDICINE

## 2021-07-15 PROCEDURE — 25010000003 MAGNESIUM SULFATE 4 GM/100ML SOLUTION: Performed by: INTERNAL MEDICINE

## 2021-07-15 PROCEDURE — 99232 SBSQ HOSP IP/OBS MODERATE 35: CPT | Performed by: INTERNAL MEDICINE

## 2021-07-15 RX ORDER — MAGNESIUM SULFATE HEPTAHYDRATE 40 MG/ML
4 INJECTION, SOLUTION INTRAVENOUS ONCE
Status: COMPLETED | OUTPATIENT
Start: 2021-07-15 | End: 2021-07-15

## 2021-07-15 RX ADMIN — CEFAZOLIN: 1 INJECTION, POWDER, FOR SOLUTION INTRAVENOUS at 04:52

## 2021-07-15 RX ADMIN — CEFAZOLIN: 1 INJECTION, POWDER, FOR SOLUTION INTRAVENOUS at 13:19

## 2021-07-15 RX ADMIN — Medication 150 MG: at 08:13

## 2021-07-15 RX ADMIN — METOPROLOL TARTRATE 12.5 MG: 25 TABLET, FILM COATED ORAL at 08:13

## 2021-07-15 RX ADMIN — CEFAZOLIN: 1 INJECTION, POWDER, FOR SOLUTION INTRAVENOUS at 20:30

## 2021-07-15 RX ADMIN — METOPROLOL TARTRATE 12.5 MG: 25 TABLET, FILM COATED ORAL at 20:36

## 2021-07-15 RX ADMIN — Medication 1 CAPSULE: at 08:13

## 2021-07-15 RX ADMIN — SODIUM CHLORIDE, PRESERVATIVE FREE 10 ML: 5 INJECTION INTRAVENOUS at 22:03

## 2021-07-15 RX ADMIN — IBUPROFEN 400 MG: 400 TABLET, FILM COATED ORAL at 20:38

## 2021-07-15 RX ADMIN — FOLIC ACID 1 MG: 1 TABLET ORAL at 08:13

## 2021-07-15 RX ADMIN — SODIUM CHLORIDE, PRESERVATIVE FREE 3 ML: 5 INJECTION INTRAVENOUS at 20:30

## 2021-07-15 RX ADMIN — Medication 1 TABLET: at 08:13

## 2021-07-15 RX ADMIN — MAGNESIUM SULFATE HEPTAHYDRATE 4 G: 40 INJECTION, SOLUTION INTRAVENOUS at 08:12

## 2021-07-15 NOTE — PROGRESS NOTES
[facility] HOSPITALIST PROGRESS NOTE     Patient Identification:  Name:  Marce Gutierrez  Age:  31 y.o.  Sex:  female  :  1990  MRN:  [id number]  Visit Number:  [id number]  ROOM: [address] [place] [address]     Primary Care Provider:  Provider, No Known     Date of Admission: 2021    Length of stay in inpatient status:  27    Subjective     Chief Compliant:    Chief Complaint   Patient presents with   • Leg Pain   • Arm Pain     History of Presenting Illness:  In brief, 32 yo female admitted on 2021 with severe sepsis of unknown etiology with possible embolic bilateral pneumonia.  Blood cultures grew MSSA and urine grew MSSA and E coli.  2 TTE were negative for endocarditis and a KATHRIN (that was delayed due to incidentally found asymptomatic COVID-19) was also negative for endocarditis.  ID has recommended Cefazolin through 2021.  Today, patient continues to do well.  She denies any chest pain, trouble breathing, nausea, vomiting, and diarrhea.  She continues to walk around the hospital without any acute issues.    Consults:  Dr. Gold with infectious disease  Dr. Montiel with psychiatry  Chelsie Villanueva and Sammy with cardiology     Procedures:  2021:  Right basilic single lumen midline catheter  2021-2021:  Left basilic single lumen midline catheter  2021:  KATHRIN    Objective     Current Hospital Meds:IVPB builder, , Intravenous, Q8H  docusate sodium, 100 mg, Oral, BID  enoxaparin, 40 mg, Subcutaneous, Daily  folic acid, 1 mg, Oral, Daily  iron polysaccharides, 150 mg, Oral, Daily  lactobacillus acidophilus, 1 capsule, Oral, Daily  magnesium sulfate, 4 g, Intravenous, Once  metoprolol tartrate, 12.5 mg, Oral, Q12H  multivitamin with minerals, 1 tablet, Oral, Daily  polyethylene glycol, 17 g, Oral, Daily  sodium chloride, 10 mL, Intravenous, Q12H  sodium chloride, 3 mL, Intravenous, Q12H       Current Antimicrobial Therapy:  Anti-Infectives (From admission, onward)    Ordered      Dose/Rate Route Frequency Start Stop    07/13/21 1000  ceFAZolin (ANCEF) 2 g in sodium chloride 0.9 % 100 mL IVPB     Donna Carcamo APRN reviewed the order on 07/14/21 0855.   Ordering Provider: Ila Gold MD    200 mL/hr over 30 Minutes Intravenous Every 8 Hours 07/13/21 1200 07/22/21 1959    06/17/21 2305  vancomycin 1 g/250 mL 0.9% NS (vial-mate)     Ordering Provider: Basilia Villanueva DO    20 mg/kg × 54.4 kg Intravenous Once 06/17/21 2307 06/18/21 0207    06/17/21 2305  piperacillin-tazobactam (ZOSYN) 3.375 g/100 mL 0.9% NS IVPB (mbp)     Ordering Provider: Basilia Villanueva DO    3.375 g Intravenous Once 06/17/21 2307 06/18/21 0040        Current Diuretic Therapy:  Diuretics (From admission, onward)    None        ----------------------------------------------------------------------------------------------------------------------  Vital Signs:  Temp:  [97.3 °F (36.3 °C)-98.3 °F (36.8 °C)] 98.3 °F (36.8 °C)  Heart Rate:  [82-94] 82  Resp:  [18-20] 18  BP: (109-140)/(66-85) 109/66  SpO2:  [99 %] 99 %  on   ;   Device (Oxygen Therapy): room air  Body mass index is 19.3 kg/m².    Wt Readings from Last 3 Encounters:   07/14/21 54.3 kg (119 lb 9.6 oz)   10/30/19 63.5 kg (140 lb)     Intake & Output (last 3 days)       07/12 0701 - 07/13 0700 07/13 0701 - 07/14 0700 07/14 0701 - 07/15 0700 07/15 0701 - 07/16 0700    P.O. 5625 341 5621 360    I.V. (mL/kg)  202.6 (3.7)      Total Intake(mL/kg) 1120 (20.3) 1042.6 (19.2) 1080 (19.9) 360 (6.6)    Net +1120 +1042.6 +1080 +360            Urine Unmeasured Occurrence 4 x 5 x 8 x     Stool Unmeasured Occurrence   1 x         Diet Regular  ----------------------------------------------------------------------------------------------------------------------  Physical Exam  Constitutional:       General: She is not in acute distress.     Appearance: She is underweight. She is not ill-appearing, toxic-appearing or diaphoretic.   HENT:      Head: Normocephalic and  atraumatic.      Right Ear: External ear normal.      Left Ear: External ear normal.      Nose: Nose normal.   Eyes:      General: No scleral icterus.        Right eye: No discharge.         Left eye: No discharge.      Pupils: Pupils are equal, round, and reactive to light.   Skin:     Coloration: Skin is not jaundiced.      Findings: No erythema.   Neurological:      Mental Status: She is alert and oriented to person, place, and time. Mental status is at baseline.      Cranial Nerves: No cranial nerve deficit.   Psychiatric:         Mood and Affect: Mood normal.         Behavior: Behavior normal.         Thought Content: Thought content normal.         Judgment: Judgment normal.     ----------------------------------------------------------------------------------------------------------------------  Tele: None  ----------------------------------------------------------------------------------------------------------------------  LABS:    CBC and coagulation:  Results from last 7 days   Lab Units 07/14/21  0138 07/12/21  0217 07/10/21  2218 07/10/21  0447 07/09/21  0142   CRP mg/dL  --  0.37 0.49 0.62* <0.30   WBC 10*3/mm3 9.01 8.33  --  9.04  --    HEMOGLOBIN g/dL 10.8* 10.0*  --  9.9*  --    HEMATOCRIT % 34.1 32.1*  --  30.6*  --    MCV fL 90.9 92.8  --  91.3  --    MCHC g/dL 31.7 31.2*  --  32.4  --    PLATELETS 10*3/mm3 387 434  --  430  --      Renal and electrolytes:  Results from last 7 days   Lab Units 07/15/21  0453 07/14/21  0138 07/13/21  0026 07/12/21  0217 07/10/21  2218   SODIUM mmol/L  --  136  --  134* 138   POTASSIUM mmol/L  --  3.9  --  4.1 4.2   MAGNESIUM mg/dL 1.8 1.8 1.8 1.9 1.7   CHLORIDE mmol/L  --  103  --  102 103   CO2 mmol/L  --  22.6  --  21.8* 23.5   BUN mg/dL  --  14  --  14 16   CREATININE mg/dL  --  0.65  --  0.64 0.72   EGFR IF NONAFRICN AM mL/min/1.73  --  107  --  109 95   CALCIUM mg/dL  --  9.0  --  8.8 8.8   PHOSPHORUS mg/dL  --  3.2  --   --   --    GLUCOSE mg/dL  --  96  --   99 85     Estimated Creatinine Clearance: 108.5 mL/min (by C-G formula based on SCr of 0.65 mg/dL).    Liver and pancreatic function:  Results from last 7 days   Lab Units 07/10/21  0447   ALBUMIN g/dL 3.42*   BILIRUBIN mg/dL 0.5   ALK PHOS U/L 129*   AST (SGOT) U/L 18   ALT (SGPT) U/L 8     Endocrine function:  Lab Results   Component Value Date    HGBA1C 5.60 06/17/2021     Glucose levels from the CMP:  Results from last 7 days   Lab Units 07/14/21  0138 07/12/21  0217 07/10/21  2218 07/10/21  0447   GLUCOSE mg/dL 96 99 85 98     Lab Results   Component Value Date    TSH 0.718 06/17/2021    FREET4 1.16 07/09/2021     I have personally looked at the labs and they are summarized above.    Assessment & Plan      -Severe sepsis that was present admission (lactic acid 2.4, white blood cell count 16,090, CRP 29.71, heart rate 135) due to MSSA bacteremia and MSSA/E coli UTI  -Suspected endocarditis on admission causing septic emboli in the lungs versus community-acquired pneumonia versus aspiration pneumonia  -Runs of SVT with aberrant conduction, improved with metoprolol  -Left lateral ankle cellulitis and sprain, improved   -Persistent low back pain with a negative MRI of the back  -Right sided knee pain but no signs to suggest septic arthritis  -Hepatitis C, 1a genotype, HCV viral load 4,470,000  -COVID-19 positive on screening test in the emergency department, asymptomatic, not treated, now out of isolation  -D-dimer 7.66 on admission, now improved  -Elevated liver enzymes, viral induced from COVID-19 versus a different hepatic virus  -Acute hypokalemia and acute hypomagnesemia  -Normocytic anemia and thrombocytopenia, suspect bone marrow suppression from COVID-19  -History of IV drug abuse  -History of MRSA skin infection of her left knee    The patient needs 8 more days of IV antibiotics.  She is still wanting to go to rehab once she is discharged.  I will repeat the patient's blood work in the morning.  Her  magnesium level is still mildly low and we will continue to replace this per our protocol.  We will continue her on her current antibiotics as suggested by the infectious disease team.    VTE Prophylaxis:   Mechanical Order History:     None      Pharmalogical Order History:      Ordered     Dose Route Frequency Stop    07/05/21 1542  enoxaparin (LOVENOX) syringe 40 mg      40 mg SC Daily --    06/21/21 1157  enoxaparin (LOVENOX) syringe 60 mg  Status:  Discontinued      1 mg/kg SC Daily 07/05/21 1542    06/18/21 0528  enoxaparin (LOVENOX) syringe 50 mg  Status:  Discontinued      1 mg/kg SC Daily 06/21/21 1157    06/18/21 0524  Pharmacy to Dose enoxaparin (LOVENOX)  Status:  Discontinued     Question:  Indication of use  Answer:  Prophylaxis    -- XX Continuous PRN 06/20/21 1211              Disposition: Home versus a drug rehab facility    Nicci Valdez MD  [facility]  07/15/21  10:47 EDT

## 2021-07-15 NOTE — PROGRESS NOTES
PROGRESS NOTE         Patient Identification:  Name:  Marce Gutierrez  Age:  31 y.o.  Sex:  female  :  1990  MRN:  [id number]  Visit Number:  [id number]  Primary Care Provider:  Provider, No Known         LOS: 27 days       ----------------------------------------------------------------------------------------------------------------------  Subjective       Chief Complaints:    Leg Pain and Arm Pain        Interval History:      The patient is sitting up in bed in no apparent distress.  Feels well and denies any complaints at this time.  Afebrile, no diarrhea.  No new labs today.    Review of Systems:    Constitutional: no fever, chills and night sweats. No appetite change or unexpected weight change. No fatigue.  Eyes: no eye drainage, itching or redness.  HEENT: no mouth sores, dysphagia or nose bleed.  Respiratory: no for shortness of breath, cough or production of sputum.  Cardiovascular: no chest pain and no orthopnea.    Gastrointestinal: no nausea, vomiting or diarrhea. No abdominal pain, hematemesis or rectal bleeding.  Genitourinary: no dysuria or polyuria.  Hematologic/lymphatic: no lymph node abnormalities, no easy bruising or easy bleeding.  Musculoskeletal:  No muscle or joint pain.  Skin: No rash and no itching.  Neurological: no loss of consciousness, no seizure, no headache.  Behavioral/Psych: no depression or suicidal ideation.  Endocrine: no hot flashes.  Immunologic: negative.     ----------------------------------------------------------------------------------------------------------------------      Objective       Current Hospital Meds:  IVPB builder, , Intravenous, Q8H  docusate sodium, 100 mg, Oral, BID  enoxaparin, 40 mg, Subcutaneous, Daily  folic acid, 1 mg, Oral, Daily  iron polysaccharides, 150 mg, Oral, Daily  lactobacillus acidophilus, 1 capsule, Oral, Daily  magnesium sulfate, 4 g, Intravenous, Once  metoprolol tartrate, 12.5 mg, Oral, Q12H  multivitamin with  minerals, 1 tablet, Oral, Daily  polyethylene glycol, 17 g, Oral, Daily  sodium chloride, 10 mL, Intravenous, Q12H  sodium chloride, 3 mL, Intravenous, Q12H         ----------------------------------------------------------------------------------------------------------------------    Vital Signs:  Temp:  [97.3 °F (36.3 °C)-98.3 °F (36.8 °C)] 98.3 °F (36.8 °C)  Heart Rate:  [] 82  Resp:  [18-20] 18  BP: (109-144)/() 109/66  Mean Arterial Pressure (Non-Invasive) for the past 24 hrs (Last 3 readings):   Noninvasive MAP (mmHg)   07/14/21 1824 96   07/14/21 1403 100   07/14/21 1019 124     SpO2 Percentage    07/14/21 1019 07/14/21 1824 07/14/21 2238   SpO2: 97% 99% 99%     SpO2:  [97 %-99 %] 99 %  on   ;   Device (Oxygen Therapy): room air    Body mass index is 19.3 kg/m².  Wt Readings from Last 3 Encounters:   07/14/21 54.3 kg (119 lb 9.6 oz)   10/30/19 63.5 kg (140 lb)        Intake/Output Summary (Last 24 hours) at 7/15/2021 0941  Last data filed at 7/15/2021 0900  Gross per 24 hour   Intake 1200 ml   Output --   Net 1200 ml     Diet Regular  ----------------------------------------------------------------------------------------------------------------------      Physical Exam:    Constitutional:  Well-developed and well-nourished.  No respiratory distress.      HENT:  Head: Normocephalic and atraumatic.  Mouth:  Moist mucous membranes.    Eyes:  Conjunctivae and EOM are normal.  No scleral icterus.  Neck:  Neck supple.  No JVD present.  Cardiovascular:  Normal rate, regular rhythm and normal heart sounds with no murmur. No edema.  Pulmonary/Chest:  No respiratory distress, no wheezes, no crackles, with normal breath sounds and good air movement.  Abdominal:  Soft.  Bowel sounds are normal.  No distension and no tenderness.   Musculoskeletal:  No edema, no tenderness, and no deformity.  No swelling or redness of joints.    Neurological:  Alert and oriented to person, place, and time.  No facial droop.   No slurred speech.   Skin:  Skin is warm and dry.  No rash noted.  No pallor.   Psychiatric:  Normal mood and affect.  Behavior is normal.  ----------------------------------------------------------------------------------------------------------------------              Results from last 7 days   Lab Units 07/14/21  0138 07/12/21  0217 07/10/21  2218 07/10/21  0447   CRP mg/dL  --  0.37 0.49 0.62*   WBC 10*3/mm3 9.01 8.33  --  9.04   HEMOGLOBIN g/dL 10.8* 10.0*  --  9.9*   HEMATOCRIT % 34.1 32.1*  --  30.6*   MCV fL 90.9 92.8  --  91.3   MCHC g/dL 31.7 31.2*  --  32.4   PLATELETS 10*3/mm3 387 434  --  430     Results from last 7 days   Lab Units 07/15/21  0453 07/14/21 0138 07/13/21  0026 07/12/21  0217 07/10/21  2218 07/10/21  0447   SODIUM mmol/L  --  136  --  134* 138 140   POTASSIUM mmol/L  --  3.9  --  4.1 4.2 4.0   MAGNESIUM mg/dL 1.8 1.8 1.8 1.9 1.7 2.0   CHLORIDE mmol/L  --  103  --  102 103 105   CO2 mmol/L  --  22.6  --  21.8* 23.5 24.4   BUN mg/dL  --  14  --  14 16 14   CREATININE mg/dL  --  0.65  --  0.64 0.72 0.63   EGFR IF NONAFRICN AM mL/min/1.73  --  107  --  109 95 111   CALCIUM mg/dL  --  9.0  --  8.8 8.8 8.5*   GLUCOSE mg/dL  --  96  --  99 85 98   ALBUMIN g/dL  --   --   --   --   --  3.42*   BILIRUBIN mg/dL  --   --   --   --   --  0.5   ALK PHOS U/L  --   --   --   --   --  129*   AST (SGOT) U/L  --   --   --   --   --  18   ALT (SGPT) U/L  --   --   --   --   --  8   Estimated Creatinine Clearance: 108.5 mL/min (by C-G formula based on SCr of 0.65 mg/dL).  No results found for: AMMONIA    No results found for: HGBA1C, POCGLU  Lab Results   Component Value Date    HGBA1C 5.60 06/17/2021     Lab Results   Component Value Date    TSH 0.718 06/17/2021    FREET4 1.16 07/09/2021       Blood Culture   Date Value Ref Range Status   06/20/2021 Abnormal Stain (C)  Preliminary   06/19/2021 Staphylococcus aureus (C)  Final     Comment:     Infectious disease consultation is highly recommended to rule  out distant foci of infection.   06/17/2021 Staphylococcus aureus (C)  Final     Comment:     Infectious disease consultation is highly recommended to rule out distant foci of infection.   06/17/2021 Staphylococcus aureus (C)  Final     Comment:     Infectious disease consultation is highly recommended to rule out distant foci of infection.  Refer to previous blood culture collected on 6/17/2021 2318 for MICs.     Urine Culture   Date Value Ref Range Status   06/17/2021 >100,000 CFU/mL Escherichia coli (A)  Final   06/17/2021 >100,000 CFU/mL Staphylococcus aureus (A)  Corrected     No results found for: WOUNDCX  No results found for: STOOLCX  No results found for: RESPCX  Pain Management Panel       Pain Management Panel Latest Ref Rng & Units 7/4/2021 6/17/2021    AMPHETAMINES SCREEN, URINE Negative Negative Positive(A)    BARBITURATES SCREEN Negative Negative Negative    BENZODIAZEPINE SCREEN, URINE Negative Negative Negative    BUPRENORPHINEUR Negative Negative Negative    COCAINE SCREEN, URINE Negative Negative Negative    METHADONE SCREEN, URINE Negative Negative Negative              ----------------------------------------------------------------------------------------------------------------------  Imaging Results (Last 24 Hours)       ** No results found for the last 24 hours. **            ----------------------------------------------------------------------------------------------------------------------    Assessment/Plan       Assessment/Plan     ASSESSMENT:    1.  Severe sepsis with lactic acid greater than 2 on admission  2.  Complicated MSSA bacteremia  3.  Likely underlying endocarditis  4.  Septic emboli    PLAN:    The patient is sitting up in bed in no apparent distress.  Feels well and denies any complaints at this time.  Afebrile, no diarrhea.  No new labs today.    KATHRIN reports no evidence of vegetation.     Blood cultures from 6/24/2021 finalized as no growth. Blood culture from 6/22/2021 1  out of 1 set positive for MSSA.    Urinalysis on 6/17/2021 was positive and urine culture finalized as greater than 100,000 colonies of E. coli and greater than 100,000 colonies of MRSA.  Blood cultures on 6/17/2021 finalized as MSSA.  Repeat blood cultures on 6/19/2021 and 6/20/2021 are still showing growth.  COVID-19 and flu A/B PCR on 6/18/2021 detected COVID-19, but patient was asymptomatic.  Mycoplasma pneumoniae antibody on 6/18/2021 was negative.  Strep pneumo antigen on 6/18/2021 was negative.  CT chest with PE protocol on 6/18/2021 showed technically degraded exam, but no definite PE is seen, and there is no aortic dissection.  Pulmonary edema with a trace amount of right pleural fluid.  Poorly defined nodular infiltrates on both sides of the chest suspicious for septic emboli.  Continued follow-up is recommended. MRI of the lumbar spine on 7/2/2021 revealed no acute findings.     Contacted micro lab regarding urine culture on 6/17/2021.  Urine culture has finalized as MSSA, but MRSA verbiage was included underneath and micro lab corrected.    In the setting of negative KAHTRIN recommend to continue cefazolin 2 g IV every 8 hours through 7/22/2021 in the setting of persistent bacteremia.  Patient stable from ID standpoint.    Code Status:   Code Status and Medical Interventions:   Ordered at: 06/18/21 0446     Level Of Support Discussed With:    Patient     Code Status:    CPR     Medical Interventions (Level of Support Prior to Arrest):    Full     SYL Jiménez  07/15/21  09:41 EDT

## 2021-07-15 NOTE — CASE MANAGEMENT/SOCIAL WORK
Discharge Planning Assessment  TAYLOR Antonio     Patient Name: Marce Gutierrez  MRN: [id number]  Today's Date: 7/15/2021    Admit Date: 6/17/2021    Discharge Needs Assessment    No documentation.       Discharge Plan     Row Name 07/15/21 0941       Plan    Plan SS contacted [facility] per Lisa who states they are an outpatient substance abuse treatment center. SS contacted [facility]  [phone] who states they do not accept adults. SS contacted [facility] [phone] per Miriam who states they are unable to accept individuals that need IV antibiotics. SS to follow.          Leanne Merino

## 2021-07-15 NOTE — PLAN OF CARE
Goal Outcome Evaluation:   Patient has been OOB and ambulating. She is receiving her antibiotics. No other issues at this time.

## 2021-07-15 NOTE — NURSING NOTE
Called and gave report to 3N. All pt belongings moved with pt at time of transfer. VSS. No s/s of acute distress noted.     Asked pt if she would like me to notify any family members of room change, and she denied.

## 2021-07-15 NOTE — PLAN OF CARE
Problem: Adult Inpatient Plan of Care  Goal: Plan of Care Review  Recent Flowsheet Documentation  Taken 7/15/2021 0248 by Eliz Murguia RN  Progress: no change  Outcome Summary: Patient transferred from [facility]. Denies any problems at this time. Will continue to monitor.  Goal: Absence of Hospital-Acquired Illness or Injury  Intervention: Identify and Manage Fall Risk  Recent Flowsheet Documentation  Taken 7/15/2021 0100 by Eliz Murguia RN  Safety Promotion/Fall Prevention: safety round/check completed  Taken 7/14/2021 2300 by Eliz Murguia RN  Safety Promotion/Fall Prevention: safety round/check completed  Intervention: Prevent Skin Injury  Recent Flowsheet Documentation  Taken 7/14/2021 2243 by Eliz Murguia RN  Skin Protection: adhesive use limited  Goal: Optimal Comfort and Wellbeing  Intervention: Provide Person-Centered Care  Recent Flowsheet Documentation  Taken 7/14/2021 2243 by Eliz Murguia RN  Trust Relationship/Rapport:   care explained   choices provided   emotional support provided   empathic listening provided   questions encouraged   questions answered   thoughts/feelings acknowledged   reassurance provided   Goal Outcome Evaluation:           Progress: no change  Outcome Summary: Patient transferred from [facility]. Denies any problems at this time. Will continue to monitor.

## 2021-07-16 PROCEDURE — 25010000003 CEFAZOLIN PER 500 MG: Performed by: INTERNAL MEDICINE

## 2021-07-16 PROCEDURE — 99232 SBSQ HOSP IP/OBS MODERATE 35: CPT | Performed by: INTERNAL MEDICINE

## 2021-07-16 RX ADMIN — Medication 150 MG: at 08:34

## 2021-07-16 RX ADMIN — SODIUM CHLORIDE, PRESERVATIVE FREE 3 ML: 5 INJECTION INTRAVENOUS at 20:32

## 2021-07-16 RX ADMIN — IBUPROFEN 400 MG: 400 TABLET, FILM COATED ORAL at 11:02

## 2021-07-16 RX ADMIN — CEFAZOLIN: 1 INJECTION, POWDER, FOR SOLUTION INTRAVENOUS at 20:29

## 2021-07-16 RX ADMIN — Medication 1 TABLET: at 08:33

## 2021-07-16 RX ADMIN — METOPROLOL TARTRATE 12.5 MG: 25 TABLET, FILM COATED ORAL at 08:33

## 2021-07-16 RX ADMIN — DOCUSATE SODIUM 100 MG: 100 CAPSULE, LIQUID FILLED ORAL at 08:33

## 2021-07-16 RX ADMIN — CEFAZOLIN: 1 INJECTION, POWDER, FOR SOLUTION INTRAVENOUS at 03:33

## 2021-07-16 RX ADMIN — Medication 1 CAPSULE: at 08:33

## 2021-07-16 RX ADMIN — SODIUM CHLORIDE, PRESERVATIVE FREE 10 ML: 5 INJECTION INTRAVENOUS at 20:40

## 2021-07-16 RX ADMIN — METOPROLOL TARTRATE 12.5 MG: 25 TABLET, FILM COATED ORAL at 20:29

## 2021-07-16 RX ADMIN — FOLIC ACID 1 MG: 1 TABLET ORAL at 08:33

## 2021-07-16 RX ADMIN — CEFAZOLIN: 1 INJECTION, POWDER, FOR SOLUTION INTRAVENOUS at 13:45

## 2021-07-16 RX ADMIN — SODIUM CHLORIDE, PRESERVATIVE FREE 10 ML: 5 INJECTION INTRAVENOUS at 20:30

## 2021-07-16 NOTE — PROGRESS NOTES
[facility] HOSPITALIST PROGRESS NOTE     Patient Identification:  Name:  Marce Gutierrez  Age:  31 y.o.  Sex:  female  :  1990  MRN:  [id number]  Visit Number:  [id number]  ROOM: [address] [place] [address]     Primary Care Provider:  Provider, No Known     Date of Admission: 2021    Length of stay in inpatient status:  28    Subjective     Chief Compliant:    Chief Complaint   Patient presents with   • Leg Pain   • Arm Pain     History of Presenting Illness:  In brief, 32 yo female admitted on 2021 with severe sepsis of unknown etiology with possible embolic bilateral pneumonia.  Blood cultures grew MSSA and urine grew MSSA and E coli.  Two TTE were negative for endocarditis and a KATHRIN (that was delayed due to incidentally found asymptomatic COVID-19) was also negative for endocarditis.  ID has recommended Cefazolin through 2021.  However, it is not safe for the patient to go home with venous access due to her history of IV drug use and that she is finishing out her antibiotics while hospitalized.  Today, the patient continues to do well.  She denies chest pain and shortness of air.  She also denies nausea, vomiting, and diarrhea.  She is still ambulating around the hospital without any issues; she does this to increase her strength as she noticed that her strength had decreased at the beginning of her hospital stay due to her drug use.    Consults:  Dr. Gold with infectious disease  Dr. Montiel with psychiatry  Chelsie Villanueva and Sammy with cardiology     Procedures:  2021:  Right basilic single lumen midline catheter  2021-2021:  Left basilic single lumen midline catheter  2021:  KATHRIN    Objective     Current Hospital Meds:IVPB builder, , Intravenous, Q8H  docusate sodium, 100 mg, Oral, BID  enoxaparin, 40 mg, Subcutaneous, Daily  folic acid, 1 mg, Oral, Daily  iron polysaccharides, 150 mg, Oral, Daily  lactobacillus acidophilus, 1 capsule, Oral, Daily  metoprolol tartrate, 12.5 mg,  Oral, Q12H  multivitamin with minerals, 1 tablet, Oral, Daily  polyethylene glycol, 17 g, Oral, Daily  sodium chloride, 10 mL, Intravenous, Q12H  sodium chloride, 3 mL, Intravenous, Q12H       Current Antimicrobial Therapy:  Anti-Infectives (From admission, onward)    Ordered     Dose/Rate Route Frequency Start Stop    07/13/21 1000  ceFAZolin (ANCEF) 2 g in sodium chloride 0.9 % 100 mL IVPB     Donna Carcamo APRN reviewed the order on 07/14/21 0855.   Ordering Provider: Ila Gold MD    200 mL/hr over 30 Minutes Intravenous Every 8 Hours 07/13/21 1200 07/22/21 1959    06/17/21 2305  vancomycin 1 g/250 mL 0.9% NS (vial-mate)     Ordering Provider: Basilia Villanueva DO    20 mg/kg × 54.4 kg Intravenous Once 06/17/21 2307 06/18/21 0207    06/17/21 2305  piperacillin-tazobactam (ZOSYN) 3.375 g/100 mL 0.9% NS IVPB (mbp)     Ordering Provider: Basilia Villanueva DO    3.375 g Intravenous Once 06/17/21 2307 06/18/21 0040        Current Diuretic Therapy:  Diuretics (From admission, onward)    None        ----------------------------------------------------------------------------------------------------------------------  Vital Signs:  Temp:  [98.4 °F (36.9 °C)] 98.4 °F (36.9 °C)  Heart Rate:  [74-82] 74  Resp:  [16] 16  BP: (109-112)/(66-75) 112/75  SpO2:  [99 %] 99 %  on   ;   Device (Oxygen Therapy): room air  Body mass index is 19.3 kg/m².    Wt Readings from Last 3 Encounters:   07/14/21 54.3 kg (119 lb 9.6 oz)   10/30/19 63.5 kg (140 lb)     Intake & Output (last 3 days)       07/13 0701 - 07/14 0700 07/14 0701 - 07/15 0700 07/15 0701 - 07/16 0700 07/16 0701 - 07/17 0700    P.O. 840 1080 1200     I.V. (mL/kg) 202.6 (3.7)       IV Piggyback   221.4     Total Intake(mL/kg) 1042.6 (19.2) 1080 (19.9) 1421.4 (26.2)     Net +1042.6 +1080 +1421.4             Urine Unmeasured Occurrence 5 x 8 x 2 x     Stool Unmeasured Occurrence  1 x          Diet  Regular  ----------------------------------------------------------------------------------------------------------------------  Physical Exam   Constitutional:       General: She is not in acute distress.     Appearance: She is underweight. She is not ill-appearing, toxic-appearing or diaphoretic.   HENT:      Head: Normocephalic and atraumatic.      Right Ear: External ear normal.      Left Ear: External ear normal.      Nose: Nose normal.   Eyes:      General: No scleral icterus.        Right eye: No discharge.         Left eye: No discharge.      Pupils: Pupils are equal, round, and reactive to light.   Skin:     Coloration: Skin is not jaundiced.      Findings: No erythema.   Neurological:      Mental Status: She is alert and oriented to person, place, and time. Mental status is at baseline.      Cranial Nerves: No cranial nerve deficit.   Psychiatric:         Mood and Affect: Mood normal.         Behavior: Behavior normal.         Thought Content: Thought content normal.         Judgment: Judgment normal.  Cardiovascular:      No murmurs, regular rate and rhythm.  Pulmonary:      No crackles, no wheezes, no prolonged expiratory phase.  Good air movement throughout.  ----------------------------------------------------------------------------------------------------------------------  Tele: None  ----------------------------------------------------------------------------------------------------------------------  LABS:    CBC and coagulation:  Results from last 7 days   Lab Units 07/14/21  0138 07/12/21  0217 07/10/21  2218 07/10/21  0447   CRP mg/dL  --  0.37 0.49 0.62*   WBC 10*3/mm3 9.01 8.33  --  9.04   HEMOGLOBIN g/dL 10.8* 10.0*  --  9.9*   HEMATOCRIT % 34.1 32.1*  --  30.6*   MCV fL 90.9 92.8  --  91.3   MCHC g/dL 31.7 31.2*  --  32.4   PLATELETS 10*3/mm3 387 434  --  430     Renal and electrolytes:  Results from last 7 days   Lab Units 07/15/21  0453 07/14/21 0138 07/13/21  0026 07/12/21 0217  07/10/21  2218   SODIUM mmol/L  --  136  --  134* 138   POTASSIUM mmol/L  --  3.9  --  4.1 4.2   MAGNESIUM mg/dL 1.8 1.8 1.8 1.9 1.7   CHLORIDE mmol/L  --  103  --  102 103   CO2 mmol/L  --  22.6  --  21.8* 23.5   BUN mg/dL  --  14  --  14 16   CREATININE mg/dL  --  0.65  --  0.64 0.72   EGFR IF NONAFRICN AM mL/min/1.73  --  107  --  109 95   CALCIUM mg/dL  --  9.0  --  8.8 8.8   PHOSPHORUS mg/dL  --  3.2  --   --   --    GLUCOSE mg/dL  --  96  --  99 85     Estimated Creatinine Clearance: 108.5 mL/min (by C-G formula based on SCr of 0.65 mg/dL).    Liver and pancreatic function:  Results from last 7 days   Lab Units 07/10/21  0447   ALBUMIN g/dL 3.42*   BILIRUBIN mg/dL 0.5   ALK PHOS U/L 129*   AST (SGOT) U/L 18   ALT (SGPT) U/L 8     Endocrine function:  Lab Results   Component Value Date    HGBA1C 5.60 06/17/2021     Glucose levels from the [facility]:  Results from last 7 days   Lab Units 07/14/21  0138 07/12/21  0217 07/10/21  2218 07/10/21  0447   GLUCOSE mg/dL 96 99 85 98     Lab Results   Component Value Date    TSH 0.718 06/17/2021    FREET4 1.16 07/09/2021     Cultures:  Lab Results   Component Value Date    COLORU Dark Yellow (A) 06/17/2021    CLARITYU Cloudy (A) 06/17/2021    PHUR 6.0 06/17/2021    GLUCOSEU Negative 06/17/2021    KETONESU Negative 06/17/2021    BLOODU Large (3+) (A) 06/17/2021    NITRITEU Positive (A) 06/17/2021    LEUKOCYTESUR Moderate (2+) (A) 06/17/2021    BILIRUBINUR Negative 06/17/2021    UROBILINOGEN 1.0 E.U./dL 06/17/2021    RBCUA Too Numerous to Count (A) 06/17/2021    WBCUA Too Numerous to Count (A) 06/17/2021    BACTERIA 4+ (A) 06/17/2021       I have personally looked at the labs and they are summarized above.    Assessment & Plan      -Severe sepsis that was present admission (lactic acid 2.4, white blood cell count 16,090, CRP 29.71, heart rate 135) due to MSSA bacteremia and MSSA/E coli UTI  -Suspected endocarditis on admission causing septic emboli in the lungs versus  community-acquired pneumonia versus aspiration pneumonia  -Runs of SVT with aberrant conduction, improved with metoprolol  -Left lateral ankle cellulitis and sprain, improved   -Persistent low back pain with a negative MRI of the back  -Right sided knee pain but no signs to suggest septic arthritis  -Hepatitis C, 1a genotype, HCV viral load 4,470,000  -COVID-19 positive on screening test in the emergency department, asymptomatic, not treated, now out of isolation  -D-dimer 7.66 on admission, now improved  -Elevated liver enzymes, viral induced from COVID-19 versus a different hepatic virus  -Acute hypokalemia and acute hypomagnesemia  -Normocytic anemia and thrombocytopenia, suspect bone marrow suppression from COVID-19  -History of IV drug abuse  -History of MRSA skin infection of her left knee    She has 7 days of antibiotics left.  We will repeat her blood work in the morning, as I am monitoring this every few days.  There are no signs of recurrent sepsis at this time.    VTE Prophylaxis:   Mechanical Order History:     None      Pharmalogical Order History:      Ordered     Dose Route Frequency Stop    07/05/21 1542  enoxaparin (LOVENOX) syringe 40 mg      40 mg SC Daily --    06/21/21 1157  enoxaparin (LOVENOX) syringe 60 mg  Status:  Discontinued      1 mg/kg SC Daily 07/05/21 1542    06/18/21 0528  enoxaparin (LOVENOX) syringe 50 mg  Status:  Discontinued      1 mg/kg SC Daily 06/21/21 1157    06/18/21 0524  Pharmacy to Dose enoxaparin (LOVENOX)  Status:  Discontinued     Question:  Indication of use  Answer:  Prophylaxis    -- XX Continuous PRN 06/20/21 1211              Disposition: Home and then to drug rehab versus straight to drug rehab    Nicci Valdez MD  [facility] Hospitalist  07/16/21  08:25 EDT

## 2021-07-16 NOTE — CASE MANAGEMENT/SOCIAL WORK
Discharge Planning Assessment   [facility]     Patient Name: Marce Gutierrez  MRN: [id number]  Today's Date: 7/16/2021    Admit Date: 6/17/2021    Discharge Needs Assessment    No documentation.       Discharge Plan     Row Name 07/16/21 1459       Plan    Plan Pt was admitted on 06/17/21.SS spoke with Pt about discharge planning on this date. Pt declined [facility] in [place]. Pt states she does not feel comfortable, being that far away from her children. SS has attempted to contact several other [facility] substance abuse centers, [facility]in [place], [facility], and [facility].  However, these facilities were not able to provide Pt  IV antibiotics. Pt was denied by her [facility] [employer] insurance to go to [facility]. Pt is not a candidate for swing bed placement, due to her insurance not being in network . Pt is not a candidate for outpatient infusion due to the frequency of her IV antibiotics being every 8 hours.  Pt plans on going to [facility], an outpatient substance abuse treatment center once IV antibiotics are completed on 07/22/21.  SS discussed plan with Physician.          Leanne Merino

## 2021-07-16 NOTE — PLAN OF CARE
Goal Outcome Evaluation:         Patient is resting comfortably in bed with eyes closed. No complaints noted at this time; will continue to monitor.

## 2021-07-16 NOTE — PROGRESS NOTES
PROGRESS NOTE         Patient Identification:  Name:  Marce Gutierrez  Age:  31 y.o.  Sex:  female  :  1990  MRN:  [id number]  Visit Number:  [id number]  Primary Care Provider:  Provider, No Known         LOS: 28 days       ----------------------------------------------------------------------------------------------------------------------  Subjective       Chief Complaints:    Leg Pain and Arm Pain        Interval History:      The patient is walking around the room this morning in no apparent distress.  The patient denies any pain, shortness of breath, cough, nausea, vomiting, or diarrhea.  Reports that pain in right knee and left ankle have completely resolved.  Afebrile, no diarrhea.  No new labs today.    Review of Systems:    Constitutional: no fever, chills and night sweats. No appetite change or unexpected weight change. No fatigue.  Eyes: no eye drainage, itching or redness.  HEENT: no mouth sores, dysphagia or nose bleed.  Respiratory: no for shortness of breath, cough or production of sputum.  Cardiovascular: no chest pain and no orthopnea.    Gastrointestinal: no nausea, vomiting or diarrhea. No abdominal pain, hematemesis or rectal bleeding.  Genitourinary: no dysuria or polyuria.  Hematologic/lymphatic: no lymph node abnormalities, no easy bruising or easy bleeding.  Musculoskeletal:  No muscle or joint pain.  Skin: No rash and no itching.  Neurological: no loss of consciousness, no seizure, no headache.  Behavioral/Psych: no depression or suicidal ideation.  Endocrine: no hot flashes.  Immunologic: negative.     ----------------------------------------------------------------------------------------------------------------------      Objective       Current [facility] Meds:  IVPB builder, , Intravenous, Q8H  docusate sodium, 100 mg, Oral, BID  enoxaparin, 40 mg, Subcutaneous, Daily  folic acid, 1 mg, Oral, Daily  iron polysaccharides, 150 mg, Oral, Daily  lactobacillus acidophilus, 1  capsule, Oral, Daily  metoprolol tartrate, 12.5 mg, Oral, Q12H  multivitamin with minerals, 1 tablet, Oral, Daily  polyethylene glycol, 17 g, Oral, Daily  sodium chloride, 10 mL, Intravenous, Q12H  sodium chloride, 3 mL, Intravenous, Q12H         ----------------------------------------------------------------------------------------------------------------------    Vital Signs:  Temp:  [98.4 °F (36.9 °C)] 98.4 °F (36.9 °C)  Heart Rate:  [74-82] 74  Resp:  [16] 16  BP: (109-112)/(66-75) 112/75  No data found.  SpO2 Percentage    07/14/21 1824 07/14/21 2238 07/16/21 0700   SpO2: 99% 99% 99%     SpO2:  [99 %] 99 %  on   ;   Device (Oxygen Therapy): room air    Body mass index is 19.3 kg/m².  Wt Readings from Last 3 Encounters:   07/14/21 54.3 kg (119 lb 9.6 oz)   10/30/19 63.5 kg (140 lb)        Intake/Output Summary (Last 24 hours) at 7/16/2021 0959  Last data filed at 7/16/2021 0400  Gross per 24 hour   Intake 1061.4 ml   Output --   Net 1061.4 ml     Diet Regular  ----------------------------------------------------------------------------------------------------------------------      Physical Exam:    Constitutional:  Well-developed and well-nourished.  No respiratory distress.      HENT:  Head: Normocephalic and atraumatic.  Mouth:  Moist mucous membranes.    Eyes:  Conjunctivae and EOM are normal.  No scleral icterus.  Neck:  Neck supple.  No JVD present.  Cardiovascular:  Normal rate, regular rhythm and normal heart sounds with no murmur. No edema.  Pulmonary/Chest:  No respiratory distress, no wheezes, no crackles, with normal breath sounds and good air movement.  Abdominal:  Soft.  Bowel sounds are normal.  No distension and no tenderness.   Musculoskeletal:  No edema, no tenderness, and no deformity.  No swelling or redness of joints.    Neurological:  Alert and oriented to person, place, and time.  No facial droop.  No slurred speech.   Skin:  Skin is warm and dry.  No rash noted.  No pallor.    Psychiatric:  Normal mood and affect.  Behavior is normal.  ----------------------------------------------------------------------------------------------------------------------              Results from last 7 days   Lab Units 07/14/21  0138 07/12/21  0217 07/10/21  2218 07/10/21  0447   CRP mg/dL  --  0.37 0.49 0.62*   WBC 10*3/mm3 9.01 8.33  --  9.04   HEMOGLOBIN g/dL 10.8* 10.0*  --  9.9*   HEMATOCRIT % 34.1 32.1*  --  30.6*   MCV fL 90.9 92.8  --  91.3   MCHC g/dL 31.7 31.2*  --  32.4   PLATELETS 10*3/mm3 387 434  --  430     Results from last 7 days   Lab Units 07/15/21  0453 07/14/21 0138 07/13/21  0026 07/12/21  0217 07/10/21  2218 07/10/21  0447   SODIUM mmol/L  --  136  --  134* 138 140   POTASSIUM mmol/L  --  3.9  --  4.1 4.2 4.0   MAGNESIUM mg/dL 1.8 1.8 1.8 1.9 1.7 2.0   CHLORIDE mmol/L  --  103  --  102 103 105   CO2 mmol/L  --  22.6  --  21.8* 23.5 24.4   BUN mg/dL  --  14  --  14 16 14   CREATININE mg/dL  --  0.65  --  0.64 0.72 0.63   EGFR IF NONAFRICN AM mL/min/1.73  --  107  --  109 95 111   CALCIUM mg/dL  --  9.0  --  8.8 8.8 8.5*   GLUCOSE mg/dL  --  96  --  99 85 98   ALBUMIN g/dL  --   --   --   --   --  3.42*   BILIRUBIN mg/dL  --   --   --   --   --  0.5   ALK PHOS U/L  --   --   --   --   --  129*   AST (SGOT) U/L  --   --   --   --   --  18   ALT (SGPT) U/L  --   --   --   --   --  8   Estimated Creatinine Clearance: 108.5 mL/min (by C-G formula based on SCr of 0.65 mg/dL).  No results found for: AMMONIA    No results found for: HGBA1C, POCGLU  Lab Results   Component Value Date    HGBA1C 5.60 06/17/2021     Lab Results   Component Value Date    TSH 0.718 06/17/2021    FREET4 1.16 07/09/2021       Blood Culture   Date Value Ref Range Status   06/20/2021 Abnormal Stain (C)  Preliminary   06/19/2021 Staphylococcus aureus (C)  Final     Comment:     Infectious disease consultation is highly recommended to rule out distant foci of infection.   06/17/2021 Staphylococcus aureus (C)  Final      Comment:     Infectious disease consultation is highly recommended to rule out distant foci of infection.   06/17/2021 Staphylococcus aureus (C)  Final     Comment:     Infectious disease consultation is highly recommended to rule out distant foci of infection.  Refer to previous blood culture collected on 6/17/2021 2318 for MICs.     Urine Culture   Date Value Ref Range Status   06/17/2021 >100,000 CFU/mL Escherichia coli (A)  Final   06/17/2021 >100,000 CFU/mL Staphylococcus aureus (A)  Corrected     No results found for: WOUNDCX  No results found for: STOOLCX  No results found for: RESPCX  Pain Management Panel       Pain Management Panel Latest Ref Rng & Units 7/4/2021 6/17/2021    AMPHETAMINES SCREEN, URINE Negative Negative Positive(A)    BARBITURATES SCREEN Negative Negative Negative    BENZODIAZEPINE SCREEN, URINE Negative Negative Negative    BUPRENORPHINEUR Negative Negative Negative    COCAINE SCREEN, URINE Negative Negative Negative    METHADONE SCREEN, URINE Negative Negative Negative              ----------------------------------------------------------------------------------------------------------------------  Imaging Results (Last 24 Hours)       ** No results found for the last 24 hours. **            ----------------------------------------------------------------------------------------------------------------------    Assessment/Plan       Assessment/Plan     ASSESSMENT:    1.  Severe sepsis with lactic acid greater than 2 on admission  2.  Complicated MSSA bacteremia  3.  Likely underlying endocarditis  4.  Septic emboli    PLAN:    The patient is walking around the room this morning in no apparent distress.  The patient denies any pain, shortness of breath, cough, nausea, vomiting, or diarrhea.  Reports that pain in right knee and left ankle have completely resolved.  Afebrile, no diarrhea.  No new labs today.    KATHRIN reports no evidence of vegetation.     Blood cultures from 6/24/2021  finalized as no growth. Blood culture from 6/22/2021 1 out of 1 set positive for MSSA.    Urinalysis on 6/17/2021 was positive and urine culture finalized as greater than 100,000 colonies of E. coli and greater than 100,000 colonies of MRSA.  Blood cultures on 6/17/2021 finalized as MSSA.  Repeat blood cultures on 6/19/2021 and 6/20/2021 are still showing growth.  COVID-19 and flu A/B PCR on 6/18/2021 detected COVID-19, but patient was asymptomatic.  Mycoplasma pneumoniae antibody on 6/18/2021 was negative.  Strep pneumo antigen on 6/18/2021 was negative.  CT chest with PE protocol on 6/18/2021 showed technically degraded exam, but no definite PE is seen, and there is no aortic dissection.  Pulmonary edema with a trace amount of right pleural fluid.  Poorly defined nodular infiltrates on both sides of the chest suspicious for septic emboli.  Continued follow-up is recommended. MRI of the lumbar spine on 7/2/2021 revealed no acute findings.     Contacted micro lab regarding urine culture on 6/17/2021.  Urine culture has finalized as MSSA, but MRSA verbiage was included underneath and micro lab corrected.    In the setting of negative KATHRIN recommend to continue cefazolin 2 g IV every 8 hours through 7/22/2021 in the setting of persistent bacteremia.  Patient stable from ID standpoint.    Code Status:   Code Status and Medical Interventions:   Ordered at: 06/18/21 0449     Level Of Support Discussed With:    Patient     Code Status:    CPR     Medical Interventions (Level of Support Prior to Arrest):    Full     SYL Jiménez  07/16/21  09:27 EDT

## 2021-07-17 LAB
ALBUMIN SERPL-MCNC: 3.75 G/DL (ref 3.5–5.2)
ALBUMIN/GLOB SERPL: 0.9 G/DL
ALP SERPL-CCNC: 123 U/L (ref 39–117)
ALT SERPL W P-5'-P-CCNC: 10 U/L (ref 1–33)
ANION GAP SERPL CALCULATED.3IONS-SCNC: 11.1 MMOL/L (ref 5–15)
AST SERPL-CCNC: 22 U/L (ref 1–32)
BASOPHILS # BLD AUTO: 0.08 10*3/MM3 (ref 0–0.2)
BASOPHILS NFR BLD AUTO: 0.9 % (ref 0–1.5)
BILIRUB SERPL-MCNC: 0.4 MG/DL (ref 0–1.2)
BUN SERPL-MCNC: 19 MG/DL (ref 6–20)
BUN/CREAT SERPL: 20 (ref 7–25)
CALCIUM SPEC-SCNC: 9 MG/DL (ref 8.6–10.5)
CHLORIDE SERPL-SCNC: 102 MMOL/L (ref 98–107)
CO2 SERPL-SCNC: 21.9 MMOL/L (ref 22–29)
CREAT SERPL-MCNC: 0.95 MG/DL (ref 0.57–1)
CRP SERPL-MCNC: <0.3 MG/DL (ref 0–0.5)
DEPRECATED RDW RBC AUTO: 43.8 FL (ref 37–54)
EOSINOPHIL # BLD AUTO: 0.19 10*3/MM3 (ref 0–0.4)
EOSINOPHIL NFR BLD AUTO: 2.1 % (ref 0.3–6.2)
ERYTHROCYTE [DISTWIDTH] IN BLOOD BY AUTOMATED COUNT: 13 % (ref 12.3–15.4)
GFR SERPL CREATININE-BSD FRML MDRD: 69 ML/MIN/1.73
GLOBULIN UR ELPH-MCNC: 4 GM/DL
GLUCOSE SERPL-MCNC: 80 MG/DL (ref 65–99)
HCT VFR BLD AUTO: 32.4 % (ref 34–46.6)
HGB BLD-MCNC: 10.3 G/DL (ref 12–15.9)
IMM GRANULOCYTES # BLD AUTO: 0.03 10*3/MM3 (ref 0–0.05)
IMM GRANULOCYTES NFR BLD AUTO: 0.3 % (ref 0–0.5)
LYMPHOCYTES # BLD AUTO: 2.54 10*3/MM3 (ref 0.7–3.1)
LYMPHOCYTES NFR BLD AUTO: 28.2 % (ref 19.6–45.3)
MAGNESIUM SERPL-MCNC: 1.6 MG/DL (ref 1.6–2.6)
MCH RBC QN AUTO: 29 PG (ref 26.6–33)
MCHC RBC AUTO-ENTMCNC: 31.8 G/DL (ref 31.5–35.7)
MCV RBC AUTO: 91.3 FL (ref 79–97)
MONOCYTES # BLD AUTO: 0.65 10*3/MM3 (ref 0.1–0.9)
MONOCYTES NFR BLD AUTO: 7.2 % (ref 5–12)
NEUTROPHILS NFR BLD AUTO: 5.52 10*3/MM3 (ref 1.7–7)
NEUTROPHILS NFR BLD AUTO: 61.3 % (ref 42.7–76)
NRBC BLD AUTO-RTO: 0 /100 WBC (ref 0–0.2)
PHOSPHATE SERPL-MCNC: 3.6 MG/DL (ref 2.5–4.5)
PLATELET # BLD AUTO: 349 10*3/MM3 (ref 140–450)
PMV BLD AUTO: 10 FL (ref 6–12)
POTASSIUM SERPL-SCNC: 4.1 MMOL/L (ref 3.5–5.2)
PROT SERPL-MCNC: 7.7 G/DL (ref 6–8.5)
RBC # BLD AUTO: 3.55 10*6/MM3 (ref 3.77–5.28)
SODIUM SERPL-SCNC: 135 MMOL/L (ref 136–145)
WBC # BLD AUTO: 9.01 10*3/MM3 (ref 3.4–10.8)

## 2021-07-17 PROCEDURE — 84100 ASSAY OF PHOSPHORUS: CPT | Performed by: INTERNAL MEDICINE

## 2021-07-17 PROCEDURE — 80053 COMPREHEN METABOLIC PANEL: CPT | Performed by: INTERNAL MEDICINE

## 2021-07-17 PROCEDURE — 83735 ASSAY OF MAGNESIUM: CPT | Performed by: INTERNAL MEDICINE

## 2021-07-17 PROCEDURE — 85025 COMPLETE CBC W/AUTO DIFF WBC: CPT | Performed by: INTERNAL MEDICINE

## 2021-07-17 PROCEDURE — 99232 SBSQ HOSP IP/OBS MODERATE 35: CPT | Performed by: INTERNAL MEDICINE

## 2021-07-17 PROCEDURE — 25010000003 CEFAZOLIN PER 500 MG: Performed by: INTERNAL MEDICINE

## 2021-07-17 PROCEDURE — 86140 C-REACTIVE PROTEIN: CPT | Performed by: PHYSICIAN ASSISTANT

## 2021-07-17 RX ADMIN — SODIUM CHLORIDE, PRESERVATIVE FREE 10 ML: 5 INJECTION INTRAVENOUS at 09:43

## 2021-07-17 RX ADMIN — SODIUM CHLORIDE, PRESERVATIVE FREE 3 ML: 5 INJECTION INTRAVENOUS at 20:57

## 2021-07-17 RX ADMIN — CEFAZOLIN: 1 INJECTION, POWDER, FOR SOLUTION INTRAVENOUS at 03:27

## 2021-07-17 RX ADMIN — CEFAZOLIN: 1 INJECTION, POWDER, FOR SOLUTION INTRAVENOUS at 20:57

## 2021-07-17 RX ADMIN — Medication 1 CAPSULE: at 09:39

## 2021-07-17 RX ADMIN — MAGNESIUM GLUCONATE 500 MG ORAL TABLET 800 MG: 500 TABLET ORAL at 18:35

## 2021-07-17 RX ADMIN — METOPROLOL TARTRATE 12.5 MG: 25 TABLET, FILM COATED ORAL at 20:57

## 2021-07-17 RX ADMIN — FOLIC ACID 1 MG: 1 TABLET ORAL at 09:40

## 2021-07-17 RX ADMIN — Medication 1 TABLET: at 09:39

## 2021-07-17 RX ADMIN — IBUPROFEN 400 MG: 400 TABLET, FILM COATED ORAL at 10:27

## 2021-07-17 RX ADMIN — METOPROLOL TARTRATE 12.5 MG: 25 TABLET, FILM COATED ORAL at 09:39

## 2021-07-17 RX ADMIN — CEFAZOLIN: 1 INJECTION, POWDER, FOR SOLUTION INTRAVENOUS at 12:14

## 2021-07-17 RX ADMIN — Medication 150 MG: at 09:40

## 2021-07-17 NOTE — PROGRESS NOTES
[facility] HOSPITALIST PROGRESS NOTE     Patient Identification:  Name:  Marce Gutierrez  Age:  31 y.o.  Sex:  female  :  1990  MRN:  [id number]  Visit Number:  [id number]  ROOM: [address] [place] [address]     Primary Care Provider:  Provider, No Known     Date of Admission: 2021    Length of stay in inpatient status:  29    Subjective     Chief Compliant:    Chief Complaint   Patient presents with   • Leg Pain   • Arm Pain     History of Presenting Illness:  In brief, 30 yo female admitted on 2021 with severe sepsis of unknown etiology with possible embolic bilateral pneumonia.  Blood cultures grew MSSA and urine grew MSSA and E coli.  Two TTE were negative for endocarditis and a KATHRIN (that was delayed due to incidentally found asymptomatic COVID-19) was also negative for endocarditis.  ID has recommended Cefazolin through 2021.  However, it is not safe for the patient to go home with venous access due to her history of IV drug use and that she is finishing out her antibiotics while hospitalized.  Today, her boyfriend was at bedside.  She is doing well, with no pain and she is eating well.  She is still ambulating around the hospital without any problems.     Consults:  Dr. Gold with infectious disease  Dr. Montiel with psychiatry  Chelsie Villanueva and Sammy with cardiology     Procedures:  2021:  Right basilic single lumen midline catheter  2021-2021:  Left basilic single lumen midline catheter  2021:  KATHRIN    Objective     Current Hospital Meds:IVPB builder, , Intravenous, Q8H  docusate sodium, 100 mg, Oral, BID  enoxaparin, 40 mg, Subcutaneous, Daily  folic acid, 1 mg, Oral, Daily  iron polysaccharides, 150 mg, Oral, Daily  lactobacillus acidophilus, 1 capsule, Oral, Daily  metoprolol tartrate, 12.5 mg, Oral, Q12H  multivitamin with minerals, 1 tablet, Oral, Daily  polyethylene glycol, 17 g, Oral, Daily  sodium chloride, 10 mL, Intravenous, Q12H  sodium chloride, 3 mL, Intravenous,  Q12H       Current Antimicrobial Therapy:  Anti-Infectives (From admission, onward)    Ordered     Dose/Rate Route Frequency Start Stop    07/13/21 1000  ceFAZolin (ANCEF) 2 g in sodium chloride 0.9 % 100 mL IVPB     Donna Carcamo APRN reviewed the order on 07/14/21 0855.   Ordering Provider: Ila Gold MD    200 mL/hr over 30 Minutes Intravenous Every 8 Hours 07/13/21 1200 07/22/21 1959    06/17/21 2305  vancomycin 1 g/250 mL 0.9% NS (vial-mate)     Ordering Provider: Basilia Villanueva DO    20 mg/kg × 54.4 kg Intravenous Once 06/17/21 2307 06/18/21 0207    06/17/21 2305  piperacillin-tazobactam (ZOSYN) 3.375 g/100 mL 0.9% NS IVPB (mbp)     Ordering Provider: Basilia Villanueva DO    3.375 g Intravenous Once 06/17/21 2307 06/18/21 0040        Current Diuretic Therapy:  Diuretics (From admission, onward)    None        ----------------------------------------------------------------------------------------------------------------------  Vital Signs:  Temp:  [97.8 °F (36.6 °C)-98 °F (36.7 °C)] 97.8 °F (36.6 °C)  Heart Rate:  [] 66  Resp:  [18-20] 20  BP: (122-153)/(73-83) 122/73  SpO2:  [96 %-98 %] 96 %  on   ;   Device (Oxygen Therapy): room air  Body mass index is 19.3 kg/m².    Wt Readings from Last 3 Encounters:   07/14/21 54.3 kg (119 lb 9.6 oz)   10/30/19 63.5 kg (140 lb)     Intake & Output (last 3 days)       07/14 0701 - 07/15 0700 07/15 0701 - 07/16 0700 07/16 0701 - 07/17 0700 07/17 0701 - 07/18 0700    P.O. 1080 1200 1980 1460    I.V. (mL/kg)        IV Piggyback  221.4 341.9 100    Total Intake(mL/kg) 1080 (19.9) 1421.4 (26.2) 2321.9 (42.8) 1560 (28.8)    Net +1080 +1421.4 +2321.9 +1560            Urine Unmeasured Occurrence 8 x 2 x 6 x 4 x    Stool Unmeasured Occurrence 1 x           Diet Regular  ----------------------------------------------------------------------------------------------------------------------  Physical Exam   Constitutional:       General: She is not in acute  distress.     Appearance: She is underweight. She is not ill-appearing, toxic-appearing or diaphoretic.   HENT:      Head: Normocephalic and atraumatic.      Right Ear: External ear normal.      Left Ear: External ear normal.      Nose: Nose normal.   Eyes:      General: No scleral icterus.        Right eye: No discharge.         Left eye: No discharge.      Pupils: Pupils are equal, round, and reactive to light.   Skin:     Coloration: Skin is not jaundiced.      Findings: No erythema.   Neurological:      Mental Status: She is alert and oriented to person, place, and time. Mental status is at baseline.      Cranial Nerves: No cranial nerve deficit.   Psychiatric:         Mood and Affect: Mood normal.         Behavior: Behavior normal.         Thought Content: Thought content normal.         Judgment: Judgment normal.  ----------------------------------------------------------------------------------------------------------------------  Tele:  None  ----------------------------------------------------------------------------------------------------------------------  LABS:    CBC and coagulation:  Results from last 7 days   Lab Units 07/17/21  0404 07/14/21  0138 07/12/21  0217 07/10/21  2218   CRP mg/dL <0.30  --  0.37 0.49   WBC 10*3/mm3 9.01 9.01 8.33  --    HEMOGLOBIN g/dL 10.3* 10.8* 10.0*  --    HEMATOCRIT % 32.4* 34.1 32.1*  --    MCV fL 91.3 90.9 92.8  --    MCHC g/dL 31.8 31.7 31.2*  --    PLATELETS 10*3/mm3 349 387 434  --      Renal and electrolytes:  Results from last 7 days   Lab Units 07/17/21  0404 07/15/21  0453 07/14/21  0138 07/12/21  0217   SODIUM mmol/L 135*  --  136 134*   POTASSIUM mmol/L 4.1  --  3.9 4.1   MAGNESIUM mg/dL 1.6 1.8 1.8 1.9   CHLORIDE mmol/L 102  --  103 102   CO2 mmol/L 21.9*  --  22.6 21.8*   BUN mg/dL 19  --  14 14   CREATININE mg/dL 0.95  --  0.65 0.64   EGFR IF NONAFRICN AM mL/min/1.73 69  --  107 109   CALCIUM mg/dL 9.0  --  9.0 8.8   PHOSPHORUS mg/dL 3.6  --  3.2  --     GLUCOSE mg/dL 80  --  96 99     Estimated Creatinine Clearance: 74.2 mL/min (by C-G formula based on SCr of 0.95 mg/dL).    Liver and pancreatic function:  Results from last 7 days   Lab Units 07/17/21  0404   ALBUMIN g/dL 3.75   BILIRUBIN mg/dL 0.4   ALK PHOS U/L 123*   AST (SGOT) U/L 22   ALT (SGPT) U/L 10     Endocrine function:  Lab Results   Component Value Date    HGBA1C 5.60 06/17/2021     Glucose levels from the CMP:  Results from last 7 days   Lab Units 07/17/21  0404 07/14/21  0138 07/12/21  0217 07/10/21  2218   GLUCOSE mg/dL 80 96 99 85     Lab Results   Component Value Date    TSH 0.718 06/17/2021    FREET4 1.16 07/09/2021       I have personally looked at the labs and they are summarized above.    Assessment & Plan      -Severe sepsis that was present admission (lactic acid 2.4, white blood cell count 16,090, CRP 29.71, heart rate 135) due to MSSA bacteremia and MSSA/E coli UTI  -Suspected endocarditis on admission causing septic emboli in the lungs versus community-acquired pneumonia versus aspiration pneumonia  -Runs of SVT with aberrant conduction, improved with metoprolol  -Left lateral ankle cellulitis and sprain, improved   -Persistent low back pain with a negative MRI of the back  -Right sided knee pain but no signs to suggest septic arthritis  -Hepatitis C, 1a genotype, HCV viral load 4,470,000  -COVID-19 positive on screening test in the emergency department, asymptomatic, not treated, now out of isolation  -D-dimer 7.66 on admission, now improved  -Elevated liver enzymes, viral induced from COVID-19 versus a different hepatic virus  -Acute hypokalemia and acute hypomagnesemia  -Normocytic anemia and thrombocytopenia, suspect bone marrow suppression from COVID-19  -History of IV drug abuse  -History of MRSA skin infection of her left knee    The cefazolin will end on 7/22/2021.  I have started her on magnesium oxide at 800 mg daily as her magnesium level continues to be low.  We will also  continue the multivitamin.  The patient will need to see a GI doctor or a hepatitis C specialist in the outpatient setting to see when she can have treatment for hepatitis C as most offices require 6 months of sobriety prior to treatment.  Will repeat labs in a few days.    VTE Prophylaxis:   Mechanical Order History:     None      Pharmalogical Order History:      Ordered     Dose Route Frequency Stop    07/05/21 1542  enoxaparin (LOVENOX) syringe 40 mg      40 mg SC Daily --    06/21/21 1157  enoxaparin (LOVENOX) syringe 60 mg  Status:  Discontinued      1 mg/kg SC Daily 07/05/21 1542    06/18/21 0528  enoxaparin (LOVENOX) syringe 50 mg  Status:  Discontinued      1 mg/kg SC Daily 06/21/21 1157    06/18/21 0524  Pharmacy to Dose enoxaparin (LOVENOX)  Status:  Discontinued     Question:  Indication of use  Answer:  Prophylaxis    -- XX Continuous PRN 06/20/21 1211              Disposition: Home and then to drug rehab versus straight to drug rehab      Nicci Valdez MD  [facility]  07/17/21  17:14 EDT

## 2021-07-17 NOTE — PLAN OF CARE
Goal Outcome Evaluation:         Patient is resting comfortably in bed; no complaints noted. Will continue to monitor.

## 2021-07-17 NOTE — PROGRESS NOTES
PROGRESS NOTE         Patient Identification:  Name:  Marce Gutierrez  Age:  31 y.o.  Sex:  female  :  1990  MRN:  [id number]  Visit Number:  [id number]  Primary Care Provider:  Provider, No Known         LOS: 29 days       ----------------------------------------------------------------------------------------------------------------------  Subjective       Chief Complaints:    Leg Pain and Arm Pain        Interval History:      The patient is sitting up in bed eating breakfast this morning in no apparent distress.  Feeling well and denies any pain, shortness of breath, cough, nausea, vomiting, or diarrhea. Afebrile, no diarrhea.  CRP remains normal at less than 0.30.  WBC remains normal.    Review of Systems:    Constitutional: no fever, chills and night sweats. No appetite change or unexpected weight change. No fatigue.  Eyes: no eye drainage, itching or redness.  HEENT: no mouth sores, dysphagia or nose bleed.  Respiratory: no for shortness of breath, cough or production of sputum.  Cardiovascular: no chest pain and no orthopnea.    Gastrointestinal: no nausea, vomiting or diarrhea. No abdominal pain, hematemesis or rectal bleeding.  Genitourinary: no dysuria or polyuria.  Hematologic/lymphatic: no lymph node abnormalities, no easy bruising or easy bleeding.  Musculoskeletal:  No muscle or joint pain.  Skin: No rash and no itching.  Neurological: no loss of consciousness, no seizure, no headache.  Behavioral/Psych: no depression or suicidal ideation.  Endocrine: no hot flashes.  Immunologic: negative.     ----------------------------------------------------------------------------------------------------------------------      Objective       Current Hospital Meds:  IVPB builder, , Intravenous, Q8H  docusate sodium, 100 mg, Oral, BID  enoxaparin, 40 mg, Subcutaneous, Daily  folic acid, 1 mg, Oral, Daily  iron polysaccharides, 150 mg, Oral, Daily  lactobacillus acidophilus, 1 capsule, Oral,  Daily  metoprolol tartrate, 12.5 mg, Oral, Q12H  multivitamin with minerals, 1 tablet, Oral, Daily  polyethylene glycol, 17 g, Oral, Daily  sodium chloride, 10 mL, Intravenous, Q12H  sodium chloride, 3 mL, Intravenous, Q12H         ----------------------------------------------------------------------------------------------------------------------    Vital Signs:  Temp:  [97.8 °F (36.6 °C)-98 °F (36.7 °C)] 97.8 °F (36.6 °C)  Heart Rate:  [] 66  Resp:  [18-20] 20  BP: (122-153)/(73-83) 122/73  No data found.  SpO2 Percentage    07/16/21 1900 07/17/21 0257 07/17/21 0700   SpO2: 98% 98% 96%     SpO2:  [96 %-98 %] 96 %  on   ;   Device (Oxygen Therapy): room air    Body mass index is 19.3 kg/m².  Wt Readings from Last 3 Encounters:   07/14/21 54.3 kg (119 lb 9.6 oz)   10/30/19 63.5 kg (140 lb)        Intake/Output Summary (Last 24 hours) at 7/17/2021 0921  Last data filed at 7/17/2021 0852  Gross per 24 hour   Intake 2801.9 ml   Output --   Net 2801.9 ml     Diet Regular  ----------------------------------------------------------------------------------------------------------------------      Physical Exam:    Constitutional:  Well-developed and well-nourished.  No respiratory distress.      HENT:  Head: Normocephalic and atraumatic.  Mouth:  Moist mucous membranes.    Eyes:  Conjunctivae and EOM are normal.  No scleral icterus.  Neck:  Neck supple.  No JVD present.  Cardiovascular:  Normal rate, regular rhythm and normal heart sounds with no murmur. No edema.  Pulmonary/Chest:  No respiratory distress, no wheezes, no crackles, with normal breath sounds and good air movement.  Abdominal:  Soft.  Bowel sounds are normal.  No distension and no tenderness.   Musculoskeletal:  No edema, no tenderness, and no deformity.  No swelling or redness of joints.    Neurological:  Alert and oriented to person, place, and time.  No facial droop.  No slurred speech.   Skin:  Skin is warm and dry.  No rash noted.  No pallor.    Psychiatric:  Normal mood and affect.  Behavior is normal.  ----------------------------------------------------------------------------------------------------------------------              Results from last 7 days   Lab Units 07/17/21  0404 07/14/21  0138 07/12/21  0217 07/10/21  2218   CRP mg/dL <0.30  --  0.37 0.49   WBC 10*3/mm3 9.01 9.01 8.33  --    HEMOGLOBIN g/dL 10.3* 10.8* 10.0*  --    HEMATOCRIT % 32.4* 34.1 32.1*  --    MCV fL 91.3 90.9 92.8  --    MCHC g/dL 31.8 31.7 31.2*  --    PLATELETS 10*3/mm3 349 387 434  --      Results from last 7 days   Lab Units 07/17/21  0404 07/15/21  0453 07/14/21  0138 07/12/21  0217   SODIUM mmol/L 135*  --  136 134*   POTASSIUM mmol/L 4.1  --  3.9 4.1   MAGNESIUM mg/dL 1.6 1.8 1.8 1.9   CHLORIDE mmol/L 102  --  103 102   CO2 mmol/L 21.9*  --  22.6 21.8*   BUN mg/dL 19  --  14 14   CREATININE mg/dL 0.95  --  0.65 0.64   EGFR IF NONAFRICN AM mL/min/1.73 69  --  107 109   CALCIUM mg/dL 9.0  --  9.0 8.8   GLUCOSE mg/dL 80  --  96 99   ALBUMIN g/dL 3.75  --   --   --    BILIRUBIN mg/dL 0.4  --   --   --    ALK PHOS U/L 123*  --   --   --    AST (SGOT) U/L 22  --   --   --    ALT (SGPT) U/L 10  --   --   --    Estimated Creatinine Clearance: 74.2 mL/min (by C-G formula based on SCr of 0.95 mg/dL).  No results found for: AMMONIA    No results found for: HGBA1C, POCGLU  Lab Results   Component Value Date    HGBA1C 5.60 06/17/2021     Lab Results   Component Value Date    TSH 0.718 06/17/2021    FREET4 1.16 07/09/2021       Blood Culture   Date Value Ref Range Status   06/20/2021 Abnormal Stain (C)  Preliminary   06/19/2021 Staphylococcus aureus (C)  Final     Comment:     Infectious disease consultation is highly recommended to rule out distant foci of infection.   06/17/2021 Staphylococcus aureus (C)  Final     Comment:     Infectious disease consultation is highly recommended to rule out distant foci of infection.   06/17/2021 Staphylococcus aureus (C)  Final     Comment:      Infectious disease consultation is highly recommended to rule out distant foci of infection.  Refer to previous blood culture collected on 6/17/2021 2318 for MICs.     Urine Culture   Date Value Ref Range Status   06/17/2021 >100,000 CFU/mL Escherichia coli (A)  Final   06/17/2021 >100,000 CFU/mL Staphylococcus aureus (A)  Corrected     No results found for: WOUNDCX  No results found for: STOOLCX  No results found for: RESPCX  Pain Management Panel       Pain Management Panel Latest Ref Rng & Units 7/4/2021 6/17/2021    AMPHETAMINES SCREEN, URINE Negative Negative Positive(A)    BARBITURATES SCREEN Negative Negative Negative    BENZODIAZEPINE SCREEN, URINE Negative Negative Negative    BUPRENORPHINEUR Negative Negative Negative    COCAINE SCREEN, URINE Negative Negative Negative    METHADONE SCREEN, URINE Negative Negative Negative              ----------------------------------------------------------------------------------------------------------------------  Imaging Results (Last 24 Hours)       ** No results found for the last 24 hours. **            ----------------------------------------------------------------------------------------------------------------------    Assessment/Plan       Assessment/Plan     ASSESSMENT:    1.  Severe sepsis with lactic acid greater than 2 on admission  2.  Complicated MSSA bacteremia  3.  Likely underlying endocarditis, ruled out by KTAHRIN  4.  Concern for septic emboli    PLAN:    The patient is sitting up in bed eating breakfast this morning in no apparent distress.  Feeling well and denies any pain, shortness of breath, cough, nausea, vomiting, or diarrhea. Afebrile, no diarrhea.  CRP remains normal at less than 0.30.  WBC remains normal.    KATHRIN reports no evidence of vegetation.     Blood cultures from 6/24/2021 finalized as no growth. Blood culture from 6/22/2021 1 out of 1 set positive for MSSA.    Urinalysis on 6/17/2021 was positive and urine culture finalized as  greater than 100,000 colonies of E. coli and greater than 100,000 colonies of MRSA.  Blood cultures on 6/17/2021 finalized as MSSA.  Repeat blood cultures on 6/19/2021 and 6/20/2021 are still showing growth.  COVID-19 and flu A/B PCR on 6/18/2021 detected COVID-19, but patient was asymptomatic.  Mycoplasma pneumoniae antibody on 6/18/2021 was negative.  Strep pneumo antigen on 6/18/2021 was negative.  CT chest with PE protocol on 6/18/2021 showed technically degraded exam, but no definite PE is seen, and there is no aortic dissection.  Pulmonary edema with a trace amount of right pleural fluid.  Poorly defined nodular infiltrates on both sides of the chest suspicious for septic emboli.  Continued follow-up is recommended. MRI of the lumbar spine on 7/2/2021 revealed no acute findings.     Contacted micro lab regarding urine culture on 6/17/2021.  Urine culture has finalized as MSSA, but MRSA verbiage was included underneath and micro lab corrected.    In the setting of negative KATHRIN recommend to continue cefazolin 2 g IV every 8 hours through 7/22/2021 in the setting of persistent bacteremia.  Patient stable from ID standpoint.    Code Status:   Code Status and Medical Interventions:   Ordered at: 06/18/21 8820     Level Of Support Discussed With:    Patient     Code Status:    CPR     Medical Interventions (Level of Support Prior to Arrest):    Full     SYL Jiménez  07/17/21  09:25 EDT

## 2021-07-18 PROCEDURE — 25010000003 CEFAZOLIN PER 500 MG: Performed by: INTERNAL MEDICINE

## 2021-07-18 PROCEDURE — 99232 SBSQ HOSP IP/OBS MODERATE 35: CPT | Performed by: PHYSICIAN ASSISTANT

## 2021-07-18 RX ORDER — MAGNESIUM SULFATE HEPTAHYDRATE 40 MG/ML
4 INJECTION, SOLUTION INTRAVENOUS ONCE
Status: DISCONTINUED | OUTPATIENT
Start: 2021-07-18 | End: 2021-07-18

## 2021-07-18 RX ADMIN — SODIUM CHLORIDE, PRESERVATIVE FREE 10 ML: 5 INJECTION INTRAVENOUS at 23:11

## 2021-07-18 RX ADMIN — MAGNESIUM GLUCONATE 500 MG ORAL TABLET 800 MG: 500 TABLET ORAL at 08:43

## 2021-07-18 RX ADMIN — CEFAZOLIN: 1 INJECTION, POWDER, FOR SOLUTION INTRAVENOUS at 04:14

## 2021-07-18 RX ADMIN — Medication 1 CAPSULE: at 08:42

## 2021-07-18 RX ADMIN — SODIUM CHLORIDE, PRESERVATIVE FREE 3 ML: 5 INJECTION INTRAVENOUS at 08:52

## 2021-07-18 RX ADMIN — METOPROLOL TARTRATE 12.5 MG: 25 TABLET, FILM COATED ORAL at 21:26

## 2021-07-18 RX ADMIN — CEFAZOLIN: 1 INJECTION, POWDER, FOR SOLUTION INTRAVENOUS at 11:05

## 2021-07-18 RX ADMIN — CEFAZOLIN: 1 INJECTION, POWDER, FOR SOLUTION INTRAVENOUS at 21:25

## 2021-07-18 RX ADMIN — DOCUSATE SODIUM 100 MG: 100 CAPSULE, LIQUID FILLED ORAL at 08:42

## 2021-07-18 RX ADMIN — Medication 1 TABLET: at 08:43

## 2021-07-18 RX ADMIN — FOLIC ACID 1 MG: 1 TABLET ORAL at 08:42

## 2021-07-18 RX ADMIN — SODIUM CHLORIDE, PRESERVATIVE FREE 10 ML: 5 INJECTION INTRAVENOUS at 08:52

## 2021-07-18 RX ADMIN — SODIUM CHLORIDE, PRESERVATIVE FREE 3 ML: 5 INJECTION INTRAVENOUS at 23:11

## 2021-07-18 RX ADMIN — IBUPROFEN 400 MG: 400 TABLET, FILM COATED ORAL at 21:48

## 2021-07-18 RX ADMIN — METOPROLOL TARTRATE 12.5 MG: 25 TABLET, FILM COATED ORAL at 08:50

## 2021-07-18 RX ADMIN — Medication 150 MG: at 08:42

## 2021-07-18 NOTE — PROGRESS NOTES
PROGRESS NOTE         Patient Identification:  Name:  Marce Gutierrez  Age:  31 y.o.  Sex:  female  :  1990  MRN:  [id number]  Visit Number:  [id number]  Primary Care Provider:  Provider, No Known         LOS: 30 days       ----------------------------------------------------------------------------------------------------------------------  Subjective       Chief Complaints:    Leg Pain and Arm Pain        Interval History:      The patient is resting comfortably in bed this morning in no apparent distress.  Significant other is at bedside.  No new issues reported.  Afebrile, no diarrhea.  No new labs today.    Review of Systems:    Constitutional: no fever, chills and night sweats. No appetite change or unexpected weight change. No fatigue.  Eyes: no eye drainage, itching or redness.  HEENT: no mouth sores, dysphagia or nose bleed.  Respiratory: no for shortness of breath, cough or production of sputum.  Cardiovascular: no chest pain and no orthopnea.    Gastrointestinal: no nausea, vomiting or diarrhea. No abdominal pain, hematemesis or rectal bleeding.  Genitourinary: no dysuria or polyuria.  Hematologic/lymphatic: no lymph node abnormalities, no easy bruising or easy bleeding.  Musculoskeletal:  No muscle or joint pain.  Skin: No rash and no itching.  Neurological: no loss of consciousness, no seizure, no headache.  Behavioral/Psych: no depression or suicidal ideation.  Endocrine: no hot flashes.  Immunologic: negative.     ----------------------------------------------------------------------------------------------------------------------      Objective       Current [facility] Meds:  IVPB builder, , Intravenous, Q8H  docusate sodium, 100 mg, Oral, BID  enoxaparin, 40 mg, Subcutaneous, Daily  folic acid, 1 mg, Oral, Daily  iron polysaccharides, 150 mg, Oral, Daily  lactobacillus acidophilus, 1 capsule, Oral, Daily  magnesium oxide, 800 mg, Oral, Daily  metoprolol tartrate, 12.5 mg, Oral,  Q12H  multivitamin with minerals, 1 tablet, Oral, Daily  polyethylene glycol, 17 g, Oral, Daily  sodium chloride, 10 mL, Intravenous, Q12H  sodium chloride, 3 mL, Intravenous, Q12H         ----------------------------------------------------------------------------------------------------------------------    Vital Signs:  Temp:  [98 °F (36.7 °C)-98.5 °F (36.9 °C)] 98 °F (36.7 °C)  Heart Rate:  [] 76  Resp:  [18-20] 18  BP: (106-134)/(60-82) 106/60  Mean Arterial Pressure (Non-Invasive) for the past 24 hrs (Last 3 readings):   Noninvasive MAP (mmHg)   07/17/21 1900 106     SpO2 Percentage    07/17/21 1900 07/18/21 0300 07/18/21 0700   SpO2: 96% 95% 97%     SpO2:  [95 %-97 %] 97 %  on   ;   Device (Oxygen Therapy): room air    Body mass index is 19.3 kg/m².  Wt Readings from Last 3 Encounters:   07/14/21 54.3 kg (119 lb 9.6 oz)   10/30/19 63.5 kg (140 lb)        Intake/Output Summary (Last 24 hours) at 7/18/2021 0817  Last data filed at 7/18/2021 0500  Gross per 24 hour   Intake 2370 ml   Output --   Net 2370 ml     Diet Regular  ----------------------------------------------------------------------------------------------------------------------      Physical Exam:    Constitutional:  Well-developed and well-nourished.  No respiratory distress.      HENT:  Head: Normocephalic and atraumatic.  Mouth:  Moist mucous membranes.    Eyes:  Conjunctivae and EOM are normal.  No scleral icterus.  Neck:  Neck supple.  No JVD present.  Cardiovascular:  Normal rate, regular rhythm and normal heart sounds with no murmur. No edema.  Pulmonary/Chest:  No respiratory distress, no wheezes, no crackles, with normal breath sounds and good air movement.  Abdominal:  Soft.  Bowel sounds are normal.  No distension and no tenderness.   Musculoskeletal:  No edema, no tenderness, and no deformity.  No swelling or redness of joints.    Neurological:  Alert and oriented to person, place, and time.  No facial droop.  No slurred speech.    Skin:  Skin is warm and dry.  No rash noted.  No pallor.   Psychiatric:  Normal mood and affect.  Behavior is normal.  ----------------------------------------------------------------------------------------------------------------------              Results from last 7 days   Lab Units 07/17/21  0404 07/14/21  0138 07/12/21  0217   CRP mg/dL <0.30  --  0.37   WBC 10*3/mm3 9.01 9.01 8.33   HEMOGLOBIN g/dL 10.3* 10.8* 10.0*   HEMATOCRIT % 32.4* 34.1 32.1*   MCV fL 91.3 90.9 92.8   MCHC g/dL 31.8 31.7 31.2*   PLATELETS 10*3/mm3 349 387 434     Results from last 7 days   Lab Units 07/17/21  0404 07/15/21  0453 07/14/21  0138 07/12/21  0217   SODIUM mmol/L 135*  --  136 134*   POTASSIUM mmol/L 4.1  --  3.9 4.1   MAGNESIUM mg/dL 1.6 1.8 1.8 1.9   CHLORIDE mmol/L 102  --  103 102   CO2 mmol/L 21.9*  --  22.6 21.8*   BUN mg/dL 19  --  14 14   CREATININE mg/dL 0.95  --  0.65 0.64   EGFR IF NONAFRICN AM mL/min/1.73 69  --  107 109   CALCIUM mg/dL 9.0  --  9.0 8.8   GLUCOSE mg/dL 80  --  96 99   ALBUMIN g/dL 3.75  --   --   --    BILIRUBIN mg/dL 0.4  --   --   --    ALK PHOS U/L 123*  --   --   --    AST (SGOT) U/L 22  --   --   --    ALT (SGPT) U/L 10  --   --   --    Estimated Creatinine Clearance: 74.2 mL/min (by C-G formula based on SCr of 0.95 mg/dL).  No results found for: AMMONIA    No results found for: HGBA1C, POCGLU  Lab Results   Component Value Date    HGBA1C 5.60 06/17/2021     Lab Results   Component Value Date    TSH 0.718 06/17/2021    FREET4 1.16 07/09/2021       Blood Culture   Date Value Ref Range Status   06/20/2021 Abnormal Stain (C)  Preliminary   06/19/2021 Staphylococcus aureus (C)  Final     Comment:     Infectious disease consultation is highly recommended to rule out distant foci of infection.   06/17/2021 Staphylococcus aureus (C)  Final     Comment:     Infectious disease consultation is highly recommended to rule out distant foci of infection.   06/17/2021 Staphylococcus aureus (C)  Final      Comment:     Infectious disease consultation is highly recommended to rule out distant foci of infection.  Refer to previous blood culture collected on 6/17/2021 2318 for MICs.     Urine Culture   Date Value Ref Range Status   06/17/2021 >100,000 CFU/mL Escherichia coli (A)  Final   06/17/2021 >100,000 CFU/mL Staphylococcus aureus (A)  Corrected     No results found for: WOUNDCX  No results found for: STOOLCX  No results found for: RESPCX  Pain Management Panel       Pain Management Panel Latest Ref Rng & Units 7/4/2021 6/17/2021    AMPHETAMINES SCREEN, URINE Negative Negative Positive(A)    BARBITURATES SCREEN Negative Negative Negative    BENZODIAZEPINE SCREEN, URINE Negative Negative Negative    BUPRENORPHINEUR Negative Negative Negative    COCAINE SCREEN, URINE Negative Negative Negative    METHADONE SCREEN, URINE Negative Negative Negative              ----------------------------------------------------------------------------------------------------------------------  Imaging Results (Last 24 Hours)       ** No results found for the last 24 hours. **            ----------------------------------------------------------------------------------------------------------------------    Assessment/Plan       Assessment/Plan     ASSESSMENT:    1.  Severe sepsis with lactic acid greater than 2 on admission  2.  Complicated MSSA bacteremia  3.  Likely underlying endocarditis, ruled out by KATHRIN  4.  Concern for septic emboli    PLAN:    The patient is resting comfortably in bed this morning in no apparent distress.  Significant other is at bedside.  No new issues reported.  Afebrile, no diarrhea.  No new labs today.    KATHRIN reports no evidence of vegetation.     Blood cultures from 6/24/2021 finalized as no growth. Blood culture from 6/22/2021 1 out of 1 set positive for MSSA.    Urinalysis on 6/17/2021 was positive and urine culture finalized as greater than 100,000 colonies of E. coli and greater than 100,000 colonies  of MRSA.  Blood cultures on 6/17/2021 finalized as MSSA.  Repeat blood cultures on 6/19/2021 and 6/20/2021 are still showing growth.  COVID-19 and flu A/B PCR on 6/18/2021 detected COVID-19, but patient was asymptomatic.  Mycoplasma pneumoniae antibody on 6/18/2021 was negative.  Strep pneumo antigen on 6/18/2021 was negative.  CT chest with PE protocol on 6/18/2021 showed technically degraded exam, but no definite PE is seen, and there is no aortic dissection.  Pulmonary edema with a trace amount of right pleural fluid.  Poorly defined nodular infiltrates on both sides of the chest suspicious for septic emboli.  Continued follow-up is recommended. MRI of the lumbar spine on 7/2/2021 revealed no acute findings.     Contacted micro lab regarding urine culture on 6/17/2021.  Urine culture has finalized as MSSA, but MRSA verbiage was included underneath and micro lab corrected.    In the setting of negative KATHRIN recommend to continue cefazolin 2 g IV every 8 hours through 7/22/2021 in the setting of persistent bacteremia.  Patient stable from ID standpoint.    Code Status:   Code Status and Medical Interventions:   Ordered at: 06/18/21 0446     Level Of Support Discussed With:    Patient     Code Status:    CPR     Medical Interventions (Level of Support Prior to Arrest):    Full     SYL [email or web address]  07/18/21  08:49 EDT

## 2021-07-18 NOTE — PROGRESS NOTES
Patient Identification:  Name:  Marce Gutierrez  Age:  31 y.o.  Sex:  female  :  1990  MRN:  [id number]  Visit Number:  [id number]  Primary Care Provider:  Provider, No Known    Length of stay:  30    Subjective/Interval History/Consultants/Procedures     Chief complaint: Follow-up MSSA bacteremia with suspected endocarditis on admission    HPI/Hospital course:     Ms. Gutierrez is a 32yo female with PMH significant for IV drug abuse.  She presented to this faciltiy on 21 for further evaluation of leg and arm pain after jumping after her cat and landing on her left side.   She reported recent injection in to her right AC prior days to presentation.  She was found to have a positive amphetamine screen on admission.  She was found to have severe sepsis on admission due to suspected endocarditis causing septic emboli in the lungs vs. CAP vs. Aspiration PNA and left ankle cellulitis. She was admitted for further evaluation and treatment with start of broad spectrum IV abx.     Of note, COVID-19 screen was positive on admission in the ED without acute hypoxia.  She was isolated initially during her hospitalization for COVID-19 and completed a 10 day quarantine.      After completion of quarantine, KATHRIN was performed and did not reveal evidence of known vegetations ID recommendations were for cefazolin 2g IV q8 through 21 in the setting of persistent bacteremia. Of note, she did report back pain during hospitalization with MRI performed and no known underlying infection.      Consults:  Dr. Gold with infectious disease  Dr. Montiel with psychiatry  Chelsie Villanueva and Sammy with cardiology     Procedures:  2021:  Right basilic single lumen midline catheter  2021-2021:  Left basilic single lumen midline catheter  2021:  KATHRIN      Subjective:      Ms. Gutierrez is evaluated resting comfortably in bed with gentlemen caller.   She is without complaints on this date.  She denies chest pain, dyspnea,  cough, and wheeze.        ----------------------------------------------------------------------------------------------------------------------  Current Hospital Meds:  IVPB builder, , Intravenous, Q8H  docusate sodium, 100 mg, Oral, BID  enoxaparin, 40 mg, Subcutaneous, Daily  folic acid, 1 mg, Oral, Daily  iron polysaccharides, 150 mg, Oral, Daily  lactobacillus acidophilus, 1 capsule, Oral, Daily  magnesium oxide, 800 mg, Oral, Daily  metoprolol tartrate, 12.5 mg, Oral, Q12H  multivitamin with minerals, 1 tablet, Oral, Daily  polyethylene glycol, 17 g, Oral, Daily  sodium chloride, 10 mL, Intravenous, Q12H  sodium chloride, 3 mL, Intravenous, Q12H         ----------------------------------------------------------------------------------------------------------------------      Objective     Vital Signs:  Temp:  [98 °F (36.7 °C)-98.5 °F (36.9 °C)] 98 °F (36.7 °C)  Heart Rate:  [] 76  Resp:  [18-20] 18  BP: (106-134)/(60-82) 106/60      07/12/21  0500 07/13/21  0400 07/14/21  0455   Weight: 55 kg (121 lb 3.2 oz) 55.1 kg (121 lb 6.4 oz) 54.3 kg (119 lb 9.6 oz)     Body mass index is 19.3 kg/m².    Intake/Output Summary (Last 24 hours) at 7/18/2021 0853  Last data filed at 7/18/2021 0500  Gross per 24 hour   Intake 2370 ml   Output --   Net 2370 ml     No intake/output data recorded.  Diet Regular  ----------------------------------------------------------------------------------------------------------------------    Physical Exam  Vitals and nursing note reviewed.   Constitutional:       General: She is awake.      Appearance: Normal appearance. She is well-developed.   HENT:      Head: Normocephalic and atraumatic.   Eyes:      General: Lids are normal.      Conjunctiva/sclera:      Right eye: Right conjunctiva is not injected.      Left eye: Left conjunctiva is not injected.   Cardiovascular:      Rate and Rhythm: Normal rate and regular rhythm.      Heart sounds: S1 normal and S2 normal.   Abdominal:       General: Bowel sounds are normal.      Palpations: Abdomen is soft.      Tenderness: There is no abdominal tenderness.   Musculoskeletal:      Right lower leg: No swelling. No edema.      Left lower leg: No swelling. No edema.   Feet:      Right foot:      Skin integrity: No erythema.      Left foot:      Skin integrity: No erythema.   Skin:     General: Skin is warm and dry.   Neurological:      Mental Status: She is alert and oriented to person, place, and time.   Psychiatric:         Attention and Perception: Attention normal.         Mood and Affect: Mood normal.         Behavior: Behavior is cooperative.              ----------------------------------------------------------------------------------------------------------------------    ----------------------------------------------------------------------------------------------------------------------      Results from last 7 days   Lab Units 07/17/21  0404 07/14/21  0138 07/12/21 0217   CRP mg/dL <0.30  --  0.37   WBC 10*3/mm3 9.01 9.01 8.33   HEMOGLOBIN g/dL 10.3* 10.8* 10.0*   HEMATOCRIT % 32.4* 34.1 32.1*   MCV fL 91.3 90.9 92.8   MCHC g/dL 31.8 31.7 31.2*   PLATELETS 10*3/mm3 349 387 434         Results from last 7 days   Lab Units 07/17/21  0404 07/15/21  0453 07/14/21  0138 07/12/21  0217   SODIUM mmol/L 135*  --  136 134*   POTASSIUM mmol/L 4.1  --  3.9 4.1   MAGNESIUM mg/dL 1.6 1.8 1.8 1.9   CHLORIDE mmol/L 102  --  103 102   CO2 mmol/L 21.9*  --  22.6 21.8*   BUN mg/dL 19  --  14 14   CREATININE mg/dL 0.95  --  0.65 0.64   EGFR IF NONAFRICN AM mL/min/1.73 69  --  107 109   CALCIUM mg/dL 9.0  --  9.0 8.8   GLUCOSE mg/dL 80  --  96 99   ALBUMIN g/dL 3.75  --   --   --    BILIRUBIN mg/dL 0.4  --   --   --    ALK PHOS U/L 123*  --   --   --    AST (SGOT) U/L 22  --   --   --    ALT (SGPT) U/L 10  --   --   --    Estimated Creatinine Clearance: 74.2 mL/min (by C-G formula based on SCr of 0.95 mg/dL).  No results found for: AMMONIA      No results found  for: BLOODCX  No results found for: URINECX  No results found for: WOUNDCX  No results found for: STOOLCX  ----------------------------------------------------------------------------------------------------------------------  Imaging Results (Last 24 Hours)     ** No results found for the last 24 hours. **        ----------------------------------------------------------------------------------------------------------------------   I have reviewed the above laboratory values for 07/18/21    Assessment/Plan     Active Hospital Problems    Diagnosis  POA   • **Endocarditis [I38]  Yes         ASSESSMENT/PLAN:  -Severe sepsis, present on admission, due to MSSA bacteremia and MSSA/E.coli UTI on admission  -Suspected endocarditis on admission causing septic emboli in the lungs vs. -CAP vs. Aspiration PNA  -Left ankle cellulitis:   Continue IV Cefazolin through 07/22/21 as previously recommended by ID.  Remains inpatient after multiple venues of further ways to receive IV abx were denies by her insurance.    KATHRIN without evidence of known vegetations.  WBC previously normalized.   Afebrile.     Hx IVDA  Hepatitic C, 1a genotype, HCV viral load 4,470,000  Plans to see further outpatient rehabilitation following hospitalization with [facility]. SS on board with assistance.   Would benefit in the future from seeing GI doctor or Hep C Specialist in outpatient setting.     Positive COVID-19 screen on admission  Completed Quarantine.    -Normocytic anemia and thrombocytopenia with suspected bone marrow suppression from COVID-19  -Iron deficiency  Stabilized.    Repeat AM labs.          -Right-sided knee pain with no known signs of septic arthritis  -Persistent low back pain  -Left ankle sprain and cellulitis  Prior knee imaging without acute abnormalities.   Prior orthopedic evaluation appreciated.   07/02/21 MRI lumbar spine without known acute findings        -----------  -DVT prophylaxis: SQ Loenox  -Activity:    -Disposition: Anticipated DC after completion of IV abx on 07/22  -Diet:   Dietary Orders (From admission, onward)     Start     Ordered    07/09/21 1324  Diet Regular  Diet Effective Now     Question:  Diet Texture / Consistency  Answer:  Regular    07/09/21 1323    06/22/21 1200  Dietary Nutrition Supplements Boost Plus (Ensure Enlive, Ensure Plus)  Daily With Breakfast, Lunch & Dinner     Question:  Select Supplement  Answer:  Boost Plus (Ensure Enlive, Ensure Plus)    06/22/21 0854                    The patient is high risk due to the following diagnoses/reasons:  Bacteremia, suspected endocarditis, IVDU        Julianna Brown PA-C  07/18/21  08:46 EDT  Pager # [phone]

## 2021-07-18 NOTE — PLAN OF CARE
Goal Outcome Evaluation:  Plan of Care Reviewed With: patient        Progress: improving  Outcome Summary: Resting comfortably.  Denies pain.  Ambulates in hallway.  Monitor progress.

## 2021-07-18 NOTE — PLAN OF CARE
Goal Outcome Evaluation:               Patient resting comfortably in bed; no complaints at this time. Will continue to monitor.

## 2021-07-19 PROCEDURE — 99231 SBSQ HOSP IP/OBS SF/LOW 25: CPT | Performed by: PHYSICIAN ASSISTANT

## 2021-07-19 PROCEDURE — 25010000003 CEFAZOLIN PER 500 MG: Performed by: INTERNAL MEDICINE

## 2021-07-19 PROCEDURE — 25010000002 ENOXAPARIN PER 10 MG: Performed by: INTERNAL MEDICINE

## 2021-07-19 RX ADMIN — DOCUSATE SODIUM 100 MG: 100 CAPSULE, LIQUID FILLED ORAL at 08:11

## 2021-07-19 RX ADMIN — CEFAZOLIN: 1 INJECTION, POWDER, FOR SOLUTION INTRAVENOUS at 19:59

## 2021-07-19 RX ADMIN — CEFAZOLIN: 1 INJECTION, POWDER, FOR SOLUTION INTRAVENOUS at 12:43

## 2021-07-19 RX ADMIN — FOLIC ACID 1 MG: 1 TABLET ORAL at 08:14

## 2021-07-19 RX ADMIN — CEFAZOLIN: 1 INJECTION, POWDER, FOR SOLUTION INTRAVENOUS at 03:35

## 2021-07-19 RX ADMIN — METOPROLOL TARTRATE 12.5 MG: 25 TABLET, FILM COATED ORAL at 08:12

## 2021-07-19 RX ADMIN — POLYETHYLENE GLYCOL (3350) 17 G: 17 POWDER, FOR SOLUTION ORAL at 08:12

## 2021-07-19 RX ADMIN — SODIUM CHLORIDE, PRESERVATIVE FREE 3 ML: 5 INJECTION INTRAVENOUS at 08:13

## 2021-07-19 RX ADMIN — ENOXAPARIN SODIUM 40 MG: 40 INJECTION SUBCUTANEOUS at 08:13

## 2021-07-19 RX ADMIN — Medication 150 MG: at 08:12

## 2021-07-19 RX ADMIN — METOPROLOL TARTRATE 12.5 MG: 25 TABLET, FILM COATED ORAL at 19:59

## 2021-07-19 RX ADMIN — MAGNESIUM GLUCONATE 500 MG ORAL TABLET 800 MG: 500 TABLET ORAL at 08:12

## 2021-07-19 RX ADMIN — Medication 1 TABLET: at 08:12

## 2021-07-19 RX ADMIN — SODIUM CHLORIDE, PRESERVATIVE FREE 10 ML: 5 INJECTION INTRAVENOUS at 08:13

## 2021-07-19 RX ADMIN — Medication 1 CAPSULE: at 08:11

## 2021-07-19 NOTE — CASE MANAGEMENT/SOCIAL WORK
Discharge Planning Assessment  TAYLOR Antonio     Patient Name: Marce Gutierrez  MRN: [id number]  Today's Date: 7/19/2021    Admit Date: 6/17/2021    Discharge Needs Assessment    No documentation.       Discharge Plan     Row Name 07/19/21 1057       Plan    Plan Pt was admitted on 06/17/21. Pt was denied by her [facility] [employer] insurance to go to [facility]. Pt is not a candidate for swing bed placement, due to her insurance not being in network . Pt is not a candidate for outpatient infusion due to the frequency of her IV antibiotics. Pt plans on going to [facility], an outpatient substance abuse treatment center once IV antibiotics are completed on 07/22/21. SS to follow.    Row Name 07/19/21 1044        Leanne Merino

## 2021-07-19 NOTE — PLAN OF CARE
Goal Outcome Evaluation:         Patient resting comfortably in bed; vital signs stable. No complaints; no acute changes. Will continue to monitor.

## 2021-07-19 NOTE — PROGRESS NOTES
PROGRESS NOTE         Patient Identification:  Name:  Marce Gutierrez  Age:  31 y.o.  Sex:  female  :  1990  MRN:  [id number]  Visit Number:  [id number]  Primary Care Provider:  Provider, No Known         LOS: 31 days       ----------------------------------------------------------------------------------------------------------------------  Subjective       Chief Complaints:    Leg Pain and Arm Pain        Interval History:      The patient is resting comfortably in bed this morning in no apparent distress. No new issues reported.  Patient is eager to complete antibiotics.  Afebrile, no diarrhea.  No new labs today.    Review of Systems:    Constitutional: no fever, chills and night sweats. No appetite change or unexpected weight change. No fatigue.  Eyes: no eye drainage, itching or redness.  HEENT: no mouth sores, dysphagia or nose bleed.  Respiratory: no for shortness of breath, cough or production of sputum.  Cardiovascular: no chest pain and no orthopnea.    Gastrointestinal: no nausea, vomiting or diarrhea. No abdominal pain, hematemesis or rectal bleeding.  Genitourinary: no dysuria or polyuria.  Hematologic/lymphatic: no lymph node abnormalities, no easy bruising or easy bleeding.  Musculoskeletal:  No muscle or joint pain.  Skin: No rash and no itching.  Neurological: no loss of consciousness, no seizure, no headache.  Behavioral/Psych: no depression or suicidal ideation.  Endocrine: no hot flashes.  Immunologic: negative.     ----------------------------------------------------------------------------------------------------------------------      Objective       Current [facility] Meds:  IVPB builder, , Intravenous, Q8H  docusate sodium, 100 mg, Oral, BID  enoxaparin, 40 mg, Subcutaneous, Daily  folic acid, 1 mg, Oral, Daily  iron polysaccharides, 150 mg, Oral, Daily  lactobacillus acidophilus, 1 capsule, Oral, Daily  magnesium oxide, 800 mg, Oral, Daily  metoprolol tartrate, 12.5 mg,  Oral, Q12H  multivitamin with minerals, 1 tablet, Oral, Daily  polyethylene glycol, 17 g, Oral, Daily  sodium chloride, 10 mL, Intravenous, Q12H  sodium chloride, 3 mL, Intravenous, Q12H         ----------------------------------------------------------------------------------------------------------------------    Vital Signs:  Temp:  [97.9 °F (36.6 °C)-98.7 °F (37.1 °C)] 97.9 °F (36.6 °C)  Heart Rate:  [80-96] 80  Resp:  [16-18] 18  BP: (108-123)/(64-83) 108/64  No data found.  SpO2 Percentage    07/18/21 1900 07/19/21 0300 07/19/21 0649   SpO2: 100% 100% 99%     SpO2:  [98 %-100 %] 99 %  on   ;   Device (Oxygen Therapy): room air    Body mass index is 19.3 kg/m².  Wt Readings from Last 3 Encounters:   07/14/21 54.3 kg (119 lb 9.6 oz)   10/30/19 63.5 kg (140 lb)        Intake/Output Summary (Last 24 hours) at 7/19/2021 1436  Last data filed at 7/19/2021 1300  Gross per 24 hour   Intake 1200 ml   Output --   Net 1200 ml     Diet Regular  ----------------------------------------------------------------------------------------------------------------------      Physical Exam:    Constitutional:  Well-developed and well-nourished.  No respiratory distress.      HENT:  Head: Normocephalic and atraumatic.  Mouth:  Moist mucous membranes.    Eyes:  Conjunctivae and EOM are normal.  No scleral icterus.  Neck:  Neck supple.  No JVD present.  Cardiovascular:  Normal rate, regular rhythm and normal heart sounds with no murmur. No edema.  Pulmonary/Chest:  No respiratory distress, no wheezes, no crackles, with normal breath sounds and good air movement.  Abdominal:  Soft.  Bowel sounds are normal.  No distension and no tenderness.   Musculoskeletal:  No edema, no tenderness, and no deformity.  No swelling or redness of joints.    Neurological:  Alert and oriented to person, place, and time.  No facial droop.  No slurred speech.   Skin:  Skin is warm and dry.  No rash noted.  No pallor.   Psychiatric:  Normal mood and affect.   Behavior is normal.  ----------------------------------------------------------------------------------------------------------------------              Results from last 7 days   Lab Units 07/17/21  0404 07/14/21 0138   CRP mg/dL <0.30  --    WBC 10*3/mm3 9.01 9.01   HEMOGLOBIN g/dL 10.3* 10.8*   HEMATOCRIT % 32.4* 34.1   MCV fL 91.3 90.9   MCHC g/dL 31.8 31.7   PLATELETS 10*3/mm3 349 387     Results from last 7 days   Lab Units 07/17/21  0404 07/15/21  0453 07/14/21 0138   SODIUM mmol/L 135*  --  136   POTASSIUM mmol/L 4.1  --  3.9   MAGNESIUM mg/dL 1.6 1.8 1.8   CHLORIDE mmol/L 102  --  103   CO2 mmol/L 21.9*  --  22.6   BUN mg/dL 19  --  14   CREATININE mg/dL 0.95  --  0.65   EGFR IF NONAFRICN AM mL/min/1.73 69  --  107   CALCIUM mg/dL 9.0  --  9.0   GLUCOSE mg/dL 80  --  96   ALBUMIN g/dL 3.75  --   --    BILIRUBIN mg/dL 0.4  --   --    ALK PHOS U/L 123*  --   --    AST (SGOT) U/L 22  --   --    ALT (SGPT) U/L 10  --   --    Estimated Creatinine Clearance: 74.2 mL/min (by C-G formula based on SCr of 0.95 mg/dL).  No results found for: AMMONIA    No results found for: HGBA1C, POCGLU  Lab Results   Component Value Date    HGBA1C 5.60 06/17/2021     Lab Results   Component Value Date    TSH 0.718 06/17/2021    FREET4 1.16 07/09/2021       Blood Culture   Date Value Ref Range Status   06/20/2021 Abnormal Stain (C)  Preliminary   06/19/2021 Staphylococcus aureus (C)  Final     Comment:     Infectious disease consultation is highly recommended to rule out distant foci of infection.   06/17/2021 Staphylococcus aureus (C)  Final     Comment:     Infectious disease consultation is highly recommended to rule out distant foci of infection.   06/17/2021 Staphylococcus aureus (C)  Final     Comment:     Infectious disease consultation is highly recommended to rule out distant foci of infection.  Refer to previous blood culture collected on 6/17/2021 2318 for MICs.     Urine Culture   Date Value Ref Range Status    06/17/2021 >100,000 CFU/mL Escherichia coli (A)  Final   06/17/2021 >100,000 CFU/mL Staphylococcus aureus (A)  Corrected     No results found for: WOUNDCX  No results found for: STOOLCX  No results found for: RESPCX  Pain Management Panel       Pain Management Panel Latest Ref Rng & Units 7/4/2021 6/17/2021    AMPHETAMINES SCREEN, URINE Negative Negative Positive(A)    BARBITURATES SCREEN Negative Negative Negative    BENZODIAZEPINE SCREEN, URINE Negative Negative Negative    BUPRENORPHINEUR Negative Negative Negative    COCAINE SCREEN, URINE Negative Negative Negative    METHADONE SCREEN, URINE Negative Negative Negative              ----------------------------------------------------------------------------------------------------------------------  Imaging Results (Last 24 Hours)       ** No results found for the last 24 hours. **            ----------------------------------------------------------------------------------------------------------------------    Assessment/Plan       Assessment/Plan     ASSESSMENT:    1.  Severe sepsis with lactic acid greater than 2 on admission  2.  Complicated MSSA bacteremia  3.  Likely underlying endocarditis, ruled out by KATHRIN  4.  Concern for septic emboli    PLAN:    The patient is resting comfortably in bed this morning in no apparent distress. No new issues reported.  Patient is eager to complete antibiotics.  Afebrile, no diarrhea.  No new labs today.    KATHRIN reports no evidence of vegetation.     Blood cultures from 6/24/2021 finalized as no growth. Blood culture from 6/22/2021 1 out of 1 set positive for MSSA.    Urinalysis on 6/17/2021 was positive and urine culture finalized as greater than 100,000 colonies of E. coli and greater than 100,000 colonies of MRSA.  Blood cultures on 6/17/2021 finalized as MSSA.  Repeat blood cultures on 6/19/2021 and 6/20/2021 are still showing growth.  COVID-19 and flu A/B PCR on 6/18/2021 detected COVID-19, but patient was  asymptomatic.  Mycoplasma pneumoniae antibody on 6/18/2021 was negative.  Strep pneumo antigen on 6/18/2021 was negative.  CT chest with PE protocol on 6/18/2021 showed technically degraded exam, but no definite PE is seen, and there is no aortic dissection.  Pulmonary edema with a trace amount of right pleural fluid.  Poorly defined nodular infiltrates on both sides of the chest suspicious for septic emboli.  Continued follow-up is recommended. MRI of the lumbar spine on 7/2/2021 revealed no acute findings.     Contacted micro lab regarding urine culture on 6/17/2021.  Urine culture has finalized as MSSA, but MRSA verbiage was included underneath and micro lab corrected.    In the setting of negative KATHRIN recommend to continue cefazolin 2 g IV every 8 hours through 7/22/2021 in the setting of persistent bacteremia.  Patient stable from ID standpoint.    Code Status:   Code Status and Medical Interventions:   Ordered at: 06/18/21 0446     Level Of Support Discussed With:    Patient     Code Status:    CPR     Medical Interventions (Level of Support Prior to Arrest):    Full     SYL Jiménez  07/19/21  14:36 EDT

## 2021-07-19 NOTE — PLAN OF CARE
Goal Outcome Evaluation:           Progress: improving  Outcome Summary: Pt has done well today.  Resting comfortably with no complaints noted.

## 2021-07-19 NOTE — PROGRESS NOTES
Patient Identification:  Name:  Marce Gutierrez  Age:  31 y.o.  Sex:  female  :  1990  MRN:  [id number]  Visit Number:  [id number]  Primary Care Provider:  Provider, No Known    Length of stay:  31    Subjective/Interval History/Consultants/Procedures     Chief complaint: Follow-up MSSA bacteremia with suspected endocarditis on admission    HPI/Hospital course:     Ms. Gutierrez is a 32yo female with PMH significant for IV drug abuse.  She presented to this faciltiy on 21 for further evaluation of leg and arm pain after jumping after her cat and landing on her left side.   She reported recent injection in to her right AC prior days to presentation.  She was found to have a positive amphetamine screen on admission.  She was found to have severe sepsis on admission due to suspected endocarditis causing septic emboli in the lungs vs. CAP vs. Aspiration PNA and left ankle cellulitis. She was admitted for further evaluation and treatment with start of broad spectrum IV abx.     Of note, COVID-19 screen was positive on admission in the ED without acute hypoxia.  She was isolated initially during her hospitalization for COVID-19 and completed a 10 day quarantine.      After completion of quarantine, KATHRIN was performed and did not reveal evidence of known vegetations ID recommendations were for cefazolin 2g IV q8 through 21 in the setting of persistent bacteremia. Of note, she did report back pain during hospitalization with MRI performed and no known underlying infection.      Consults:  Dr. Gold with infectious disease  Dr. Montiel with psychiatry  Chelsie Villanueva and Sammy with cardiology     Procedures:  2021:  Right basilic single lumen midline catheter  2021-2021:  Left basilic single lumen midline catheter  2021:  KATHRIN      Subjective:      Ms. Gutierrez is resting in bed. She reports she is doing well. She has no complains on this date. She awaits completion of her IV abx.     ADAN Ramirez  Bright present at bedside during examination.     ----------------------------------------------------------------------------------------------------------------------  Current Hospital Meds:  IVPB builder, , Intravenous, Q8H  docusate sodium, 100 mg, Oral, BID  enoxaparin, 40 mg, Subcutaneous, Daily  folic acid, 1 mg, Oral, Daily  iron polysaccharides, 150 mg, Oral, Daily  lactobacillus acidophilus, 1 capsule, Oral, Daily  magnesium oxide, 800 mg, Oral, Daily  metoprolol tartrate, 12.5 mg, Oral, Q12H  multivitamin with minerals, 1 tablet, Oral, Daily  polyethylene glycol, 17 g, Oral, Daily  sodium chloride, 10 mL, Intravenous, Q12H  sodium chloride, 3 mL, Intravenous, Q12H         ----------------------------------------------------------------------------------------------------------------------      Objective     Vital Signs:  Temp:  [97.9 °F (36.6 °C)-98.7 °F (37.1 °C)] 97.9 °F (36.6 °C)  Heart Rate:  [80-96] 80  Resp:  [16-18] 18  BP: (108-123)/(64-83) 108/64      07/12/21  0500 07/13/21  0400 07/14/21  0455   Weight: 55 kg (121 lb 3.2 oz) 55.1 kg (121 lb 6.4 oz) 54.3 kg (119 lb 9.6 oz)     Body mass index is 19.3 kg/m².    Intake/Output Summary (Last 24 hours) at 7/19/2021 0897  Last data filed at 7/19/2021 0300  Gross per 24 hour   Intake 360 ml   Output --   Net 360 ml     No intake/output data recorded.  Diet Regular  ----------------------------------------------------------------------------------------------------------------------    Physical Exam  Vitals and nursing note reviewed.   Constitutional:       General: She is awake.      Appearance: Normal appearance. She is well-developed.   HENT:      Head: Normocephalic and atraumatic.   Eyes:      General: Lids are normal.      Conjunctiva/sclera:      Right eye: Right conjunctiva is not injected.      Left eye: Left conjunctiva is not injected.   Cardiovascular:      Rate and Rhythm: Normal rate and regular rhythm.      Heart sounds: S1 normal and S2  normal.   Abdominal:      General: Bowel sounds are normal.      Palpations: Abdomen is soft.      Tenderness: There is no abdominal tenderness.   Musculoskeletal:      Right lower leg: No swelling. No edema.      Left lower leg: No swelling. No edema.   Feet:      Right foot:      Skin integrity: No erythema.      Left foot:      Skin integrity: No erythema.   Skin:     General: Skin is warm and dry.   Neurological:      Mental Status: She is alert and oriented to person, place, and time.   Psychiatric:         Attention and Perception: Attention normal.         Mood and Affect: Mood normal.         Behavior: Behavior is cooperative.              ----------------------------------------------------------------------------------------------------------------------    ----------------------------------------------------------------------------------------------------------------------      Results from last 7 days   Lab Units 07/17/21  0404 07/14/21  0138   CRP mg/dL <0.30  --    WBC 10*3/mm3 9.01 9.01   HEMOGLOBIN g/dL 10.3* 10.8*   HEMATOCRIT % 32.4* 34.1   MCV fL 91.3 90.9   MCHC g/dL 31.8 31.7   PLATELETS 10*3/mm3 349 387         Results from last 7 days   Lab Units 07/17/21  0404 07/15/21  0453 07/14/21 0138   SODIUM mmol/L 135*  --  136   POTASSIUM mmol/L 4.1  --  3.9   MAGNESIUM mg/dL 1.6 1.8 1.8   CHLORIDE mmol/L 102  --  103   CO2 mmol/L 21.9*  --  22.6   BUN mg/dL 19  --  14   CREATININE mg/dL 0.95  --  0.65   EGFR IF NONAFRICN AM mL/min/1.73 69  --  107   CALCIUM mg/dL 9.0  --  9.0   GLUCOSE mg/dL 80  --  96   ALBUMIN g/dL 3.75  --   --    BILIRUBIN mg/dL 0.4  --   --    ALK PHOS U/L 123*  --   --    AST (SGOT) U/L 22  --   --    ALT (SGPT) U/L 10  --   --    Estimated Creatinine Clearance: 74.2 mL/min (by C-G formula based on SCr of 0.95 mg/dL).  No results found for: AMMONIA      No results found for: BLOODCX  No results found for: URINECX  No results found for: WOUNDCX  No results found for:  STOOLCX  ----------------------------------------------------------------------------------------------------------------------  Imaging Results (Last 24 Hours)     ** No results found for the last 24 hours. **        ----------------------------------------------------------------------------------------------------------------------   I have reviewed the above laboratory values for 07/19/21    Assessment/Plan     Active Hospital Problems    Diagnosis  POA   • **Endocarditis [I38]  Yes         ASSESSMENT/PLAN:  -Severe sepsis, present on admission, due to MSSA bacteremia and MSSA/E.coli UTI on admission  -Suspected endocarditis on admission causing septic emboli in the lungs vs. -CAP vs. Aspiration PNA  -Left ankle cellulitis:   Continue IV Cefazolin through 07/22/21 as previously recommended by ID.  Remains inpatient after multiple venues of further ways to receive IV abx were denies by her insurance.    KATHRIN without evidence of known vegetations.  WBC previously normalized.   Afebrile.     Hx IVDA  Hepatitic C, 1a genotype, HCV viral load 4,470,000  Plans to see further outpatient rehabilitation following hospitalization with [facility]. SS on board with assistance.   Would benefit in the future from seeing GI doctor or Hep C Specialist in outpatient setting.     Positive COVID-19 screen on admission  Completed Quarantine.    -Normocytic anemia and thrombocytopenia with suspected bone marrow suppression from COVID-19  -Iron deficiency  Stabilized.          -Right-sided knee pain with no known signs of septic arthritis  -Persistent low back pain  -Left ankle sprain and cellulitis  Prior knee imaging without acute abnormalities.   Prior orthopedic evaluation appreciated.   07/02/21 MRI lumbar spine without known acute findings    -Hypomagnesemia:   Continue oral magnesium supplementation daily.     -----------  -DVT prophylaxis: SQ Lovenox  -Activity: Ad blanka  -Disposition: Anticipated DC after completion of IV  abx on 07/22  -Diet:   Dietary Orders (From admission, onward)     Start     Ordered    07/09/21 1324  Diet Regular  Diet Effective Now     Question:  Diet Texture / Consistency  Answer:  Regular    07/09/21 1323    06/22/21 1200  Dietary Nutrition Supplements Boost Plus (Ensure Enlive, Ensure Plus)  Daily With Breakfast, Lunch & Dinner     Question:  Select Supplement  Answer:  Boost Plus (Ensure Enlive, Ensure Plus)    06/22/21 0854                    The patient is high risk due to the following diagnoses/reasons:  Bacteremia, suspected endocarditis, IVDU        Julianna Brown PA-C  07/19/21  08:57 EDT  Pager # [phone]

## 2021-07-20 LAB
ALBUMIN SERPL-MCNC: 3.66 G/DL (ref 3.5–5.2)
ALBUMIN/GLOB SERPL: 1.1 G/DL
ALP SERPL-CCNC: 126 U/L (ref 39–117)
ALT SERPL W P-5'-P-CCNC: 13 U/L (ref 1–33)
ANION GAP SERPL CALCULATED.3IONS-SCNC: 11.5 MMOL/L (ref 5–15)
AST SERPL-CCNC: 31 U/L (ref 1–32)
BASOPHILS # BLD AUTO: 0.06 10*3/MM3 (ref 0–0.2)
BASOPHILS NFR BLD AUTO: 0.8 % (ref 0–1.5)
BILIRUB SERPL-MCNC: 0.2 MG/DL (ref 0–1.2)
BUN SERPL-MCNC: 16 MG/DL (ref 6–20)
BUN/CREAT SERPL: 21.9 (ref 7–25)
CALCIUM SPEC-SCNC: 9 MG/DL (ref 8.6–10.5)
CHLORIDE SERPL-SCNC: 104 MMOL/L (ref 98–107)
CO2 SERPL-SCNC: 22.5 MMOL/L (ref 22–29)
CREAT SERPL-MCNC: 0.73 MG/DL (ref 0.57–1)
DEPRECATED RDW RBC AUTO: 42.5 FL (ref 37–54)
EOSINOPHIL # BLD AUTO: 0.29 10*3/MM3 (ref 0–0.4)
EOSINOPHIL NFR BLD AUTO: 3.8 % (ref 0.3–6.2)
ERYTHROCYTE [DISTWIDTH] IN BLOOD BY AUTOMATED COUNT: 12.8 % (ref 12.3–15.4)
GFR SERPL CREATININE-BSD FRML MDRD: 94 ML/MIN/1.73
GLOBULIN UR ELPH-MCNC: 3.4 GM/DL
GLUCOSE SERPL-MCNC: 94 MG/DL (ref 65–99)
HCT VFR BLD AUTO: 31.5 % (ref 34–46.6)
HGB BLD-MCNC: 10.1 G/DL (ref 12–15.9)
IMM GRANULOCYTES # BLD AUTO: 0.01 10*3/MM3 (ref 0–0.05)
IMM GRANULOCYTES NFR BLD AUTO: 0.1 % (ref 0–0.5)
LYMPHOCYTES # BLD AUTO: 2.66 10*3/MM3 (ref 0.7–3.1)
LYMPHOCYTES NFR BLD AUTO: 35.2 % (ref 19.6–45.3)
MAGNESIUM SERPL-MCNC: 1.8 MG/DL (ref 1.6–2.6)
MCH RBC QN AUTO: 29.2 PG (ref 26.6–33)
MCHC RBC AUTO-ENTMCNC: 32.1 G/DL (ref 31.5–35.7)
MCV RBC AUTO: 91 FL (ref 79–97)
MONOCYTES # BLD AUTO: 0.57 10*3/MM3 (ref 0.1–0.9)
MONOCYTES NFR BLD AUTO: 7.5 % (ref 5–12)
NEUTROPHILS NFR BLD AUTO: 3.97 10*3/MM3 (ref 1.7–7)
NEUTROPHILS NFR BLD AUTO: 52.6 % (ref 42.7–76)
NRBC BLD AUTO-RTO: 0 /100 WBC (ref 0–0.2)
PLATELET # BLD AUTO: 278 10*3/MM3 (ref 140–450)
PMV BLD AUTO: 9.8 FL (ref 6–12)
POTASSIUM SERPL-SCNC: 4.1 MMOL/L (ref 3.5–5.2)
PROT SERPL-MCNC: 7.1 G/DL (ref 6–8.5)
RBC # BLD AUTO: 3.46 10*6/MM3 (ref 3.77–5.28)
SODIUM SERPL-SCNC: 138 MMOL/L (ref 136–145)
WBC # BLD AUTO: 7.56 10*3/MM3 (ref 3.4–10.8)

## 2021-07-20 PROCEDURE — 25010000003 MAGNESIUM SULFATE 4 GM/100ML SOLUTION: Performed by: INTERNAL MEDICINE

## 2021-07-20 PROCEDURE — 85025 COMPLETE CBC W/AUTO DIFF WBC: CPT | Performed by: INTERNAL MEDICINE

## 2021-07-20 PROCEDURE — 83735 ASSAY OF MAGNESIUM: CPT | Performed by: INTERNAL MEDICINE

## 2021-07-20 PROCEDURE — 25010000003 CEFAZOLIN PER 500 MG: Performed by: INTERNAL MEDICINE

## 2021-07-20 PROCEDURE — 99231 SBSQ HOSP IP/OBS SF/LOW 25: CPT | Performed by: INTERNAL MEDICINE

## 2021-07-20 PROCEDURE — 80053 COMPREHEN METABOLIC PANEL: CPT | Performed by: INTERNAL MEDICINE

## 2021-07-20 RX ORDER — MAGNESIUM SULFATE HEPTAHYDRATE 40 MG/ML
4 INJECTION, SOLUTION INTRAVENOUS ONCE
Status: COMPLETED | OUTPATIENT
Start: 2021-07-20 | End: 2021-07-20

## 2021-07-20 RX ADMIN — CEFAZOLIN: 1 INJECTION, POWDER, FOR SOLUTION INTRAVENOUS at 11:42

## 2021-07-20 RX ADMIN — MAGNESIUM GLUCONATE 500 MG ORAL TABLET 800 MG: 500 TABLET ORAL at 08:22

## 2021-07-20 RX ADMIN — Medication 150 MG: at 08:22

## 2021-07-20 RX ADMIN — CEFAZOLIN: 1 INJECTION, POWDER, FOR SOLUTION INTRAVENOUS at 04:00

## 2021-07-20 RX ADMIN — FOLIC ACID 1 MG: 1 TABLET ORAL at 08:23

## 2021-07-20 RX ADMIN — Medication 1 TABLET: at 08:22

## 2021-07-20 RX ADMIN — MAGNESIUM SULFATE HEPTAHYDRATE 4 G: 40 INJECTION, SOLUTION INTRAVENOUS at 08:21

## 2021-07-20 RX ADMIN — SODIUM CHLORIDE, PRESERVATIVE FREE 3 ML: 5 INJECTION INTRAVENOUS at 08:23

## 2021-07-20 RX ADMIN — METOPROLOL TARTRATE 12.5 MG: 25 TABLET, FILM COATED ORAL at 20:02

## 2021-07-20 RX ADMIN — SODIUM CHLORIDE, PRESERVATIVE FREE 10 ML: 5 INJECTION INTRAVENOUS at 08:23

## 2021-07-20 RX ADMIN — Medication 1 CAPSULE: at 08:23

## 2021-07-20 RX ADMIN — CEFAZOLIN: 1 INJECTION, POWDER, FOR SOLUTION INTRAVENOUS at 20:02

## 2021-07-20 NOTE — PROGRESS NOTES
Antimicrobial Length of Therapy:    Day 30 of 32 cefazolin    Thank you.  Netta Gonzalez, Pharm.D.  7/20/2021  13:27 EDT

## 2021-07-20 NOTE — PROGRESS NOTES
PROGRESS NOTE         Patient Identification:  Name:  Marce Gutierrez  Age:  31 y.o.  Sex:  female  :  1990  MRN:  [id number]  Visit Number:  [id number]  Primary Care Provider:  Provider, No Known         LOS: 32 days       ----------------------------------------------------------------------------------------------------------------------  Subjective       Chief Complaints:    Leg Pain and Arm Pain        Interval History:      The patient is resting comfortably in bed this morning in no apparent distress.  Overall feeling very well and denies any shortness of breath, chest pain, abdominal pain, nausea, vomiting, or diarrhea. Afebrile, no diarrhea.  WBC remains normal.    Review of Systems:    Constitutional: no fever, chills and night sweats. No appetite change or unexpected weight change. No fatigue.  Eyes: no eye drainage, itching or redness.  HEENT: no mouth sores, dysphagia or nose bleed.  Respiratory: no for shortness of breath, cough or production of sputum.  Cardiovascular: no chest pain and no orthopnea.    Gastrointestinal: no nausea, vomiting or diarrhea. No abdominal pain, hematemesis or rectal bleeding.  Genitourinary: no dysuria or polyuria.  Hematologic/lymphatic: no lymph node abnormalities, no easy bruising or easy bleeding.  Musculoskeletal:  No muscle or joint pain.  Skin: No rash and no itching.  Neurological: no loss of consciousness, no seizure, no headache.  Behavioral/Psych: no depression or suicidal ideation.  Endocrine: no hot flashes.  Immunologic: negative.     ----------------------------------------------------------------------------------------------------------------------      Objective       Current [facility] Meds:  IVPB builder, , Intravenous, Q8H  docusate sodium, 100 mg, Oral, BID  enoxaparin, 40 mg, Subcutaneous, Daily  folic acid, 1 mg, Oral, Daily  iron polysaccharides, 150 mg, Oral, Daily  lactobacillus acidophilus, 1 capsule, Oral, Daily  magnesium oxide,  800 mg, Oral, Daily  magnesium sulfate, 4 g, Intravenous, Once  metoprolol tartrate, 12.5 mg, Oral, Q12H  multivitamin with minerals, 1 tablet, Oral, Daily  polyethylene glycol, 17 g, Oral, Daily  sodium chloride, 10 mL, Intravenous, Q12H  sodium chloride, 3 mL, Intravenous, Q12H         ----------------------------------------------------------------------------------------------------------------------    Vital Signs:  Temp:  [97.3 °F (36.3 °C)-98.4 °F (36.9 °C)] 97.3 °F (36.3 °C)  Heart Rate:  [84-96] 84  Resp:  [14-16] 14  BP: (112-134)/(74-90) 112/74  No data found.  SpO2 Percentage    07/19/21 0649 07/19/21 1900 07/20/21 0300   SpO2: 99% 96% 98%     SpO2:  [96 %-98 %] 98 %  on   ;   Device (Oxygen Therapy): room air    Body mass index is 19.3 kg/m².  Wt Readings from Last 3 Encounters:   07/14/21 54.3 kg (119 lb 9.6 oz)   10/30/19 63.5 kg (140 lb)        Intake/Output Summary (Last 24 hours) at 7/20/2021 1121  Last data filed at 7/20/2021 0900  Gross per 24 hour   Intake 1660.92 ml   Output --   Net 1660.92 ml     Diet Regular  ----------------------------------------------------------------------------------------------------------------------      Physical Exam:    Constitutional:  Well-developed and well-nourished.  No respiratory distress.      HENT:  Head: Normocephalic and atraumatic.  Mouth:  Moist mucous membranes.    Eyes:  Conjunctivae and EOM are normal.  No scleral icterus.  Neck:  Neck supple.  No JVD present.  Cardiovascular:  Normal rate, regular rhythm and normal heart sounds with no murmur. No edema.  Pulmonary/Chest:  No respiratory distress, no wheezes, no crackles, with normal breath sounds and good air movement.  Abdominal:  Soft.  Bowel sounds are normal.  No distension and no tenderness.   Musculoskeletal:  No edema, no tenderness, and no deformity.  No swelling or redness of joints.    Neurological:  Alert and oriented to person, place, and time.  No facial droop.  No slurred speech.    Skin:  Skin is warm and dry.  No rash noted.  No pallor.   Psychiatric:  Normal mood and affect.  Behavior is normal.  ----------------------------------------------------------------------------------------------------------------------              Results from last 7 days   Lab Units 07/20/21  0057 07/17/21  0404 07/14/21  0138   CRP mg/dL  --  <0.30  --    WBC 10*3/mm3 7.56 9.01 9.01   HEMOGLOBIN g/dL 10.1* 10.3* 10.8*   HEMATOCRIT % 31.5* 32.4* 34.1   MCV fL 91.0 91.3 90.9   MCHC g/dL 32.1 31.8 31.7   PLATELETS 10*3/mm3 278 349 387     Results from last 7 days   Lab Units 07/20/21  0057 07/17/21  0404 07/15/21  0453 07/14/21  0138   SODIUM mmol/L 138 135*  --  136   POTASSIUM mmol/L 4.1 4.1  --  3.9   MAGNESIUM mg/dL 1.8 1.6 1.8 1.8   CHLORIDE mmol/L 104 102  --  103   CO2 mmol/L 22.5 21.9*  --  22.6   BUN mg/dL 16 19  --  14   CREATININE mg/dL 0.73 0.95  --  0.65   EGFR IF NONAFRICN AM mL/min/1.73 94 69  --  107   CALCIUM mg/dL 9.0 9.0  --  9.0   GLUCOSE mg/dL 94 80  --  96   ALBUMIN g/dL 3.66 3.75  --   --    BILIRUBIN mg/dL 0.2 0.4  --   --    ALK PHOS U/L 126* 123*  --   --    AST (SGOT) U/L 31 22  --   --    ALT (SGPT) U/L 13 10  --   --    Estimated Creatinine Clearance: 96.6 mL/min (by C-G formula based on SCr of 0.73 mg/dL).  No results found for: AMMONIA    No results found for: HGBA1C, POCGLU  Lab Results   Component Value Date    HGBA1C 5.60 06/17/2021     Lab Results   Component Value Date    TSH 0.718 06/17/2021    FREET4 1.16 07/09/2021       Blood Culture   Date Value Ref Range Status   06/20/2021 Abnormal Stain (C)  Preliminary   06/19/2021 Staphylococcus aureus (C)  Final     Comment:     Infectious disease consultation is highly recommended to rule out distant foci of infection.   06/17/2021 Staphylococcus aureus (C)  Final     Comment:     Infectious disease consultation is highly recommended to rule out distant foci of infection.   06/17/2021 Staphylococcus aureus (C)  Final     Comment:      Infectious disease consultation is highly recommended to rule out distant foci of infection.  Refer to previous blood culture collected on 6/17/2021 2318 for MICs.     Urine Culture   Date Value Ref Range Status   06/17/2021 >100,000 CFU/mL Escherichia coli (A)  Final   06/17/2021 >100,000 CFU/mL Staphylococcus aureus (A)  Corrected     No results found for: WOUNDCX  No results found for: STOOLCX  No results found for: RESPCX  Pain Management Panel       Pain Management Panel Latest Ref Rng & Units 7/4/2021 6/17/2021    AMPHETAMINES SCREEN, URINE Negative Negative Positive(A)    BARBITURATES SCREEN Negative Negative Negative    BENZODIAZEPINE SCREEN, URINE Negative Negative Negative    BUPRENORPHINEUR Negative Negative Negative    COCAINE SCREEN, URINE Negative Negative Negative    METHADONE SCREEN, URINE Negative Negative Negative              ----------------------------------------------------------------------------------------------------------------------  Imaging Results (Last 24 Hours)       ** No results found for the last 24 hours. **            ----------------------------------------------------------------------------------------------------------------------    Assessment/Plan       Assessment/Plan     ASSESSMENT:    1.  Severe sepsis with lactic acid greater than 2 on admission  2.  Complicated MSSA bacteremia  3.  Likely underlying endocarditis, ruled out by KATHRIN  4.  Concern for septic emboli    PLAN:    The patient is resting comfortably in bed this morning in no apparent distress.  Overall feeling very well and denies any shortness of breath, chest pain, abdominal pain, nausea, vomiting, or diarrhea. Afebrile, no diarrhea.  WBC remains normal.    KATHRIN reports no evidence of vegetation.     Blood cultures from 6/24/2021 finalized as no growth. Blood culture from 6/22/2021 1 out of 1 set positive for MSSA.    Urinalysis on 6/17/2021 was positive and urine culture finalized as greater than 100,000  colonies of E. coli and greater than 100,000 colonies of MRSA.  Blood cultures on 6/17/2021 finalized as MSSA.  Repeat blood cultures on 6/19/2021 and 6/20/2021 are still showing growth.  COVID-19 and flu A/B PCR on 6/18/2021 detected COVID-19, but patient was asymptomatic.  Mycoplasma pneumoniae antibody on 6/18/2021 was negative.  Strep pneumo antigen on 6/18/2021 was negative.  CT chest with PE protocol on 6/18/2021 showed technically degraded exam, but no definite PE is seen, and there is no aortic dissection.  Pulmonary edema with a trace amount of right pleural fluid.  Poorly defined nodular infiltrates on both sides of the chest suspicious for septic emboli.  Continued follow-up is recommended. MRI of the lumbar spine on 7/2/2021 revealed no acute findings.     Contacted micro lab regarding urine culture on 6/17/2021.  Urine culture has finalized as MSSA, but MRSA verbiage was included underneath and micro lab corrected.    In the setting of negative KATHRIN recommend to continue cefazolin 2 g IV every 8 hours through 7/22/2021 in the setting of persistent bacteremia.  Patient stable from ID standpoint.    Code Status:   Code Status and Medical Interventions:   Ordered at: 06/18/21 0446     Level Of Support Discussed With:    Patient     Code Status:    CPR     Medical Interventions (Level of Support Prior to Arrest):    Full     SYL Jiménez  07/20/21  11:21 EDT

## 2021-07-20 NOTE — PLAN OF CARE
Goal Outcome Evaluation:           Progress: improving     Pt continues on ABX, has no complaints this shift.

## 2021-07-20 NOTE — PLAN OF CARE
Goal Outcome Evaluation:           Progress: improving  Outcome Summary: Pt has done well today, resting comfortably with no complaints.  Magnesium replaced.

## 2021-07-20 NOTE — PROGRESS NOTES
Assisted By: Ashley PATEL RN    CC: Follow-up on bacteremia    Interview History/HPI: Patient has been going out regularly and walking, she has no complaints, she states her left ankle and right knee are feeling okay.  Breathing is okay no cough.    ROS:     Vitals:    07/20/21 0300   BP: 112/74   Pulse: 84   Resp: 14   Temp: 97.3 °F (36.3 °C)   SpO2: 98%         Intake/Output Summary (Last 24 hours) at 7/20/2021 1328  Last data filed at 7/20/2021 0900  Gross per 24 hour   Intake 1180.92 ml   Output --   Net 1180.92 ml       EXAM: Lungs clear heart regular rate and rhythm no edema      LABS:     Lab Results (last 48 hours)     Procedure Component Value Units Date/Time    Comprehensive Metabolic Panel [[id number]]  (Abnormal) Collected: 07/20/21 0057    Specimen: Blood Updated: 07/20/21 0236     Glucose 94 mg/dL      BUN 16 mg/dL      Creatinine 0.73 mg/dL      Sodium 138 mmol/L      Potassium 4.1 mmol/L      Comment: Slight hemolysis detected by analyzer. Results may be affected.        Chloride 104 mmol/L      CO2 22.5 mmol/L      Calcium 9.0 mg/dL      Total Protein 7.1 g/dL      Albumin 3.66 g/dL      ALT (SGPT) 13 U/L      AST (SGOT) 31 U/L      Alkaline Phosphatase 126 U/L      Total Bilirubin 0.2 mg/dL      eGFR Non African Amer 94 mL/min/1.73      Globulin 3.4 gm/dL      A/G Ratio 1.1 g/dL      BUN/Creatinine Ratio 21.9     Anion Gap 11.5 mmol/L     Narrative:      GFR Normal >60  Chronic Kidney Disease <60  Kidney Failure <15      Magnesium [[id number]]  (Normal) Collected: 07/20/21 0057    Specimen: Blood Updated: 07/20/21 0236     Magnesium 1.8 mg/dL     CBC & Differential [[id number]]  (Abnormal) Collected: 07/20/21 0057    Specimen: Blood Updated: 07/20/21 0207    Narrative:      The following orders were created for panel order CBC & Differential.  Procedure                               Abnormality         Status                     ---------                               -----------         ------                      CBC Auto Differential[[id number]]        Abnormal            Final result                 Please view results for these tests on the individual orders.    CBC Auto Differential [[id number]]  (Abnormal) Collected: 07/20/21 0057    Specimen: Blood Updated: 07/20/21 0207     WBC 7.56 10*3/mm3      RBC 3.46 10*6/mm3      Hemoglobin 10.1 g/dL      Hematocrit 31.5 %      MCV 91.0 fL      MCH 29.2 pg      MCHC 32.1 g/dL      RDW 12.8 %      RDW-SD 42.5 fl      MPV 9.8 fL      Platelets 278 10*3/mm3      Neutrophil % 52.6 %      Lymphocyte % 35.2 %      Monocyte % 7.5 %      Eosinophil % 3.8 %      Basophil % 0.8 %      Immature Grans % 0.1 %      Neutrophils, Absolute 3.97 10*3/mm3      Lymphocytes, Absolute 2.66 10*3/mm3      Monocytes, Absolute 0.57 10*3/mm3      Eosinophils, Absolute 0.29 10*3/mm3      Basophils, Absolute 0.06 10*3/mm3      Immature Grans, Absolute 0.01 10*3/mm3      nRBC 0.0 /100 WBC                Radiology:    Imaging Results (Last 72 Hours)     ** No results found for the last 72 hours. **          Results for orders placed during the hospital encounter of 06/17/21    Adult Transesophageal Echo (KATHRIN) W/ Cont if Necessary Per Protocol    Interpretation Summary  · Normal left ventricular cavity size and wall thickness noted. All left ventricular wall segments contract normally.  · Left ventricular ejection fraction appears to be 56 - 60%.  · The aortic valve is structurally normal with no regurgitation or stenosis present.  · The mitral valve is structurally normal with no significant stenosis present. Mild mitral valve regurgitation is present.  · The tricuspid valve is structurally normal with no significant regurgitation or significant stenosis present  · There is no evidence of pericardial effusion. .  · Saline test results are negative.  · There is no echocardiographic evidence of atrial or ventricular septal defect noted on the saline test.  · There is no echocardiographic evidence of  any endocardial vegetations or aortic root abscess.  · Comments: There is no echocardiograph evidence of endocarditis noted on the study.      Assessment/Plan:   MSSA bacteremia, she will complete her treatment on July 22.  She can be discharged home on July 23.  Is planning to go to her father's over the weekend and then go to a drug rehabilitation.    COVID-19 on admission, asymptomatic    DVT prophylaxis, subcu Lovenox, labs acceptable    Mild low magnesium, being supplemented    Tachycardia, improved on low-dose Lopressor    Disposition Rehab facility    Deng Sanchez MD

## 2021-07-21 PROCEDURE — 25010000003 CEFAZOLIN PER 500 MG: Performed by: INTERNAL MEDICINE

## 2021-07-21 PROCEDURE — 99231 SBSQ HOSP IP/OBS SF/LOW 25: CPT | Performed by: INTERNAL MEDICINE

## 2021-07-21 PROCEDURE — 25010000003 MAGNESIUM SULFATE 4 GM/100ML SOLUTION: Performed by: INTERNAL MEDICINE

## 2021-07-21 RX ORDER — MAGNESIUM SULFATE HEPTAHYDRATE 40 MG/ML
4 INJECTION, SOLUTION INTRAVENOUS ONCE
Status: COMPLETED | OUTPATIENT
Start: 2021-07-21 | End: 2021-07-21

## 2021-07-21 RX ADMIN — METOPROLOL TARTRATE 12.5 MG: 25 TABLET, FILM COATED ORAL at 09:45

## 2021-07-21 RX ADMIN — SODIUM CHLORIDE, PRESERVATIVE FREE 10 ML: 5 INJECTION INTRAVENOUS at 09:46

## 2021-07-21 RX ADMIN — CEFAZOLIN: 1 INJECTION, POWDER, FOR SOLUTION INTRAVENOUS at 03:53

## 2021-07-21 RX ADMIN — Medication 1 CAPSULE: at 09:44

## 2021-07-21 RX ADMIN — METOPROLOL TARTRATE 12.5 MG: 25 TABLET, FILM COATED ORAL at 20:07

## 2021-07-21 RX ADMIN — CEFAZOLIN: 1 INJECTION, POWDER, FOR SOLUTION INTRAVENOUS at 20:07

## 2021-07-21 RX ADMIN — Medication 150 MG: at 09:46

## 2021-07-21 RX ADMIN — Medication 1 TABLET: at 09:46

## 2021-07-21 RX ADMIN — MAGNESIUM GLUCONATE 500 MG ORAL TABLET 800 MG: 500 TABLET ORAL at 09:44

## 2021-07-21 RX ADMIN — FOLIC ACID 1 MG: 1 TABLET ORAL at 09:45

## 2021-07-21 RX ADMIN — MAGNESIUM SULFATE HEPTAHYDRATE 4 G: 40 INJECTION, SOLUTION INTRAVENOUS at 09:50

## 2021-07-21 RX ADMIN — CEFAZOLIN: 1 INJECTION, POWDER, FOR SOLUTION INTRAVENOUS at 13:41

## 2021-07-21 NOTE — PLAN OF CARE
Goal Outcome Evaluation:              Outcome Summary: Patient alert and oriented, no complaints, will moitor.

## 2021-07-21 NOTE — PROGRESS NOTES
PROGRESS NOTE         Patient Identification:  Name:  Marce Gutierrez  Age:  31 y.o.  Sex:  female  :  1990  MRN:  [id number]  Visit Number:  [id number]  Primary Care Provider:  Provider, No Known         LOS: 33 days       ----------------------------------------------------------------------------------------------------------------------  Subjective       Chief Complaints:    Leg Pain and Arm Pain        Interval History:      Patient resting comfortably in bed this morning.  No issues or complaints.  Afebrile, no diarrhea.  No new labs today.  Cefazolin to complete tomorrow.    Review of Systems:    Constitutional: no fever, chills and night sweats. No appetite change or unexpected weight change. No fatigue.  Eyes: no eye drainage, itching or redness.  HEENT: no mouth sores, dysphagia or nose bleed.  Respiratory: no for shortness of breath, cough or production of sputum.  Cardiovascular: no chest pain and no orthopnea.    Gastrointestinal: no nausea, vomiting or diarrhea. No abdominal pain, hematemesis or rectal bleeding.  Genitourinary: no dysuria or polyuria.  Hematologic/lymphatic: no lymph node abnormalities, no easy bruising or easy bleeding.  Musculoskeletal:  No muscle or joint pain.  Skin: No rash and no itching.  Neurological: no loss of consciousness, no seizure, no headache.  Behavioral/Psych: no depression or suicidal ideation.  Endocrine: no hot flashes.  Immunologic: negative.     ----------------------------------------------------------------------------------------------------------------------      Objective       Current [facility] Meds:  IVPB builder, , Intravenous, Q8H  docusate sodium, 100 mg, Oral, BID  enoxaparin, 40 mg, Subcutaneous, Daily  folic acid, 1 mg, Oral, Daily  iron polysaccharides, 150 mg, Oral, Daily  lactobacillus acidophilus, 1 capsule, Oral, Daily  magnesium oxide, 800 mg, Oral, Daily  magnesium sulfate, 4 g, Intravenous, Once  metoprolol tartrate, 12.5 mg,  Oral, Q12H  multivitamin with minerals, 1 tablet, Oral, Daily  polyethylene glycol, 17 g, Oral, Daily  sodium chloride, 10 mL, Intravenous, Q12H  sodium chloride, 3 mL, Intravenous, Q12H         ----------------------------------------------------------------------------------------------------------------------    Vital Signs:  Temp:  [98.3 °F (36.8 °C)-98.7 °F (37.1 °C)] 98.4 °F (36.9 °C)  Heart Rate:  [78-98] 78  Resp:  [14-18] 18  BP: (117-131)/(77-82) 120/82  No data found.  SpO2 Percentage    07/20/21 1900 07/21/21 0300 07/21/21 0700   SpO2: 98% 96% 98%     SpO2:  [96 %-98 %] 98 %  on   ;   Device (Oxygen Therapy): room air    Body mass index is 19.3 kg/m².  Wt Readings from Last 3 Encounters:   07/14/21 54.3 kg (119 lb 9.6 oz)   10/30/19 63.5 kg (140 lb)        Intake/Output Summary (Last 24 hours) at 7/21/2021 0932  Last data filed at 7/21/2021 0700  Gross per 24 hour   Intake 1520 ml   Output --   Net 1520 ml     Diet Regular  ----------------------------------------------------------------------------------------------------------------------      Physical Exam:    Constitutional:  Well-developed and well-nourished.  No respiratory distress.      HENT:  Head: Normocephalic and atraumatic.  Mouth:  Moist mucous membranes.    Eyes:  Conjunctivae and EOM are normal.  No scleral icterus.  Neck:  Neck supple.  No JVD present.  Cardiovascular:  Normal rate, regular rhythm and normal heart sounds with no murmur. No edema.  Pulmonary/Chest:  No respiratory distress, no wheezes, no crackles, with normal breath sounds and good air movement.  Abdominal:  Soft.  Bowel sounds are normal.  No distension and no tenderness.   Musculoskeletal:  No edema, no tenderness, and no deformity.  No swelling or redness of joints.    Neurological:  Alert and oriented to person, place, and time.  No facial droop.  No slurred speech.   Skin:  Skin is warm and dry.  No rash noted.  No pallor.   Psychiatric:  Normal mood and affect.   Behavior is normal.  ----------------------------------------------------------------------------------------------------------------------              Results from last 7 days   Lab Units 07/20/21 0057 07/17/21  0404   CRP mg/dL  --  <0.30   WBC 10*3/mm3 7.56 9.01   HEMOGLOBIN g/dL 10.1* 10.3*   HEMATOCRIT % 31.5* 32.4*   MCV fL 91.0 91.3   MCHC g/dL 32.1 31.8   PLATELETS 10*3/mm3 278 349     Results from last 7 days   Lab Units 07/20/21 0057 07/17/21 0404 07/15/21  0453   SODIUM mmol/L 138 135*  --    POTASSIUM mmol/L 4.1 4.1  --    MAGNESIUM mg/dL 1.8 1.6 1.8   CHLORIDE mmol/L 104 102  --    CO2 mmol/L 22.5 21.9*  --    BUN mg/dL 16 19  --    CREATININE mg/dL 0.73 0.95  --    EGFR IF NONAFRICN AM mL/min/1.73 94 69  --    CALCIUM mg/dL 9.0 9.0  --    GLUCOSE mg/dL 94 80  --    ALBUMIN g/dL 3.66 3.75  --    BILIRUBIN mg/dL 0.2 0.4  --    ALK PHOS U/L 126* 123*  --    AST (SGOT) U/L 31 22  --    ALT (SGPT) U/L 13 10  --    Estimated Creatinine Clearance: 96.6 mL/min (by C-G formula based on SCr of 0.73 mg/dL).  No results found for: AMMONIA    No results found for: HGBA1C, POCGLU  Lab Results   Component Value Date    HGBA1C 5.60 06/17/2021     Lab Results   Component Value Date    TSH 0.718 06/17/2021    FREET4 1.16 07/09/2021       Blood Culture   Date Value Ref Range Status   06/20/2021 Abnormal Stain (C)  Preliminary   06/19/2021 Staphylococcus aureus (C)  Final     Comment:     Infectious disease consultation is highly recommended to rule out distant foci of infection.   06/17/2021 Staphylococcus aureus (C)  Final     Comment:     Infectious disease consultation is highly recommended to rule out distant foci of infection.   06/17/2021 Staphylococcus aureus (C)  Final     Comment:     Infectious disease consultation is highly recommended to rule out distant foci of infection.  Refer to previous blood culture collected on 6/17/2021 2318 for MICs.     Urine Culture   Date Value Ref Range Status   06/17/2021  >100,000 CFU/mL Escherichia coli (A)  Final   06/17/2021 >100,000 CFU/mL Staphylococcus aureus (A)  Corrected     No results found for: WOUNDCX  No results found for: STOOLCX  No results found for: RESPCX  Pain Management Panel       Pain Management Panel Latest Ref Rng & Units 7/4/2021 6/17/2021    AMPHETAMINES SCREEN, URINE Negative Negative Positive(A)    BARBITURATES SCREEN Negative Negative Negative    BENZODIAZEPINE SCREEN, URINE Negative Negative Negative    BUPRENORPHINEUR Negative Negative Negative    COCAINE SCREEN, URINE Negative Negative Negative    METHADONE SCREEN, URINE Negative Negative Negative              ----------------------------------------------------------------------------------------------------------------------  Imaging Results (Last 24 Hours)       ** No results found for the last 24 hours. **            ----------------------------------------------------------------------------------------------------------------------    Assessment/Plan       Assessment/Plan     ASSESSMENT:    1.  Severe sepsis with lactic acid greater than 2 on admission  2.  Complicated MSSA bacteremia  3.  Likely underlying endocarditis, ruled out by KATHRIN  4.  Concern for septic emboli    PLAN:    Patient resting comfortably in bed this morning.  No issues or complaints.  Afebrile, no diarrhea.  No new labs today.  Cefazolin to complete tomorrow.    KATHRIN reports no evidence of vegetation.     Blood cultures from 6/24/2021 finalized as no growth. Blood culture from 6/22/2021 1 out of 1 set positive for MSSA.    Urinalysis on 6/17/2021 was positive and urine culture finalized as greater than 100,000 colonies of E. coli and greater than 100,000 colonies of MRSA.  Blood cultures on 6/17/2021 finalized as MSSA.  Repeat blood cultures on 6/19/2021 and 6/20/2021 are still showing growth.  COVID-19 and flu A/B PCR on 6/18/2021 detected COVID-19, but patient was asymptomatic.  Mycoplasma pneumoniae antibody on 6/18/2021 was  negative.  Strep pneumo antigen on 6/18/2021 was negative.  CT chest with PE protocol on 6/18/2021 showed technically degraded exam, but no definite PE is seen, and there is no aortic dissection.  Pulmonary edema with a trace amount of right pleural fluid.  Poorly defined nodular infiltrates on both sides of the chest suspicious for septic emboli.  Continued follow-up is recommended. MRI of the lumbar spine on 7/2/2021 revealed no acute findings.     Contacted micro lab regarding urine culture on 6/17/2021.  Urine culture has finalized as MSSA, but MRSA verbiage was included underneath and micro lab corrected.    In the setting of negative KATHRIN recommend to continue cefazolin 2 g IV every 8 hours through 7/22/2021 in the setting of persistent bacteremia.  Patient stable from ID standpoint.    Code Status:   Code Status and Medical Interventions:   Ordered at: 06/18/21 0446     Level Of Support Discussed With:    Patient     Code Status:    CPR     Medical Interventions (Level of Support Prior to Arrest):    Full     ANGELICA Anderson  07/21/21  09:32 EDT

## 2021-07-21 NOTE — PROGRESS NOTES
CC: Follow-up bacteremia    HPI patient was seen with Carolina ARELLANO, no complaints, no joint pain currently.  She has been up ambulating around the halls.    Exam: Mood is good vital signs temperature 98.4 pulse 78 respiratory rate is 18 120/82, saturation 98%, no edema    CMP and CBC really unremarkable yesterday.    A/P  MSSA bacteremia, patient's course was complicated as it was difficult to sterilize her blood cultures.  She is completing a long-term course of IV Ancef tomorrow.  I did explain that hopefully by Friday her a.m. she will be able to be discharged.    DVT prophylaxis, subcu Lovenox    COVID-19 that was asymptomatic.    Hepatitis C, she is going to be going to [place] to an inpatient safe house for drug recovery, she will need to see the outpatient liver clinic, she understands this and is willing to do this.    Tachycardia, improved, continue low-dose Lopressor

## 2021-07-21 NOTE — PLAN OF CARE
Goal Outcome Evaluation:  Plan of Care Reviewed With: patient        Progress: improving   Pt continues on ABX, has no complaints this shift.

## 2021-07-21 NOTE — CASE MANAGEMENT/SOCIAL WORK
Discharge Planning Assessment  TAYLOR Antonio     Patient Name: Marce Gutierrez  MRN: [id number]  Today's Date: 7/21/2021    Admit Date: 6/17/2021    Discharge Needs Assessment    No documentation.       Discharge Plan     Row Name 07/21/21 1524       Plan    Plan Pt was admitted on 06/17/21. SS spoke with Pt about discharge planning. Pt plans on retuning home with her father at discharge once IV antibiotics are completed. Pt plans to go to  [facility] substance abuse rehab treatment on 07/26/21. Pt's father to provide transportation at discharge. SS to follow.          Leanne Merino

## 2021-07-22 LAB
CRP SERPL-MCNC: <0.3 MG/DL (ref 0–0.5)
MAGNESIUM SERPL-MCNC: 1.8 MG/DL (ref 1.6–2.6)

## 2021-07-22 PROCEDURE — 99231 SBSQ HOSP IP/OBS SF/LOW 25: CPT | Performed by: PHYSICIAN ASSISTANT

## 2021-07-22 PROCEDURE — 86140 C-REACTIVE PROTEIN: CPT | Performed by: NURSE PRACTITIONER

## 2021-07-22 PROCEDURE — 25010000003 MAGNESIUM SULFATE 4 GM/100ML SOLUTION: Performed by: INTERNAL MEDICINE

## 2021-07-22 PROCEDURE — 25010000003 CEFAZOLIN PER 500 MG: Performed by: PHYSICIAN ASSISTANT

## 2021-07-22 PROCEDURE — 83735 ASSAY OF MAGNESIUM: CPT | Performed by: INTERNAL MEDICINE

## 2021-07-22 PROCEDURE — 25010000003 CEFAZOLIN PER 500 MG: Performed by: INTERNAL MEDICINE

## 2021-07-22 RX ORDER — MAGNESIUM SULFATE HEPTAHYDRATE 40 MG/ML
4 INJECTION, SOLUTION INTRAVENOUS ONCE
Status: COMPLETED | OUTPATIENT
Start: 2021-07-22 | End: 2021-07-22

## 2021-07-22 RX ADMIN — SODIUM CHLORIDE, PRESERVATIVE FREE 10 ML: 5 INJECTION INTRAVENOUS at 09:01

## 2021-07-22 RX ADMIN — Medication 150 MG: at 09:01

## 2021-07-22 RX ADMIN — CEFAZOLIN: 1 INJECTION, POWDER, FOR SOLUTION INTRAVENOUS at 03:21

## 2021-07-22 RX ADMIN — FOLIC ACID 1 MG: 1 TABLET ORAL at 09:04

## 2021-07-22 RX ADMIN — CEFAZOLIN: 1 INJECTION, POWDER, FOR SOLUTION INTRAVENOUS at 12:16

## 2021-07-22 RX ADMIN — DOCUSATE SODIUM 100 MG: 100 CAPSULE, LIQUID FILLED ORAL at 20:22

## 2021-07-22 RX ADMIN — MAGNESIUM SULFATE IN WATER 4 G: 40 INJECTION, SOLUTION INTRAVENOUS at 05:41

## 2021-07-22 RX ADMIN — Medication 1 TABLET: at 09:04

## 2021-07-22 RX ADMIN — Medication 1 CAPSULE: at 09:04

## 2021-07-22 RX ADMIN — MAGNESIUM GLUCONATE 500 MG ORAL TABLET 800 MG: 500 TABLET ORAL at 09:01

## 2021-07-22 RX ADMIN — METOPROLOL TARTRATE 12.5 MG: 25 TABLET, FILM COATED ORAL at 20:22

## 2021-07-22 RX ADMIN — CEFAZOLIN: 1 INJECTION, POWDER, FOR SOLUTION INTRAVENOUS at 20:22

## 2021-07-22 RX ADMIN — METOPROLOL TARTRATE 12.5 MG: 25 TABLET, FILM COATED ORAL at 09:02

## 2021-07-22 RX ADMIN — SODIUM CHLORIDE, PRESERVATIVE FREE 3.5 ML: 5 INJECTION INTRAVENOUS at 20:22

## 2021-07-22 NOTE — PROGRESS NOTES
Patient Identification:  Name:  Marce Gutierrez  Age:  31 y.o.  Sex:  female  :  1990  MRN:  [id number]  Visit Number:  [id number]  Primary Care Provider:  Provider, No Known    Length of stay:  34    Subjective/Interval History/Consultants/Procedures     Chief complaint: Follow-up MSSA bacteremia with suspected endocarditis on admission    HPI/Hospital course:     Ms. Gutierrez is a 32yo female with PMH significant for IV drug abuse.  She presented to this faciltiy on 21 for further evaluation of leg and arm pain after jumping after her cat and landing on her left side.   She reported recent injection in to her right AC prior days to presentation.  She was found to have a positive amphetamine screen on admission.  She was found to have severe sepsis on admission due to suspected endocarditis causing septic emboli in the lungs vs. CAP vs. Aspiration PNA and left ankle cellulitis. She was admitted for further evaluation and treatment with start of broad spectrum IV abx.     Of note, COVID-19 screen was positive on admission in the ED without acute hypoxia.  She was isolated initially during her hospitalization for COVID-19 and completed a 10 day quarantine.      After completion of quarantine, KATHRIN was performed and did not reveal evidence of known vegetations ID recommendations were for cefazolin 2g IV q8 through 21 in the setting of persistent bacteremia. Of note, she did report back pain during hospitalization with MRI performed and no known underlying infection.      Consults:  Dr. Gold with infectious disease  Dr. Montiel with psychiatry  Chelsie Villanueva and Sammy with cardiology     Procedures:  2021:  Right basilic single lumen midline catheter  2021-2021:  Left basilic single lumen midline catheter  2021:  KATHRIN      Subjective:      Ms. Gutierrez is evaluated resting comfortably in bed. We discuss plan for discharge in the AM.  She reports her bowels are moving and she is urinating  well.  She is without complaints and reports she is anxious for her discharge tomorrow.      ----------------------------------------------------------------------------------------------------------------------  Current Hospital Meds:  IVPB builder, , Intravenous, Q8H  docusate sodium, 100 mg, Oral, BID  enoxaparin, 40 mg, Subcutaneous, Daily  folic acid, 1 mg, Oral, Daily  iron polysaccharides, 150 mg, Oral, Daily  lactobacillus acidophilus, 1 capsule, Oral, Daily  magnesium oxide, 800 mg, Oral, Daily  metoprolol tartrate, 12.5 mg, Oral, Q12H  multivitamin with minerals, 1 tablet, Oral, Daily  polyethylene glycol, 17 g, Oral, Daily  sodium chloride, 10 mL, Intravenous, Q12H  sodium chloride, 3 mL, Intravenous, Q12H         ----------------------------------------------------------------------------------------------------------------------      Objective     Vital Signs:  Temp:  [97.6 °F (36.4 °C)] 97.6 °F (36.4 °C)  Heart Rate:  [62-96] 62  Resp:  [16-18] 16  BP: (122-127)/(67-85) 122/67      07/12/21  0500 07/13/21  0400 07/14/21  0455   Weight: 55 kg (121 lb 3.2 oz) 55.1 kg (121 lb 6.4 oz) 54.3 kg (119 lb 9.6 oz)     Body mass index is 19.3 kg/m².    Intake/Output Summary (Last 24 hours) at 7/22/2021 1008  Last data filed at 7/22/2021 0627  Gross per 24 hour   Intake 1629 ml   Output --   Net 1629 ml     No intake/output data recorded.  Diet Regular  ----------------------------------------------------------------------------------------------------------------------    Physical Exam  Vitals and nursing note reviewed.   Constitutional:       General: She is awake.      Appearance: Normal appearance. She is well-developed.   HENT:      Head: Normocephalic and atraumatic.   Eyes:      General: Lids are normal.      Conjunctiva/sclera:      Right eye: Right conjunctiva is not injected.      Left eye: Left conjunctiva is not injected.   Cardiovascular:      Rate and Rhythm: Normal rate and regular rhythm.       Heart sounds: S1 normal and S2 normal.   Abdominal:      General: Bowel sounds are normal.      Palpations: Abdomen is soft.      Tenderness: There is no abdominal tenderness.   Musculoskeletal:      Right lower leg: No swelling. No edema.      Left lower leg: No swelling. No edema.   Feet:      Right foot:      Skin integrity: No erythema.      Left foot:      Skin integrity: No erythema.   Skin:     General: Skin is warm and dry.   Neurological:      Mental Status: She is alert and oriented to person, place, and time.   Psychiatric:         Attention and Perception: Attention normal.         Mood and Affect: Mood normal.         Behavior: Behavior is cooperative.              ----------------------------------------------------------------------------------------------------------------------    ----------------------------------------------------------------------------------------------------------------------      Results from last 7 days   Lab Units 07/22/21 0134 07/20/21 0057 07/17/21  0404   CRP mg/dL <0.30  --  <0.30   WBC 10*3/mm3  --  7.56 9.01   HEMOGLOBIN g/dL  --  10.1* 10.3*   HEMATOCRIT %  --  31.5* 32.4*   MCV fL  --  91.0 91.3   MCHC g/dL  --  32.1 31.8   PLATELETS 10*3/mm3  --  278 349         Results from last 7 days   Lab Units 07/22/21 0134 07/20/21 0057 07/17/21  0404   SODIUM mmol/L  --  138 135*   POTASSIUM mmol/L  --  4.1 4.1   MAGNESIUM mg/dL 1.8 1.8 1.6   CHLORIDE mmol/L  --  104 102   CO2 mmol/L  --  22.5 21.9*   BUN mg/dL  --  16 19   CREATININE mg/dL  --  0.73 0.95   EGFR IF NONAFRICN AM mL/min/1.73  --  94 69   CALCIUM mg/dL  --  9.0 9.0   GLUCOSE mg/dL  --  94 80   ALBUMIN g/dL  --  3.66 3.75   BILIRUBIN mg/dL  --  0.2 0.4   ALK PHOS U/L  --  126* 123*   AST (SGOT) U/L  --  31 22   ALT (SGPT) U/L  --  13 10   Estimated Creatinine Clearance: 96.6 mL/min (by C-G formula based on SCr of 0.73 mg/dL).  No results found for: AMMONIA      No results found for: BLOODCX  No results found  for: URINECX  No results found for: WOUNDCX  No results found for: STOOLCX  ----------------------------------------------------------------------------------------------------------------------  Imaging Results (Last 24 Hours)     ** No results found for the last 24 hours. **        ----------------------------------------------------------------------------------------------------------------------   I have reviewed the above laboratory values for 07/22/21    Assessment/Plan     Active Hospital Problems    Diagnosis  POA   • **Endocarditis [I38]  Yes         ASSESSMENT/PLAN:  -Severe sepsis, present on admission, due to MSSA bacteremia and MSSA/E.coli UTI on admission  -Suspected endocarditis on admission causing septic emboli in the lungs vs. -CAP vs. Aspiration PNA  -Left ankle cellulitis:   Continue IV Cefazolin through 07/22/21 8pm as previously recommended by ID.  Remains inpatient after multiple venues of further ways to receive IV abx were denies by her insurance.   Plan is for DC in the AM after receiving last dose of Cefazolin this evening.   Her father plans to pick her up in the morning per discussion with attending physician.  KATHRIN without evidence of known vegetations.  WBC previously normalized.   Remains afebrile on 07/22/21.      Hx IVDA  Hepatitic C, 1a genotype, HCV viral load 4,470,000  Plans to see further outpatient rehabilitation following hospitalization with [facility]. SS on board with assistance.   Would benefit in the future from seeing GI doctor or Hep C Specialist in outpatient setting.     Positive COVID-19 screen on admission  Completed Quarantine.    -Normocytic anemia and thrombocytopenia with suspected bone marrow suppression from COVID-19  -Iron deficiency  Stabilized.      -Right-sided knee pain with no known signs of septic arthritis  -Persistent low back pain  -Left ankle sprain and cellulitis  Prior knee imaging without acute abnormalities.   Prior orthopedic evaluation  appreciated.   07/02/21 MRI lumbar spine without known acute findings    -Hypomagnesemia:   Continue oral magnesium supplementation daily.     -----------  -DVT prophylaxis: SQ Lovenox  -Activity: Ad blanka  -Disposition: Anticipated DC on 07/23 after last dose of IV abx on evening of 07/22  -Diet:   Dietary Orders (From admission, onward)     Start     Ordered    07/09/21 1324  Diet Regular  Diet Effective Now     Question:  Diet Texture / Consistency  Answer:  Regular    07/09/21 1323    06/22/21 1200  Dietary Nutrition Supplements Boost Plus (Ensure Enlive, Ensure Plus)  Daily With Breakfast, Lunch & Dinner     Question:  Select Supplement  Answer:  Boost Plus (Ensure Enlive, Ensure Plus)    06/22/21 0854                    The patient is high risk due to the following diagnoses/reasons:  Bacteremia, suspected endocarditis, IVDU        Julianna rBown PA-C  07/22/21  10:08 EDT  Pager # [phone]

## 2021-07-22 NOTE — PLAN OF CARE
Goal Outcome Evaluation:  Plan of Care Reviewed With: patient        Progress: improving     Pt still on ABX, ambulated in halls, and is stable.

## 2021-07-22 NOTE — PROGRESS NOTES
PROGRESS NOTE         Patient Identification:  Name:  Marce Gutierrez  Age:  31 y.o.  Sex:  female  :  1990  MRN:  [id number]  Visit Number:  [id number]  Primary Care Provider:  Provider, No Known         LOS: 34 days       ----------------------------------------------------------------------------------------------------------------------  Subjective       Chief Complaints:    Leg Pain and Arm Pain        Interval History:      Patient resting comfortably in bed this morning.  Asleep during assessment.  Afebrile, no diarrhea.  Currently on room air with no apparent distress.  WBC normal.  CRP normal.    Review of Systems:    Constitutional: no fever, chills and night sweats. No appetite change or unexpected weight change. No fatigue.  Eyes: no eye drainage, itching or redness.  HEENT: no mouth sores, dysphagia or nose bleed.  Respiratory: no for shortness of breath, cough or production of sputum.  Cardiovascular: no chest pain and no orthopnea.    Gastrointestinal: no nausea, vomiting or diarrhea. No abdominal pain, hematemesis or rectal bleeding.  Genitourinary: no dysuria or polyuria.  Hematologic/lymphatic: no lymph node abnormalities, no easy bruising or easy bleeding.  Musculoskeletal:  No muscle or joint pain.  Skin: No rash and no itching.  Neurological: no loss of consciousness, no seizure, no headache.  Behavioral/Psych: no depression or suicidal ideation.  Endocrine: no hot flashes.  Immunologic: negative.     ----------------------------------------------------------------------------------------------------------------------      Objective       Current [facility] Meds:  IVPB builder, , Intravenous, Q8H  docusate sodium, 100 mg, Oral, BID  enoxaparin, 40 mg, Subcutaneous, Daily  folic acid, 1 mg, Oral, Daily  iron polysaccharides, 150 mg, Oral, Daily  lactobacillus acidophilus, 1 capsule, Oral, Daily  magnesium oxide, 800 mg, Oral, Daily  magnesium sulfate, 4 g, Intravenous,  Once  metoprolol tartrate, 12.5 mg, Oral, Q12H  multivitamin with minerals, 1 tablet, Oral, Daily  polyethylene glycol, 17 g, Oral, Daily  sodium chloride, 10 mL, Intravenous, Q12H  sodium chloride, 3 mL, Intravenous, Q12H         ----------------------------------------------------------------------------------------------------------------------    Vital Signs:  Temp:  [97.6 °F (36.4 °C)] 97.6 °F (36.4 °C)  Heart Rate:  [62-96] 62  Resp:  [16-18] 16  BP: (122-127)/(67-85) 122/67  No data found.  SpO2 Percentage    07/21/21 0700 07/21/21 1823 07/22/21 0626   SpO2: 98% 98% 97%     SpO2:  [97 %-98 %] 97 %  on   ;   Device (Oxygen Therapy): room air    Body mass index is 19.3 kg/m².  Wt Readings from Last 3 Encounters:   07/14/21 54.3 kg (119 lb 9.6 oz)   10/30/19 63.5 kg (140 lb)        Intake/Output Summary (Last 24 hours) at 7/22/2021 0905  Last data filed at 7/22/2021 0627  Gross per 24 hour   Intake 1629 ml   Output --   Net 1629 ml     Diet Regular  ----------------------------------------------------------------------------------------------------------------------      Physical Exam:    Constitutional:  Well-developed and well-nourished.  No respiratory distress.      HENT:  Head: Normocephalic and atraumatic.  Mouth:  Moist mucous membranes.    Eyes:  Conjunctivae and EOM are normal.  No scleral icterus.  Neck:  Neck supple.  No JVD present.  Cardiovascular:  Normal rate, regular rhythm and normal heart sounds with no murmur. No edema.  Pulmonary/Chest:  No respiratory distress, no wheezes, no crackles, with normal breath sounds and good air movement.  Abdominal:  Soft.  Bowel sounds are normal.  No distension and no tenderness.   Musculoskeletal:  No edema, no tenderness, and no deformity.  No swelling or redness of joints.    Neurological:  Alert and oriented to person, place, and time.  No facial droop.  No slurred speech.   Skin:  Skin is warm and dry.  No rash noted.  No pallor.   Psychiatric:  Normal  mood and affect.  Behavior is normal.  ----------------------------------------------------------------------------------------------------------------------              Results from last 7 days   Lab Units 07/22/21 0134 07/20/21 0057 07/17/21  0404   CRP mg/dL <0.30  --  <0.30   WBC 10*3/mm3  --  7.56 9.01   HEMOGLOBIN g/dL  --  10.1* 10.3*   HEMATOCRIT %  --  31.5* 32.4*   MCV fL  --  91.0 91.3   MCHC g/dL  --  32.1 31.8   PLATELETS 10*3/mm3  --  278 349     Results from last 7 days   Lab Units 07/22/21 0134 07/20/21 0057 07/17/21  0404   SODIUM mmol/L  --  138 135*   POTASSIUM mmol/L  --  4.1 4.1   MAGNESIUM mg/dL 1.8 1.8 1.6   CHLORIDE mmol/L  --  104 102   CO2 mmol/L  --  22.5 21.9*   BUN mg/dL  --  16 19   CREATININE mg/dL  --  0.73 0.95   EGFR IF NONAFRICN AM mL/min/1.73  --  94 69   CALCIUM mg/dL  --  9.0 9.0   GLUCOSE mg/dL  --  94 80   ALBUMIN g/dL  --  3.66 3.75   BILIRUBIN mg/dL  --  0.2 0.4   ALK PHOS U/L  --  126* 123*   AST (SGOT) U/L  --  31 22   ALT (SGPT) U/L  --  13 10   Estimated Creatinine Clearance: 96.6 mL/min (by C-G formula based on SCr of 0.73 mg/dL).  No results found for: AMMONIA    No results found for: HGBA1C, POCGLU  Lab Results   Component Value Date    HGBA1C 5.60 06/17/2021     Lab Results   Component Value Date    TSH 0.718 06/17/2021    FREET4 1.16 07/09/2021       Blood Culture   Date Value Ref Range Status   06/20/2021 Abnormal Stain (C)  Preliminary   06/19/2021 Staphylococcus aureus (C)  Final     Comment:     Infectious disease consultation is highly recommended to rule out distant foci of infection.   06/17/2021 Staphylococcus aureus (C)  Final     Comment:     Infectious disease consultation is highly recommended to rule out distant foci of infection.   06/17/2021 Staphylococcus aureus (C)  Final     Comment:     Infectious disease consultation is highly recommended to rule out distant foci of infection.  Refer to previous blood culture collected on 6/17/2021 2318 for  MICs.     Urine Culture   Date Value Ref Range Status   06/17/2021 >100,000 CFU/mL Escherichia coli (A)  Final   06/17/2021 >100,000 CFU/mL Staphylococcus aureus (A)  Corrected     No results found for: WOUNDCX  No results found for: STOOLCX  No results found for: RESPCX  Pain Management Panel       Pain Management Panel Latest Ref Rng & Units 7/4/2021 6/17/2021    AMPHETAMINES SCREEN, URINE Negative Negative Positive(A)    BARBITURATES SCREEN Negative Negative Negative    BENZODIAZEPINE SCREEN, URINE Negative Negative Negative    BUPRENORPHINEUR Negative Negative Negative    COCAINE SCREEN, URINE Negative Negative Negative    METHADONE SCREEN, URINE Negative Negative Negative              ----------------------------------------------------------------------------------------------------------------------  Imaging Results (Last 24 Hours)       ** No results found for the last 24 hours. **            ----------------------------------------------------------------------------------------------------------------------    Assessment/Plan       Assessment/Plan     ASSESSMENT:    1.  Severe sepsis with lactic acid greater than 2 on admission  2.  Complicated MSSA bacteremia  3.  Likely underlying endocarditis, ruled out by KATHRIN  4.  Concern for septic emboli    PLAN:    Patient resting comfortably in bed this morning.  Asleep during assessment.  Afebrile, no diarrhea.  Currently on room air with no apparent distress.  WBC normal.  CRP normal.    KATHRIN reports no evidence of vegetation.     Blood cultures from 6/24/2021 finalized as no growth. Blood culture from 6/22/2021 1 out of 1 set positive for MSSA.    Urinalysis on 6/17/2021 was positive and urine culture finalized as greater than 100,000 colonies of E. coli and greater than 100,000 colonies of MRSA.  Blood cultures on 6/17/2021 finalized as MSSA.  Repeat blood cultures on 6/19/2021 and 6/20/2021 are still showing growth.  COVID-19 and flu A/B PCR on 6/18/2021  detected COVID-19, but patient was asymptomatic.  Mycoplasma pneumoniae antibody on 6/18/2021 was negative.  Strep pneumo antigen on 6/18/2021 was negative.  CT chest with PE protocol on 6/18/2021 showed technically degraded exam, but no definite PE is seen, and there is no aortic dissection.  Pulmonary edema with a trace amount of right pleural fluid.  Poorly defined nodular infiltrates on both sides of the chest suspicious for septic emboli.  Continued follow-up is recommended. MRI of the lumbar spine on 7/2/2021 revealed no acute findings.     Contacted micro lab regarding urine culture on 6/17/2021.  Urine culture has finalized as MSSA, but MRSA verbiage was included underneath and micro lab corrected.    In the setting of negative KATHRIN recommend to continue cefazolin 2 g IV every 8 hours through 7/22/2021 in the setting of persistent bacteremia.  Patient stable from ID standpoint.  Okay to discharge from ID standpoint.    Code Status:   Code Status and Medical Interventions:   Ordered at: 06/18/21 0446     Level Of Support Discussed With:    Patient     Code Status:    CPR     Medical Interventions (Level of Support Prior to Arrest):    Full     ANGELICA Anderson  07/22/21  09:40 EDT

## 2021-07-22 NOTE — PLAN OF CARE
Goal Outcome Evaluation:              Outcome Summary: Patient rested in bed today, alert and oriented, no acute changes, will monitor.

## 2021-07-23 ENCOUNTER — READMISSION MANAGEMENT (OUTPATIENT)
Dept: CALL CENTER | Facility: HOSPITAL | Age: 31
End: 2021-07-23

## 2021-07-23 VITALS
DIASTOLIC BLOOD PRESSURE: 90 MMHG | SYSTOLIC BLOOD PRESSURE: 132 MMHG | OXYGEN SATURATION: 100 % | HEART RATE: 83 BPM | TEMPERATURE: 97.7 F | RESPIRATION RATE: 16 BRPM | BODY MASS INDEX: 19.22 KG/M2 | WEIGHT: 119.6 LBS | HEIGHT: 66 IN

## 2021-07-23 LAB — MAGNESIUM SERPL-MCNC: 1.8 MG/DL (ref 1.6–2.6)

## 2021-07-23 PROCEDURE — 99239 HOSP IP/OBS DSCHRG MGMT >30: CPT | Performed by: PHYSICIAN ASSISTANT

## 2021-07-23 PROCEDURE — 83735 ASSAY OF MAGNESIUM: CPT | Performed by: INTERNAL MEDICINE

## 2021-07-23 RX ORDER — FOLIC ACID 1 MG/1
1 TABLET ORAL DAILY
Qty: 30 TABLET | Refills: 0 | Status: SHIPPED | OUTPATIENT
Start: 2021-07-23 | End: 2021-08-17 | Stop reason: SDUPTHER

## 2021-07-23 RX ORDER — IRON POLYSACCHARIDE COMPLEX 150 MG
150 CAPSULE ORAL DAILY
Qty: 30 CAPSULE | Refills: 0 | Status: SHIPPED | OUTPATIENT
Start: 2021-07-23 | End: 2021-08-17 | Stop reason: SDUPTHER

## 2021-07-23 RX ORDER — MULTIPLE VITAMINS W/ MINERALS TAB 9MG-400MCG
1 TAB ORAL DAILY
Qty: 30 TABLET | Refills: 0 | Status: SHIPPED | OUTPATIENT
Start: 2021-07-23 | End: 2021-08-17 | Stop reason: SDUPTHER

## 2021-07-23 RX ADMIN — Medication 1 CAPSULE: at 08:49

## 2021-07-23 RX ADMIN — FOLIC ACID 1 MG: 1 TABLET ORAL at 08:49

## 2021-07-23 RX ADMIN — Medication 1 TABLET: at 08:49

## 2021-07-23 RX ADMIN — SODIUM CHLORIDE, PRESERVATIVE FREE 10 ML: 5 INJECTION INTRAVENOUS at 08:50

## 2021-07-23 RX ADMIN — MAGNESIUM GLUCONATE 500 MG ORAL TABLET 800 MG: 500 TABLET ORAL at 08:49

## 2021-07-23 RX ADMIN — Medication 150 MG: at 08:49

## 2021-07-23 RX ADMIN — METOPROLOL TARTRATE 12.5 MG: 25 TABLET, FILM COATED ORAL at 08:49

## 2021-07-23 NOTE — DISCHARGE SUMMARY
[facility] Medicine Services  DISCHARGE SUMMARY    Date of Admission: 6/17/2021    Date of Discharge:  7/23/2021    PCP: Provider, No Known   Will follow-up with Jeff Larios NP    Discharging Provider: Julianna Brown PA-C  Attending Physician on day of DC:     Admission Diagnosis:   Please see admission H&P    Discharge Diagnosis:   Severe sepsis present on admission due to MSSA bacteremia and MSSA/E. coli UTI on admission  Suspected endocarditis on admission causing septic emboli in the lungs  Community-acquired versus aspiration pneumonia  Left ankle cellulitis  Hepatitis C  History of IV drug abuse  Positive COVID-19 screen on admission with quarantine completed during hospitalization  Iron deficiency  Normocytic anemia and parietal thrombocytopenia with Supprette suspected bone marrow suppression from COVID-19 during hospitalization  Right-sided knee pain without known signs of septic arthritis  Persistent low back pain  Left ankle sprain and cellulitis  Hypomagnesemia      Procedures Performed:  6/30/2021:  Right basilic single lumen midline catheter  6/21/2021-6/30/2021:  Left basilic single lumen midline catheter  7/9/2021:  KATHRIN     Consults:   Consults     Date and Time Order Name Status Description    7/11/2021 11:25 AM Inpatient Cardiology Consult Completed     6/28/2021  1:42 PM Inpatient Cardiology Consult      6/21/2021 11:29 AM Inpatient Orthopedic Surgery Consult      6/21/2021 12:30 AM Inpatient Infectious Diseases Consult Completed     6/19/2021  5:20 PM Inpatient Psychiatrist Consult Completed     6/19/2021  8:54 AM Inpatient Cardiology Consult Completed     6/19/2021  7:49 AM Inpatient Cardiology Consult            HPI     History of Present Illness:  Marce Gutierrez is a 31 y.o. female with PMH significant for IV drug abuse.  She presented to this faciltiy on 06/18/21 for further evaluation of leg and arm pain after jumping after her cat and landing on her left  side.   She reported recent injection in to her right AC prior days to presentation.  She was found to have a positive amphetamine screen on admission.  She was found to have severe sepsis on admission due to suspected endocarditis causing septic emboli in the lungs vs. CAP vs. Aspiration PNA and left ankle cellulitis. She was admitted for further evaluation and treatment with start of broad spectrum IV abx.        Hospital Course     Hospital Course  Marce Gutierrez was admitted as outlined in above HPI.       Of note, COVID-19 screen was positive on admission in the ED without acute hypoxia.  She was isolated initially during her hospitalization for COVID-19 and completed a 10 day quarantine.        After completion of quarantine, KATHRIN was performed and did not reveal evidence of known vegetations ID recommendations were for cefazolin 2g IV q8 through 07/22/21 in the setting of persistent bacteremia. Of note, she did report back pain during hospitalization with MRI performed and no known underlying infection.    She did also report some right-sided knee pain without known signs of septic arthritis.    Ultimately, ID recommended above-mentioned IV antibiotic course through July 22, 2021.  She remained hospitalized for completion of this course of antibiotics after multiple venues were explored without ability to arrange an outpatient setting.  She did ultimately wish to go for recovery and will be checking into all in recovery substance abuse rehab treatment on July 26, 2021 with father to provide transportation.    On the day of discharge July 23, 2021 Mrs. Gutierrez reports she felt significantly better from her admission.  She looks very much forward to participating in recovery.  We discussed her hepatitis C diagnosis as well as follow-up with outpatient hepatitis clinic for further outpatient treatment options.    Ms. Gutierrez was a pleasure to take care for in her hospitalization and wish her luck on her journey to  recovery.      Due to anemia with underlying iron deficiency which she was started on iron supplementation while hospitalized.  Patient follow-up was arranged with Jeff Larios nurse practitioner at the time of discharge.    Pertinent Laboratory and Radiology Results     Pertinent Test Results:          Results from last 7 days   Lab Units 07/20/21  0057 07/17/21  0404   WBC 10*3/mm3 7.56 9.01   HEMOGLOBIN g/dL 10.1* 10.3*   HEMATOCRIT % 31.5* 32.4*   PLATELETS 10*3/mm3 278 349   MCV fL 91.0 91.3   SODIUM mmol/L 138 135*   POTASSIUM mmol/L 4.1 4.1   CHLORIDE mmol/L 104 102   CO2 mmol/L 22.5 21.9*   BUN mg/dL 16 19   CREATININE mg/dL 0.73 0.95   GLUCOSE mg/dL 94 80   CALCIUM mg/dL 9.0 9.0          Results from last 7 days   Lab Units 07/22/21  0134 07/20/21  0057 07/17/21  0404   CRP mg/dL <0.30  --  <0.30   WBC 10*3/mm3  --  7.56 9.01     Results from last 7 days   Lab Units 07/20/21  0057 07/17/21  0404   BILIRUBIN mg/dL 0.2 0.4   ALK PHOS U/L 126* 123*   ALT (SGPT) U/L 13 10   AST (SGOT) U/L 31 22           Invalid input(s): TG, LDLCALC, LDLREALC      Brief Urine Lab Results  (Last result in the past 365 days)      Color   Clarity   Blood   Leuk Est   Nitrite   Protein   CREAT   Urine HCG        06/17/21 2253 Dark Yellow Cloudy Large (3+) Moderate (2+) Positive 30 mg/dL (1+)         06/17/21 2253               Negative                   Results for orders placed during the hospital encounter of 06/17/21    Adult Transesophageal Echo (KATHRIN) W/ Cont if Necessary Per Protocol    Interpretation Summary  · Normal left ventricular cavity size and wall thickness noted. All left ventricular wall segments contract normally.  · Left ventricular ejection fraction appears to be 56 - 60%.  · The aortic valve is structurally normal with no regurgitation or stenosis present.  · The mitral valve is structurally normal with no significant stenosis present. Mild mitral valve regurgitation is present.  · The tricuspid valve is  structurally normal with no significant regurgitation or significant stenosis present  · There is no evidence of pericardial effusion. .  · Saline test results are negative.  · There is no echocardiographic evidence of atrial or ventricular septal defect noted on the saline test.  · There is no echocardiographic evidence of any endocardial vegetations or aortic root abscess.  · Comments: There is no echocardiograph evidence of endocarditis noted on the study.            ----------------------------------------------------------------------------------------------------------------------  Adult Transesophageal Echo (KATHRIN) W/ Cont if Necessary Per Protocol    Addendum Date: 7/9/2021    · Normal left ventricular cavity size and wall thickness noted. All left ventricular wall segments contract normally. · Left ventricular ejection fraction appears to be 56 - 60%. · The aortic valve is structurally normal with no regurgitation or stenosis present. · The mitral valve is structurally normal with no significant stenosis present. Mild mitral valve regurgitation is present. · The tricuspid valve is structurally normal with no significant regurgitation or significant stenosis present · There is no evidence of pericardial effusion. . · Saline test results are negative. · There is no echocardiographic evidence of atrial or ventricular septal defect noted on the saline test. · There is no echocardiographic evidence of any endocardial vegetations or aortic root abscess. · Comments: There is no echocardiograph evidence of endocarditis noted on the study.      MRI Lumbar Spine Without Contrast    Result Date: 7/2/2021    No acute findings in the lumbar spine.  This report was finalized on 7/2/2021 12:17 PM by Dr. Bill Hawkins MD.      US Venous Doppler Lower Extremity Right (duplex)    Result Date: 7/5/2021  No DVT in the right lower extremity on today's exam.  This report was finalized on 7/5/2021 5:19 PM by Dr. Hakeem Morgan MD.           Microbiology Results (last 10 days)     ** No results found for the last 240 hours. **          Labs above have been reviewed on the day of discharge.  Radiology images from prior 30 days were reviewed prior to discharge as incorporated into this document.     Discharge Vitals and Physical Examination       Vital Signs  Temp:  [97.7 °F (36.5 °C)-98.9 °F (37.2 °C)] 97.7 °F (36.5 °C)  Heart Rate:  [83-98] 83  Resp:  [16-17] 16  BP: (122-139)/(73-94) 132/90     PHYSICAL EXAMINATION:   Physical Exam  Vitals and nursing note reviewed.   Constitutional:       Appearance: Normal appearance.   HENT:      Head: Normocephalic and atraumatic.      Nose: Nose normal.      Mouth/Throat:      Mouth: Mucous membranes are dry.   Eyes:      Extraocular Movements: Extraocular movements intact.      Pupils: Pupils are equal, round, and reactive to light.   Cardiovascular:      Rate and Rhythm: Normal rate and regular rhythm.      Pulses: Normal pulses.      Heart sounds: Normal heart sounds.   Pulmonary:      Effort: Pulmonary effort is normal.      Breath sounds: Normal breath sounds.   Abdominal:      General: There is no distension.      Palpations: Abdomen is soft.   Musculoskeletal:         General: No swelling or tenderness.   Skin:     General: Skin is warm and dry.   Neurological:      Mental Status: She is alert and oriented to person, place, and time.   Psychiatric:         Behavior: Behavior normal.           Discharge Disposition, Discharge Medications, and Discharge Appointments     Discharge Disposition:   home    Condition on Discharge:  Fair    Discharge Medications:     Discharge Medications      New Medications      Instructions Start Date   folic acid 1 MG tablet  Commonly known as: FOLVITE   1 mg, Oral, Daily      magnesium oxide 400 MG tablet  Commonly known as: MAG-OX   800 mg, Oral, Daily      metoprolol tartrate 25 MG tablet  Commonly known as: LOPRESSOR   Take 1/2 tablet by mouth Every 12 (Twelve)  Hours for 30 days.      Poly-Iron 150 150 MG capsule  Generic drug: iron polysaccharides   150 mg, Oral, Daily      Thera-Tabs M tablet tablet  Generic drug: multivitamin with minerals   1 tablet, Oral, Daily             Discharged medication regimen discussed with attending physician prior to discharge.     Discharge Diet:   regular diet  Dietary Orders (From admission, onward)     Start     Ordered    07/09/21 1324  Diet Regular  Diet Effective Now     Question:  Diet Texture / Consistency  Answer:  Regular    07/09/21 1323    06/22/21 1200  Dietary Nutrition Supplements Boost Plus (Ensure Enlive, Ensure Plus)  Daily With Breakfast, Lunch & Dinner     Question:  Select Supplement  Answer:  Boost Plus (Ensure Enlive, Ensure Plus)    06/22/21 0854                Activity at Discharge:  activity as tolerated         Discharge Disposition:    Home or Self Care        Follow-up Appointments:  Your Scheduled Appointments    Jul 30, 2021 10:15 AM  New Patient with ANGELICA Stratton  [facility] ([place]) [address] [facility] [id number] [phone]   Bring all previous medical records and films, along with current medications and insurance information.     Aug 25, 2021  1:30 PM  [facility]  [facility] ([place]) [address] [facility] [address]  [phone]             Additional Instructions for the Follow-ups that You Need to Schedule     Discharge Follow-up with PCP   As directed       Currently Documented PCP:    Provider, No Known    PCP Phone Number:    [phone]     Follow Up Details: One week hospital follow-up for recent extended stay for bacteremia and endocarditis         Discharge Follow-up with Specified Provider: Keep previously arranged outpatient liver clinic follow-up   As directed      To: Keep previously arranged outpatient liver clinic follow-up         Discharge Follow-up with Specified  Provider: Please call Hepatitis clinic to arrange outpatient appointment, if this has not already been arranged   As directed      To: Please call Hepatitis clinic to arrange outpatient appointment, if this has not already been arranged           Follow-up Information     Provider, No Known .    Why: One week hospital follow-up for recent extended stay for bacteremia and endocarditis  Contact information:  [facility] [address]  [phone]             Provider, No Known Follow up.    Contact information:  [place] [id number]  [phone]                   Additional Instructions for the Follow-ups that You Need to Schedule     Discharge Follow-up with PCP   As directed       Currently Documented PCP:    Provider, No Known    PCP Phone Number:    [phone]     Follow Up Details: One week hospital follow-up for recent extended stay for bacteremia and endocarditis         Discharge Follow-up with Specified Provider: Keep previously arranged outpatient liver clinic follow-up   As directed      To: Keep previously arranged outpatient liver clinic follow-up         Discharge Follow-up with Specified Provider: Please call Hepatitis clinic to arrange outpatient appointment, if this has not already been arranged   As directed      To: Please call Hepatitis clinic to arrange outpatient appointment, if this has not already been arranged               Test Results Pending at Discharge:           Julianna Brown PA-C  [facility] Medicine Team  07/23/21  12:27 EDT      Time: Greater than 30 minutes spent on this discharge.  I spent 45 minutes on this discharge activity which included:  Face-to-face encounter with the patient, discussing plan with attending physician, reviewing the data in the system, coordination of the care with the nursing staff as well as consultations, documentation, and entering orders.

## 2021-07-23 NOTE — PLAN OF CARE
Goal Outcome Evaluation:  Plan of Care Reviewed With: patient           Outcome Summary: has rested well has been up walking stated she was going home today VSS SS no acute changes will continue to monitor

## 2021-07-23 NOTE — CASE MANAGEMENT/SOCIAL WORK
Discharge Planning Assessment  TAYLOR Antonio     Patient Name: Marce Gutierrez  MRN: [id number]  Today's Date: 7/23/2021    Admit Date: 6/17/2021    Discharge Needs Assessment    No documentation.       Discharge Plan     Row Name 07/23/21 0917       Plan    Final Discharge Disposition Code  01 - home or self-care    Final Note  Pt discharged home on this date. No other needs identified. Family to transport.          Leanne Merino

## 2021-07-23 NOTE — PAYOR COMM NOTE
"CONTACT:  JOSE VARGHESE MSN, APRN  UTILIZATION MANAGEMENT DEPT.  [facility]  [facility], [address]  PHONE:  [phone]  FAX: [phone]    PATIENT DISCHARGED TO HOME ON 7/23/21    REFER TO AUTH # [id number]    Tran Santiago (31 y.o. Female)     Date of Birth Social Security Number Address Home Phone MRN    1990  [address] [address]  [id number]    Episcopalian Marital Status          Non-Hindu Single       Admission Date Admission Type Admitting Provider Attending Provider Department, Room/Bed    6/17/21 Emergency Nicci Valdez MD  [address], [address]/    Discharge Date Discharge Disposition Discharge Destination        7/23/2021 Home or Self Care              Attending Provider: (none)   Allergies: Cinnamon    Isolation: None   Infection: COVID (History) (06/28/21)   Code Status: CPR    Ht: 167.6 cm (66\")   Wt: 54.3 kg (119 lb 9.6 oz)    Admission Cmt: None   Principal Problem: Endocarditis [I38]                 Active Insurance as of 6/17/2021     Primary Coverage     Payor Plan Insurance Group Employer/Plan Group    [facility]      Payor Plan Address Payor Plan Phone Number Payor Plan Fax Number Effective Dates    [address] [address]   12/13/2019 - None Entered    [place] [address]       Subscriber Name Subscriber Birth Date Member ID       TRAN SANTIAGO 1990 [id number]                 Emergency Contacts      (Rel.) Home Phone Work Phone Mobile Phone    GILDARDO LEIVA (Father) -- -- [phone]               Discharge Summary      Jose Brown PA-C at 07/23/21 0850              [facility] Medicine Services  DISCHARGE SUMMARY    Date of Admission: 6/17/2021    Date of Discharge:  7/23/2021    PCP: Provider, No Known   Will follow-up with Jeff Larios NP    Discharging Provider: Jose Brown PA-C  Attending Physician on day of DC:     Admission " Diagnosis:   Please see admission H&P    Discharge Diagnosis:   · Severe sepsis present on admission due to MSSA bacteremia and MSSA/E. coli UTI on admission  · Suspected endocarditis on admission causing septic emboli in the lungs  · Community-acquired versus aspiration pneumonia  · Left ankle cellulitis  · Hepatitis C  · History of IV drug abuse  · Positive COVID-19 screen on admission with quarantine completed during hospitalization  · Iron deficiency  · Normocytic anemia and parietal thrombocytopenia with Supprette suspected bone marrow suppression from COVID-19 during hospitalization  · Right-sided knee pain without known signs of septic arthritis  · Persistent low back pain  · Left ankle sprain and cellulitis  · Hypomagnesemia      Procedures Performed:  · 6/30/2021:  Right basilic single lumen midline catheter  · 6/21/2021-6/30/2021:  Left basilic single lumen midline catheter  · 7/9/2021:  KATHRIN     Consults:   Consults     Date and Time Order Name Status Description    7/11/2021 11:25 AM Inpatient Cardiology Consult Completed     6/28/2021  1:42 PM Inpatient Cardiology Consult      6/21/2021 11:29 AM Inpatient Orthopedic Surgery Consult      6/21/2021 12:30 AM Inpatient Infectious Diseases Consult Completed     6/19/2021  5:20 PM Inpatient Psychiatrist Consult Completed     6/19/2021  8:54 AM Inpatient Cardiology Consult Completed     6/19/2021  7:49 AM Inpatient Cardiology Consult            HPI     History of Present Illness:  Marce Gutierrez is a 31 y.o. female with PMH significant for IV drug abuse.  She presented to this faciltiy on 06/18/21 for further evaluation of leg and arm pain after jumping after her cat and landing on her left side.   She reported recent injection in to her right AC prior days to presentation.  She was found to have a positive amphetamine screen on admission.  She was found to have severe sepsis on admission due to suspected endocarditis causing septic emboli in the lungs vs. CAP  vs. Aspiration PNA and left ankle cellulitis. She was admitted for further evaluation and treatment with start of broad spectrum IV abx.        Hospital Course     Hospital Course  Marce Gutierrez was admitted as outlined in above HPI.       Of note, COVID-19 screen was positive on admission in the ED without acute hypoxia.  She was isolated initially during her hospitalization for COVID-19 and completed a 10 day quarantine.        After completion of quarantine, KATHRIN was performed and did not reveal evidence of known vegetations ID recommendations were for cefazolin 2g IV q8 through 07/22/21 in the setting of persistent bacteremia. Of note, she did report back pain during hospitalization with MRI performed and no known underlying infection.    She did also report some right-sided knee pain without known signs of septic arthritis.    Ultimately, ID recommended above-mentioned IV antibiotic course through July 22, 2021.  She remained hospitalized for completion of this course of antibiotics after multiple venues were explored without ability to arrange an outpatient setting.  She did ultimately wish to go for recovery and will be checking into all in recovery substance abuse rehab treatment on July 26, 2021 with father to provide transportation.    On the day of discharge July 23, 2021 Mrs. Gutierrez reports she felt significantly better from her admission.  She looks very much forward to participating in recovery.  We discussed her hepatitis C diagnosis as well as follow-up with outpatient hepatitis clinic for further outpatient treatment options.    Ms. Gutierrez was a pleasure to take care for in her hospitalization and wish her luck on her journey to recovery.      Due to anemia with underlying iron deficiency which she was started on iron supplementation while hospitalized.  Patient follow-up was arranged with Jeff Larios nurse practitioner at the time of discharge.    Pertinent Laboratory and Radiology Results      Pertinent Test Results:          Results from last 7 days   Lab Units 07/20/21  0057 07/17/21  0404   WBC 10*3/mm3 7.56 9.01   HEMOGLOBIN g/dL 10.1* 10.3*   HEMATOCRIT % 31.5* 32.4*   PLATELETS 10*3/mm3 278 349   MCV fL 91.0 91.3   SODIUM mmol/L 138 135*   POTASSIUM mmol/L 4.1 4.1   CHLORIDE mmol/L 104 102   CO2 mmol/L 22.5 21.9*   BUN mg/dL 16 19   CREATININE mg/dL 0.73 0.95   GLUCOSE mg/dL 94 80   CALCIUM mg/dL 9.0 9.0          Results from last 7 days   Lab Units 07/22/21  0134 07/20/21  0057 07/17/21  0404   CRP mg/dL <0.30  --  <0.30   WBC 10*3/mm3  --  7.56 9.01     Results from last 7 days   Lab Units 07/20/21  0057 07/17/21  0404   BILIRUBIN mg/dL 0.2 0.4   ALK PHOS U/L 126* 123*   ALT (SGPT) U/L 13 10   AST (SGOT) U/L 31 22           Invalid input(s): TG, LDLCALC, LDLREALC      Brief Urine Lab Results  (Last result in the past 365 days)      Color   Clarity   Blood   Leuk Est   Nitrite   Protein   CREAT   Urine HCG        06/17/21 2253 Dark Yellow Cloudy Large (3+) Moderate (2+) Positive 30 mg/dL (1+)         06/17/21 2253               Negative                   Results for orders placed during the hospital encounter of 06/17/21    Adult Transesophageal Echo (KATHRIN) W/ Cont if Necessary Per Protocol    Interpretation Summary  · Normal left ventricular cavity size and wall thickness noted. All left ventricular wall segments contract normally.  · Left ventricular ejection fraction appears to be 56 - 60%.  · The aortic valve is structurally normal with no regurgitation or stenosis present.  · The mitral valve is structurally normal with no significant stenosis present. Mild mitral valve regurgitation is present.  · The tricuspid valve is structurally normal with no significant regurgitation or significant stenosis present  · There is no evidence of pericardial effusion. .  · Saline test results are negative.  · There is no echocardiographic evidence of atrial or ventricular septal defect noted on the saline  test.  · There is no echocardiographic evidence of any endocardial vegetations or aortic root abscess.  · Comments: There is no echocardiograph evidence of endocarditis noted on the study.            ----------------------------------------------------------------------------------------------------------------------  Adult Transesophageal Echo (KATHRIN) W/ Cont if Necessary Per Protocol    Addendum Date: 7/9/2021    · Normal left ventricular cavity size and wall thickness noted. All left ventricular wall segments contract normally. · Left ventricular ejection fraction appears to be 56 - 60%. · The aortic valve is structurally normal with no regurgitation or stenosis present. · The mitral valve is structurally normal with no significant stenosis present. Mild mitral valve regurgitation is present. · The tricuspid valve is structurally normal with no significant regurgitation or significant stenosis present · There is no evidence of pericardial effusion. . · Saline test results are negative. · There is no echocardiographic evidence of atrial or ventricular septal defect noted on the saline test. · There is no echocardiographic evidence of any endocardial vegetations or aortic root abscess. · Comments: There is no echocardiograph evidence of endocarditis noted on the study.      MRI Lumbar Spine Without Contrast    Result Date: 7/2/2021    No acute findings in the lumbar spine.  This report was finalized on 7/2/2021 12:17 PM by Dr. Bill Hawkins MD.      US Venous Doppler Lower Extremity Right (duplex)    Result Date: 7/5/2021  No DVT in the right lower extremity on today's exam.  This report was finalized on 7/5/2021 5:19 PM by Dr. Hakeem Morgan MD.          Microbiology Results (last 10 days)     ** No results found for the last 240 hours. **          Labs above have been reviewed on the day of discharge.  Radiology images from prior 30 days were reviewed prior to discharge as incorporated into this document.      Discharge Vitals and Physical Examination       Vital Signs  Temp:  [97.7 °F (36.5 °C)-98.9 °F (37.2 °C)] 97.7 °F (36.5 °C)  Heart Rate:  [83-98] 83  Resp:  [16-17] 16  BP: (122-139)/(73-94) 132/90     PHYSICAL EXAMINATION:   Physical Exam  Vitals and nursing note reviewed.   Constitutional:       Appearance: Normal appearance.   HENT:      Head: Normocephalic and atraumatic.      Nose: Nose normal.      Mouth/Throat:      Mouth: Mucous membranes are dry.   Eyes:      Extraocular Movements: Extraocular movements intact.      Pupils: Pupils are equal, round, and reactive to light.   Cardiovascular:      Rate and Rhythm: Normal rate and regular rhythm.      Pulses: Normal pulses.      Heart sounds: Normal heart sounds.   Pulmonary:      Effort: Pulmonary effort is normal.      Breath sounds: Normal breath sounds.   Abdominal:      General: There is no distension.      Palpations: Abdomen is soft.   Musculoskeletal:         General: No swelling or tenderness.   Skin:     General: Skin is warm and dry.   Neurological:      Mental Status: She is alert and oriented to person, place, and time.   Psychiatric:         Behavior: Behavior normal.           Discharge Disposition, Discharge Medications, and Discharge Appointments     Discharge Disposition:   home    Condition on Discharge:  Fair    Discharge Medications:     Discharge Medications      New Medications      Instructions Start Date   folic acid 1 MG tablet  Commonly known as: FOLVITE   1 mg, Oral, Daily      magnesium oxide 400 MG tablet  Commonly known as: MAG-OX   800 mg, Oral, Daily      metoprolol tartrate 25 MG tablet  Commonly known as: LOPRESSOR   Take 1/2 tablet by mouth Every 12 (Twelve) Hours for 30 days.      Poly-Iron 150 150 MG capsule  Generic drug: iron polysaccharides   150 mg, Oral, Daily      Thera-Tabs M tablet tablet  Generic drug: multivitamin with minerals   1 tablet, Oral, Daily             Discharged medication regimen discussed with  attending physician prior to discharge.     Discharge Diet:   regular diet  Dietary Orders (From admission, onward)     Start     Ordered    07/09/21 1324  Diet Regular  Diet Effective Now     Question:  Diet Texture / Consistency  Answer:  Regular    07/09/21 1323    06/22/21 1200  Dietary Nutrition Supplements Boost Plus (Ensure Enlive, Ensure Plus)  Daily With Breakfast, Lunch & Dinner     Question:  Select Supplement  Answer:  Boost Plus (Ensure Enlive, Ensure Plus)    06/22/21 0854                Activity at Discharge:  activity as tolerated         Discharge Disposition:    Home or Self Care        Follow-up Appointments:  Your Scheduled Appointments    Jul 30, 2021 10:15 AM  New Patient with ANGELICA Stratton  [facility] MEDICAL [facility] ([place]) [facility] [address] [phone]   Bring all previous medical records and films, along with current medications and insurance information.     Aug 25, 2021  1:30 PM  [facility] with [facility]  [facility] ([place]) [facility] [address]  [phone]             Additional Instructions for the Follow-ups that You Need to Schedule     Discharge Follow-up with PCP   As directed       Currently Documented PCP:    Provider, No Known    PCP Phone Number:    [phone]     Follow Up Details: One week hospital follow-up for recent extended stay for bacteremia and endocarditis         Discharge Follow-up with Specified Provider: Keep previously arranged outpatient liver clinic follow-up   As directed      To: Keep previously arranged outpatient liver clinic follow-up         Discharge Follow-up with Specified Provider: Please call Hepatitis clinic to arrange outpatient appointment, if this has not already been arranged   As directed      To: Please call Hepatitis clinic to arrange outpatient appointment, if this has not already been arranged           Follow-up Information      Provider, No Known .    Why: One week hospital follow-up for recent extended stay for bacteremia and endocarditis  Contact information:  [facility] [place] [address]  [phone]             Provider, No Known Follow up.    Contact information:  [facility] [place] [address]  [phone]                   Additional Instructions for the Follow-ups that You Need to Schedule     Discharge Follow-up with PCP   As directed       Currently Documented PCP:    Provider, No Known    PCP Phone Number:    [phone]     Follow Up Details: One week hospital follow-up for recent extended stay for bacteremia and endocarditis         Discharge Follow-up with Specified Provider: Keep previously arranged outpatient liver clinic follow-up   As directed      To: Keep previously arranged outpatient liver clinic follow-up         Discharge Follow-up with Specified Provider: Please call Hepatitis clinic to arrange outpatient appointment, if this has not already been arranged   As directed      To: Please call Hepatitis clinic to arrange outpatient appointment, if this has not already been arranged               Test Results Pending at Discharge:           Julianna Brown PA-C  [facility] Medicine Team  07/23/21  12:27 EDT      Time: Greater than 30 minutes spent on this discharge.  I spent 45 minutes on this discharge activity which included:  Face-to-face encounter with the patient, discussing plan with attending physician, reviewing the data in the system, coordination of the care with the nursing staff as well as consultations, documentation, and entering orders.               Electronically signed by Julianna Brown PA-C at 07/23/21 0539

## 2021-07-23 NOTE — PROGRESS NOTES
PROGRESS NOTE         Patient Identification:  Name:  Marce Gutierrez  Age:  31 y.o.  Sex:  female  :  1990  MRN:  [id number]  Visit Number:  [id number]  Primary Care Provider:  Provider, No Known         LOS: 35 days       ----------------------------------------------------------------------------------------------------------------------  Subjective       Chief Complaints:    Leg Pain and Arm Pain        Interval History:      Patient sitting up in bed this morning.  Plans for discharge home today.  Antibiotic therapy complete.  Afebrile, no diarrhea.    Review of Systems:    Constitutional: no fever, chills and night sweats. No appetite change or unexpected weight change. No fatigue.  Eyes: no eye drainage, itching or redness.  HEENT: no mouth sores, dysphagia or nose bleed.  Respiratory: no for shortness of breath, cough or production of sputum.  Cardiovascular: no chest pain and no orthopnea.    Gastrointestinal: no nausea, vomiting or diarrhea. No abdominal pain, hematemesis or rectal bleeding.  Genitourinary: no dysuria or polyuria.  Hematologic/lymphatic: no lymph node abnormalities, no easy bruising or easy bleeding.  Musculoskeletal:  No muscle or joint pain.  Skin: No rash and no itching.  Neurological: no loss of consciousness, no seizure, no headache.  Behavioral/Psych: no depression or suicidal ideation.  Endocrine: no hot flashes.  Immunologic: negative.     ----------------------------------------------------------------------------------------------------------------------      Objective       Current [facility] Meds:  docusate sodium, 100 mg, Oral, BID  enoxaparin, 40 mg, Subcutaneous, Daily  folic acid, 1 mg, Oral, Daily  iron polysaccharides, 150 mg, Oral, Daily  lactobacillus acidophilus, 1 capsule, Oral, Daily  magnesium oxide, 800 mg, Oral, Daily  metoprolol tartrate, 12.5 mg, Oral, Q12H  multivitamin with minerals, 1 tablet, Oral, Daily  polyethylene glycol, 17 g, Oral,  Daily  sodium chloride, 10 mL, Intravenous, Q12H  sodium chloride, 3 mL, Intravenous, Q12H         ----------------------------------------------------------------------------------------------------------------------    Vital Signs:  Temp:  [97.7 °F (36.5 °C)-98.9 °F (37.2 °C)] 97.7 °F (36.5 °C)  Heart Rate:  [83-98] 83  Resp:  [16-17] 16  BP: (122-139)/(73-94) 132/90  No data found.  SpO2 Percentage    07/22/21 1900 07/23/21 0300 07/23/21 0640   SpO2: 98% 99% 100%     SpO2:  [98 %-100 %] 100 %  on   ;   Device (Oxygen Therapy): room air    Body mass index is 19.3 kg/m².  Wt Readings from Last 3 Encounters:   07/14/21 54.3 kg (119 lb 9.6 oz)   10/30/19 63.5 kg (140 lb)        Intake/Output Summary (Last 24 hours) at 7/23/2021 0955  Last data filed at 7/23/2021 0600  Gross per 24 hour   Intake 920 ml   Output --   Net 920 ml     Diet Regular  ----------------------------------------------------------------------------------------------------------------------      Physical Exam:    Constitutional:  Well-developed and well-nourished.  No respiratory distress.      HENT:  Head: Normocephalic and atraumatic.  Mouth:  Moist mucous membranes.    Eyes:  Conjunctivae and EOM are normal.  No scleral icterus.  Neck:  Neck supple.  No JVD present.  Cardiovascular:  Normal rate, regular rhythm and normal heart sounds with no murmur. No edema.  Pulmonary/Chest:  No respiratory distress, no wheezes, no crackles, with normal breath sounds and good air movement.  Abdominal:  Soft.  Bowel sounds are normal.  No distension and no tenderness.   Musculoskeletal:  No edema, no tenderness, and no deformity.  No swelling or redness of joints.    Neurological:  Alert and oriented to person, place, and time.  No facial droop.  No slurred speech.   Skin:  Skin is warm and dry.  No rash noted.  No pallor.   Psychiatric:  Normal mood and affect.  Behavior is  normal.  ----------------------------------------------------------------------------------------------------------------------              Results from last 7 days   Lab Units 07/22/21  0134 07/20/21 0057 07/17/21 0404   CRP mg/dL <0.30  --  <0.30   WBC 10*3/mm3  --  7.56 9.01   HEMOGLOBIN g/dL  --  10.1* 10.3*   HEMATOCRIT %  --  31.5* 32.4*   MCV fL  --  91.0 91.3   MCHC g/dL  --  32.1 31.8   PLATELETS 10*3/mm3  --  278 349     Results from last 7 days   Lab Units 07/23/21  0323 07/22/21 0134 07/20/21 0057 07/17/21 0404 07/17/21 0404   SODIUM mmol/L  --   --  138  --  135*   POTASSIUM mmol/L  --   --  4.1  --  4.1   MAGNESIUM mg/dL 1.8 1.8 1.8   < > 1.6   CHLORIDE mmol/L  --   --  104  --  102   CO2 mmol/L  --   --  22.5  --  21.9*   BUN mg/dL  --   --  16  --  19   CREATININE mg/dL  --   --  0.73  --  0.95   EGFR IF NONAFRICN AM mL/min/1.73  --   --  94  --  69   CALCIUM mg/dL  --   --  9.0  --  9.0   GLUCOSE mg/dL  --   --  94  --  80   ALBUMIN g/dL  --   --  3.66  --  3.75   BILIRUBIN mg/dL  --   --  0.2  --  0.4   ALK PHOS U/L  --   --  126*  --  123*   AST (SGOT) U/L  --   --  31  --  22   ALT (SGPT) U/L  --   --  13  --  10    < > = values in this interval not displayed.   Estimated Creatinine Clearance: 96.6 mL/min (by C-G formula based on SCr of 0.73 mg/dL).  No results found for: AMMONIA    No results found for: HGBA1C, POCGLU  Lab Results   Component Value Date    HGBA1C 5.60 06/17/2021     Lab Results   Component Value Date    TSH 0.718 06/17/2021    FREET4 1.16 07/09/2021       Blood Culture   Date Value Ref Range Status   06/20/2021 Abnormal Stain (C)  Preliminary   06/19/2021 Staphylococcus aureus (C)  Final     Comment:     Infectious disease consultation is highly recommended to rule out distant foci of infection.   06/17/2021 Staphylococcus aureus (C)  Final     Comment:     Infectious disease consultation is highly recommended to rule out distant foci of infection.   06/17/2021  Staphylococcus aureus (C)  Final     Comment:     Infectious disease consultation is highly recommended to rule out distant foci of infection.  Refer to previous blood culture collected on 6/17/2021 2318 for MICs.     Urine Culture   Date Value Ref Range Status   06/17/2021 >100,000 CFU/mL Escherichia coli (A)  Final   06/17/2021 >100,000 CFU/mL Staphylococcus aureus (A)  Corrected     No results found for: WOUNDCX  No results found for: STOOLCX  No results found for: RESPCX  Pain Management Panel       Pain Management Panel Latest Ref Rng & Units 7/4/2021 6/17/2021    AMPHETAMINES SCREEN, URINE Negative Negative Positive(A)    BARBITURATES SCREEN Negative Negative Negative    BENZODIAZEPINE SCREEN, URINE Negative Negative Negative    BUPRENORPHINEUR Negative Negative Negative    COCAINE SCREEN, URINE Negative Negative Negative    METHADONE SCREEN, URINE Negative Negative Negative              ----------------------------------------------------------------------------------------------------------------------  Imaging Results (Last 24 Hours)       ** No results found for the last 24 hours. **            ----------------------------------------------------------------------------------------------------------------------    Assessment/Plan       Assessment/Plan     ASSESSMENT:    1.  Severe sepsis with lactic acid greater than 2 on admission  2.  Complicated MSSA bacteremia  3.  Likely underlying endocarditis, ruled out by KATHRIN  4.  Concern for septic emboli    PLAN:    Patient sitting up in bed this morning.  Plans for discharge home today.  Antibiotic therapy complete.  Afebrile, no diarrhea.    KATHRIN reports no evidence of vegetation.     Blood cultures from 6/24/2021 finalized as no growth. Blood culture from 6/22/2021 1 out of 1 set positive for MSSA.    Urinalysis on 6/17/2021 was positive and urine culture finalized as greater than 100,000 colonies of E. coli and greater than 100,000 colonies of MRSA.  Blood  cultures on 6/17/2021 finalized as MSSA.  Repeat blood cultures on 6/19/2021 and 6/20/2021 are still showing growth.  COVID-19 and flu A/B PCR on 6/18/2021 detected COVID-19, but patient was asymptomatic.  Mycoplasma pneumoniae antibody on 6/18/2021 was negative.  Strep pneumo antigen on 6/18/2021 was negative.  CT chest with PE protocol on 6/18/2021 showed technically degraded exam, but no definite PE is seen, and there is no aortic dissection.  Pulmonary edema with a trace amount of right pleural fluid.  Poorly defined nodular infiltrates on both sides of the chest suspicious for septic emboli.  Continued follow-up is recommended. MRI of the lumbar spine on 7/2/2021 revealed no acute findings.     Contacted micro lab regarding urine culture on 6/17/2021.  Urine culture has finalized as MSSA, but MRSA verbiage was included underneath and micro lab corrected.    In the setting of negative KATHRIN, cefazolin 2 g IV every 8 hours completed 7/22/2021 in the setting of persistent bacteremia. Recommend repeat blood cultures x2 in 5-7 days, can be done outpatient.  Patient stable from ID standpoint.  Okay to discharge from ID standpoint.    Code Status:   Code Status and Medical Interventions:   Ordered at: 06/18/21 0446     Level Of Support Discussed With:    Patient     Code Status:    CPR     Medical Interventions (Level of Support Prior to Arrest):    Full     ANGELICA Anderson  07/23/21  09:55 EDT

## 2021-07-23 NOTE — PLAN OF CARE
Goal Outcome Evaluation:  Plan of Care Reviewed With: patient      Progress: improving  Outcome Summary: Pt doing well today, pt is being d/c home today. Currently waiting on ride.

## 2021-07-23 NOTE — PROGRESS NOTES
Patient seen with Eun ARELLANO, patient is doing well, no complaints, stable for discharge.  Liver clinic appointment will be set up, she is going to when she returns on Sunday

## 2021-07-24 NOTE — OUTREACH NOTE
Prep Survey      Responses   [employer] facility patient discharged from?  [facility]   Is LACE score < 7 ?  No   Emergency Room discharge w/ pulse ox?  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  Sepsis   Does the patient have Home health ordered?  No   Is there a DME ordered?  No   Comments regarding appointments  plans out pt rehab treatment on 7/26   Prep survey completed?  Yes          Sheela Anderson RN

## 2021-07-27 ENCOUNTER — READMISSION MANAGEMENT (OUTPATIENT)
Dept: CALL CENTER | Facility: HOSPITAL | Age: 31
End: 2021-07-27

## 2021-07-27 NOTE — OUTREACH NOTE
Sepsis Week 1 Survey      Responses   [facility] patient discharged from?  [facility]   Does the patient have one of the following disease processes/diagnoses(primary or secondary)?  Sepsis   Week 1 attempt successful?  No   Unsuccessful attempts  Attempt 1          Rolanda Stewart RN

## 2021-08-02 ENCOUNTER — READMISSION MANAGEMENT (OUTPATIENT)
Dept: CALL CENTER | Facility: HOSPITAL | Age: 31
End: 2021-08-02

## 2021-08-02 NOTE — OUTREACH NOTE
Sepsis Week 1 Survey      Responses   [facility] patient discharged from?  [facility]   Does the patient have one of the following disease processes/diagnoses(primary or secondary)?  Sepsis   Week 1 attempt successful?  No   Unsuccessful attempts  Attempt 2          Jesi Amaro RN

## 2021-08-03 ENCOUNTER — OFFICE VISIT (OUTPATIENT)
Dept: FAMILY MEDICINE CLINIC | Age: 31
End: 2021-08-03

## 2021-08-03 VITALS
TEMPERATURE: 97.1 F | OXYGEN SATURATION: 100 % | DIASTOLIC BLOOD PRESSURE: 82 MMHG | BODY MASS INDEX: 20.7 KG/M2 | SYSTOLIC BLOOD PRESSURE: 110 MMHG | HEART RATE: 109 BPM | WEIGHT: 128.8 LBS | HEIGHT: 66 IN

## 2021-08-03 DIAGNOSIS — Z76.89 ENCOUNTER TO ESTABLISH CARE: ICD-10-CM

## 2021-08-03 DIAGNOSIS — I33.0 BACTERIAL ENDOCARDITIS, UNSPECIFIED CHRONICITY: Primary | ICD-10-CM

## 2021-08-03 DIAGNOSIS — Z09 HOSPITAL DISCHARGE FOLLOW-UP: ICD-10-CM

## 2021-08-03 PROBLEM — D50.9 IRON DEFICIENCY ANEMIA: Status: ACTIVE | Noted: 2021-08-03

## 2021-08-03 PROBLEM — E83.42 HYPOMAGNESEMIA: Status: ACTIVE | Noted: 2021-08-03

## 2021-08-03 PROBLEM — F17.200 SMOKING ADDICTION: Status: ACTIVE | Noted: 2021-08-03

## 2021-08-03 PROBLEM — U07.1 COVID-19: Status: RESOLVED | Noted: 2021-08-03 | Resolved: 2021-08-03

## 2021-08-03 PROBLEM — A41.01 SEPSIS DUE TO METHICILLIN SUSCEPTIBLE STAPHYLOCOCCUS AUREUS: Status: ACTIVE | Noted: 2021-08-03

## 2021-08-03 PROBLEM — B19.20 HEPATITIS C: Status: ACTIVE | Noted: 2021-08-03

## 2021-08-03 PROBLEM — J18.9 PNEUMONIA: Status: ACTIVE | Noted: 2021-08-03

## 2021-08-03 PROBLEM — U07.1 COVID-19: Status: ACTIVE | Noted: 2021-08-03

## 2021-08-03 PROBLEM — F19.90 IVDU (INTRAVENOUS DRUG USER): Status: ACTIVE | Noted: 2021-08-03

## 2021-08-03 PROCEDURE — 99214 OFFICE O/P EST MOD 30 MIN: CPT | Performed by: NURSE PRACTITIONER

## 2021-08-03 RX ORDER — CYCLOBENZAPRINE HCL 5 MG
5 TABLET ORAL
COMMUNITY
Start: 2021-06-10 | End: 2021-08-03

## 2021-08-03 RX ORDER — IBUPROFEN 600 MG/1
600 TABLET ORAL AS NEEDED
COMMUNITY
Start: 2021-06-10 | End: 2021-08-17 | Stop reason: SDUPTHER

## 2021-08-03 NOTE — PATIENT INSTRUCTIONS
Endocarditis    Endocarditis is an infection of the heart valves or an infection of the inner layer of the heart (endocardium). Endocarditis can cause growths on the heart valves or inside the heart. Over time, these growths can destroy heart tissue and cause heart failure or problems with the heart rhythm. They can also cause a stroke if they break away and block an artery in the brain. Early treatment offers the best chance for curing endocarditis and preventing complications.  What are the causes?  This condition may be caused by:  · Germs that normally live in or on your body. The germs that most commonly cause endocarditis are bacteria.  · Fungus.  · Cancer.  · Connective tissue disease.  What increases the risk?  This condition is more likely to develop in people who have:  · A heart defect.  · Artificial (prosthetic) heart valves.  · An abnormal or damaged heart valve.  · A history of endocarditis.  · IV drug abuse.  Having certain procedures may also increase the risk of germs getting into the heart or bloodstream.  What are the signs or symptoms?  Symptoms of this condition may start suddenly, or they may start slowly and gradually get worse. Symptoms include:  · Fever.  · Chills.  · Night sweats.  · Muscle aches.  · Fatigue.  · Weakness.  · Shortness of breath.  · Chest pain.  · Blood spots in the eyes.  · Bleeding under the fingernails or toenails.  · Painless red spots on the palms.  · Painful lumps in the fingertips or toes.  · Swelling in the feet or ankles.  How is this diagnosed?  This condition may be diagnosed based on:  · A physical exam. Your health care provider will listen to your heart to check for abnormal heart sounds (murmur). He or she may also use a scope to check for bleeding at the back of your eyes (retinas).  · Tests. They may include:  ? Blood tests to look for the germs that cause endocarditis.  ? Imaging tests. These include a chest X-ray, CT scan, or echocardiogram. A type of  echocardiogram called a transesophageal echocardiogram may be done to look at heart valves more closely.  How is this treated?  Treatment for this condition depends on the cause of the endocarditis. Treatment may include:  · Antibiotic medicines. These may be given through an IV line or taken by mouth. You may need to be on more than one antibiotic medicine.  · Surgery to replace your heart valve. You may need surgery if:  ? The endocarditis does not respond to treatment.  ? You develop complications.  ? Your heart valve is severely damaged.  Follow these instructions at home:  Medicines  · Take over-the-counter and prescription medicines only as told by your health care provider.  · If you were prescribed an antibiotic medicine, take it as told by your health care provider. Do not stop taking the antibiotic even if you start to feel better. You may need to be on intravenous or oral antibiotics for several weeks.  · Do not use IV drugs unless it is part of your medical treatment.  Lifestyle  · Do not get tattoos or body piercings.  · Practice good oral hygiene. This includes:  ? Brushing and flossing regularly.  ? Scheduling routine dental appointments.  · Do not use any products that contain nicotine or tobacco, such as cigarettes, e-cigarettes, and chewing tobacco. If you need help quitting, ask your health care provider.  · If you drink alcohol:  ? Limit how much you use to:  § 0-1 drink a day for women.  § 0-2 drinks a day for men.  ? Be aware of how much alcohol is in your drink. In the [place] one drink equals one typical bottle of beer (12 oz), one-half glass of wine (5 oz), or one shot of hard liquor (1½ oz).  General instructions  · Let your health care provider know before you have any dental or surgical procedures. You may need to take antibiotics before the procedure.  · Tell all of your health care providers, including your dentist, that you have had endocarditis.  · Gradually resume your usual  "activities.  · Keep all follow-up visits as told by your health care provider. This is important.  Contact a health care provider if:  · You have a fever.  · Your symptoms do not improve.  · Your symptoms get worse.  · Your symptoms come back.  Get help right away if:  · You have trouble breathing.  · You have chest pain.  · You have any symptoms of a stroke. \"BE FAST\" is an easy way to remember the main warning signs of a stroke:  ? B - Balance. Signs are dizziness, sudden trouble walking, or loss of balance.  ? E - Eyes. Signs are trouble seeing or a sudden change in vision.  ? F - Face. Signs are sudden weakness or numbness of the face, or the face or eyelid drooping on one side.  ? A - Arms. Signs are weakness or numbness in an arm. This happens suddenly and usually on one side of the body.  ? S - Speech. Signs are sudden trouble speaking, slurred speech, or trouble understanding what people say.  ? T - Time. Time to call emergency services. Write down what time symptoms started.  · You have other signs of a stroke, such as:  ? A sudden, severe headache with no known cause.  ? Nausea or vomiting.  ? Seizure.  These symptoms may represent a serious problem that is an emergency. Do not wait to see if the symptoms will go away. Get medical help right away. Call your local emergency services (911 in the [place] Do not drive yourself to the hospital.  Summary  · Endocarditis is an infection of the heart valves or inner layer of the heart (endocardium). It is caused by bacteria or a fungus.  · Having certain heart conditions or procedures may increase the risk of endocarditis.  · Antibiotics are an important treatment for endocarditis. Take these medicines as told by your health care provider. Do not stop taking them even if you start to feel better.  · Tell all of your health care providers, including your dentist, that you have had endocarditis.  This information is not intended to replace advice given to you by your " "health care provider. Make sure you discuss any questions you have with your health care provider.  Document Revised: 08/05/2019 Document Reviewed: 08/05/2019  [employer] Patient Education © 2021 [employer]    [employer] (25th ed., pp. 1786-3862). [place], [place]: [employer].\">   Anemia    Anemia is a condition in which there is not enough red blood cells or hemoglobin in the blood. Hemoglobin is a substance in red blood cells that carries oxygen.  When you do not have enough red blood cells or hemoglobin (are anemic), your body cannot get enough oxygen and your organs may not work properly. As a result, you may feel very tired or have other problems.  What are the causes?  Common causes of anemia include:  · Excessive bleeding. Anemia can be caused by excessive bleeding inside or outside the body, including bleeding from the intestines or from heavy menstrual periods in females.  · Poor nutrition.  · Long-lasting (chronic) kidney, thyroid, and liver disease.  · Bone marrow disorders, spleen problems, and blood disorders.  · Cancer and treatments for cancer.  · HIV (human immunodeficiency virus) and AIDS (acquired immunodeficiency syndrome).  · Infections, medicines, and autoimmune disorders that destroy red blood cells.  What are the signs or symptoms?  Symptoms of this condition include:  · Minor weakness.  · Dizziness.  · Headache, or difficulties concentrating and sleeping.  · Heartbeats that feel irregular or faster than normal (palpitations).  · Shortness of breath, especially with exercise.  · Pale skin, lips, and nails, or cold hands and feet.  · Indigestion and nausea.  Symptoms may occur suddenly or develop slowly. If your anemia is mild, you may not have symptoms.  How is this diagnosed?  This condition is diagnosed based on blood tests, your medical history, and a physical exam. In some cases, a test may be needed in which cells are removed from the soft tissue inside of a bone and looked at " under a microscope (bone marrow biopsy). Your health care provider may also check your stool (feces) for blood and may do additional testing to look for the cause of your bleeding.  Other tests may include:  · Imaging tests, such as a CT scan or MRI.  · A procedure to see inside your esophagus and stomach (endoscopy).  · A procedure to see inside your colon and rectum (colonoscopy).  How is this treated?  Treatment for this condition depends on the cause. If you continue to lose a lot of blood, you may need to be treated at a hospital. Treatment may include:  · Taking supplements of iron, vitamin B12, or folic acid.  · Taking a hormone medicine (erythropoietin) that can help to stimulate red blood cell growth.  · Having a blood transfusion. This may be needed if you lose a lot of blood.  · Making changes to your diet.  · Having surgery to remove your spleen.  Follow these instructions at home:  · Take over-the-counter and prescription medicines only as told by your health care provider.  · Take supplements only as told by your health care provider.  · Follow any diet instructions that you were given by your health care provider.  · Keep all follow-up visits as told by your health care provider. This is important.  Contact a health care provider if:  · You develop new bleeding anywhere in the body.  Get help right away if:  · You are very weak.  · You are short of breath.  · You have pain in your abdomen or chest.  · You are dizzy or feel faint.  · You have trouble concentrating.  · You have bloody stools, black stools, or tarry stools.  · You vomit repeatedly or you vomit up blood.  These symptoms may represent a serious problem that is an emergency. Do not wait to see if the symptoms will go away. Get medical help right away. Call your local emergency services (911 in the [place] Do not drive yourself to the hospital.  Summary  · Anemia is a condition in which you do not have enough red blood cells or enough of a  substance in your red blood cells that carries oxygen (hemoglobin).  · Symptoms may occur suddenly or develop slowly.  · If your anemia is mild, you may not have symptoms.  · This condition is diagnosed with blood tests, a medical history, and a physical exam. Other tests may be needed.  · Treatment for this condition depends on the cause of the anemia.  This information is not intended to replace advice given to you by your health care provider. Make sure you discuss any questions you have with your health care provider.  Document Revised: 11/24/2020 Document Reviewed: 11/24/2020  [employer] Patient Education © 2021 [employer]    Hepatitis C    Hepatitis C is a liver infection that is caused by a virus. Many people have no symptoms or only mild symptoms. Hepatitis C is contagious. This means that it can spread from person to person. Over time, the infection can lead to serious liver problems.  What are the causes?  This condition is caused by a virus. People can get this infection through:  · Contact with certain body fluids of someone who has the virus. This includes:  ? Blood.  ? Semen or vaginal fluids.  · Childbirth. A woman can pass the virus to her baby during birth.  · Blood or organ donations done in the [place] before 1992.  What increases the risk?  The following things may make you more likely to get this condition:  · Having contact with needles or syringes that have the virus on them. This may happen while:  ? Getting a treatment that involves putting thin needles through your skin (acupuncture).  ? Getting a tattoo.  ? Getting a body piercing.  ? Injecting drugs.  · Having sex with someone who has the virus. The virus can be passed through vaginal, oral, or anal sex.  · Getting treatment to filter your blood (kidney dialysis).  · Having HIV or AIDS.  · Having a job that involves contact with blood or certain other body fluids, such as in health care.  What are the signs or symptoms?  Symptoms  of this condition include:  · Tiredness (fatigue).  · Not wanting to eat as much as normal (loss of appetite).  · Feeling like you may vomit (nauseous).  · Vomiting.  · Pain in your belly (abdomen).  · Dark yellow pee (urine).  · Your skin or the white parts of your eyes turning yellow (jaundice).  · Itchy skin.  · Light-colored or tan poop (stool).  · Joint pain.  · Bleeding and bruising that happen often.  · Fluid building up in your stomach (ascites).  Often, there are no symptoms.  How is this treated?  Treatment may depend on how bad your condition is, how long it has lasted, and whether you have liver damage. More testing may be done to figure out the best treatment. Treatment may include:  · Taking antiviral medicines and other medicines.  · Having follow-up treatments every 6-12 months for infections or other liver problems.  · Getting a new liver from a donor (liver transplant).  Follow these instructions at home:  Medicines  · Take over-the-counter and prescription medicines only as told by your doctor.  · If you were prescribed an antiviral medicine, take it as told by your doctor. Do not stop using the antiviral even if you start to feel better.  · Do not take any new medicines unless your doctor says that this is okay. This includes over-the-counter medicines and supplements.  Activity  · Rest as needed.  · Do not have sex until your doctor says this is okay.  · Return to your normal activities as told by your doctor. Ask your doctor what activities are safe for you.  · Ask your doctor when you may go back to school or work.  Eating and drinking    · Eat a balanced diet. Eat plenty of:  ? Fruits and vegetables.  ? Whole grains.  ? Low-fat (lean) meats or non-meat proteins, such as beans or tofu.  · Drink enough fluids to keep your pee pale yellow.  · Do not drink alcohol.  General instructions  · Do not share toothbrushes, nail clippers, or razors.  · Wash your hands often with soap and water for at  least 20 seconds. If you do not have soap and water, use hand .  · Cover any cuts or open sores on your skin.  · Avoid swimming or using hot tubs if you have open sores or wounds.  · Keep all follow-up visits as told by your doctor. This is important. You may need follow-up visits every 6-12 months.  How is this prevented?  · Wash your hands often with soap and water for at least 20 seconds.  · Do not share needles or syringes.  · Use a condom every time you have vaginal, oral, or anal sex. Be sure to use it the right way each time.  ? Both females and males should wear condoms.  ? Condoms should be kept in place from the start to the end of sexual activity.  ? Latex condoms should be used, if possible.  · Do not handle blood or body fluids without gloves or other protection.  · Avoid getting tattoos or piercings in places that are not clean.  Where to find more information  · Centers for Disease Control and Prevention: www.cdc.gov  Contact a doctor if:  · You have a fever.  · You have belly pain.  · Your pee is dark.  · Your poop is a light color or tan.  · You have joint pain.  Get help right away if:  · You feel more and more tired.  · You feel more and more weak.  · You do not feel like eating.  · You cannot eat or drink without vomiting.  · Your skin or the whites of your eyes turn yellow or turn more yellow than they were before.  · You bruise or bleed easily.  Summary  · Hepatitis C is a liver infection that is caused by a virus.  · Over time, the infection can lead to serious liver problems.  · The virus can be spread from person to person through blood, semen, or vaginal fluids.  · Do not take any new medicines unless your doctor says that this is okay.  This information is not intended to replace advice given to you by your health care provider. Make sure you discuss any questions you have with your health care provider.  Document Revised: 09/09/2020 Document Reviewed: 08/17/2020  [employer] Patient  Education © 2021 [employer]    Iron Deficiency Anemia, Adult  Iron deficiency anemia is when you do not have enough red blood cells or hemoglobin in your blood. This happens because you have too little iron in your body. Hemoglobin carries oxygen to parts of the body. Anemia can cause your body to not get enough oxygen.  What are the causes?  · Not eating enough foods that have iron in them.  · The body not being able to take in iron well.  · Needing more iron due to pregnancy or heavy menstrual periods, for females.  · Cancer.  · Bleeding in the bowels.  · Many blood draws.  What increases the risk?  · Being pregnant.  · Being a teenage girl going through a growth spurt.  What are the signs or symptoms?  · Pale skin, lips, and nails.  · Weakness, dizziness, and getting tired easily.  · Headache.  · Feeling like you cannot breathe well when moving (shortness of breath).  · Cold hands and feet.  · Fast heartbeat or a heartbeat that is not regular.  · Feeling grouchy (irritable) or breathing fast. These are more common in very bad anemia.  Mild anemia may not cause any symptoms.  How is this treated?  This condition is treated by finding out why you do not have enough iron and then getting more iron. It may include:  · Adding foods to your diet that have a lot of iron.  · Taking iron pills (supplements). If you are pregnant or breastfeeding, you may need to take extra iron. Your diet often does not provide the amount of iron that you need.  · Getting more vitamin C in your diet. Vitamin C helps your body take in iron. You may need to take iron pills with a glass of orange juice or vitamin C pills.  · Medicines to make heavy menstrual periods lighter.  · Surgery.  You may need blood tests to see if treatment is working. If the treatment does not seem to be working, you may need more tests.  Follow these instructions at home:  Medicines  · Take over-the-counter and prescription medicines only as told by your doctor.  This includes iron pills and vitamins.  ? Take iron pills when your stomach is empty. If you cannot handle this, take them with food.  ? Do not drink milk or take antacids at the same time as your iron pills.  ? Iron pills may turn your poop (stool)black.  · If you cannot handle taking iron pills by mouth, ask your doctor about getting iron through:  ? An IV tube.  ? A shot (injection) into a muscle.  Eating and drinking    · Talk with your doctor before changing the foods you eat. He or she may tell you to eat foods that have a lot of iron, such as:  ? Liver.  ? Low-fat (lean) beef.  ? Breads and cereals that have iron added to them.  ? Eggs.  ? Dried fruit.  ? Dark green, leafy vegetables.  · Eat fresh fruits and vegetables that are high in vitamin C. They help your body use iron. Foods with a lot of vitamin C include:  ? Oranges.  ? Peppers.  ? Tomatoes.  ? Mangoes.  · Drink enough fluid to keep your pee (urine) pale yellow.  Managing constipation  If you are taking iron pills, they may cause trouble pooping (constipation). To prevent or treat trouble pooping, you may need to:  · Take over-the-counter or prescription medicines.  · Eat foods that are high in fiber. These include beans, whole grains, and fresh fruits and vegetables.  · Limit foods that are high in fat and sugar. These include fried or sweet foods.  General instructions  · Return to your normal activities as told by your doctor. Ask your doctor what activities are safe for you.  · Keep yourself clean, and keep things clean around you.  · Keep all follow-up visits as told by your doctor. This is important.  Contact a doctor if:  · You feel like you may vomit (nauseous), or you vomit.  · You feel weak.  · You are sweating for no reason.  · You have trouble pooping, such as:  ? Pooping less than 3 times a week.  ? Straining to poop.  ? Having poop that is hard, dry, or larger than normal.  ? Feeling full or bloated.  ? Pain in the lower belly.  ? Not  feeling better after pooping.  Get help right away if:  · You pass out (faint).  · You have chest pain.  · You have trouble breathing that:  ? Is very bad.  ? Gets worse with physical activity.  · You have a fast heartbeat, or a heartbeat that does not feel regular.  · You get light-headed when getting up from sitting or lying down.  These symptoms may be an emergency. Do not wait to see if the symptoms will go away. Get medical help right away. Call your local emergency services (911 in the [place] Do not drive yourself to the hospital.  Summary  · Iron deficiency anemia is when you have too little iron in your body.  · This condition is treated by finding out why you do not have enough iron in your body and then getting more iron.  · Take over-the-counter and prescription medicines only as told by your doctor.  · Eat fresh fruits and vegetables that are high in vitamin C.  · Get help right away if you cannot breathe well.  This information is not intended to replace advice given to you by your health care provider. Make sure you discuss any questions you have with your health care provider.  Document Revised: 08/25/2020 Document Reviewed: 08/25/2020  [employer] Patient Education © 2021 [employer]    Sepsis, Self Care, Adult  Sepsis is a serious illness that may require intensive care in the hospital. The following information explains what you need to know in order to manage your condition after you are discharged from the hospital.  What are the risks?  After being treated for sepsis and discharged from the hospital, you may be at a higher risk for certain problems. These problems may be physical or mental.  Physical problems:  · Weakness and tiredness.  · Shortness of breath.  · Pain in many areas of the body.  · Difficulty walking.  · Dry, itchy skin.  · Lack of appetite. This may lead to weight loss.  · Organ failure.  Mental problems:  · Difficulty sleeping.  · Depression.  · Confusion.  · Anxiety and worry  caused by having gone through a bad experience (post-traumatic stress disorder,PTSD).  · Low self-esteem.  Follow these instructions at home:  Medicines  · Take over-the-counter and prescription medicines only as told by your health care provider.  · If you were prescribed an antibiotic, antiviral, or antifungal medicine, take it as told by your health care provider. Do not stop taking the medicine even if you start to feel better.  Eating and drinking    · Eat a healthy diet that includes plenty of vegetables, fruits, whole grains, low-fat dairy products, and lean protein. Ask your health care provider if you should avoid certain foods.  · Drink enough fluid to keep your urine pale yellow.  Alcohol use  · Do not drink alcohol if:  ? Your health care provider tells you not to drink.  ? You are pregnant, may be pregnant, or are planning to become pregnant.  · If you drink alcohol, limit how much you use to:  ? 0-1 drink a day for women.  ? 0-2 drinks a day for men.  § Be aware of how much alcohol is in your drink. In the [place] one drink equals one 12 oz bottle of beer (355 mL), one 5 oz glass of wine (148 mL), or one 1½ oz glass of hard liquor (44 mL).  Activity  · Rest and gradually return to your normal activities. Ask your health care provider what activities are safe for you.  · Avoid sitting for a long time without moving. Get up to take short walks every 1-2 hours. This is important to improve blood flow and breathing. Ask for help if you feel weak or unsteady.  · Try to set small, achievable goals each week, such as dressing yourself, bathing, or walking up the stairs. It may take a while to rebuild your strength.  · Try to exercise regularly if you feel healthy enough to do so. Ask your health care provider what exercises are safe for you.  Preventing infection    · Keep your vaccinations up to date. Get the flu shot every year.  · Wash your hands often using soap and water. Use hand  if soap and  water are not available.  · Practice good hygiene. Keep cuts clean and covered until healed.  Managing stress  Talk with your health care provider or counselor about ways to reduce stress. He or she may suggest:  · Meditation, muscle relaxation, and breathing exercises.  · Talk therapy.  · Spending time on hobbies and activities that you enjoy.  General instructions  · Get the right amount and quality of sleep. Most adults need 7-9 hours of sleep each night. To help with sleep:  ? Keep your bedroom cool and dark.  ? Do not eat a heavy meal within one hour of bedtime.  ? Do not drink alcohol or caffeinated drinks before bed.  ? Avoid screen time, such as television, computers, tablets, or cell phones before bed.  · Do not use any products that contain nicotine or tobacco, such as cigarettes, e-cigarettes, and chewing tobacco. If you need help quitting, ask your health care provider.  · Talk to trusted family members and friends about your condition. Explain your symptoms to them, and let them know that you are working with a health care provider to treat your condition. This can provide you with one way to get support and guidance.  · Keep all follow-up visits as told by your health care provider. This is important.  Questions to ask your health care provider:  · What physical and emotional changes do I need to report?  · Do I need to have someone with me all the time?  · Is it safe for me to drive?  Contact a health care provider if you:  · Do not feel like you are getting better or regaining strength.  · Have muscle or joint pain.  · Frequently feel tired.  · Are having trouble coping with your recovery.  · Have nightmares, or trouble falling asleep or staying asleep.  · Feel sad, down, or depressed more often than not, every day for more than 2 weeks.  · Have difficulty concentrating.  · Feel irritable or you cry for no reason.  Get help right away if you:  · Have difficulty breathing.  · Have a rapid or skipping  heartbeat.  · Become confused or disoriented.  · See, hear, or feel things that do not exist (hallucinations).  · Have a high fever.  · Have an infection that is getting worse or not getting better.  · You have thoughts of hurting yourself or others.  If you ever feel like you may hurt yourself or others, or have thoughts about taking your own life, get help right away. You can go to your nearest emergency department or call:  · Your local emergency services (911 in the [place]  · A suicide crisis helpline, such as the National Suicide Prevention Lifeline at 1-581.470.6330. This is open 24 hours a day.  Summary  · Sepsis is a serious illness that may require intensive care in a hospital. You may experience long-term health effects after you are discharged from the hospital.  · Try to set small, achievable goals each week, such as dressing yourself, bathing, or walking up the stairs. It may take a while to rebuild your strength.  · Keep all follow-up visits as told by your health care provider. This is important.  · Know what symptoms you should get help right away for.  This information is not intended to replace advice given to you by your health care provider. Make sure you discuss any questions you have with your health care provider.  Document Revised: 07/26/2019 Document Reviewed: 07/26/2019  [employer] Patient Education © 2021 [employer]

## 2021-08-03 NOTE — PROGRESS NOTES
"Chief Complaint  Hospital Follow Up Visit (Patient was admitted at [facility]) and Establish Care    Subjective          Marce Gutierrez presents to [facility]  Pt presents to the clinic for her hospital follow up and to establish care. Pt states that she is feeling better, with no further edema in her feet. Still having some residual pain in her left hip. Attending NA meetings in [place]. Pt denies any further drug consumption. States that she \"learned a big lesson\" through this sickness. Pt very rushed today as she has ride waiting for her in the parking lot. Pt states that she will have labs drawn tomorrow and make follow up appointment when she is able to drive self. Denies current concerns.      Objective   Vital Signs:   /82 (BP Location: Right arm, Patient Position: Sitting, Cuff Size: Adult)   Pulse 109   Temp 97.1 °F (36.2 °C) (Temporal)   Ht 167.6 cm (65.98\")   Wt 58.4 kg (128 lb 12.8 oz)   SpO2 100%   BMI 20.80 kg/m²     Physical Exam  Vitals reviewed.   Constitutional:       General: She is not in acute distress.     Appearance: Normal appearance. She is well-developed and normal weight. She is not ill-appearing.   HENT:      Head: Normocephalic.      Nose: Nose normal.   Eyes:      Pupils: Pupils are equal, round, and reactive to light.   Cardiovascular:      Rate and Rhythm: Regular rhythm. Tachycardia present.      Pulses: Normal pulses.      Heart sounds: Normal heart sounds. No murmur heard.     Pulmonary:      Effort: Pulmonary effort is normal.      Breath sounds: Wheezing present.   Abdominal:      General: Bowel sounds are normal.      Palpations: Abdomen is soft. Abdomen is not rigid.      Tenderness: There is no abdominal tenderness. There is no guarding or rebound.   Musculoskeletal:         General: No swelling. Normal range of motion.      Cervical back: Normal range of motion and neck supple.   Skin:     General: Skin is warm and dry.   Neurological:     "  Mental Status: She is alert and oriented to person, place, and time.   Psychiatric:         Mood and Affect: Mood is anxious.        Result Review :                 Assessment and Plan    Diagnoses and all orders for this visit:    1. Bacterial endocarditis, unspecified chronicity (Primary)  -     Blood Culture - Blood, Arm, Right; Future  -     Blood Culture - Blood, Arm, Left; Future  -     Comprehensive Metabolic Panel; Future  -     CBC & Differential; Future  -     Magnesium; Future    2. Hospital discharge follow-up  -     Blood Culture - Blood, Arm, Right; Future  -     Blood Culture - Blood, Arm, Left; Future  -     Comprehensive Metabolic Panel; Future  -     CBC & Differential; Future  -     Magnesium; Future    3. Encounter to establish care  -     Blood Culture - Blood, Arm, Right; Future  -     Blood Culture - Blood, Arm, Left; Future  -     Comprehensive Metabolic Panel; Future  -     CBC & Differential; Future  -     Magnesium; Future        Follow Up   No follow-ups on file.  Patient was given instructions and counseling regarding her condition or for health maintenance advice. Please see specific information pulled into the AVS if appropriate.

## 2021-08-09 ENCOUNTER — READMISSION MANAGEMENT (OUTPATIENT)
Dept: CALL CENTER | Facility: HOSPITAL | Age: 31
End: 2021-08-09

## 2021-08-09 NOTE — OUTREACH NOTE
Sepsis Week 3 Survey      Responses   [facility] patient discharged from?  [facility]   Does the patient have one of the following disease processes/diagnoses(primary or secondary)?  Sepsis   Week 3 attempt successful?  No   Unsuccessful attempts  Attempt 1          Rosa Montgomery RN

## 2021-08-10 ENCOUNTER — LAB (OUTPATIENT)
Dept: LAB | Facility: HOSPITAL | Age: 31
End: 2021-08-10

## 2021-08-10 DIAGNOSIS — Z76.89 ENCOUNTER TO ESTABLISH CARE: ICD-10-CM

## 2021-08-10 DIAGNOSIS — Z09 HOSPITAL DISCHARGE FOLLOW-UP: ICD-10-CM

## 2021-08-10 DIAGNOSIS — I33.0 BACTERIAL ENDOCARDITIS, UNSPECIFIED CHRONICITY: ICD-10-CM

## 2021-08-10 LAB
ALBUMIN SERPL-MCNC: 4.2 G/DL (ref 3.5–5.2)
ALBUMIN/GLOB SERPL: 1.4 G/DL
ALP SERPL-CCNC: 104 U/L (ref 39–117)
ALT SERPL W P-5'-P-CCNC: 37 U/L (ref 1–33)
ANION GAP SERPL CALCULATED.3IONS-SCNC: 11.2 MMOL/L (ref 5–15)
AST SERPL-CCNC: 35 U/L (ref 1–32)
BASOPHILS # BLD AUTO: 0.05 10*3/MM3 (ref 0–0.2)
BASOPHILS NFR BLD AUTO: 0.8 % (ref 0–1.5)
BILIRUB SERPL-MCNC: 0.4 MG/DL (ref 0–1.2)
BUN SERPL-MCNC: 12 MG/DL (ref 6–20)
BUN/CREAT SERPL: 15.6 (ref 7–25)
CALCIUM SPEC-SCNC: 9.3 MG/DL (ref 8.6–10.5)
CHLORIDE SERPL-SCNC: 103 MMOL/L (ref 98–107)
CO2 SERPL-SCNC: 22.8 MMOL/L (ref 22–29)
CREAT SERPL-MCNC: 0.77 MG/DL (ref 0.57–1)
DEPRECATED RDW RBC AUTO: 42.2 FL (ref 37–54)
EOSINOPHIL # BLD AUTO: 0.09 10*3/MM3 (ref 0–0.4)
EOSINOPHIL NFR BLD AUTO: 1.4 % (ref 0.3–6.2)
ERYTHROCYTE [DISTWIDTH] IN BLOOD BY AUTOMATED COUNT: 13.1 % (ref 12.3–15.4)
GFR SERPL CREATININE-BSD FRML MDRD: 88 ML/MIN/1.73
GLOBULIN UR ELPH-MCNC: 3 GM/DL
GLUCOSE SERPL-MCNC: 64 MG/DL (ref 65–99)
HCT VFR BLD AUTO: 39.3 % (ref 34–46.6)
HGB BLD-MCNC: 12.5 G/DL (ref 12–15.9)
IMM GRANULOCYTES # BLD AUTO: 0.04 10*3/MM3 (ref 0–0.05)
IMM GRANULOCYTES NFR BLD AUTO: 0.6 % (ref 0–0.5)
LYMPHOCYTES # BLD AUTO: 1.68 10*3/MM3 (ref 0.7–3.1)
LYMPHOCYTES NFR BLD AUTO: 26 % (ref 19.6–45.3)
MAGNESIUM SERPL-MCNC: 1.7 MG/DL (ref 1.6–2.6)
MCH RBC QN AUTO: 28.3 PG (ref 26.6–33)
MCHC RBC AUTO-ENTMCNC: 31.8 G/DL (ref 31.5–35.7)
MCV RBC AUTO: 88.9 FL (ref 79–97)
MONOCYTES # BLD AUTO: 0.34 10*3/MM3 (ref 0.1–0.9)
MONOCYTES NFR BLD AUTO: 5.3 % (ref 5–12)
NEUTROPHILS NFR BLD AUTO: 4.26 10*3/MM3 (ref 1.7–7)
NEUTROPHILS NFR BLD AUTO: 65.9 % (ref 42.7–76)
NRBC BLD AUTO-RTO: 0 /100 WBC (ref 0–0.2)
PLATELET # BLD AUTO: 529 10*3/MM3 (ref 140–450)
PMV BLD AUTO: 9.4 FL (ref 6–12)
POTASSIUM SERPL-SCNC: 4.2 MMOL/L (ref 3.5–5.2)
PROT SERPL-MCNC: 7.2 G/DL (ref 6–8.5)
RBC # BLD AUTO: 4.42 10*6/MM3 (ref 3.77–5.28)
SODIUM SERPL-SCNC: 137 MMOL/L (ref 136–145)
WBC # BLD AUTO: 6.46 10*3/MM3 (ref 3.4–10.8)

## 2021-08-10 PROCEDURE — 80053 COMPREHEN METABOLIC PANEL: CPT

## 2021-08-10 PROCEDURE — 87040 BLOOD CULTURE FOR BACTERIA: CPT

## 2021-08-10 PROCEDURE — 36415 COLL VENOUS BLD VENIPUNCTURE: CPT

## 2021-08-10 PROCEDURE — 83735 ASSAY OF MAGNESIUM: CPT

## 2021-08-10 PROCEDURE — 85025 COMPLETE CBC W/AUTO DIFF WBC: CPT

## 2021-08-12 ENCOUNTER — READMISSION MANAGEMENT (OUTPATIENT)
Dept: CALL CENTER | Facility: HOSPITAL | Age: 31
End: 2021-08-12

## 2021-08-12 NOTE — OUTREACH NOTE
Sepsis Week 3 Survey      Responses   [facility] patient discharged from?  [facility]   Does the patient have one of the following disease processes/diagnoses(primary or secondary)?  Sepsis   Week 3 attempt successful?  No   Unsuccessful attempts  Attempt 2          Gemma Douglas LPN

## 2021-08-15 LAB
BACTERIA SPEC AEROBE CULT: NORMAL
BACTERIA SPEC AEROBE CULT: NORMAL

## 2021-08-17 ENCOUNTER — OFFICE VISIT (OUTPATIENT)
Dept: FAMILY MEDICINE CLINIC | Age: 31
End: 2021-08-17

## 2021-08-17 VITALS
WEIGHT: 125.4 LBS | HEIGHT: 66 IN | OXYGEN SATURATION: 100 % | BODY MASS INDEX: 20.15 KG/M2 | SYSTOLIC BLOOD PRESSURE: 142 MMHG | HEART RATE: 103 BPM | RESPIRATION RATE: 16 BRPM | TEMPERATURE: 97.6 F | DIASTOLIC BLOOD PRESSURE: 95 MMHG

## 2021-08-17 DIAGNOSIS — F11.20 NARCOTIC ADDICTION (HCC): ICD-10-CM

## 2021-08-17 DIAGNOSIS — F41.9 ANXIETY: Primary | ICD-10-CM

## 2021-08-17 DIAGNOSIS — Z76.0 MEDICATION REFILL: ICD-10-CM

## 2021-08-17 DIAGNOSIS — I10 ESSENTIAL HYPERTENSION: ICD-10-CM

## 2021-08-17 PROBLEM — A41.01 SEPSIS DUE TO METHICILLIN SUSCEPTIBLE STAPHYLOCOCCUS AUREUS: Status: RESOLVED | Noted: 2021-08-03 | Resolved: 2021-08-17

## 2021-08-17 PROBLEM — J18.9 PNEUMONIA: Status: RESOLVED | Noted: 2021-08-03 | Resolved: 2021-08-17

## 2021-08-17 PROCEDURE — 99213 OFFICE O/P EST LOW 20 MIN: CPT | Performed by: NURSE PRACTITIONER

## 2021-08-17 RX ORDER — HYDROXYZINE PAMOATE 25 MG/1
25 CAPSULE ORAL 2 TIMES DAILY PRN
Qty: 60 CAPSULE | Refills: 2 | Status: SHIPPED | OUTPATIENT
Start: 2021-08-17 | End: 2021-12-27 | Stop reason: SDUPTHER

## 2021-08-17 RX ORDER — IRON POLYSACCHARIDE COMPLEX 150 MG
150 CAPSULE ORAL DAILY
Qty: 30 CAPSULE | Refills: 2 | Status: SHIPPED | OUTPATIENT
Start: 2021-08-17 | End: 2023-02-22

## 2021-08-17 RX ORDER — MULTIPLE VITAMINS W/ MINERALS TAB 9MG-400MCG
1 TAB ORAL DAILY
Qty: 30 TABLET | Refills: 0 | Status: SHIPPED | OUTPATIENT
Start: 2021-08-17 | End: 2021-09-16

## 2021-08-17 RX ORDER — FOLIC ACID 1 MG/1
1 TABLET ORAL DAILY
Qty: 30 TABLET | Refills: 2 | Status: SHIPPED | OUTPATIENT
Start: 2021-08-17 | End: 2021-09-16

## 2021-08-17 RX ORDER — IBUPROFEN 600 MG/1
600 TABLET ORAL EVERY 8 HOURS PRN
Qty: 90 TABLET | Refills: 2 | Status: SHIPPED | OUTPATIENT
Start: 2021-08-17 | End: 2021-12-27 | Stop reason: SDUPTHER

## 2021-08-17 RX ORDER — MAGNESIUM OXIDE 400 MG/1
400 TABLET ORAL DAILY
Qty: 30 TABLET | Refills: 2 | Status: SHIPPED | OUTPATIENT
Start: 2021-08-17 | End: 2023-02-22

## 2021-08-17 NOTE — PROGRESS NOTES
"Chief Complaint  Follow-up    Subjective          Marce Gutierrez presents to [facility]    Pt presents to the clinic for follow up and medication refills. Pt states that she has been feeling increased anxiety over the past couple weeks. Pt states drug free X 4 months and attending NA meetings in [place]. Pt requests UDS for court. She states that while she has been doing much better, she still has some legal issues to take care of. Has not established care with behavioral health. Denies acute issue or concern at this time.      Objective   Vital Signs:   /95 (BP Location: Right arm, Patient Position: Sitting)   Pulse 103   Temp 97.6 °F (36.4 °C) (Infrared)   Resp 16   Ht 167.6 cm (65.98\")   Wt 56.9 kg (125 lb 6.4 oz)   SpO2 100%   BMI 20.25 kg/m²     Physical Exam  Vitals reviewed.   Constitutional:       General: She is not in acute distress.     Appearance: Normal appearance. She is well-developed and normal weight.   HENT:      Right Ear: Tympanic membrane normal.      Left Ear: Tympanic membrane normal.      Nose: Nose normal.      Mouth/Throat:      Mouth: Mucous membranes are moist.   Eyes:      Extraocular Movements: Extraocular movements intact.      Conjunctiva/sclera: Conjunctivae normal.      Pupils: Pupils are equal, round, and reactive to light.   Cardiovascular:      Rate and Rhythm: Regular rhythm. Tachycardia present.      Pulses: Normal pulses.      Heart sounds: Normal heart sounds. No murmur heard.     Pulmonary:      Effort: Pulmonary effort is normal.      Breath sounds: Normal breath sounds. No wheezing.   Abdominal:      General: Bowel sounds are normal.      Palpations: Abdomen is soft. Abdomen is not rigid.      Tenderness: There is no abdominal tenderness. There is no guarding or rebound.   Musculoskeletal:         General: Normal range of motion.      Cervical back: Normal range of motion and neck supple.   Skin:     General: Skin is warm and dry. "   Neurological:      Mental Status: She is alert and oriented to person, place, and time.   Psychiatric:         Mood and Affect: Mood normal.         Behavior: Behavior normal.        Result Review :                 Assessment and Plan    Diagnoses and all orders for this visit:    1. Anxiety (Primary)  -     Ambulatory Referral to Behavioral Health    2. Narcotic addiction (CMS/[facility])  -     Urine Drug Screen - Urine, Clean Catch; Future  -     Ambulatory Referral to Behavioral Health    3. Medication refill    Other orders  -     metoprolol tartrate (LOPRESSOR) 25 MG tablet; Take 1 tablet by mouth Daily.  Dispense: 30 tablet; Refill: 2  -     hydrOXYzine pamoate (Vistaril) 25 MG capsule; Take 1 capsule by mouth 2 (Two) Times a Day As Needed for Anxiety.  Dispense: 60 capsule; Refill: 2  -     multivitamin with minerals tablet tablet; Take 1 tablet by mouth Daily for 30 days.  Dispense: 30 tablet; Refill: 0  -     iron polysaccharides (NIFEREX) 150 MG capsule; Take 1 capsule by mouth Daily.  Dispense: 30 capsule; Refill: 2  -     ibuprofen (ADVIL,MOTRIN) 600 MG tablet; Take 1 tablet by mouth Every 8 (Eight) Hours As Needed for Moderate Pain .  Dispense: 90 tablet; Refill: 2  -     magnesium oxide (MAG-OX) 400 MG tablet; Take 1 tablet by mouth Daily.  Dispense: 30 tablet; Refill: 2  -     folic acid (FOLVITE) 1 MG tablet; Take 1 tablet by mouth Daily for 30 days.  Dispense: 30 tablet; Refill: 2        Follow Up {Instructions Charge Capture  Follow-up Communications :23}  Return in about 1 month (around 9/17/2021) for Next scheduled follow up.  Patient was given instructions and counseling regarding her condition or for health maintenance advice. Please see specific information pulled into the AVS if appropriate.

## 2021-10-27 LAB
QT INTERVAL: 344 MS
QTC INTERVAL: 434 MS

## 2021-12-27 ENCOUNTER — OFFICE VISIT (OUTPATIENT)
Dept: FAMILY MEDICINE CLINIC | Age: 31
End: 2021-12-27

## 2021-12-27 VITALS
TEMPERATURE: 98.2 F | WEIGHT: 139 LBS | SYSTOLIC BLOOD PRESSURE: 112 MMHG | HEART RATE: 88 BPM | BODY MASS INDEX: 22.34 KG/M2 | OXYGEN SATURATION: 98 % | RESPIRATION RATE: 16 BRPM | HEIGHT: 66 IN | DIASTOLIC BLOOD PRESSURE: 64 MMHG

## 2021-12-27 DIAGNOSIS — B19.20 HEPATITIS C VIRUS INFECTION WITHOUT HEPATIC COMA, UNSPECIFIED CHRONICITY: Primary | ICD-10-CM

## 2021-12-27 DIAGNOSIS — Z76.0 MEDICATION REFILL: ICD-10-CM

## 2021-12-27 DIAGNOSIS — I10 ESSENTIAL HYPERTENSION: ICD-10-CM

## 2021-12-27 DIAGNOSIS — R30.0 DYSURIA: ICD-10-CM

## 2021-12-27 DIAGNOSIS — F41.9 ANXIETY: ICD-10-CM

## 2021-12-27 PROCEDURE — 99213 OFFICE O/P EST LOW 20 MIN: CPT | Performed by: NURSE PRACTITIONER

## 2021-12-27 RX ORDER — SULFAMETHOXAZOLE AND TRIMETHOPRIM 800; 160 MG/1; MG/1
1 TABLET ORAL 2 TIMES DAILY
Qty: 20 TABLET | Refills: 0 | Status: SHIPPED | OUTPATIENT
Start: 2021-12-27 | End: 2022-01-06

## 2021-12-27 RX ORDER — IRON POLYSACCHARIDE COMPLEX 150 MG
150 CAPSULE ORAL DAILY
Qty: 30 CAPSULE | Refills: 2 | Status: CANCELLED | OUTPATIENT
Start: 2021-12-27

## 2021-12-27 RX ORDER — HYDROXYZINE PAMOATE 25 MG/1
25 CAPSULE ORAL 2 TIMES DAILY PRN
Qty: 60 CAPSULE | Refills: 2 | Status: SHIPPED | OUTPATIENT
Start: 2021-12-27 | End: 2023-02-22

## 2021-12-27 RX ORDER — IBUPROFEN 600 MG/1
600 TABLET ORAL EVERY 8 HOURS PRN
Qty: 90 TABLET | Refills: 2 | Status: SHIPPED | OUTPATIENT
Start: 2021-12-27 | End: 2023-02-22

## 2021-12-27 RX ORDER — MAGNESIUM OXIDE 400 MG/1
400 TABLET ORAL DAILY
Qty: 30 TABLET | Refills: 2 | Status: CANCELLED | OUTPATIENT
Start: 2021-12-27

## 2021-12-27 RX ORDER — MULTIPLE VITAMINS W/ MINERALS TAB 9MG-400MCG
1 TAB ORAL DAILY
COMMUNITY
End: 2023-02-22

## 2021-12-28 NOTE — PATIENT INSTRUCTIONS
Dysuria  Dysuria is pain or discomfort while urinating. The pain or discomfort may be felt in the part of your body that drains urine from the bladder (urethra) or in the surrounding tissue of the genitals. The pain may also be felt in the groin area, lower abdomen, or lower back. You may have to urinate frequently or have the sudden feeling that you have to urinate (urgency). Dysuria can affect both men and women, but it is more common in women.  Dysuria can be caused by many different things, including:  · Urinary tract infection.  · Kidney stones or bladder stones.  · Certain sexually transmitted infections (STIs), such as chlamydia.  · Dehydration.  · Inflammation of the tissues of the vagina.  · Use of certain medicines.  · Use of certain soaps or scented products that cause irritation.  Follow these instructions at home:  General instructions  · Watch your condition for any changes.  · Urinate often. Avoid holding urine for long periods of time.  · After a bowel movement or urination, women should cleanse from front to back, using each tissue only once.  · Urinate after sexual intercourse.  · Keep all follow-up visits as told by your health care provider. This is important.  · If you had any tests done to find the cause of dysuria, it is up to you to get your test results. Ask your health care provider, or the department that is doing the test, when your results will be ready.  Eating and drinking    · Drink enough fluid to keep your urine pale yellow.  · Avoid caffeine, tea, and alcohol. They can irritate the bladder and make dysuria worse. In men, alcohol may irritate the prostate.    Medicines  · Take over-the-counter and prescription medicines only as told by your health care provider.  · If you were prescribed an antibiotic medicine, take it as told by your health care provider. Do not stop taking the antibiotic even if you start to feel better.  Contact a health care provider if:  · You have a  fever.  · You develop pain in your back or sides.  · You have nausea or vomiting.  · You have blood in your urine.  · You are not urinating as often as you usually do.  Get help right away if:  · Your pain is severe and not relieved with medicines.  · You cannot eat or drink without vomiting.  · You are confused.  · You have a rapid heartbeat while at rest.  · You have shaking or chills.  · You feel extremely weak.  Summary  · Dysuria is pain or discomfort while urinating. Many different conditions can lead to dysuria.  · If you have dysuria, you may have to urinate frequently or have the sudden feeling that you have to urinate (urgency).  · Watch your condition for any changes. Keep all follow-up visits as told by your health care provider.  · Make sure that you urinate often and drink enough fluid to keep your urine pale yellow.  This information is not intended to replace advice given to you by your health care provider. Make sure you discuss any questions you have with your health care provider.  Document Revised: 11/30/2018 Document Reviewed: 10/04/2018  [employer] Patient Education © 2021 [employer]    Hepatitis C    Hepatitis C is a liver infection that is caused by a virus. Many people have no symptoms or only mild symptoms. Hepatitis C is contagious. This means that it can spread from person to person. Over time, the infection can lead to serious liver problems.  What are the causes?  This condition is caused by a virus. People can get this infection through:  · Contact with certain body fluids of someone who has the virus. This includes:  ? Blood.  ? Semen or vaginal fluids.  · Childbirth. A woman can pass the virus to her baby during birth.  · Blood or organ donations done in the [place] before 1992.  What increases the risk?  The following things may make you more likely to get this condition:  · Having contact with needles or syringes that have the virus on them. This may happen while:  ? Getting  a treatment that involves putting thin needles through your skin (acupuncture).  ? Getting a tattoo.  ? Getting a body piercing.  ? Injecting drugs.  · Having sex with someone who has the virus. The virus can be passed through vaginal, oral, or anal sex.  · Getting treatment to filter your blood (kidney dialysis).  · Having HIV or AIDS.  · Having a job that involves contact with blood or certain other body fluids, such as in health care.  What are the signs or symptoms?  Symptoms of this condition include:  · Tiredness (fatigue).  · Not wanting to eat as much as normal (loss of appetite).  · Feeling like you may vomit (nauseous).  · Vomiting.  · Pain in your belly (abdomen).  · Dark yellow pee (urine).  · Your skin or the white parts of your eyes turning yellow (jaundice).  · Itchy skin.  · Light-colored or tan poop (stool).  · Joint pain.  · Bleeding and bruising that happen often.  · Fluid building up in your stomach (ascites).  Often, there are no symptoms.  How is this treated?  Treatment may depend on how bad your condition is, how long it has lasted, and whether you have liver damage. More testing may be done to figure out the best treatment. Treatment may include:  · Taking antiviral medicines and other medicines.  · Having follow-up treatments every 6-12 months for infections or other liver problems.  · Getting a new liver from a donor (liver transplant).  Follow these instructions at home:  Medicines  · Take over-the-counter and prescription medicines only as told by your doctor.  · If you were prescribed an antiviral medicine, take it as told by your doctor. Do not stop using the antiviral even if you start to feel better.  · Do not take any new medicines unless your doctor says that this is okay. This includes over-the-counter medicines and supplements.  Activity  · Rest as needed.  · Do not have sex until your doctor says this is okay.  · Return to your normal activities as told by your doctor. Ask your  doctor what activities are safe for you.  · Ask your doctor when you may go back to school or work.  Eating and drinking    · Eat a balanced diet. Eat plenty of:  ? Fruits and vegetables.  ? Whole grains.  ? Low-fat (lean) meats or non-meat proteins, such as beans or tofu.  · Drink enough fluids to keep your pee pale yellow.  · Do not drink alcohol.    General instructions  · Do not share toothbrushes, nail clippers, or razors.  · Wash your hands often with soap and water for at least 20 seconds. If you do not have soap and water, use hand .  · Cover any cuts or open sores on your skin.  · Avoid swimming or using hot tubs if you have open sores or wounds.  · Keep all follow-up visits as told by your doctor. This is important. You may need follow-up visits every 6-12 months.  How is this prevented?  · Wash your hands often with soap and water for at least 20 seconds.  · Do not share needles or syringes.  · Use a condom every time you have vaginal, oral, or anal sex. Be sure to use it the right way each time.  ? Both females and males should wear condoms.  ? Condoms should be kept in place from the start to the end of sexual activity.  ? Latex condoms should be used, if possible.  · Do not handle blood or body fluids without gloves or other protection.  · Avoid getting tattoos or piercings in places that are not clean.  Where to find more information  · Centers for Disease Control and Prevention: www.cdc.gov  Contact a doctor if:  · You have a fever.  · You have belly pain.  · Your pee is dark.  · Your poop is a light color or tan.  · You have joint pain.  Get help right away if:  · You feel more and more tired.  · You feel more and more weak.  · You do not feel like eating.  · You cannot eat or drink without vomiting.  · Your skin or the whites of your eyes turn yellow or turn more yellow than they were before.  · You bruise or bleed easily.  Summary  · Hepatitis C is a liver infection that is caused by a  virus.  · Over time, the infection can lead to serious liver problems.  · The virus can be spread from person to person through blood, semen, or vaginal fluids.  · Do not take any new medicines unless your doctor says that this is okay.  This information is not intended to replace advice given to you by your health care provider. Make sure you discuss any questions you have with your health care provider.  Document Revised: 09/09/2020 Document Reviewed: 08/17/2020  [employer] Patient Education © 2021 [employer]    Managing Anxiety, Adult  After being diagnosed with an anxiety disorder, you may be relieved to know why you have felt or behaved a certain way. You may also feel overwhelmed about the treatment ahead and what it will mean for your life. With care and support, you can manage this condition and recover from it.  How to manage lifestyle changes  Managing stress and anxiety    Stress is your body's reaction to life changes and events, both good and bad. Most stress will last just a few hours, but stress can be ongoing and can lead to more than just stress. Although stress can play a major role in anxiety, it is not the same as anxiety. Stress is usually caused by something external, such as a deadline, test, or competition. Stress normally passes after the triggering event has ended.   Anxiety is caused by something internal, such as imagining a terrible outcome or worrying that something will go wrong that will devastate you. Anxiety often does not go away even after the triggering event is over, and it can become long-term (chronic) worry. It is important to understand the differences between stress and anxiety and to manage your stress effectively so that it does not lead to an anxious response.  Talk with your health care provider or a counselor to learn more about reducing anxiety and stress. He or she may suggest tension reduction techniques, such as:  · Music therapy. This can include creating or  listening to music that you enjoy and that inspires you.  · Mindfulness-based meditation. This involves being aware of your normal breaths while not trying to control your breathing. It can be done while sitting or walking.  · Centering prayer. This involves focusing on a word, phrase, or sacred image that means something to you and brings you peace.  · Deep breathing. To do this, expand your stomach and inhale slowly through your nose. Hold your breath for 3-5 seconds. Then exhale slowly, letting your stomach muscles relax.  · Self-talk. This involves identifying thought patterns that lead to anxiety reactions and changing those patterns.  · Muscle relaxation. This involves tensing muscles and then relaxing them.  Choose a tension reduction technique that suits your lifestyle and personality. These techniques take time and practice. Set aside 5-15 minutes a day to do them. Therapists can offer counseling and training in these techniques. The training to help with anxiety may be covered by some insurance plans. Other things you can do to manage stress and anxiety include:  · Keeping a stress/anxiety diary. This can help you learn what triggers your reaction and then learn ways to manage your response.  · Thinking about how you react to certain situations. You may not be able to control everything, but you can control your response.  · Making time for activities that help you relax and not feeling guilty about spending your time in this way.  · Visual imagery and yoga can help you stay calm and relax.    Medicines  Medicines can help ease symptoms. Medicines for anxiety include:  · Anti-anxiety drugs.  · Antidepressants.  Medicines are often used as a primary treatment for anxiety disorder. Medicines will be prescribed by a health care provider. When used together, medicines, psychotherapy, and tension reduction techniques may be the most effective treatment.  Relationships  Relationships can play a big part in  helping you recover. Try to spend more time connecting with trusted friends and family members. Consider going to couples counseling, taking family education classes, or going to family therapy. Therapy can help you and others better understand your condition.  How to recognize changes in your anxiety  Everyone responds differently to treatment for anxiety. Recovery from anxiety happens when symptoms decrease and stop interfering with your daily activities at home or work. This may mean that you will start to:  · Have better concentration and focus. Worry will interfere less in your daily thinking.  · Sleep better.  · Be less irritable.  · Have more energy.  · Have improved memory.  It is important to recognize when your condition is getting worse. Contact your health care provider if your symptoms interfere with home or work and you feel like your condition is not improving.  Follow these instructions at home:  Activity  · Exercise. Most adults should do the following:  ? Exercise for at least 150 minutes each week. The exercise should increase your heart rate and make you sweat (moderate-intensity exercise).  ? Strengthening exercises at least twice a week.  · Get the right amount and quality of sleep. Most adults need 7-9 hours of sleep each night.  Lifestyle    · Eat a healthy diet that includes plenty of vegetables, fruits, whole grains, low-fat dairy products, and lean protein. Do not eat a lot of foods that are high in solid fats, added sugars, or salt.  · Make choices that simplify your life.  · Do not use any products that contain nicotine or tobacco, such as cigarettes, e-cigarettes, and chewing tobacco. If you need help quitting, ask your health care provider.  · Avoid caffeine, alcohol, and certain over-the-counter cold medicines. These may make you feel worse. Ask your pharmacist which medicines to avoid.    General instructions  · Take over-the-counter and prescription medicines only as told by your  health care provider.  · Keep all follow-up visits as told by your health care provider. This is important.  Where to find support  You can get help and support from these sources:  · Self-help groups.  · Online and community organizations.  · A trusted spiritual leader.  · Couples counseling.  · Family education classes.  · Family therapy.  Where to find more information  You may find that joining a support group helps you deal with your anxiety. The following sources can help you locate counselors or support groups near you:  · Mental Health [place]: www.mentalhealth[place].net  · Anxiety and Depression Association of [place] (ADAA): www.adaa.org  · National [place] on Mental Illness (GUERLINE): www.guerline.org  Contact a health care provider if you:  · Have a hard time staying focused or finishing daily tasks.  · Spend many hours a day feeling worried about everyday life.  · Become exhausted by worry.  · Start to have headaches, feel tense, or have nausea.  · Urinate more than normal.  · Have diarrhea.  Get help right away if you have:  · A racing heart and shortness of breath.  · Thoughts of hurting yourself or others.  If you ever feel like you may hurt yourself or others, or have thoughts about taking your own life, get help right away. You can go to your nearest emergency department or call:  · Your local emergency services (911 in the [place]  · A suicide crisis helpline, such as the National Suicide Prevention Lifeline at 1-838.327.2116. This is open 24 hours a day.  Summary  · Taking steps to learn and use tension reduction techniques can help calm you and help prevent triggering an anxiety reaction.  · When used together, medicines, psychotherapy, and tension reduction techniques may be the most effective treatment.  · Family, friends, and partners can play a big part in helping you recover from an anxiety disorder.  This information is not intended to replace advice given to you by your health care provider.  Make sure you discuss any questions you have with your health care provider.  Document Revised: 05/19/2020 Document Reviewed: 05/19/2020  [employer] Patient Education © 2021 [employer]    Managing Your Hypertension  Hypertension, also called high blood pressure, is when the force of the blood pressing against the walls of the arteries is too strong. Arteries are blood vessels that carry blood from your heart throughout your body. Hypertension forces the heart to work harder to pump blood and may cause the arteries to become narrow or stiff.  Understanding blood pressure readings  Your personal target blood pressure may vary depending on your medical conditions, your age, and other factors. A blood pressure reading includes a higher number over a lower number. Ideally, your blood pressure should be below 120/80. You should know that:  · The first, or top, number is called the systolic pressure. It is a measure of the pressure in your arteries as your heart beats.  · The second, or bottom number, is called the diastolic pressure. It is a measure of the pressure in your arteries as the heart relaxes.  Blood pressure is classified into four stages. Based on your blood pressure reading, your health care provider may use the following stages to determine what type of treatment you need, if any. Systolic pressure and diastolic pressure are measured in a unit called mmHg.  Normal  · Systolic pressure: below 120.  · Diastolic pressure: below 80.  Elevated  · Systolic pressure: 120-129.  · Diastolic pressure: below 80.  Hypertension stage 1  · Systolic pressure: 130-139.  · Diastolic pressure: 80-89.  Hypertension stage 2  · Systolic pressure: 140 or above.  · Diastolic pressure: 90 or above.  How can this condition affect me?  Managing your hypertension is an important responsibility. Over time, hypertension can damage the arteries and decrease blood flow to important parts of the body, including the brain, heart, and  kidneys. Having untreated or uncontrolled hypertension can lead to:  · A heart attack.  · A stroke.  · A weakened blood vessel (aneurysm).  · Heart failure.  · Kidney damage.  · Eye damage.  · Metabolic syndrome.  · Memory and concentration problems.  · Vascular dementia.  What actions can I take to manage this condition?  Hypertension can be managed by making lifestyle changes and possibly by taking medicines. Your health care provider will help you make a plan to bring your blood pressure within a normal range.  Nutrition    · Eat a diet that is high in fiber and potassium, and low in salt (sodium), added sugar, and fat. An example eating plan is called the Dietary Approaches to Stop Hypertension (DASH) diet. To eat this way:  ? Eat plenty of fresh fruits and vegetables. Try to fill one-half of your plate at each meal with fruits and vegetables.  ? Eat whole grains, such as whole-wheat pasta, brown rice, or whole-grain bread. Fill about one-fourth of your plate with whole grains.  ? Eat low-fat dairy products.  ? Avoid fatty cuts of meat, processed or cured meats, and poultry with skin. Fill about one-fourth of your plate with lean proteins such as fish, chicken without skin, beans, eggs, and tofu.  ? Avoid pre-made and processed foods. These tend to be higher in sodium, added sugar, and fat.  · Reduce your daily sodium intake. Most people with hypertension should eat less than 1,500 mg of sodium a day.    Lifestyle    · Work with your health care provider to maintain a healthy body weight or to lose weight. Ask what an ideal weight is for you.  · Get at least 30 minutes of exercise that causes your heart to beat faster (aerobic exercise) most days of the week. Activities may include walking, swimming, or biking.  · Include exercise to strengthen your muscles (resistance exercise), such as weight lifting, as part of your weekly exercise routine. Try to do these types of exercises for 30 minutes at least 3 days a  week.  · Do not use any products that contain nicotine or tobacco, such as cigarettes, e-cigarettes, and chewing tobacco. If you need help quitting, ask your health care provider.  · Control any long-term (chronic) conditions you have, such as high cholesterol or diabetes.  · Identify your sources of stress and find ways to manage stress. This may include meditation, deep breathing, or making time for fun activities.    Alcohol use  · Do not drink alcohol if:  ? Your health care provider tells you not to drink.  ? You are pregnant, may be pregnant, or are planning to become pregnant.  · If you drink alcohol:  ? Limit how much you use to:  § 0-1 drink a day for women.  § 0-2 drinks a day for men.  ? Be aware of how much alcohol is in your drink. In the [place] one drink equals one 12 oz bottle of beer (355 mL), one 5 oz glass of wine (148 mL), or one 1½ oz glass of hard liquor (44 mL).  Medicines  Your health care provider may prescribe medicine if lifestyle changes are not enough to get your blood pressure under control and if:  · Your systolic blood pressure is 130 or higher.  · Your diastolic blood pressure is 80 or higher.  Take medicines only as told by your health care provider. Follow the directions carefully. Blood pressure medicines must be taken as told by your health care provider. The medicine does not work as well when you skip doses. Skipping doses also puts you at risk for problems.  Monitoring  Before you monitor your blood pressure:  · Do not smoke, drink caffeinated beverages, or exercise within 30 minutes before taking a measurement.  · Use the bathroom and empty your bladder (urinate).  · Sit quietly for at least 5 minutes before taking measurements.  Monitor your blood pressure at home as told by your health care provider. To do this:  · Sit with your back straight and supported.  · Place your feet flat on the floor. Do not cross your legs.  · Support your arm on a flat surface, such as a table.  Make sure your upper arm is at heart level.  · Each time you measure, take two or three readings one minute apart and record the results.  You may also need to have your blood pressure checked regularly by your health care provider.  General information  · Talk with your health care provider about your diet, exercise habits, and other lifestyle factors that may be contributing to hypertension.  · Review all the medicines you take with your health care provider because there may be side effects or interactions.  · Keep all visits as told by your health care provider. Your health care provider can help you create and adjust your plan for managing your high blood pressure.  Where to find more information  · National Heart, Lung, and Blood [place]: www.nhlbi.nih.gov  · American Heart Association: www.heart.org  Contact a health care provider if:  · You think you are having a reaction to medicines you have taken.  · You have repeated (recurrent) headaches.  · You feel dizzy.  · You have swelling in your ankles.  · You have trouble with your vision.  Get help right away if:  · You develop a severe headache or confusion.  · You have unusual weakness or numbness, or you feel faint.  · You have severe pain in your chest or abdomen.  · You vomit repeatedly.  · You have trouble breathing.  These symptoms may represent a serious problem that is an emergency. Do not wait to see if the symptoms will go away. Get medical help right away. Call your local emergency services (911 in the [place] Do not drive yourself to the hospital.  Summary  · Hypertension is when the force of blood pumping through your arteries is too strong. If this condition is not controlled, it may put you at risk for serious complications.  · Your personal target blood pressure may vary depending on your medical conditions, your age, and other factors. For most people, a normal blood pressure is less than 120/80.  · Hypertension is managed by lifestyle  changes, medicines, or both.  · Lifestyle changes to help manage hypertension include losing weight, eating a healthy, low-sodium diet, exercising more, stopping smoking, and limiting alcohol.  This information is not intended to replace advice given to you by your health care provider. Make sure you discuss any questions you have with your health care provider.  Document Revised: 01/22/2021 Document Reviewed: 11/17/2020  [employer] Patient Education © 2021 [employer]

## 2021-12-28 NOTE — PROGRESS NOTES
"Chief Complaint  Anxiety (Follow up) and Med Refill    Subjective          Marce Almonte presents to [facility]  Pt presents to the clinic for follow up and medication refills. Pt states that she has been doing well on Vistaril. Pt states that it is helping to control her anxiety and she is now working two jobs. Pt interested in going to the Hep C clinic. Will make referral. No acute illness or concerns at this time.    Anxiety  Presents for follow-up visit. Symptoms include nervous/anxious behavior. Symptoms occur occasionally. The severity of symptoms is mild. The quality of sleep is good. Nighttime awakenings: occasional.     Compliance with medications is %.       Objective   Vital Signs:   /64 (BP Location: Left arm, Patient Position: Sitting)   Pulse 88   Temp 98.2 °F (36.8 °C) (Temporal)   Resp 16   Ht 167.6 cm (66\")   Wt 63 kg (139 lb)   SpO2 98%   BMI 22.44 kg/m²     Physical Exam  Vitals reviewed.   Constitutional:       Appearance: Normal appearance. She is well-developed. She is not ill-appearing.   HENT:      Head: Normocephalic.      Right Ear: Tympanic membrane normal.      Left Ear: Tympanic membrane normal.      Nose: Nose normal.      Mouth/Throat:      Mouth: Mucous membranes are moist.   Eyes:      Pupils: Pupils are equal, round, and reactive to light.   Cardiovascular:      Rate and Rhythm: Normal rate and regular rhythm.      Pulses: Normal pulses.      Heart sounds: Normal heart sounds. No murmur heard.      Pulmonary:      Effort: Pulmonary effort is normal. No respiratory distress.      Breath sounds: Normal breath sounds. No wheezing.   Abdominal:      General: Bowel sounds are normal.      Palpations: Abdomen is soft.      Tenderness: There is abdominal tenderness in the suprapubic area. There is no guarding or rebound.   Musculoskeletal:         General: Normal range of motion.      Cervical back: Normal range of motion and neck " supple.   Skin:     General: Skin is warm and dry.   Neurological:      Mental Status: She is alert and oriented to person, place, and time.   Psychiatric:         Mood and Affect: Mood normal.         Behavior: Behavior normal.        Result Review :                 Assessment and Plan    Diagnoses and all orders for this visit:    1. Hepatitis C virus infection without hepatic coma, unspecified chronicity (Primary)  -     Ambulatory Referral to Specialty Pharmacy    2. Medication refill  -     metoprolol tartrate (LOPRESSOR) 25 MG tablet; Take 1 tablet by mouth Daily.  Dispense: 30 tablet; Refill: 2  -     ibuprofen (ADVIL,MOTRIN) 600 MG tablet; Take 1 tablet by mouth Every 8 (Eight) Hours As Needed for Moderate Pain .  Dispense: 90 tablet; Refill: 2    3. Essential hypertension  -     metoprolol tartrate (LOPRESSOR) 25 MG tablet; Take 1 tablet by mouth Daily.  Dispense: 30 tablet; Refill: 2    4. Anxiety  -     hydrOXYzine pamoate (Vistaril) 25 MG capsule; Take 1 capsule by mouth 2 (Two) Times a Day As Needed for Anxiety.  Dispense: 60 capsule; Refill: 2    5. Dysuria  -     sulfamethoxazole-trimethoprim (Bactrim DS) 800-160 MG per tablet; Take 1 tablet by mouth 2 (Two) Times a Day for 10 days.  Dispense: 20 tablet; Refill: 0        Follow Up   Return in about 3 months (around 3/27/2022) for Next scheduled follow up.  Patient was given instructions and counseling regarding her condition or for health maintenance advice. Please see specific information pulled into the AVS if appropriate.

## 2022-01-18 NOTE — PLAN OF CARE
Goal Outcome Evaluation:        Patient is resting in bed. Denies any chest pain or SOA. PRN pain medication given for back pain (see MAR.) Patient voices no other concerns at this time. Will continue to monitor and follow the plan of care.    Problem: Adult Inpatient Plan of Care  Goal: Absence of Hospital-Acquired Illness or Injury  Intervention: Prevent Infection  Recent Flowsheet Documentation  Taken 7/8/2021 0100 by Caro Peñaloza RN  Infection Prevention:   rest/sleep promoted   hand hygiene promoted  Taken 7/7/2021 2300 by Caro Peñaloza RN  Infection Prevention:   rest/sleep promoted   hand hygiene promoted  Taken 7/7/2021 2100 by Caro Peñaloza RN  Infection Prevention:   rest/sleep promoted   hand hygiene promoted  Taken 7/7/2021 2015 by Caro Peñaloza RN  Infection Prevention:   rest/sleep promoted   hand hygiene promoted     Problem: Adult Inpatient Plan of Care  Goal: Absence of Hospital-Acquired Illness or Injury  Intervention: Prevent and Manage VTE (venous thromboembolism) Risk  Recent Flowsheet Documentation  Taken 7/7/2021 2015 by Caro Peñaloza RN  VTE Prevention/Management: (see MAR) other (see comments)     Problem: Adult Inpatient Plan of Care  Goal: Absence of Hospital-Acquired Illness or Injury  Intervention: Identify and Manage Fall Risk  Recent Flowsheet Documentation  Taken 7/8/2021 0100 by Caro Peñaloza RN  Safety Promotion/Fall Prevention:   safety round/check completed   fall prevention program maintained  Taken 7/7/2021 2300 by Caro Peñaloza RN  Safety Promotion/Fall Prevention:   safety round/check completed   fall prevention program maintained  Taken 7/7/2021 2100 by Caro Peñaloza RN  Safety Promotion/Fall Prevention:   safety round/check completed   fall prevention program maintained  Taken 7/7/2021 2015 by Caro Peñaloza RN  Safety Promotion/Fall Prevention:   safety round/check completed   fall prevention program maintained     Problem: Adult  Inpatient Plan of Care  Goal: Optimal Comfort and Wellbeing  Intervention: Provide Person-Centered Care  Recent Flowsheet Documentation  Taken 7/7/2021 2015 by Caro Peñaloza RN  Trust Relationship/Rapport:   care explained   choices provided   empathic listening provided   emotional support provided   questions answered   thoughts/feelings acknowledged   questions encouraged   reassurance provided     Problem: Fall Injury Risk  Goal: Absence of Fall and Fall-Related Injury  Intervention: Identify and Manage Contributors to Fall Injury Risk  Recent Flowsheet Documentation  Taken 7/8/2021 0100 by Caro Peñaloza RN  Medication Review/Management: medications reviewed  Taken 7/7/2021 2300 by Caro Peñaloza RN  Medication Review/Management: medications reviewed  Taken 7/7/2021 2100 by Caro Peñaloza RN  Medication Review/Management: medications reviewed  Taken 7/7/2021 2015 by Caro Peñaloza RN  Medication Review/Management: medications reviewed  Intervention: Promote Injury-Free Environment  Recent Flowsheet Documentation  Taken 7/8/2021 0100 by Caro Peñaloza RN  Safety Promotion/Fall Prevention:   safety round/check completed   fall prevention program maintained  Taken 7/7/2021 2300 by Caro Peñaloza RN  Safety Promotion/Fall Prevention:   safety round/check completed   fall prevention program maintained  Taken 7/7/2021 2100 by Caro Peñaloza RN  Safety Promotion/Fall Prevention:   safety round/check completed   fall prevention program maintained  Taken 7/7/2021 2015 by Caro Peñaloza RN  Safety Promotion/Fall Prevention:   safety round/check completed   fall prevention program maintained     Problem: Skin or Soft Tissue Infection  Goal: Infection Symptom Resolution  Intervention: Provide Meticulous Infection Site Care  Recent Flowsheet Documentation  Taken 7/8/2021 0100 by Caro Peñaloza RN  Infection Prevention:   rest/sleep promoted   hand hygiene promoted  Taken 7/7/2021 2300 by  Caro Peñaloza RN  Infection Prevention:   rest/sleep promoted   hand hygiene promoted  Taken 7/7/2021 2100 by Caro Peñaloza RN  Infection Prevention:   rest/sleep promoted   hand hygiene promoted  Taken 7/7/2021 2015 by Caro Peñaloza RN  Infection Prevention:   rest/sleep promoted   hand hygiene promoted  Intervention: Minimize and Manage Infection Progression  Recent Flowsheet Documentation  Taken 7/8/2021 0100 by Caro Peñaloza RN  Isolation Precautions: protective environment maintained  Taken 7/7/2021 2300 by Caro Peñaloza RN  Isolation Precautions: protective environment maintained  Taken 7/7/2021 2100 by Caro Peñaloza RN  Infection Management: aseptic technique maintained  Isolation Precautions: protective environment maintained  Taken 7/7/2021 2015 by Caro Peñaloza RN  Infection Management: aseptic technique maintained  Isolation Precautions: protective environment maintained               Donor Site Anesthesia Type: same as repair anesthesia

## 2022-08-29 NOTE — PROGRESS NOTES
Assisted By: Ivy ARELLANO    CC: Follow-up on bacteremia    Interview History/HPI: Patient states she feels okay, she underwent her KATHRIN this a.m. without difficulty, there were no vegetations.  Patient had some tachycardia last night and earlier this morning and she again vague cold and this seemed to help.  The valves look good by KATHRIN.  She states she has some mild nausea this morning but otherwise feels okay.  She was not able to eat this morning got her antibiotic and she states this tends to make her nauseated.    ROS:     Vitals:    07/09/21 1325   BP: 142/95   Pulse: 107   Resp: 16   Temp:    SpO2:          Intake/Output Summary (Last 24 hours) at 7/9/2021 1446  Last data filed at 7/9/2021 1325  Gross per 24 hour   Intake 1035 ml   Output 1300 ml   Net -265 ml       EXAM: Temperature is 98.4, saturation is 100%.  She is in no distress, pressure 142/95, current heart rate 103 when I was in the room and she was sinus rhythm.  Lungs have bilateral breath sounds are clear no rhonchi rales or wheezing heard, heart mildly tachycardic regular rhythm without murmur rub or gallop.  Abdomen soft bowel sounds are active nondistended nontender, extremities are without edema.      EKG: Image reviewed EKG yesterday essentially normal except for mild tachycardia    LABS:     Lab Results (last 48 hours)     Procedure Component Value Units Date/Time    T4, Free [[id number]]  (Normal) Collected: 07/09/21 1154    Specimen: Blood Updated: 07/09/21 1232     Free T4 1.16 ng/dL     Narrative:      Results may be falsely increased if patient taking Biotin.      C-reactive Protein [[id number]]  (Normal) Collected: 07/09/21 0142    Specimen: Blood Updated: 07/09/21 0252     C-Reactive Protein <0.30 mg/dL     Potassium [[id number]]  (Normal) Collected: 07/08/21 2136    Specimen: Blood Updated: 07/08/21 2159     Potassium 3.9 mmol/L      Comment: Slight hemolysis detected by analyzer. Results may be affected.       Magnesium [[id number]]   (Normal) Collected: 07/08/21 2136    Specimen: Blood Updated: 07/08/21 2159     Magnesium 1.7 mg/dL     Basic Metabolic Panel [[id number]]  (Abnormal) Collected: 07/08/21 0114    Specimen: Blood Updated: 07/08/21 0229     Glucose 96 mg/dL      BUN 15 mg/dL      Creatinine 0.59 mg/dL      Sodium 138 mmol/L      Potassium 4.0 mmol/L      Chloride 103 mmol/L      CO2 26.6 mmol/L      Calcium 8.5 mg/dL      eGFR Non African Amer 120 mL/min/1.73      BUN/Creatinine Ratio 25.4     Anion Gap 8.4 mmol/L     Narrative:      GFR Normal >60  Chronic Kidney Disease <60  Kidney Failure <15      C-reactive Protein [[id number]]  (Normal) Collected: 07/08/21 0114    Specimen: Blood Updated: 07/08/21 0229     C-Reactive Protein <0.30 mg/dL     CBC & Differential [[id number]]  (Abnormal) Collected: 07/08/21 0114    Specimen: Blood Updated: 07/08/21 0211    Narrative:      The following orders were created for panel order CBC & Differential.  Procedure                               Abnormality         Status                     ---------                               -----------         ------                     CBC Auto Differential[[id number]]        Abnormal            Final result                 Please view results for these tests on the individual orders.    CBC Auto Differential [[id number]]  (Abnormal) Collected: 07/08/21 0114    Specimen: Blood Updated: 07/08/21 0211     WBC 10.77 10*3/mm3      RBC 3.27 10*6/mm3      Hemoglobin 9.4 g/dL      Hematocrit 31.1 %      MCV 95.1 fL      MCH 28.7 pg      MCHC 30.2 g/dL      RDW 12.8 %      RDW-SD 44.3 fl      MPV 9.6 fL      Platelets 438 10*3/mm3      Neutrophil % 65.0 %      Lymphocyte % 24.4 %      Monocyte % 7.3 %      Eosinophil % 1.5 %      Basophil % 1.0 %      Immature Grans % 0.8 %      Neutrophils, Absolute 6.99 10*3/mm3      Lymphocytes, Absolute 2.63 10*3/mm3      Monocytes, Absolute 0.79 10*3/mm3      Eosinophils, Absolute 0.16 10*3/mm3      Basophils, Absolute 0.11  10*3/mm3      Immature Grans, Absolute 0.09 10*3/mm3      nRBC 0.0 /100 WBC                Radiology:    Imaging Results (Last 72 Hours)     ** No results found for the last 72 hours. **          Results for orders placed during the hospital encounter of 06/17/21    Adult Transthoracic Echo Limited W/ Cont if Necessary Per Protocol    Interpretation Summary  · Left ventricular ejection fraction appears to be 61 - 65%. Left ventricular systolic function is normal.  · No significant functional valvular abnormalities noted  · There is no evidence of pericardial effusion  Discussed KATHRIN results with Dr. Christianson, no vegetations    Assessment/Plan:   Bacteremia, she since she does not have vegetations but had a complicated bacteremia needs 4 weeks of IV Ancef.  This being the case this will complete through July 22 to my count.  This would mean she can be discharged home on July 23.   is working on a possible place for the patient to go to continue to get this, she is too high risk considering her IV drug use to go home with a line.    Tachycardia, TSH and free T4 have been normal, follow    Hepatitis C, will need outpatient follow-up, possible treatment in the future once this is followed and confirmed to stay positive however her transaminases are normal.    Anemia, continue iron and folic acid    COVID-19, and now she is 21 days out from diagnosis she did not ever have symptoms.    DVT prophylaxis, continue subcu Lovenox    Borderline magnesium, being supplemented, follow.      Disposition yet to be determined    Deng Sanchez MD      Topical Clindamycin Counseling: Patient counseled that this medication may cause skin irritation or allergic reactions.  In the event of skin irritation, the patient was advised to reduce the amount of the drug applied or use it less frequently.   The patient verbalized understanding of the proper use and possible adverse effects of clindamycin.  All of the patient's questions and concerns were addressed.

## 2022-09-13 DIAGNOSIS — I10 ESSENTIAL HYPERTENSION: ICD-10-CM

## 2022-09-13 DIAGNOSIS — Z76.0 MEDICATION REFILL: ICD-10-CM

## 2023-02-22 ENCOUNTER — DISEASE STATE MANAGEMENT VISIT (OUTPATIENT)
Dept: PHARMACY | Facility: HOSPITAL | Age: 33
End: 2023-02-22
Payer: COMMERCIAL

## 2023-02-22 ENCOUNTER — HOSPITAL ENCOUNTER (OUTPATIENT)
Dept: ULTRASOUND IMAGING | Facility: HOSPITAL | Age: 33
Discharge: HOME | End: 2023-02-22
Admitting: PHYSICIAN ASSISTANT
Payer: COMMERCIAL

## 2023-02-22 VITALS
BODY MASS INDEX: 21.86 KG/M2 | WEIGHT: 136 LBS | DIASTOLIC BLOOD PRESSURE: 80 MMHG | SYSTOLIC BLOOD PRESSURE: 120 MMHG | HEIGHT: 66 IN | HEART RATE: 86 BPM

## 2023-02-22 DIAGNOSIS — B18.2 CHRONIC HEPATITIS C WITHOUT HEPATIC COMA: Primary | ICD-10-CM

## 2023-02-22 DIAGNOSIS — B18.2 CHRONIC HEPATITIS C WITHOUT HEPATIC COMA: ICD-10-CM

## 2023-02-22 LAB
ALBUMIN SERPL-MCNC: 4.3 G/DL (ref 3.5–5.2)
ALBUMIN/GLOB SERPL: 1.5 G/DL
ALP SERPL-CCNC: 93 U/L (ref 39–117)
ALPHA-FETOPROTEIN: 3.7 NG/ML (ref 0–8.3)
ALT SERPL W P-5'-P-CCNC: 45 U/L (ref 1–33)
AMPHET+METHAMPHET UR QL: NEGATIVE
AMPHETAMINES UR QL: NEGATIVE
ANION GAP SERPL CALCULATED.3IONS-SCNC: 5.1 MMOL/L (ref 5–15)
AST SERPL-CCNC: 52 U/L (ref 1–32)
BARBITURATES UR QL SCN: NEGATIVE
BASOPHILS # BLD AUTO: 0.04 10*3/MM3 (ref 0–0.2)
BASOPHILS NFR BLD AUTO: 1 % (ref 0–1.5)
BENZODIAZ UR QL SCN: NEGATIVE
BILIRUB SERPL-MCNC: 0.6 MG/DL (ref 0–1.2)
BUN SERPL-MCNC: 9 MG/DL (ref 6–20)
BUN/CREAT SERPL: 10.1 (ref 7–25)
BUPRENORPHINE SERPL-MCNC: NEGATIVE NG/ML
CALCIUM SPEC-SCNC: 9 MG/DL (ref 8.6–10.5)
CANNABINOIDS SERPL QL: NEGATIVE
CHLORIDE SERPL-SCNC: 105 MMOL/L (ref 98–107)
CO2 SERPL-SCNC: 26.9 MMOL/L (ref 22–29)
COCAINE UR QL: NEGATIVE
CREAT SERPL-MCNC: 0.89 MG/DL (ref 0.57–1)
DEPRECATED RDW RBC AUTO: 42.4 FL (ref 37–54)
EGFRCR SERPLBLD CKD-EPI 2021: 89 ML/MIN/1.73
EOSINOPHIL # BLD AUTO: 0.05 10*3/MM3 (ref 0–0.4)
EOSINOPHIL NFR BLD AUTO: 1.3 % (ref 0.3–6.2)
ERYTHROCYTE [DISTWIDTH] IN BLOOD BY AUTOMATED COUNT: 14.5 % (ref 12.3–15.4)
GLOBULIN UR ELPH-MCNC: 2.8 GM/DL
GLUCOSE SERPL-MCNC: 69 MG/DL (ref 65–99)
HBV SURFACE AB SER RIA-ACNC: REACTIVE
HBV SURFACE AG SERPL QL IA: NORMAL
HCG SERPL QL: NEGATIVE
HCT VFR BLD AUTO: 36.8 % (ref 34–46.6)
HGB BLD-MCNC: 11.8 G/DL (ref 12–15.9)
HIV1+2 AB SER QL: NORMAL
IMM GRANULOCYTES # BLD AUTO: 0.01 10*3/MM3 (ref 0–0.05)
IMM GRANULOCYTES NFR BLD AUTO: 0.3 % (ref 0–0.5)
INR PPP: 1 (ref 0.9–1.1)
LYMPHOCYTES # BLD AUTO: 1.47 10*3/MM3 (ref 0.7–3.1)
LYMPHOCYTES NFR BLD AUTO: 37 % (ref 19.6–45.3)
MCH RBC QN AUTO: 26 PG (ref 26.6–33)
MCHC RBC AUTO-ENTMCNC: 32.1 G/DL (ref 31.5–35.7)
MCV RBC AUTO: 81.2 FL (ref 79–97)
METHADONE UR QL SCN: NEGATIVE
MONOCYTES # BLD AUTO: 0.32 10*3/MM3 (ref 0.1–0.9)
MONOCYTES NFR BLD AUTO: 8.1 % (ref 5–12)
NEUTROPHILS NFR BLD AUTO: 2.08 10*3/MM3 (ref 1.7–7)
NEUTROPHILS NFR BLD AUTO: 52.3 % (ref 42.7–76)
NRBC BLD AUTO-RTO: 0 /100 WBC (ref 0–0.2)
OPIATES UR QL: NEGATIVE
OXYCODONE UR QL SCN: NEGATIVE
PCP UR QL SCN: NEGATIVE
PLATELET # BLD AUTO: 329 10*3/MM3 (ref 140–450)
PMV BLD AUTO: 10.7 FL (ref 6–12)
POTASSIUM SERPL-SCNC: 3.9 MMOL/L (ref 3.5–5.2)
PROPOXYPH UR QL: NEGATIVE
PROT SERPL-MCNC: 7.1 G/DL (ref 6–8.5)
PROTHROMBIN TIME: 13.4 SECONDS (ref 12.1–14.7)
RBC # BLD AUTO: 4.53 10*6/MM3 (ref 3.77–5.28)
SODIUM SERPL-SCNC: 137 MMOL/L (ref 136–145)
TRICYCLICS UR QL SCN: NEGATIVE
WBC NRBC COR # BLD: 3.97 10*3/MM3 (ref 3.4–10.8)

## 2023-02-22 PROCEDURE — 87340 HEPATITIS B SURFACE AG IA: CPT

## 2023-02-22 PROCEDURE — 84703 CHORIONIC GONADOTROPIN ASSAY: CPT

## 2023-02-22 PROCEDURE — 80306 DRUG TEST PRSMV INSTRMNT: CPT

## 2023-02-22 PROCEDURE — 86704 HEP B CORE ANTIBODY TOTAL: CPT

## 2023-02-22 PROCEDURE — 80053 COMPREHEN METABOLIC PANEL: CPT

## 2023-02-22 PROCEDURE — 85025 COMPLETE CBC W/AUTO DIFF WBC: CPT

## 2023-02-22 PROCEDURE — 76705 ECHO EXAM OF ABDOMEN: CPT | Performed by: RADIOLOGY

## 2023-02-22 PROCEDURE — 86706 HEP B SURFACE ANTIBODY: CPT

## 2023-02-22 PROCEDURE — 99214 OFFICE O/P EST MOD 30 MIN: CPT | Performed by: PHYSICIAN ASSISTANT

## 2023-02-22 PROCEDURE — 86708 HEPATITIS A ANTIBODY: CPT

## 2023-02-22 PROCEDURE — 85610 PROTHROMBIN TIME: CPT

## 2023-02-22 PROCEDURE — 87902 NFCT AGT GNTYP ALYS HEP C: CPT

## 2023-02-22 PROCEDURE — 82105 ALPHA-FETOPROTEIN SERUM: CPT

## 2023-02-22 PROCEDURE — 76705 ECHO EXAM OF ABDOMEN: CPT

## 2023-02-22 PROCEDURE — 87522 HEPATITIS C REVRS TRNSCRPJ: CPT

## 2023-02-22 PROCEDURE — 81596 NFCT DS CHRNC HCV 6 ASSAYS: CPT

## 2023-02-22 PROCEDURE — G0432 EIA HIV-1/HIV-2 SCREEN: HCPCS

## 2023-02-22 NOTE — PROGRESS NOTES
Chief Complaint   Patient presents with   • Hepatitis C     Marce Pack is a 32 y.o. female who presents to the office today at the request of Self Referring for Hepatitis C.    HPI  She found out about having Hepatitis C infection approx 1 year ago. She has not had prior treatment for hepatitis. Reports no known personal history of liver disease including other viral hepatitis. There is no known family history of liver disease or cirrhosis. She admits to previous IVDU and intranasal drug use. She does have nonprofessional tattoos. Denies having previous alcoholism. She does not currently drink alcohol. She denies current illicit drug use including marijuana. No recent liver imaging. She has not had recent labs. She has had previous vaccinations for Hepatitis A and B. She is currently unemployed. The patient receives support from her father and in-laws..    Review of Systems   Constitutional: Negative for activity change, appetite change and fatigue.   HENT: Negative for trouble swallowing and voice change.    Respiratory: Negative for cough and choking.    Cardiovascular: Negative for leg swelling.   Gastrointestinal: Negative for abdominal distention, abdominal pain, anal bleeding, blood in stool, constipation, diarrhea, nausea, rectal pain and vomiting.   Endocrine: Negative for polyphagia.   Genitourinary: Negative for flank pain.   Musculoskeletal: Negative for back pain.   Skin: Negative for color change and pallor.   Allergic/Immunologic: Negative for food allergies.   Neurological: Negative for weakness.   Psychiatric/Behavioral: Negative for confusion.       ACTIVE PROBLEMS:   Specialty Problems        Cardiology Problems    Endocarditis           PAST MEDICAL HISTORY:  Past Medical History:   Diagnosis Date   • COVID-19 8/3/2021   • Endocarditis     July 2021   • Irregular heart beat     July 2021   • IV drug abuse (HCC)    • MRSA cellulitis 2019    Left knee   • Pneumonia 8/3/2021   •  "Staphylococcus aureus bacteremia with sepsis ([facility]) 8/3/2021       SURGICAL HISTORY:  Past Surgical History:   Procedure Laterality Date   • ENDOSCOPY      [place], June 2021   • TONSILLECTOMY         FAMILY HISTORY:  Family History   Problem Relation Age of Onset   • Hypertension Father        SOCIAL HISTORY:  Social History     Tobacco Use   • Smoking status: Former     Types: Cigarettes   • Smokeless tobacco: Never   Substance Use Topics   • Alcohol use: Not Currently     Comment: sober since 2021       CURRENT MEDICATION:  No current outpatient medications on file.    ALLERGIES:  Cinnamon    VISIT VITALS:  Blood Pressure 120/80   Pulse 86   Height 167.6 cm (66\")   Weight 61.7 kg (136 lb)   Body Mass Index 21.95 kg/m²     PHYSICAL EXAMINATION:  Physical Exam  Constitutional:       General: She is not in acute distress.     Appearance: She is well-developed. She is not diaphoretic.   HENT:      Head: Normocephalic and atraumatic.      Right Ear: External ear normal.      Left Ear: External ear normal.      Nose: Nose normal.      Mouth/Throat:      Pharynx: No oropharyngeal exudate.   Eyes:      General: No scleral icterus.        Right eye: No discharge.         Left eye: No discharge.      Conjunctiva/sclera: Conjunctivae normal.      Pupils: Pupils are equal, round, and reactive to light.   Neck:      Thyroid: No thyromegaly.      Vascular: No JVD.      Trachea: No tracheal deviation.   Cardiovascular:      Rate and Rhythm: Normal rate and regular rhythm.      Heart sounds: Normal heart sounds. No murmur heard.    No friction rub. No gallop.   Pulmonary:      Effort: Pulmonary effort is normal. No respiratory distress.      Breath sounds: Normal breath sounds. No stridor. No wheezing or rales.   Chest:      Chest wall: No tenderness.   Abdominal:      General: Bowel sounds are normal. There is no distension.      Palpations: Abdomen is soft. There is no mass.      Tenderness: There is no abdominal tenderness. " There is no guarding or rebound.      Hernia: No hernia is present.   Genitourinary:     Rectum: Guaiac result negative.   Musculoskeletal:      Cervical back: Normal range of motion and neck supple.   Lymphadenopathy:      Cervical: No cervical adenopathy.   Skin:     General: Skin is warm and dry.      Coloration: Skin is not pale.      Findings: No erythema or rash.   Neurological:      Mental Status: She is alert and oriented to person, place, and time.      Cranial Nerves: No cranial nerve deficit.      Motor: No abnormal muscle tone.      Coordination: Coordination normal.      Deep Tendon Reflexes: Reflexes are normal and symmetric. Reflexes normal.   Psychiatric:         Behavior: Behavior normal.         Thought Content: Thought content normal.         Judgment: Judgment normal.         Assessment & Plan      Diagnosis Plan   1. Chronic hepatitis C without hepatic coma (HCC)  AFP Tumor Marker    CBC & Differential    Comprehensive Metabolic Panel    hCG, Serum, Qualitative    HCV FibroSURE    HCV RNA By PCR, Qn Rfx Lucía    Hepatitis A Antibody, Total    Hepatitis B Core Antibody, Total    Hepatitis B Surface Antibody    Hepatitis B Surface Antigen    HIV-1 & HIV-2 Antibodies    Protime-INR    Urine Drug Screen - Urine, Clean Catch    US Liver        The patient will complete initial lab work and liver US in order to begin Hepatitis C treatment.  The patient will return in two weeks to discuss results and begin treatment if warranted.  Return in about 2 weeks (around 3/8/2023) for Recheck.           Miguelina Azevedo PA-C

## 2023-02-23 LAB
HAV AB SER QL IA: NEGATIVE
HBV CORE AB SERPL QL IA: NEGATIVE

## 2023-02-25 LAB
A2 MACROGLOB SERPL-MCNC: 207 MG/DL (ref 110–276)
ALT SERPL W P-5'-P-CCNC: 51 IU/L (ref 0–40)
APO A-I SERPL-MCNC: 160 MG/DL (ref 116–209)
BILIRUB SERPL-MCNC: 0.5 MG/DL (ref 0–1.2)
FIBROSIS SCORING:: ABNORMAL
FIBROSIS STAGE SERPL QL: ABNORMAL
GGT SERPL-CCNC: 29 IU/L (ref 0–60)
HAPTOGLOB SERPL-MCNC: 44 MG/DL (ref 33–278)
HCV AB SER QL: ABNORMAL
LABORATORY COMMENT REPORT: ABNORMAL
LIVER FIBR SCORE SERPL CALC.FIBROSURE: 0.18 (ref 0–0.21)
NECROINFLAMM ACTIVITY SCORING:: ABNORMAL
NECROINFLAMMATORY ACT GRADE SERPL QL: ABNORMAL
NECROINFLAMMATORY ACT SCORE SERPL: 0.26 (ref 0–0.17)
SERVICE CMNT-IMP: ABNORMAL

## 2023-02-28 LAB
HCV GENTYP SERPL NAA+PROBE: NORMAL
HCV GENTYP SERPL NAA+PROBE: NORMAL
HCV RNA SERPL NAA+PROBE-ACNC: NORMAL IU/ML
HCV RNA SERPL NAA+PROBE-LOG IU: 6.15 LOG10 IU/ML
LABORATORY COMMENT REPORT: NORMAL
LABORATORY COMMENT REPORT: NORMAL

## 2023-05-03 ENCOUNTER — SPECIALTY PHARMACY (OUTPATIENT)
Dept: PHARMACY | Facility: HOSPITAL | Age: 33
End: 2023-05-03
Payer: COMMERCIAL

## 2023-05-03 ENCOUNTER — DISEASE STATE MANAGEMENT VISIT (OUTPATIENT)
Dept: PHARMACY | Facility: HOSPITAL | Age: 33
End: 2023-05-03
Payer: COMMERCIAL

## 2023-05-03 VITALS
HEIGHT: 66 IN | HEART RATE: 79 BPM | BODY MASS INDEX: 22.34 KG/M2 | SYSTOLIC BLOOD PRESSURE: 113 MMHG | WEIGHT: 139 LBS | DIASTOLIC BLOOD PRESSURE: 86 MMHG

## 2023-05-03 DIAGNOSIS — B18.2 CHRONIC HEPATITIS C WITHOUT HEPATIC COMA: Primary | ICD-10-CM

## 2023-05-03 RX ORDER — GLECAPREVIR AND PIBRENTASVIR 40; 100 MG/1; MG/1
3 TABLET, FILM COATED ORAL DAILY
Qty: 84 TABLET | Refills: 1
Start: 2023-05-03 | End: 2023-05-03 | Stop reason: SDUPTHER

## 2023-05-03 RX ORDER — GLECAPREVIR AND PIBRENTASVIR 40; 100 MG/1; MG/1
3 TABLET, FILM COATED ORAL DAILY
Qty: 84 TABLET | Refills: 1 | Status: SHIPPED | OUTPATIENT
Start: 2023-05-03

## 2023-05-03 NOTE — PROGRESS NOTES
Initial Education Provided for Mavyret    The patient has been provided with the following education for MAVYRET. All questions and concerns have been addressed prior to the patient receiving the medication, and the patient has verbalized understanding of the education and any materials provided.  Additional patient education shall be provided and documented upon request by the patient, provider or payer.      Patient was counseled on new medication Mavyret (glecaprevir and pibrentasvir)     - This medication is used to treat hepatitis C infection and the goal of this treatment is to cure Hepatitis C.   - Take 3 tablets by mouth at the same time each day, with food.   - You will take this for a total of 8 weeks    - Frequently reported side effects of this drug include: headache, loss of energy, nausea and diarrhea.     - Go to the ED or call 911 with signs of a significant reaction including wheezing; chest tightness; fever; itching; bad cough; blue skin color; seizures; or swelling of face, lips, tongue or throat.   - Hepatic decompensation and hepatic failure have been reported. This typically occurs within the first 4 weeks of treatment initiation. Talk to your doctor right away if you notice dark urine, fatigue, lack of appetite, nausea, abdominal pain, light-colored stools, vomiting or yellow skin.       - Be sure to follow up with our clinic 4 weeks after starting the medication to ensure you are tolerating the medication well and that you are responding appropriately to the medication.   - Make sure to tell your doctor or pharmacist about all medications you are taking, including herbal supplements and OTC products.    - Do not stop taking this medication without talking to your provider.     - It is very important that you do not miss a dose of this medication.  Use a pill planner, medication adherence feng or other tool to help you remember how to take your medication.    - If you do miss a dose and it is  within 18 hours from the usual dosage time, administer dose as soon as possible, then administer dose at usual dosage time.  If more than 18 hours from the usual dosage time has passed, skip the missed dose and administer the next dose at the usual time.     - Keep this medication away from extreme temperatures or moisture exposure. Store at room temperature.    Patient Specific Counseling Points:     - Females of childbearing potential should consider postponing pregnancy until therapy is complete to reduce the risk of HCV transmission.   - This medication should not be used in pregnant females and it is not known if the medication is present in breast milk.     - Patients with diabetes should closely monitor their blood sugar after starting. This medication may lead to an improvement in glucose metabolism, potentially resulting in hypoglycemia.  Monitor for signs and symptoms of hypoglycemia and follow the rule of 15 to treat hypoglycemia.       Adherence and Self-Administration  • Barriers to Patient Adherence and/or Self-Administration: None  • Methods for Supporting Patient Adherence and/or Self-Administration: None Required     Associated Vaccinations     Your chronic liver disease puts you at risk for serious complications if you get infected with hepatitis A virus.  If you've never been vaccinated against hepatitis A, you need 2 doses of this vaccine, usually spaced 6-19 months apart.     If you already have chronic hepatitis B infection, you won't need a hepatitis B vaccine.  However, if you do not have sufficient Hepatitis B antibodies (either not vaccinated or insufficient response to vaccination), you should get the Hepatitis B vaccination series.  The vaccine is given in 2 or 3 doses, depending on the brand.     A combination A & B vaccination is also available if both are needed.     a. Contraindications: Severe allergic reaction (eg, anaphylaxis) after a previous dose of any hepatitis A-containing or  hepatitis B-containing vaccine or any component of the formulation, including yeast and neomycin.   b. Precautions: Consider deferring administration in patients with moderate or severe acute illness.  Use with caution in patients with bleeding disorders or severely immunocompromised patients    Marce Pack was counseled that the following immunizations are recommended: Hepatitis A    The patient would like to utilize mail out specialty services    Krystle Baltazar [facility]  05/03/23  09:51 EDT

## 2023-05-03 NOTE — PROGRESS NOTES
[facility]  Hepatitis C Clinical Assessment     Marce Pack is a 33 y.o. female seen by in the [facility] for Hepatitis C treatment. Patient denies pregnancy or breastfeeding at this time.     Previous Hep C Treatment  is treatment naïve    Relevant Past Medical History and Comorbidities  Past Medical History:   Diagnosis Date   • COVID-19 08/03/2021   • Endocarditis     July 2021   • Hypertension    • Irregular heart beat     July 2021   • IV drug abuse    • MRSA cellulitis 2019    Left knee   • Pneumonia 08/03/2021   • Staphylococcus aureus bacteremia with sepsis 08/03/2021     Social History     Socioeconomic History   • Marital status: Single   Tobacco Use   • Smoking status: Former     Types: Cigarettes   • Smokeless tobacco: Never   Vaping Use   • Vaping Use: Every day   • Substances: Nicotine, Flavoring   • Devices: Disposable   Substance and Sexual Activity   • Alcohol use: Not Currently     Comment: sober since 2021   • Drug use: Not Currently     Types: Oxycodone, Opium, IV, Methamphetamines     Comment: clean 2 years   • Sexual activity: Yes     Partners: Male       Allergies  Cinnamon    Insurance Coverage & Financial Support  Medicaid    Current Medication List    Current Outpatient Medications:   •  Glecaprevir-Pibrentasvir (Mavyret) 100-40 MG tablet, Take 3 tablets by mouth Daily., Disp: 84 tablet, Rfl: 1    Drug Interactions  Patient does not take any other medications, no issues at this time.     Relevant Laboratory Values  Lab Results   Component Value Date    GLUCOSE 69 02/22/2023    CALCIUM 9.0 02/22/2023     02/22/2023    K 3.9 02/22/2023    CO2 26.9 02/22/2023     02/22/2023    BUN 9 02/22/2023    CREATININE 0.89 02/22/2023    EGFRIFNONA 88 08/10/2021    BCR 10.1 02/22/2023    ANIONGAP 5.1 02/22/2023    AST 52 (H) 02/22/2023    ALT 45 (H) 02/22/2023     Lab Results   Component Value Date    WBC 3.97 02/22/2023    HGB 11.8 (L) 02/22/2023     HCT 36.8 02/22/2023    MCV 81.2 02/22/2023     02/22/2023     No results found for: HCVRNA  [id number]   Hepatitis C Genotype   Date Value Ref Range Status   02/22/2023 1a  Final     HCV Genotype   Date Value Ref Range Status   02/22/2023 Comment  Final     Comment:     To be performed on this specimen.        Fibrosis  F0  FIB4  0.75  Immunizations  Hep A recommended  Hepatitis B immune  Lab Results   Component Value Date    HAV Negative 02/22/2023    HEPAIGM Non-Reactive 06/18/2021    HEPBCAB Negative 02/22/2023         Co-infection  HIV negative  Hepatitis B negative    Goals of Therapy  • Patient Goals of Therapy: Medication Adherence   • Clinical Goals or Therapeutic Targets, If Applicable: Sustained Virological Response at 12 Weeks Post-Treatment     Attestation  I attest that the initiated specialty medication(s) are appropriate for the patient based on my assessment.  If the prescribed therapy is at any point deemed not appropriate based on the current or future assessments, a consultation will be initiated with the patient's specialty care provider to determine the best course of action. The revised plan of therapy will be documented along with any additional patient education provided.     Assessment & Plan    Patient has Hepatitis C, genotype 1a (F0)(FIB-4 =0.75) and is treatment naïve.  Patient has been prescribed Mavyret 3 tablets daily with food x 8 weeks.  Will begin prior authorization, if applicable. Medication education and counseling provided, see counseling note.     Counseled patient to call us if she were to become pregnant or start any new medications. Patient expressed verbal understanding.     The patient would like to utilize mail out services.    The following immunizations are needed: Hepatitis A.     Call home phone first (pt does not get good mobile service at home)    Patient will follow up with clinic 4 weeks after starting medication to assess virologic response and medication  tolerability.     Krystle Baltazar, [facility]  5/3/2023  09:51 EDT

## 2023-05-03 NOTE — PROGRESS NOTES
Chief Complaint   Patient presents with   • Hepatitis C       Marce Pack is a 33 y.o. female who presents to the office today for follow up appointment for Hepatitis C  .    HPI  The patient was seen for a follow up visit to discuss initial testing results for Hep C treatment.  Liver US:  Homogeneous echotexture.  Labs:  Hep C viral load of 1,420,000; Hep C genotype of 1a; PT-INR normal; HIV nonreactive; Hepatitis B surface antibodies reactive; Hep A Antibody negative; Hep B core antibody and surface antigen negative; HCV fibrosure was stage F0-no fibrosis; HCG negative; ALT 45; AST 52; platelets normal; AFP tumor marker. Urine drug screen negative.        Review of Systems   Constitutional: Negative for activity change, appetite change and fatigue.   HENT: Negative for trouble swallowing and voice change.    Respiratory: Negative for cough and choking.    Cardiovascular: Negative for leg swelling.   Gastrointestinal: Negative for abdominal distention, abdominal pain, anal bleeding, blood in stool, constipation, diarrhea, nausea, rectal pain and vomiting.   Endocrine: Negative for polyphagia.   Genitourinary: Negative for flank pain.   Musculoskeletal: Negative for back pain.   Skin: Negative for color change and pallor.   Allergic/Immunologic: Negative for food allergies.   Neurological: Negative for weakness.   Psychiatric/Behavioral: Negative for confusion.       ACTIVE PROBLEMS:   Specialty Problems        Cardiology Problems    Endocarditis           PAST MEDICAL HISTORY:  Past Medical History:   Diagnosis Date   • COVID-19 08/03/2021   • Endocarditis     July 2021   • Hypertension    • Irregular heart beat     July 2021   • IV drug abuse    • MRSA cellulitis 2019    Left knee   • Pneumonia 08/03/2021   • Staphylococcus aureus bacteremia with sepsis 08/03/2021       SURGICAL HISTORY:  Past Surgical History:   Procedure Laterality Date   • ENDOSCOPY      [place], June 2021   • TONSILLECTOMY    "      FAMILY HISTORY:  Family History   Problem Relation Age of Onset   • Hypertension Father        SOCIAL HISTORY:  Social History     Tobacco Use   • Smoking status: Former     Types: Cigarettes   • Smokeless tobacco: Never   Substance Use Topics   • Alcohol use: Not Currently     Comment: sober since 2021       CURRENT MEDICATION:    Current Outpatient Medications:   •  Glecaprevir-Pibrentasvir (Mavyret) 100-40 MG tablet, Take 3 tablets by mouth Daily., Disp: 84 tablet, Rfl: 1    ALLERGIES:  Cinnamon    VISIT VITALS:  Blood Pressure 113/86   Pulse 79   Height 167.6 cm (66\")   Weight 63 kg (139 lb)   Body Mass Index 22.44 kg/m²     Physical Exam  Constitutional:       General: She is not in acute distress.     Appearance: She is well-developed. She is not diaphoretic.   HENT:      Head: Normocephalic and atraumatic.      Right Ear: External ear normal.      Left Ear: External ear normal.      Nose: Nose normal.      Mouth/Throat:      Pharynx: No oropharyngeal exudate.   Eyes:      General: No scleral icterus.        Right eye: No discharge.         Left eye: No discharge.      Conjunctiva/sclera: Conjunctivae normal.      Pupils: Pupils are equal, round, and reactive to light.   Neck:      Thyroid: No thyromegaly.      Vascular: No JVD.      Trachea: No tracheal deviation.   Cardiovascular:      Rate and Rhythm: Normal rate and regular rhythm.      Heart sounds: Normal heart sounds. No murmur heard.    No friction rub. No gallop.   Pulmonary:      Effort: Pulmonary effort is normal. No respiratory distress.      Breath sounds: Normal breath sounds. No stridor. No wheezing or rales.   Chest:      Chest wall: No tenderness.   Abdominal:      General: Bowel sounds are normal. There is no distension.      Palpations: Abdomen is soft. There is no mass.      Tenderness: There is no abdominal tenderness. There is no guarding or rebound.      Hernia: No hernia is present.   Genitourinary:     Rectum: Guaiac result " negative.   Musculoskeletal:      Cervical back: Normal range of motion and neck supple.   Lymphadenopathy:      Cervical: No cervical adenopathy.   Skin:     General: Skin is warm and dry.      Coloration: Skin is not pale.      Findings: No erythema or rash.   Neurological:      Mental Status: She is alert and oriented to person, place, and time.      Cranial Nerves: No cranial nerve deficit.      Motor: No abnormal muscle tone.      Coordination: Coordination normal.      Deep Tendon Reflexes: Reflexes are normal and symmetric. Reflexes normal.   Psychiatric:         Behavior: Behavior normal.         Thought Content: Thought content normal.         Judgment: Judgment normal.         Assessment & Plan      Diagnosis Plan   1. Chronic hepatitis C without hepatic coma          Mavyret will be ordered.  Patient was educated on administration and side effects on medication.  Patient will return in four weeks to check viral load to see if the body is responding effectively to treatment and also to have liver enzymes monitored.  Patient voiced understanding and agreement.  Return in about 4 weeks (around 5/31/2023) for Recheck.         Miguelina Azevedo PA-C

## 2023-06-02 ENCOUNTER — SPECIALTY PHARMACY (OUTPATIENT)
Dept: PHARMACY | Facility: HOSPITAL | Age: 33
End: 2023-06-02

## 2023-06-02 NOTE — PROGRESS NOTES
[facility]  Hepatitis C Clinical Assessment     Marce Pack is a 33 y.o. female seen by in the Medication Management Clinic for Hepatitis C treatment.     A male picked up prescription today from [facility] for patient. He reports she has been tolerating the medication well with no side effects and she has not missed any doses. She has not started on any new medications or had any changes this past month. He denies patient is pregnant or planning to become pregnant at this time.     Previous Hep C Treatment  is treatment naïve    Relevant Past Medical History and Comorbidities  Past Medical History:   Diagnosis Date   • COVID-19 08/03/2021   • Endocarditis     July 2021   • Hypertension    • Irregular heart beat     July 2021   • IV drug abuse    • MRSA cellulitis 2019    Left knee   • Pneumonia 08/03/2021   • Staphylococcus aureus bacteremia with sepsis 08/03/2021     Social History     Socioeconomic History   • Marital status: Single   Tobacco Use   • Smoking status: Former     Types: Cigarettes   • Smokeless tobacco: Never   Vaping Use   • Vaping Use: Every day   • Substances: Nicotine, Flavoring   • Devices: Disposable   Substance and Sexual Activity   • Alcohol use: Not Currently     Comment: sober since 2021   • Drug use: Not Currently     Types: Oxycodone, Opium, IV, Methamphetamines     Comment: clean 2 years   • Sexual activity: Yes     Partners: Male       Allergies  Cinnamon    Insurance Coverage & Financial Support  Medicaid    Current Medication List    Current Outpatient Medications:   •  Glecaprevir-Pibrentasvir (Mavyret) 100-40 MG tablet, Take 3 tablets by mouth Daily. Take with food, Disp: 84 tablet, Rfl: 1    Drug Interactions  Patient does not take any other medications, no issues at this time.     Relevant Laboratory Values  Lab Results   Component Value Date    GLUCOSE 69 02/22/2023    CALCIUM 9.0 02/22/2023     02/22/2023    K 3.9 02/22/2023    CO2 26.9  02/22/2023     02/22/2023    BUN 9 02/22/2023    CREATININE 0.89 02/22/2023    EGFRIFNONA 88 08/10/2021    BCR 10.1 02/22/2023    ANIONGAP 5.1 02/22/2023    AST 52 (H) 02/22/2023    ALT 45 (H) 02/22/2023     Lab Results   Component Value Date    WBC 3.97 02/22/2023    HGB 11.8 (L) 02/22/2023    HCT 36.8 02/22/2023    MCV 81.2 02/22/2023     02/22/2023     No results found for: HCVRNA  [id number]   Hepatitis C Genotype   Date Value Ref Range Status   02/22/2023 1a  Final     HCV Genotype   Date Value Ref Range Status   02/22/2023 Comment  Final     Comment:     To be performed on this specimen.        Fibrosis  F0  FIB4  0.75  Immunizations  Hep A recommended  Hepatitis B immune  Lab Results   Component Value Date    HAV Negative 02/22/2023    HEPAIGM Non-Reactive 06/18/2021    HEPBCAB Negative 02/22/2023         Co-infection  HIV negative  Hepatitis B negative    Goals of Therapy  • Patient Goals of Therapy: Medication Adherence   • Clinical Goals or Therapeutic Targets, If Applicable: Sustained Virological Response at 12 Weeks Post-Treatment     Attestation  I attest that the initiated specialty medication(s) are appropriate for the patient based on my assessment.  If the prescribed therapy is at any point deemed not appropriate based on the current or future assessments, a consultation will be initiated with the patient's specialty care provider to determine the best course of action. The revised plan of therapy will be documented along with any additional patient education provided.     Assessment & Plan    Patient has Hepatitis C, genotype 1a (F0)(FIB-4 =0.75) and is treatment naïve.      Patient has been prescribed Mavyret 3 tablets daily with food x 8 weeks.  This is fill 2 of 2 to complete 8 week course of therapy.     The patient would like to  prescription in the apothecary.    The following immunizations are needed: Hepatitis A.     Call home phone first (pt does not get good mobile service  at home)    Patient will follow up with clinic next week (she rescheduled her appointment) and then 12 weeks after completion of therapy for SVR12 labs.      Krystle Baltazar RPH  6/2/2023  12:58 EDT